# Patient Record
Sex: MALE | Race: WHITE | NOT HISPANIC OR LATINO | Employment: FULL TIME | URBAN - METROPOLITAN AREA
[De-identification: names, ages, dates, MRNs, and addresses within clinical notes are randomized per-mention and may not be internally consistent; named-entity substitution may affect disease eponyms.]

---

## 2017-04-24 ENCOUNTER — ALLSCRIPTS OFFICE VISIT (OUTPATIENT)
Dept: OTHER | Facility: OTHER | Age: 52
End: 2017-04-24

## 2017-05-19 ENCOUNTER — ALLSCRIPTS OFFICE VISIT (OUTPATIENT)
Dept: OTHER | Facility: OTHER | Age: 52
End: 2017-05-19

## 2017-05-19 ENCOUNTER — TRANSCRIBE ORDERS (OUTPATIENT)
Dept: ADMINISTRATIVE | Facility: HOSPITAL | Age: 52
End: 2017-05-19

## 2017-05-19 DIAGNOSIS — I35.0 NODULAR CALCIFIC AORTIC VALVE STENOSIS: Primary | ICD-10-CM

## 2017-05-19 DIAGNOSIS — I35.0 NONRHEUMATIC AORTIC VALVE STENOSIS: ICD-10-CM

## 2017-05-25 ENCOUNTER — HOSPITAL ENCOUNTER (OUTPATIENT)
Dept: NON INVASIVE DIAGNOSTICS | Facility: HOSPITAL | Age: 52
Discharge: HOME | End: 2017-05-25
Attending: INTERNAL MEDICINE
Payer: COMMERCIAL

## 2017-05-25 DIAGNOSIS — I35.0 NODULAR CALCIFIC AORTIC VALVE STENOSIS: ICD-10-CM

## 2017-05-25 PROCEDURE — 93306 TTE W/DOPPLER COMPLETE: CPT

## 2017-06-27 ENCOUNTER — ALLSCRIPTS OFFICE VISIT (OUTPATIENT)
Dept: OTHER | Facility: OTHER | Age: 52
End: 2017-06-27

## 2017-06-28 ENCOUNTER — GENERIC CONVERSION - ENCOUNTER (OUTPATIENT)
Dept: OTHER | Facility: OTHER | Age: 52
End: 2017-06-28

## 2017-08-23 ENCOUNTER — GENERIC CONVERSION - ENCOUNTER (OUTPATIENT)
Dept: OTHER | Facility: OTHER | Age: 52
End: 2017-08-23

## 2017-08-30 ENCOUNTER — HOSPITAL ENCOUNTER (OUTPATIENT)
Facility: HOSPITAL | Age: 52
Setting detail: OBSERVATION
Discharge: HOME | End: 2017-09-01
Attending: EMERGENCY MEDICINE | Admitting: INTERNAL MEDICINE
Payer: COMMERCIAL

## 2017-08-30 ENCOUNTER — ALLSCRIPTS OFFICE VISIT (OUTPATIENT)
Dept: OTHER | Facility: OTHER | Age: 52
End: 2017-08-30

## 2017-08-30 ENCOUNTER — APPOINTMENT (EMERGENCY)
Dept: CT IMAGING | Facility: HOSPITAL | Age: 52
End: 2017-08-30
Payer: COMMERCIAL

## 2017-08-30 ENCOUNTER — APPOINTMENT (EMERGENCY)
Dept: RADIOLOGY | Facility: HOSPITAL | Age: 52
End: 2017-08-30
Payer: COMMERCIAL

## 2017-08-30 DIAGNOSIS — R94.31 T WAVE INVERSION IN EKG: ICD-10-CM

## 2017-08-30 DIAGNOSIS — R06.00 DYSPNEA: Primary | ICD-10-CM

## 2017-08-30 DIAGNOSIS — E10.9 T1DM (TYPE 1 DIABETES MELLITUS) (HCC): ICD-10-CM

## 2017-08-30 DIAGNOSIS — I35.0 AS (AORTIC STENOSIS): ICD-10-CM

## 2017-08-30 PROBLEM — I10 BENIGN ESSENTIAL HTN: Status: ACTIVE | Noted: 2017-08-30

## 2017-08-30 PROBLEM — R00.2 PALPITATION: Status: ACTIVE | Noted: 2017-08-30

## 2017-08-30 LAB
ANION GAP SERPL CALCULATED.3IONS-SCNC: 9 MMOL/L (ref 4–13)
ATRIAL RATE: 82 BPM
ATRIAL RATE: 85 BPM
BASOPHILS # BLD AUTO: 0.06 THOUSANDS/ΜL (ref 0–0.1)
BASOPHILS NFR BLD AUTO: 1 % (ref 0–1)
BUN SERPL-MCNC: 11 MG/DL (ref 5–25)
CALCIUM SERPL-MCNC: 8.7 MG/DL (ref 8.3–10.1)
CHLORIDE SERPL-SCNC: 103 MMOL/L (ref 100–108)
CO2 SERPL-SCNC: 28 MMOL/L (ref 21–32)
CREAT SERPL-MCNC: 0.7 MG/DL (ref 0.6–1.3)
DEPRECATED D DIMER PPP: 468 NG/ML (FEU) (ref 0–424)
EOSINOPHIL # BLD AUTO: 0.17 THOUSAND/ΜL (ref 0–0.61)
EOSINOPHIL NFR BLD AUTO: 2 % (ref 0–6)
ERYTHROCYTE [DISTWIDTH] IN BLOOD BY AUTOMATED COUNT: 16.7 % (ref 11.6–15.1)
GFR SERPL CREATININE-BSD FRML MDRD: 109 ML/MIN/1.73SQ M
GLUCOSE SERPL-MCNC: 155 MG/DL (ref 65–140)
GLUCOSE SERPL-MCNC: 200 MG/DL (ref 65–140)
HCT VFR BLD AUTO: 42.7 % (ref 36.5–49.3)
HGB BLD-MCNC: 14 G/DL (ref 12–17)
LYMPHOCYTES # BLD AUTO: 1.76 THOUSANDS/ΜL (ref 0.6–4.47)
LYMPHOCYTES NFR BLD AUTO: 20 % (ref 14–44)
MCH RBC QN AUTO: 30.9 PG (ref 26.8–34.3)
MCHC RBC AUTO-ENTMCNC: 32.8 G/DL (ref 31.4–37.4)
MCV RBC AUTO: 94 FL (ref 82–98)
MONOCYTES # BLD AUTO: 1.06 THOUSAND/ΜL (ref 0.17–1.22)
MONOCYTES NFR BLD AUTO: 12 % (ref 4–12)
NEUTROPHILS # BLD AUTO: 5.97 THOUSANDS/ΜL (ref 1.85–7.62)
NEUTS SEG NFR BLD AUTO: 65 % (ref 43–75)
P AXIS: 35 DEGREES
P AXIS: 42 DEGREES
PLATELET # BLD AUTO: 277 THOUSANDS/UL (ref 149–390)
PMV BLD AUTO: 10.7 FL (ref 8.9–12.7)
POTASSIUM SERPL-SCNC: 4 MMOL/L (ref 3.5–5.3)
PR INTERVAL: 170 MS
PR INTERVAL: 170 MS
QRS AXIS: 20 DEGREES
QRS AXIS: 24 DEGREES
QRSD INTERVAL: 102 MS
QRSD INTERVAL: 98 MS
QT INTERVAL: 368 MS
QT INTERVAL: 370 MS
QTC INTERVAL: 429 MS
QTC INTERVAL: 440 MS
RBC # BLD AUTO: 4.53 MILLION/UL (ref 3.88–5.62)
SODIUM SERPL-SCNC: 140 MMOL/L (ref 136–145)
SPECIMEN SOURCE: NORMAL
T WAVE AXIS: -14 DEGREES
T WAVE AXIS: -7 DEGREES
TROPONIN I BLD-MCNC: 0.01 NG/ML (ref 0–0.08)
TROPONIN I SERPL-MCNC: 0.02 NG/ML
TROPONIN I SERPL-MCNC: 0.03 NG/ML
VENTRICULAR RATE: 82 BPM
VENTRICULAR RATE: 85 BPM
WBC # BLD AUTO: 9.02 THOUSAND/UL (ref 4.31–10.16)

## 2017-08-30 PROCEDURE — 94640 AIRWAY INHALATION TREATMENT: CPT

## 2017-08-30 PROCEDURE — 85025 COMPLETE CBC W/AUTO DIFF WBC: CPT | Performed by: EMERGENCY MEDICINE

## 2017-08-30 PROCEDURE — 36415 COLL VENOUS BLD VENIPUNCTURE: CPT | Performed by: EMERGENCY MEDICINE

## 2017-08-30 PROCEDURE — 71275 CT ANGIOGRAPHY CHEST: CPT

## 2017-08-30 PROCEDURE — 84484 ASSAY OF TROPONIN QUANT: CPT | Performed by: INTERNAL MEDICINE

## 2017-08-30 PROCEDURE — 80048 BASIC METABOLIC PNL TOTAL CA: CPT | Performed by: EMERGENCY MEDICINE

## 2017-08-30 PROCEDURE — 71020 HB CHEST X-RAY 2VW FRONTAL&LATL: CPT

## 2017-08-30 PROCEDURE — 82948 REAGENT STRIP/BLOOD GLUCOSE: CPT

## 2017-08-30 PROCEDURE — 85379 FIBRIN DEGRADATION QUANT: CPT | Performed by: EMERGENCY MEDICINE

## 2017-08-30 PROCEDURE — 84484 ASSAY OF TROPONIN QUANT: CPT

## 2017-08-30 PROCEDURE — 93005 ELECTROCARDIOGRAM TRACING: CPT | Performed by: EMERGENCY MEDICINE

## 2017-08-30 PROCEDURE — 99285 EMERGENCY DEPT VISIT HI MDM: CPT

## 2017-08-30 RX ORDER — OMEPRAZOLE 20 MG/1
20 CAPSULE, DELAYED RELEASE ORAL DAILY
COMMUNITY

## 2017-08-30 RX ORDER — LABETALOL HYDROCHLORIDE 5 MG/ML
10 INJECTION, SOLUTION INTRAVENOUS ONCE
Status: COMPLETED | OUTPATIENT
Start: 2017-08-30 | End: 2017-08-30

## 2017-08-30 RX ORDER — ALBUTEROL SULFATE 2.5 MG/3ML
5 SOLUTION RESPIRATORY (INHALATION) ONCE
Status: COMPLETED | OUTPATIENT
Start: 2017-08-30 | End: 2017-08-30

## 2017-08-30 RX ORDER — LABETALOL HYDROCHLORIDE 5 MG/ML
10 INJECTION, SOLUTION INTRAVENOUS EVERY 4 HOURS PRN
Status: DISCONTINUED | OUTPATIENT
Start: 2017-08-30 | End: 2017-09-01 | Stop reason: HOSPADM

## 2017-08-30 RX ORDER — LISINOPRIL 20 MG/1
2.5 TABLET ORAL DAILY
COMMUNITY
End: 2020-09-10 | Stop reason: HOSPADM

## 2017-08-30 RX ORDER — ASPIRIN 81 MG/1
324 TABLET, CHEWABLE ORAL ONCE
Status: COMPLETED | OUTPATIENT
Start: 2017-08-30 | End: 2017-08-30

## 2017-08-30 RX ORDER — LISINOPRIL 20 MG/1
20 TABLET ORAL DAILY
Status: DISCONTINUED | OUTPATIENT
Start: 2017-08-31 | End: 2017-09-01 | Stop reason: HOSPADM

## 2017-08-30 RX ORDER — LANOLIN ALCOHOL/MO/W.PET/CERES
3 CREAM (GRAM) TOPICAL
Status: DISCONTINUED | OUTPATIENT
Start: 2017-08-30 | End: 2017-09-01 | Stop reason: HOSPADM

## 2017-08-30 RX ORDER — PANTOPRAZOLE SODIUM 20 MG/1
20 TABLET, DELAYED RELEASE ORAL
Status: DISCONTINUED | OUTPATIENT
Start: 2017-08-31 | End: 2017-09-01 | Stop reason: HOSPADM

## 2017-08-30 RX ADMIN — ALBUTEROL SULFATE 5 MG: 2.5 SOLUTION RESPIRATORY (INHALATION) at 17:17

## 2017-08-30 RX ADMIN — IOHEXOL 85 ML: 350 INJECTION, SOLUTION INTRAVENOUS at 14:31

## 2017-08-30 RX ADMIN — ASPIRIN 81 MG 324 MG: 81 TABLET ORAL at 12:39

## 2017-08-30 RX ADMIN — LABETALOL HYDROCHLORIDE 10 MG: 5 INJECTION, SOLUTION INTRAVENOUS at 18:02

## 2017-08-30 RX ADMIN — MELATONIN TAB 3 MG 3 MG: 3 TAB at 23:27

## 2017-08-31 ENCOUNTER — APPOINTMENT (OUTPATIENT)
Dept: NON INVASIVE DIAGNOSTICS | Facility: HOSPITAL | Age: 52
End: 2017-08-31
Payer: COMMERCIAL

## 2017-08-31 ENCOUNTER — GENERIC CONVERSION - ENCOUNTER (OUTPATIENT)
Dept: OTHER | Facility: OTHER | Age: 52
End: 2017-08-31

## 2017-08-31 LAB
ALBUMIN SERPL BCP-MCNC: 3 G/DL (ref 3.5–5)
ALP SERPL-CCNC: 162 U/L (ref 46–116)
ALT SERPL W P-5'-P-CCNC: 44 U/L (ref 12–78)
ANION GAP SERPL CALCULATED.3IONS-SCNC: 9 MMOL/L (ref 4–13)
AST SERPL W P-5'-P-CCNC: 24 U/L (ref 5–45)
BILIRUB SERPL-MCNC: 0.5 MG/DL (ref 0.2–1)
BUN SERPL-MCNC: 12 MG/DL (ref 5–25)
CALCIUM SERPL-MCNC: 8.8 MG/DL (ref 8.3–10.1)
CHLORIDE SERPL-SCNC: 103 MMOL/L (ref 100–108)
CO2 SERPL-SCNC: 30 MMOL/L (ref 21–32)
CREAT SERPL-MCNC: 0.72 MG/DL (ref 0.6–1.3)
ERYTHROCYTE [DISTWIDTH] IN BLOOD BY AUTOMATED COUNT: 16.4 % (ref 11.6–15.1)
GFR SERPL CREATININE-BSD FRML MDRD: 108 ML/MIN/1.73SQ M
GLUCOSE P FAST SERPL-MCNC: 123 MG/DL (ref 65–99)
GLUCOSE SERPL-MCNC: 123 MG/DL (ref 65–140)
GLUCOSE SERPL-MCNC: 131 MG/DL (ref 65–140)
GLUCOSE SERPL-MCNC: 200 MG/DL (ref 65–140)
GLUCOSE SERPL-MCNC: 217 MG/DL (ref 65–140)
GLUCOSE SERPL-MCNC: 71 MG/DL (ref 65–140)
HCT VFR BLD AUTO: 40.5 % (ref 36.5–49.3)
HGB BLD-MCNC: 13.3 G/DL (ref 12–17)
KCT BLD-ACNC: 310 SEC (ref 89–137)
MCH RBC QN AUTO: 30.9 PG (ref 26.8–34.3)
MCHC RBC AUTO-ENTMCNC: 32.8 G/DL (ref 31.4–37.4)
MCV RBC AUTO: 94 FL (ref 82–98)
NT-PROBNP SERPL-MCNC: 1460 PG/ML
PLATELET # BLD AUTO: 276 THOUSANDS/UL (ref 149–390)
PMV BLD AUTO: 10.4 FL (ref 8.9–12.7)
POTASSIUM SERPL-SCNC: 3.8 MMOL/L (ref 3.5–5.3)
PROT SERPL-MCNC: 6.7 G/DL (ref 6.4–8.2)
RBC # BLD AUTO: 4.3 MILLION/UL (ref 3.88–5.62)
SODIUM SERPL-SCNC: 142 MMOL/L (ref 136–145)
SPECIMEN SOURCE: ABNORMAL
T3 SERPL-MCNC: 0.9 NG/ML (ref 0.6–1.8)
T4 FREE SERPL-MCNC: 1.07 NG/DL (ref 0.76–1.46)
TROPONIN I SERPL-MCNC: 0.02 NG/ML
TSH SERPL DL<=0.05 MIU/L-ACNC: 5.44 UIU/ML (ref 0.36–3.74)
WBC # BLD AUTO: 6.5 THOUSAND/UL (ref 4.31–10.16)

## 2017-08-31 PROCEDURE — C1725 CATH, TRANSLUMIN NON-LASER: HCPCS | Performed by: NURSE PRACTITIONER

## 2017-08-31 PROCEDURE — 83880 ASSAY OF NATRIURETIC PEPTIDE: CPT | Performed by: NURSE PRACTITIONER

## 2017-08-31 PROCEDURE — C9600 PERC DRUG-EL COR STENT SING: HCPCS | Performed by: NURSE PRACTITIONER

## 2017-08-31 PROCEDURE — C1874 STENT, COATED/COV W/DEL SYS: HCPCS

## 2017-08-31 PROCEDURE — C1887 CATHETER, GUIDING: HCPCS | Performed by: NURSE PRACTITIONER

## 2017-08-31 PROCEDURE — C1769 GUIDE WIRE: HCPCS | Performed by: NURSE PRACTITIONER

## 2017-08-31 PROCEDURE — 84439 ASSAY OF FREE THYROXINE: CPT | Performed by: INTERNAL MEDICINE

## 2017-08-31 PROCEDURE — 99153 MOD SED SAME PHYS/QHP EA: CPT | Performed by: NURSE PRACTITIONER

## 2017-08-31 PROCEDURE — 93005 ELECTROCARDIOGRAM TRACING: CPT | Performed by: INTERNAL MEDICINE

## 2017-08-31 PROCEDURE — 82948 REAGENT STRIP/BLOOD GLUCOSE: CPT

## 2017-08-31 PROCEDURE — C1894 INTRO/SHEATH, NON-LASER: HCPCS | Performed by: NURSE PRACTITIONER

## 2017-08-31 PROCEDURE — 84484 ASSAY OF TROPONIN QUANT: CPT | Performed by: INTERNAL MEDICINE

## 2017-08-31 PROCEDURE — 84480 ASSAY TRIIODOTHYRONINE (T3): CPT | Performed by: INTERNAL MEDICINE

## 2017-08-31 PROCEDURE — 93454 CORONARY ARTERY ANGIO S&I: CPT | Performed by: NURSE PRACTITIONER

## 2017-08-31 PROCEDURE — 80053 COMPREHEN METABOLIC PANEL: CPT | Performed by: INTERNAL MEDICINE

## 2017-08-31 PROCEDURE — 85347 COAGULATION TIME ACTIVATED: CPT

## 2017-08-31 PROCEDURE — 93308 TTE F-UP OR LMTD: CPT

## 2017-08-31 PROCEDURE — 84443 ASSAY THYROID STIM HORMONE: CPT | Performed by: INTERNAL MEDICINE

## 2017-08-31 PROCEDURE — 99152 MOD SED SAME PHYS/QHP 5/>YRS: CPT | Performed by: NURSE PRACTITIONER

## 2017-08-31 PROCEDURE — 85027 COMPLETE CBC AUTOMATED: CPT | Performed by: INTERNAL MEDICINE

## 2017-08-31 RX ORDER — VERAPAMIL HCL 2.5 MG/ML
AMPUL (ML) INTRAVENOUS CODE/TRAUMA/SEDATION MEDICATION
Status: DISCONTINUED | OUTPATIENT
Start: 2017-08-31 | End: 2017-09-01 | Stop reason: HOSPADM

## 2017-08-31 RX ORDER — HEPARIN SODIUM 1000 [USP'U]/ML
INJECTION, SOLUTION INTRAVENOUS; SUBCUTANEOUS CODE/TRAUMA/SEDATION MEDICATION
Status: DISCONTINUED | OUTPATIENT
Start: 2017-08-31 | End: 2017-09-01 | Stop reason: HOSPADM

## 2017-08-31 RX ORDER — ATORVASTATIN CALCIUM 40 MG/1
80 TABLET, FILM COATED ORAL
Status: DISCONTINUED | OUTPATIENT
Start: 2017-08-31 | End: 2017-09-01 | Stop reason: HOSPADM

## 2017-08-31 RX ORDER — FUROSEMIDE 10 MG/ML
20 INJECTION INTRAMUSCULAR; INTRAVENOUS ONCE
Status: COMPLETED | OUTPATIENT
Start: 2017-08-31 | End: 2017-08-31

## 2017-08-31 RX ORDER — ASPIRIN 325 MG
325 TABLET ORAL ONCE
Status: COMPLETED | OUTPATIENT
Start: 2017-08-31 | End: 2017-08-31

## 2017-08-31 RX ORDER — FENTANYL CITRATE 50 UG/ML
INJECTION, SOLUTION INTRAMUSCULAR; INTRAVENOUS CODE/TRAUMA/SEDATION MEDICATION
Status: DISCONTINUED | OUTPATIENT
Start: 2017-08-31 | End: 2017-09-01 | Stop reason: HOSPADM

## 2017-08-31 RX ORDER — NITROGLYCERIN 20 MG/100ML
INJECTION INTRAVENOUS CODE/TRAUMA/SEDATION MEDICATION
Status: DISCONTINUED | OUTPATIENT
Start: 2017-08-31 | End: 2017-09-01 | Stop reason: HOSPADM

## 2017-08-31 RX ORDER — MIDAZOLAM HYDROCHLORIDE 1 MG/ML
INJECTION INTRAMUSCULAR; INTRAVENOUS CODE/TRAUMA/SEDATION MEDICATION
Status: DISCONTINUED | OUTPATIENT
Start: 2017-08-31 | End: 2017-09-01 | Stop reason: HOSPADM

## 2017-08-31 RX ORDER — LIDOCAINE HYDROCHLORIDE 10 MG/ML
INJECTION, SOLUTION INFILTRATION; PERINEURAL CODE/TRAUMA/SEDATION MEDICATION
Status: DISCONTINUED | OUTPATIENT
Start: 2017-08-31 | End: 2017-09-01 | Stop reason: HOSPADM

## 2017-08-31 RX ORDER — SODIUM CHLORIDE 9 MG/ML
INJECTION, SOLUTION INTRAVENOUS
Status: DISCONTINUED | OUTPATIENT
Start: 2017-08-31 | End: 2017-09-01 | Stop reason: HOSPADM

## 2017-08-31 RX ORDER — ZOLPIDEM TARTRATE 5 MG/1
5 TABLET ORAL
Status: DISCONTINUED | OUTPATIENT
Start: 2017-08-31 | End: 2017-09-01 | Stop reason: HOSPADM

## 2017-08-31 RX ORDER — ATORVASTATIN CALCIUM 40 MG/1
80 TABLET, FILM COATED ORAL
Status: DISCONTINUED | OUTPATIENT
Start: 2017-08-31 | End: 2017-08-31

## 2017-08-31 RX ADMIN — LISINOPRIL 20 MG: 20 TABLET ORAL at 10:13

## 2017-08-31 RX ADMIN — ASPIRIN 325 MG: 325 TABLET ORAL at 12:32

## 2017-08-31 RX ADMIN — PANTOPRAZOLE SODIUM 20 MG: 20 TABLET, DELAYED RELEASE ORAL at 07:18

## 2017-08-31 RX ADMIN — HEPARIN SODIUM 5000 UNITS: 1000 INJECTION INTRAVENOUS; SUBCUTANEOUS at 13:50

## 2017-08-31 RX ADMIN — HEPARIN SODIUM 4000 UNITS: 1000 INJECTION INTRAVENOUS; SUBCUTANEOUS at 13:43

## 2017-08-31 RX ADMIN — LIDOCAINE HYDROCHLORIDE 1 ML: 10 INJECTION, SOLUTION INFILTRATION; PERINEURAL at 13:41

## 2017-08-31 RX ADMIN — ZOLPIDEM TARTRATE 5 MG: 5 TABLET ORAL at 22:06

## 2017-08-31 RX ADMIN — VERAPAMIL HYDROCHLORIDE 1.25 MG: 2.5 INJECTION, SOLUTION INTRAVENOUS at 13:43

## 2017-08-31 RX ADMIN — MIDAZOLAM HYDROCHLORIDE 1 MG: 1 INJECTION, SOLUTION INTRAMUSCULAR; INTRAVENOUS at 13:42

## 2017-08-31 RX ADMIN — MIDAZOLAM HYDROCHLORIDE 2 MG: 1 INJECTION, SOLUTION INTRAMUSCULAR; INTRAVENOUS at 13:33

## 2017-08-31 RX ADMIN — TICAGRELOR 180 MG: 90 TABLET ORAL at 13:50

## 2017-08-31 RX ADMIN — SERTRALINE HYDROCHLORIDE 50 MG: 50 TABLET ORAL at 10:13

## 2017-08-31 RX ADMIN — SODIUM CHLORIDE 100 ML/HR: 0.9 INJECTION, SOLUTION INTRAVENOUS at 13:25

## 2017-08-31 RX ADMIN — NITROGLYCERIN 200 MCG: 20 INJECTION INTRAVENOUS at 13:43

## 2017-08-31 RX ADMIN — FENTANYL CITRATE 25 MCG: 50 INJECTION, SOLUTION INTRAMUSCULAR; INTRAVENOUS at 13:42

## 2017-08-31 RX ADMIN — MIDAZOLAM HYDROCHLORIDE 2 MG: 1 INJECTION, SOLUTION INTRAMUSCULAR; INTRAVENOUS at 13:30

## 2017-08-31 RX ADMIN — FENTANYL CITRATE 50 MCG: 50 INJECTION, SOLUTION INTRAMUSCULAR; INTRAVENOUS at 13:30

## 2017-08-31 RX ADMIN — ATORVASTATIN CALCIUM 80 MG: 40 TABLET, FILM COATED ORAL at 12:32

## 2017-08-31 RX ADMIN — FENTANYL CITRATE 50 MCG: 50 INJECTION, SOLUTION INTRAMUSCULAR; INTRAVENOUS at 13:33

## 2017-08-31 RX ADMIN — IOHEXOL 130 ML: 350 INJECTION, SOLUTION INTRAVENOUS at 14:13

## 2017-08-31 RX ADMIN — FUROSEMIDE 20 MG: 10 INJECTION, SOLUTION INTRAMUSCULAR; INTRAVENOUS at 12:32

## 2017-09-01 VITALS
DIASTOLIC BLOOD PRESSURE: 80 MMHG | OXYGEN SATURATION: 93 % | RESPIRATION RATE: 18 BRPM | HEIGHT: 66 IN | TEMPERATURE: 98.5 F | WEIGHT: 176.59 LBS | HEART RATE: 73 BPM | SYSTOLIC BLOOD PRESSURE: 132 MMHG | BODY MASS INDEX: 28.38 KG/M2

## 2017-09-01 LAB
ALBUMIN SERPL BCP-MCNC: 3.1 G/DL (ref 3.5–5)
ALP SERPL-CCNC: 174 U/L (ref 46–116)
ALT SERPL W P-5'-P-CCNC: 46 U/L (ref 12–78)
ANION GAP SERPL CALCULATED.3IONS-SCNC: 7 MMOL/L (ref 4–13)
AST SERPL W P-5'-P-CCNC: 30 U/L (ref 5–45)
BILIRUB SERPL-MCNC: 0.5 MG/DL (ref 0.2–1)
BUN SERPL-MCNC: 8 MG/DL (ref 5–25)
CALCIUM SERPL-MCNC: 8.7 MG/DL (ref 8.3–10.1)
CHLORIDE SERPL-SCNC: 101 MMOL/L (ref 100–108)
CHOLEST SERPL-MCNC: 231 MG/DL (ref 50–200)
CO2 SERPL-SCNC: 31 MMOL/L (ref 21–32)
CREAT SERPL-MCNC: 0.7 MG/DL (ref 0.6–1.3)
ERYTHROCYTE [DISTWIDTH] IN BLOOD BY AUTOMATED COUNT: 16.3 % (ref 11.6–15.1)
EST. AVERAGE GLUCOSE BLD GHB EST-MCNC: 174 MG/DL
GFR SERPL CREATININE-BSD FRML MDRD: 109 ML/MIN/1.73SQ M
GLUCOSE P FAST SERPL-MCNC: 109 MG/DL (ref 65–99)
GLUCOSE SERPL-MCNC: 107 MG/DL (ref 65–140)
GLUCOSE SERPL-MCNC: 109 MG/DL (ref 65–140)
GLUCOSE SERPL-MCNC: 237 MG/DL (ref 65–140)
GLUCOSE SERPL-MCNC: 47 MG/DL (ref 65–140)
GLUCOSE SERPL-MCNC: 66 MG/DL (ref 65–140)
HBA1C MFR BLD: 7.7 % (ref 4.2–6.3)
HCT VFR BLD AUTO: 43.8 % (ref 36.5–49.3)
HDLC SERPL-MCNC: 161 MG/DL (ref 40–60)
HGB BLD-MCNC: 14.7 G/DL (ref 12–17)
LDLC SERPL CALC-MCNC: 58 MG/DL (ref 0–100)
MCH RBC QN AUTO: 31.3 PG (ref 26.8–34.3)
MCHC RBC AUTO-ENTMCNC: 33.6 G/DL (ref 31.4–37.4)
MCV RBC AUTO: 93 FL (ref 82–98)
PLATELET # BLD AUTO: 273 THOUSANDS/UL (ref 149–390)
PMV BLD AUTO: 10.2 FL (ref 8.9–12.7)
POTASSIUM SERPL-SCNC: 3.8 MMOL/L (ref 3.5–5.3)
PROT SERPL-MCNC: 7.1 G/DL (ref 6.4–8.2)
RBC # BLD AUTO: 4.69 MILLION/UL (ref 3.88–5.62)
SODIUM SERPL-SCNC: 139 MMOL/L (ref 136–145)
TRIGL SERPL-MCNC: 62 MG/DL
TSH SERPL DL<=0.05 MIU/L-ACNC: 4.25 UIU/ML (ref 0.36–3.74)
WBC # BLD AUTO: 7.36 THOUSAND/UL (ref 4.31–10.16)

## 2017-09-01 PROCEDURE — 85027 COMPLETE CBC AUTOMATED: CPT | Performed by: INTERNAL MEDICINE

## 2017-09-01 PROCEDURE — 80061 LIPID PANEL: CPT | Performed by: INTERNAL MEDICINE

## 2017-09-01 PROCEDURE — 84443 ASSAY THYROID STIM HORMONE: CPT | Performed by: INTERNAL MEDICINE

## 2017-09-01 PROCEDURE — 83036 HEMOGLOBIN GLYCOSYLATED A1C: CPT | Performed by: INTERNAL MEDICINE

## 2017-09-01 PROCEDURE — 80053 COMPREHEN METABOLIC PANEL: CPT | Performed by: INTERNAL MEDICINE

## 2017-09-01 PROCEDURE — 82948 REAGENT STRIP/BLOOD GLUCOSE: CPT

## 2017-09-01 RX ORDER — METOPROLOL SUCCINATE 25 MG/1
25 TABLET, EXTENDED RELEASE ORAL DAILY
Status: DISCONTINUED | OUTPATIENT
Start: 2017-09-01 | End: 2017-09-01 | Stop reason: HOSPADM

## 2017-09-01 RX ORDER — ATORVASTATIN CALCIUM 80 MG/1
80 TABLET, FILM COATED ORAL
Qty: 30 TABLET | Refills: 0 | Status: SHIPPED | OUTPATIENT
Start: 2017-09-01

## 2017-09-01 RX ORDER — FUROSEMIDE 10 MG/ML
20 INJECTION INTRAMUSCULAR; INTRAVENOUS ONCE
Status: COMPLETED | OUTPATIENT
Start: 2017-09-01 | End: 2017-09-01

## 2017-09-01 RX ORDER — DIPHENHYDRAMINE HCL 25 MG
25 TABLET ORAL ONCE
Status: COMPLETED | OUTPATIENT
Start: 2017-09-01 | End: 2017-09-01

## 2017-09-01 RX ORDER — CLOPIDOGREL BISULFATE 75 MG/1
300 TABLET ORAL ONCE
Status: DISCONTINUED | OUTPATIENT
Start: 2017-09-01 | End: 2017-09-01

## 2017-09-01 RX ORDER — ZOLPIDEM TARTRATE 5 MG/1
5 TABLET ORAL
Qty: 2 TABLET | Refills: 0 | Status: SHIPPED | OUTPATIENT
Start: 2017-09-01 | End: 2022-01-13 | Stop reason: HOSPADM

## 2017-09-01 RX ORDER — DEXTROSE MONOHYDRATE 25 G/50ML
25 INJECTION, SOLUTION INTRAVENOUS ONCE
Status: COMPLETED | OUTPATIENT
Start: 2017-09-01 | End: 2017-09-01

## 2017-09-01 RX ORDER — METOPROLOL SUCCINATE 25 MG/1
25 TABLET, EXTENDED RELEASE ORAL DAILY
Qty: 30 TABLET | Refills: 0 | Status: SHIPPED | OUTPATIENT
Start: 2017-09-01 | End: 2018-09-19 | Stop reason: ALTCHOICE

## 2017-09-01 RX ADMIN — METOPROLOL SUCCINATE 25 MG: 25 TABLET, EXTENDED RELEASE ORAL at 11:04

## 2017-09-01 RX ADMIN — LISINOPRIL 20 MG: 20 TABLET ORAL at 07:54

## 2017-09-01 RX ADMIN — SERTRALINE HYDROCHLORIDE 50 MG: 50 TABLET ORAL at 07:54

## 2017-09-01 RX ADMIN — PANTOPRAZOLE SODIUM 20 MG: 20 TABLET, DELAYED RELEASE ORAL at 07:55

## 2017-09-01 RX ADMIN — FUROSEMIDE 20 MG: 10 INJECTION, SOLUTION INTRAMUSCULAR; INTRAVENOUS at 07:53

## 2017-09-01 RX ADMIN — TICAGRELOR 90 MG: 90 TABLET ORAL at 11:00

## 2017-09-01 RX ADMIN — DEXTROSE MONOHYDRATE 25 ML: 25 INJECTION, SOLUTION INTRAVENOUS at 02:12

## 2017-09-01 RX ADMIN — DIPHENHYDRAMINE HYDROCHLORIDE 25 MG: 25 TABLET ORAL at 01:14

## 2017-09-02 LAB
ATRIAL RATE: 69 BPM
P AXIS: 53 DEGREES
PR INTERVAL: 200 MS
QRS AXIS: 20 DEGREES
QRSD INTERVAL: 106 MS
QT INTERVAL: 474 MS
QTC INTERVAL: 507 MS
T WAVE AXIS: 123 DEGREES
VENTRICULAR RATE: 69 BPM

## 2017-09-03 LAB
ATRIAL RATE: 87 BPM
P AXIS: 52 DEGREES
PR INTERVAL: 168 MS
QRS AXIS: 24 DEGREES
QRSD INTERVAL: 96 MS
QT INTERVAL: 358 MS
QTC INTERVAL: 430 MS
T WAVE AXIS: -1 DEGREES
VENTRICULAR RATE: 87 BPM

## 2017-09-05 ENCOUNTER — ALLSCRIPTS OFFICE VISIT (OUTPATIENT)
Dept: OTHER | Facility: OTHER | Age: 52
End: 2017-09-05

## 2017-09-15 ENCOUNTER — GENERIC CONVERSION - ENCOUNTER (OUTPATIENT)
Dept: OTHER | Facility: OTHER | Age: 52
End: 2017-09-15

## 2017-09-25 ENCOUNTER — TRANSCRIBE ORDERS (OUTPATIENT)
Dept: ADMINISTRATIVE | Facility: HOSPITAL | Age: 52
End: 2017-09-25

## 2017-09-25 ENCOUNTER — GENERIC CONVERSION - ENCOUNTER (OUTPATIENT)
Dept: OTHER | Facility: OTHER | Age: 52
End: 2017-09-25

## 2017-09-25 DIAGNOSIS — I42.9 CARDIOMYOPATHY, UNSPECIFIED TYPE (HCC): Primary | ICD-10-CM

## 2017-10-23 DIAGNOSIS — E78.5 HYPERLIPIDEMIA: ICD-10-CM

## 2017-11-30 DIAGNOSIS — E78.5 HYPERLIPIDEMIA: ICD-10-CM

## 2017-11-30 DIAGNOSIS — I25.5 ISCHEMIC CARDIOMYOPATHY: ICD-10-CM

## 2017-12-01 ENCOUNTER — HOSPITAL ENCOUNTER (INPATIENT)
Facility: HOSPITAL | Age: 52
LOS: 3 days | Discharge: HOME | DRG: 813 | End: 2017-12-04
Attending: EMERGENCY MEDICINE | Admitting: FAMILY MEDICINE
Payer: COMMERCIAL

## 2017-12-01 DIAGNOSIS — D69.3 ACUTE ITP (HCC): Primary | ICD-10-CM

## 2017-12-01 DIAGNOSIS — I25.10 CAD (CORONARY ARTERY DISEASE): ICD-10-CM

## 2017-12-01 DIAGNOSIS — D69.6 THROMBOCYTOPENIA (HCC): ICD-10-CM

## 2017-12-01 PROBLEM — K13.79 MOUTH SORES: Status: ACTIVE | Noted: 2017-12-01

## 2017-12-01 PROBLEM — R74.01 TRANSAMINITIS: Status: ACTIVE | Noted: 2017-12-01

## 2017-12-01 LAB
ALBUMIN SERPL BCP-MCNC: 3.4 G/DL (ref 3.5–5)
ALP SERPL-CCNC: 245 U/L (ref 46–116)
ALT SERPL W P-5'-P-CCNC: 107 U/L (ref 12–78)
ANION GAP SERPL CALCULATED.3IONS-SCNC: 5 MMOL/L (ref 4–13)
APTT PPP: 28 SECONDS (ref 23–35)
AST SERPL W P-5'-P-CCNC: 62 U/L (ref 5–45)
BASOPHILS # BLD AUTO: 0.04 THOUSANDS/ΜL (ref 0–0.1)
BASOPHILS NFR BLD AUTO: 1 % (ref 0–1)
BILIRUB SERPL-MCNC: 0.3 MG/DL (ref 0.2–1)
BUN SERPL-MCNC: 21 MG/DL (ref 5–25)
CALCIUM SERPL-MCNC: 9.2 MG/DL (ref 8.3–10.1)
CHLORIDE SERPL-SCNC: 100 MMOL/L (ref 100–108)
CO2 SERPL-SCNC: 30 MMOL/L (ref 21–32)
CREAT SERPL-MCNC: 0.81 MG/DL (ref 0.6–1.3)
EOSINOPHIL # BLD AUTO: 0.24 THOUSAND/ΜL (ref 0–0.61)
EOSINOPHIL NFR BLD AUTO: 4 % (ref 0–6)
ERYTHROCYTE [DISTWIDTH] IN BLOOD BY AUTOMATED COUNT: 14.2 % (ref 11.6–15.1)
GFR SERPL CREATININE-BSD FRML MDRD: 103 ML/MIN/1.73SQ M
GLUCOSE SERPL-MCNC: 221 MG/DL (ref 65–140)
GLUCOSE SERPL-MCNC: 265 MG/DL (ref 65–140)
GLUCOSE SERPL-MCNC: 320 MG/DL (ref 65–140)
HCT VFR BLD AUTO: 37 % (ref 36.5–49.3)
HGB BLD-MCNC: 12.3 G/DL (ref 12–17)
INR PPP: 0.83 (ref 0.86–1.16)
LDH SERPL-CCNC: 215 U/L (ref 81–234)
LYMPHOCYTES # BLD AUTO: 1.38 THOUSANDS/ΜL (ref 0.6–4.47)
LYMPHOCYTES NFR BLD AUTO: 22 % (ref 14–44)
MCH RBC QN AUTO: 30.2 PG (ref 26.8–34.3)
MCHC RBC AUTO-ENTMCNC: 33.2 G/DL (ref 31.4–37.4)
MCV RBC AUTO: 91 FL (ref 82–98)
MONOCYTES # BLD AUTO: 0.88 THOUSAND/ΜL (ref 0.17–1.22)
MONOCYTES NFR BLD AUTO: 14 % (ref 4–12)
NEUTROPHILS # BLD AUTO: 3.82 THOUSANDS/ΜL (ref 1.85–7.62)
NEUTS SEG NFR BLD AUTO: 59 % (ref 43–75)
PLATELET # BLD AUTO: 9 THOUSANDS/UL (ref 149–390)
POTASSIUM SERPL-SCNC: 4.7 MMOL/L (ref 3.5–5.3)
PROT SERPL-MCNC: 7.5 G/DL (ref 6.4–8.2)
PROTHROMBIN TIME: 11.6 SECONDS (ref 12.1–14.4)
RBC # BLD AUTO: 4.07 MILLION/UL (ref 3.88–5.62)
SODIUM SERPL-SCNC: 135 MMOL/L (ref 136–145)
WBC # BLD AUTO: 6.36 THOUSAND/UL (ref 4.31–10.16)

## 2017-12-01 PROCEDURE — 83615 LACTATE (LD) (LDH) ENZYME: CPT | Performed by: EMERGENCY MEDICINE

## 2017-12-01 PROCEDURE — 85730 THROMBOPLASTIN TIME PARTIAL: CPT | Performed by: EMERGENCY MEDICINE

## 2017-12-01 PROCEDURE — 36415 COLL VENOUS BLD VENIPUNCTURE: CPT | Performed by: EMERGENCY MEDICINE

## 2017-12-01 PROCEDURE — 80053 COMPREHEN METABOLIC PANEL: CPT | Performed by: EMERGENCY MEDICINE

## 2017-12-01 PROCEDURE — 99284 EMERGENCY DEPT VISIT MOD MDM: CPT

## 2017-12-01 PROCEDURE — 85610 PROTHROMBIN TIME: CPT | Performed by: EMERGENCY MEDICINE

## 2017-12-01 PROCEDURE — 85025 COMPLETE CBC W/AUTO DIFF WBC: CPT | Performed by: EMERGENCY MEDICINE

## 2017-12-01 PROCEDURE — 82948 REAGENT STRIP/BLOOD GLUCOSE: CPT

## 2017-12-01 RX ORDER — ATORVASTATIN CALCIUM 20 MG/1
80 TABLET, FILM COATED ORAL
Status: DISCONTINUED | OUTPATIENT
Start: 2017-12-01 | End: 2017-12-04 | Stop reason: HOSPADM

## 2017-12-01 RX ORDER — LISINOPRIL 20 MG/1
20 TABLET ORAL DAILY
Status: DISCONTINUED | OUTPATIENT
Start: 2017-12-01 | End: 2017-12-04 | Stop reason: HOSPADM

## 2017-12-01 RX ORDER — METOPROLOL SUCCINATE 25 MG/1
25 TABLET, EXTENDED RELEASE ORAL DAILY
Status: DISCONTINUED | OUTPATIENT
Start: 2017-12-01 | End: 2017-12-04 | Stop reason: HOSPADM

## 2017-12-01 RX ORDER — PANTOPRAZOLE SODIUM 20 MG/1
20 TABLET, DELAYED RELEASE ORAL
Status: DISCONTINUED | OUTPATIENT
Start: 2017-12-02 | End: 2017-12-04 | Stop reason: HOSPADM

## 2017-12-01 RX ORDER — ONDANSETRON 2 MG/ML
4 INJECTION INTRAMUSCULAR; INTRAVENOUS EVERY 4 HOURS PRN
Status: DISCONTINUED | OUTPATIENT
Start: 2017-12-01 | End: 2017-12-04 | Stop reason: HOSPADM

## 2017-12-01 RX ADMIN — DEXAMETHASONE SODIUM PHOSPHATE 40 MG: 10 INJECTION, SOLUTION INTRAMUSCULAR; INTRAVENOUS at 09:38

## 2017-12-01 NOTE — H&P
History and Physical - [facility] Internal Medicine    Patient Information: Yuki Donaldson 47 y o  male MRN: [id number]  Unit/Bed#: ED 08 Encounter: [id number]  Admitting Physician: Darryl Menjivar PA-C  PCP: Thaddeus Guerin DO  Date of Admission:  12/01/17    Assessment/Plan:    Hospital Problem List:     Principal Problem: Thrombocytopenia ([facility] )  Active Problems:    AS (aortic stenosis)    T1DM (type 1 diabetes mellitus) ([facility] )    Benign essential HTN    Mouth sores    CAD (coronary artery disease)    Transaminitis      Plan for the Primary Problem(s):  · Acute thrombocytopenia-patient with 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  He had normal platelet count of 294 until 3 months ago when he had a cardiac stent placed and when on dual anti-platelet therapy  He presents with multiple blood blisters in his mouth and gum bleeding but no other catastrophic bleeding noted  Hematology should be consulted and they have already been notified of the case and recommended Decadron 40 mg IV x1  Platelet transfusion was not recommended at this time unless he has significant bleeding  Recommend rechecking CBC in a m  -defer additional workup at this time to the Hematology team    Plan for Additional Problems:   · Mild transaminitis-unclear if this is related to would ever process has caused his thrombocytopenia or possibly related to his recent uptick in alcohol use  Would recheck in a m  recommend alcohol cessation  · Coronary artery disease status post stent 90 days ago-I spoke directly with Cardiology in reference to this today  Given his profoundly low platelet count his dual anti-platelet therapy should be held  Certainly this does create risk in the setting of recently placed stents but the risk of continuing it outweighs the benefit at this time  Continue beta-blocker, Ace inhibitor, and statin as patient does have underlying cardiomyopathy as well    He will continue with routine follow-up of his known aortic stenosis  · Type 1 diabetes-anticipate he will have significant hyperglycemia after the dose of Decadron provided today  He wishes to continue to utilize his insulin pump from home  VTE Prophylaxis: Pharmacologic VTE Prophylaxis contraindicated due to low platelets  / sequential compression device   Code Status: full   POLST: There is no POLST form on file for this patient (pre-hospital)    Anticipated Length of Stay:  Patient will be admitted on an Inpatient basis with an anticipated length of stay of  Greater than 2 midnights  Justification for Hospital Stay: severely low platelets    Total Time for Visit, including Counseling / Coordination of Care: 1 hour  Greater than 50% of this total time spent on direct patient counseling and coordination of care  Spoke with heme onc and cardiology    Chief Complaint:   Mouth sores    History of Present Illness:    Kimo Altamirano is a 47 y o  male who presents with 2 days of bothersome blood blisters and gum bleeding in his mouth  He also had 1 area of bruising on his knee that resulted from dropping of very heavy milk gallon it  He did not perceive this bruising to be more than expected  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  He had normal platelet count since  He has no diagnosis of cirrhosis and denies any recent significant infections  He denies any other significant bleeding episodes  Review of Systems:    Review of Systems   Constitutional: Negative for activity change, appetite change, chills, diaphoresis, fatigue, fever and unexpected weight change  HENT: Positive for congestion, mouth sores and rhinorrhea (He reports minor runny and stuffy nose)  Negative for nosebleeds, sinus pain, sinus pressure and trouble swallowing  Eyes: Negative for visual disturbance  Respiratory: Negative for choking, chest tightness, shortness of breath and wheezing  Cardiovascular: Negative for chest pain, palpitations and leg swelling  Gastrointestinal: Negative for abdominal distention, abdominal pain, anal bleeding, blood in stool, constipation, diarrhea, nausea and vomiting  Genitourinary: Negative for difficulty urinating  Musculoskeletal: Negative for arthralgias, back pain, gait problem, joint swelling, myalgias and neck pain  Skin: Negative for color change, pallor, rash and wound  Neurological: Positive for headaches (Patient reports new onset headache in the last couple weeks)  Negative for dizziness, tremors, seizures, syncope, facial asymmetry, speech difficulty, weakness, light-headedness and numbness  Hematological:        Patient reports that he did not feel he had any significant bruising that was similar to when he was in 7 grade and had ITP  He had an event yesterday where he dropped a very heavy milk bottle on his leg and had bruising but expected to have it based on how heavy the bottle was   Psychiatric/Behavioral:        Patient reports significant increased stress recently       Past Medical and Surgical History:     Past Medical History:   Diagnosis Date    Aortic stenosis     Diabetes mellitus ([facility] ) type 1 with insulin pump     Hyperlipidemia     Hypertension    Coronary artery disease with stent placed August 31, 2017  ITP diagnosed at approximately age 7, status post splenectomy at that time    Past Surgical History:   Procedure Laterality Date    SPLENECTOMY         Meds/Allergies:    Prior to Admission medications    Medication Sig Start Date End Date Taking?  Authorizing Provider   atorvastatin (LIPITOR) 80 mg tablet Take 1 tablet by mouth daily with dinner 9/1/17  Yes Vicki Vogel MD   Insulin Infusion Pump KIT 1 Units/hr by Subcutaneous Insulin Pump route continuous   Yes Historical Provider, MD   lisinopril (ZESTRIL) 20 mg tablet Take 20 mg by mouth daily   Yes Historical Provider, MD   metoprolol succinate (TOPROL-XL) 25 mg 24 hr tablet Take 1 tablet by mouth daily 9/1/17  Yes Ramon Louis MD   omeprazole (PriLOSEC) 20 mg delayed release capsule Take 20 mg by mouth daily   Yes Historical Provider, MD   sertraline (ZOLOFT) 50 mg tablet Take 50 mg by mouth daily   Yes Historical Provider, MD   ticagrelor (BRILINTA) 90 MG Take 1 tablet by mouth every 12 (twelve) hours 9/1/17  Yes Ramon Louis MD   zolpidem (AMBIEN) 5 mg tablet Take 1 tablet by mouth daily at bedtime as needed for sleep for up to 2 days 9/1/17 12/1/17 Yes Ramon Louis MD     I have reviewed home medications using allscripts  Patient reports he is on the exact same medicines he was on during his last admission but could not name all of them  He reports he is compliant with taking them  Allergies: No Known Allergies    Social History:     Marital Status: /Civil Union   Occupation:  He owns a small grocery store  Patient Pre-hospital Living Situation:  Lives with his wife  Patient Pre-hospital Level of Mobility:   Patient Pre-hospital Diet Restrictions:   Substance Use History:   History   Alcohol Use    Yes     Comment: daily    Patient initially told me he drinks 2 glasses of wine or vodka daily but does admit the last several months he has been more stressed out and has been having more like 3-4 drinks almost every night  He denies any withdrawal symptoms on days that he does not drink  History   Smoking Status    Former Smoker   Smokeless Tobacco    Never Used     History   Drug Use No       Family History:    non-contributory    Physical Exam:     Vitals:   Blood Pressure: 124/72 (12/01/17 1239)  Pulse: 74 (12/01/17 1239)  Temperature: 98 2 °F (36 8 °C) (12/01/17 0735)  Temp Source: Oral (12/01/17 0735)  Respirations: 18 (12/01/17 1239)  Weight - Scale: 77 1 kg (170 lb) (12/01/17 0735)  SpO2: 98 % (12/01/17 1239)    Physical Exam   Constitutional: He appears well-developed and well-nourished  No distress     HENT:   Head: Normocephalic and atraumatic  Patient with multiple areas of blood blisters along the buccal surface and gum line   Eyes: Conjunctivae are normal  Pupils are equal, round, and reactive to light  Right eye exhibits no discharge  Left eye exhibits no discharge  No scleral icterus  Subtle exotropia left eye   Cardiovascular: Normal rate and regular rhythm  Murmur (Systolic ejection murmur) heard  Pulmonary/Chest: Effort normal and breath sounds normal  No respiratory distress  He has no wheezes  Abdominal: Soft  Bowel sounds are normal  He exhibits no distension  There is no tenderness  There is no rebound  Musculoskeletal: He exhibits no edema, tenderness or deformity  Skin: Skin is warm and dry  He is not diaphoretic  Tiny area of ecchymosis noted just lateral to his left eye  Also with area of ecchymosis noted on his left shin near his knee  No identifiable hematoma   Psychiatric: He has a normal mood and affect  His behavior is normal  Judgment and thought content normal    Vitals reviewed  Additional Data:     Lab Results: I have personally reviewed pertinent reports  Results from last 7 days  Lab Units 12/01/17  0759   WBC Thousand/uL 6 36   HEMOGLOBIN g/dL 12 3   HEMATOCRIT % 37 0   PLATELETS Thousands/uL 9*   NEUTROS PCT % 59   LYMPHS PCT % 22   MONOS PCT % 14*   EOS PCT % 4       Results from last 7 days  Lab Units 12/01/17  0759   SODIUM mmol/L 135*   POTASSIUM mmol/L 4 7   CHLORIDE mmol/L 100   CO2 mmol/L 30   BUN mg/dL 21   CREATININE mg/dL 0 81   CALCIUM mg/dL 9 2   TOTAL PROTEIN g/dL 7 5   BILIRUBIN TOTAL mg/dL 0 30   ALK PHOS U/L 245*   ALT U/L 107*   AST U/L 62*   GLUCOSE RANDOM mg/dL 221*       Results from last 7 days  Lab Units 12/01/17  0759   INR  0 83*       Imaging:  No results found  EKG, Pathology, and Other Studies Reviewed on Admission:   · EKG:     Allscripts / Epic Records Reviewed: Yes     ** Please Note: This note has been constructed using a voice recognition system   **

## 2017-12-01 NOTE — CONSULTS
Consultation - Cardiology   Javid Crystal 47 y o  male MRN: [id number]  Unit/Bed#: -01 Encounter: [id number]    Assessment/Plan     Principal Problem: Thrombocytopenia (HCC)  Active Problems:    AS (aortic stenosis)    T1DM (type 1 diabetes mellitus) ([address] )    Benign essential HTN    Mouth sores    CAD (coronary artery disease)    Transaminitis      Assessment:  1  Severe thrombocytopenia  DAPT held d/t severe thrombocytopenia  Plate ct 8,557  Was 273,000 3 months ago prior to use of brilinta/plavix  No active bleeding  Hematology consulted  F/U with recs  Dose of IV decadron given    2  Mouth sores- secondary to #1  No other obvious bleeding  H/H stable    3  CAD s/p Xience Alpine MINNIE to mid LAD 8/31/2017  Was on DAPT- asa / Grady Colla ( which was changed 2 weeks ago to plavix )    4  ICMP- EF 40-45%  On toprol/Ace inhibitor/ lasix  Volume status stable    5  Alcohol use- daily  Advised on alcohol cessation with his severely low platelet count as well as CMP    6  HTN- stable    7  Transaminitis- ? 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    History of Present Illness   Physician Requesting Consult: Dom Flores,*  Reason for Consult / Principal Problem: severe thrombocytopenia, pt with MINNIE    HPI: Javid Crystal is a 47y o  year old male with ICMP ( EF 40-45%), chronic splenectomy, IDDM,  Mild to moderate AS, CAD s/p MINNIE ( Xience Alpine MINNIE) 8/31/2017 who presents with mouth sores that he noted over the past day or 2  He had been taking asa and brilinta 90mg BID  D/T cost his brilinta was changed to plavix 75mg daily on Nov 13th after loading dose of 300mg  He has a history of ITP in his childhood s/p splenectomy  No history of bleeding  Otherwise from CV standpoint he has been stable  No exertional CP / SOB  No c/o LE edema, PND  He is followed by Dr Jil Roberts        Inpatient consult to Cardiology  Consult performed by: Andrea Sandhu  Consult ordered by: Mckenzie COLLAZO          Review of Systems   Constitutional: Negative  HENT: Positive for mouth sores  Eyes: Negative  Respiratory: Negative for shortness of breath  Cardiovascular: Negative for chest pain, palpitations and leg swelling  Gastrointestinal: Negative  Genitourinary: Negative  Musculoskeletal: Negative  Neurological: Negative  Psychiatric/Behavioral: Negative  Historical Information   Past Medical History:   Diagnosis Date    Aortic stenosis     Diabetes mellitus ([address] )     Hyperlipidemia     Hypertension      Past Surgical History:   Procedure Laterality Date    SPLENECTOMY       History   Alcohol Use    Yes     Comment: daily     History   Drug Use No     History   Smoking Status    Former Smoker   Smokeless Tobacco    Never Used     Family History: History reviewed  No pertinent family history      Meds/Allergies   current meds:   Current Facility-Administered Medications   Medication Dose Route Frequency    atorvastatin (LIPITOR) tablet 80 mg  80 mg Oral Daily With Dinner    insulin aspart (NovoLOG) FOR PUMP REFILLS 1 Units  1 Units Subcutaneous Insulin Pump PRN    lisinopril (ZESTRIL) tablet 20 mg  20 mg Oral Daily    metoprolol succinate (TOPROL-XL) 24 hr tablet 25 mg  25 mg Oral Daily    ondansetron (ZOFRAN) injection 4 mg  4 mg Intravenous Q4H PRN    [START ON 12/2/2017] pantoprazole (PROTONIX) EC tablet 20 mg  20 mg Oral Early Morning    PATIENT MAINTAINED INSULIN PUMP 1 each  1 each Subcutaneous Q8H    sertraline (ZOLOFT) tablet 50 mg  50 mg Oral Daily    and PTA meds:    Prescriptions Prior to Admission   Medication    atorvastatin (LIPITOR) 80 mg tablet    Insulin Infusion Pump KIT    lisinopril (ZESTRIL) 20 mg tablet    metoprolol succinate (TOPROL-XL) 25 mg 24 hr tablet    omeprazole (PriLOSEC) 20 mg delayed release capsule    sertraline (ZOLOFT) 50 mg tablet    ticagrelor (BRILINTA) 90 MG    zolpidem (AMBIEN) 5 mg tablet No Known Allergies    Objective   Vitals: Blood pressure 134/68, pulse 75, temperature 98 2 °F (36 8 °C), temperature source Oral, resp  rate 20, weight 77 1 kg (170 lb), SpO2 95 %  Orthostatic Blood Pressures    Flowsheet Row Most Recent Value   Blood Pressure  134/68 filed at 12/01/2017 1542   Patient Position - Orthostatic VS  Lying filed at 12/01/2017 1542          No intake or output data in the 24 hours ending 12/01/17 1608    Invasive Devices     Peripheral Intravenous Line            Peripheral IV 12/01/17 Right Antecubital less than 1 day                Physical Exam: /68   Pulse 75   Temp 98 2 °F (36 8 °C) (Oral)   Resp 20   Wt 77 1 kg (170 lb)   SpO2 95%   BMI 27 44 kg/m²   General Appearance:    Alert, cooperative, no distress, appears stated age   Head:    Normocephalic, no scleral icterus   Eyes:    PERRL   Nose:   Nares normal, septum midline, mucosa normal, no drainage    Throat:   Mouth sores visible   Neck:   Supple, symmetrical, trachea midline     no JVD   Back:     Symmetric   Lungs:     Clear to auscultation bilaterally, respirations unlabored   Chest Wall:    No tenderness or deformity    Heart:    Regular rate and rhythm, S1 and S2 normal, no murmur, rub   or gallop   Abdomen:     Soft, non-tender, bowel sounds active all four quadrants,     no masses, no organomegaly   Extremities:   Extremities normal, atraumatic, no cyanosis or edema   Pulses:   2+ and symmetric all extremities   Skin:   Skin color, texture, turgor normal, no rashes or lesions   Neurologic:   Alert and oriented to person place and time   No focal deficits       Lab Results:   Recent Results (from the past 72 hour(s))   CBC and differential    Collection Time: 12/01/17  7:59 AM   Result Value Ref Range    WBC 6 36 4 31 - 10 16 Thousand/uL    RBC 4 07 3 88 - 5 62 Million/uL    Hemoglobin 12 3 12 0 - 17 0 g/dL    Hematocrit 37 0 36 5 - 49 3 %    MCV 91 82 - 98 fL    MCH 30 2 26 8 - 34 3 pg    MCHC 33 2 31 4 - 37 4 g/dL    RDW 14 2 11 6 - 15 1 %    Platelets 9 (LL) 543 - 390 Thousands/uL    Neutrophils Relative 59 43 - 75 %    Lymphocytes Relative 22 14 - 44 %    Monocytes Relative 14 (H) 4 - 12 %    Eosinophils Relative 4 0 - 6 %    Basophils Relative 1 0 - 1 %    Neutrophils Absolute 3 82 1 85 - 7 62 Thousands/µL    Lymphocytes Absolute 1 38 0 60 - 4 47 Thousands/µL    Monocytes Absolute 0 88 0 17 - 1 22 Thousand/µL    Eosinophils Absolute 0 24 0 00 - 0 61 Thousand/µL    Basophils Absolute 0 04 0 00 - 0 10 Thousands/µL   Protime-INR    Collection Time: 17  7:59 AM   Result Value Ref Range    Protime 11 6 (L) 12 1 - 14 4 seconds    INR 0 83 (L) 0 86 - 1 16   APTT    Collection Time: 17  7:59 AM   Result Value Ref Range    PTT 28 23 - 35 seconds   Comprehensive metabolic panel    Collection Time: 17  7:59 AM   Result Value Ref Range    Sodium 135 (L) 136 - 145 mmol/L    Potassium 4 7 3 5 - 5 3 mmol/L    Chloride 100 100 - 108 mmol/L    CO2 30 21 - 32 mmol/L    Anion Gap 5 4 - 13 mmol/L    BUN 21 5 - 25 mg/dL    Creatinine 0 81 0 60 - 1 30 mg/dL    Glucose 221 (H) 65 - 140 mg/dL    Calcium 9 2 8 3 - 10 1 mg/dL    AST 62 (H) 5 - 45 U/L     (H) 12 - 78 U/L    Alkaline Phosphatase 245 (H) 46 - 116 U/L    Total Protein 7 5 6 4 - 8 2 g/dL    Albumin 3 4 (L) 3 5 - 5 0 g/dL    Total Bilirubin 0 30 0 20 - 1 00 mg/dL    eGFR 103 ml/min/1 73sq m   LD,Blood    Collection Time: 17  7:59 AM   Result Value Ref Range     81 - 234 U/L        Patient: Ryan Rivera  MR number: [id number]  Account number: [de-identified]  Study date: 2017  Gender: Male  : 1965  Height: 66 1 in  Weight: 195 4 lb  BSA: 1 99 m squared     Allergies: NO KNOWN ALLERGIES     Diagnostic Cardiologist:  Vasu Zimmer MD  Interventional Cardiologist:  Vasu Zimmer MD  Primary Physician:  Zana Acharya     SUMMARY     CORONARY CIRCULATION:  Left main: Normal   LAD: The vessel was normal sized   There was a 90 % stenosis in mid vessel  Circumflex: The vessel was normal sized  There was moderate diffuse plaque of OM1  There were no obstructive lesions  RCA: The vessel was normal sized and dominant, giving rise to the PDA and two posterolateral branches  There was an 80% stenosis in the mid aspect of the small PL2 branch      1ST LESION INTERVENTIONS:  Following pre-dilation, a Xience Alpine Rx 3 0 x 23mm drug-eluting stent was placed across the site of the 90% stenosis in the mid LAD and deployed at a maximum inflation pressure of 16 arpan  At the completion of the procedure, there was no  residual stenosis, and distal flow was normal      REPORT ELEMENT SELECTION:  Right radial access was employed      INDICATIONS:  --  Possible CAD: unstable angina      PROCEDURES PERFORMED     --  Left coronary angiography  --  Right coronary angiography  --  Mod Sedation Same Physician Initial 15min  --  Mod Sedation Same Physician Add 15min  --  Mod Sedation Same Physician Add 15min  --  Coronary Catheterization (w/o LHC)  --  Coronary Drug Eluting Stent w/PTCA  --  Intervention on mid LAD: balloon angioplasty      PROCEDURE: The risks and alternatives of the procedures and conscious sedation were explained to the patient and informed consent was obtained  The patient was brought to the cath lab and placed on the table  The planned puncture sites  were prepped and draped in the usual sterile fashion      --  Right radial artery access  After performing an Rashawn's test to verify adequate ulnar artery supply to the hand, the radial site was prepped  The puncture site was infiltrated with local anesthetic  The vessel was accessed using the  modified Seldinger technique, a wire was advanced into the vessel, and a sheath was advanced over the wire into the vessel      --  Left coronary artery angiography   A catheter was advanced over a guidewire into the aorta and positioned in the left coronary artery ostium under fluoroscopic guidance  Angiography was performed      --  Right coronary artery angiography  A catheter was advanced over a guidewire into the aorta and positioned in the right coronary artery ostium under fluoroscopic guidance  Angiography was performed      --  Mod Sedation Same Physician Initial 15min      --  Mod Sedation Same Physician Add 15min      --  Mod Sedation Same Physician Add 15min      --  Coronary Catheterization (w/o C)      LESION INTERVENTION: PCI was performed on the 90% lesion in the mid LAD  BRIAN flow was grade 3 before and after PCI  Transthoracic Echocardiogram  Limited 2D, M-mode, Doppler, and Color Doppler     Study date:  31-Aug-2017     Patient: Danyel Greer  MR number: [id number]  Account number: [de-identified]  : 1965  Age: 47 years  Gender: Male  Status: Outpatient  Location: Bedside  Height: 66 in  Weight: 195 lb  BP: 143/ 75 mmHg     Indications: Limited to evaluate LV function      Diagnoses: I25 83 - Coronary atherosclerosis due to lipid rich plaque     Sonographer:  Delane Runner, RDCS  Primary Physician:  Bindu De Santiago DO  Referring Physician:  DEVEN Lozada  Group:  [facility]  Interpreting Physician:  Lani Mortimer, DO     SUMMARY     LEFT VENTRICLE:  Systolic function was mildly to moderately reduced  Ejection fraction was estimated in the range of 40 % to 45 %  There was severe hypokinesis of the apical anterior, mid anteroseptal, apical inferior, apical septal, apical lateral, and apical wall(s)  Wall thickness was mildly increased  There was mild concentric hypertrophy      RIGHT VENTRICLE:  The size was normal   Systolic function was normal      LEFT ATRIUM:  The atrium was mildly dilated      MITRAL VALVE:  There was mild regurgitation      AORTIC VALVE:  The valve was trileaflet  Leaflets exhibited moderately increased thickness and mild to moderate calcification  Transaortic velocity was increased due to valvular stenosis    There was mild to moderate stenosis (MINI 1 4 cm2/MG 12 mmHg/DI 0 37)  There was mild regurgitation      COMPARISONS:  Comparison was made with the previous study of 25-May-2017  Ejection fraction has decreased from 55 % to 40 % with new regional wall motion abnormalities      HISTORY: PRIOR HISTORY: Diabetes, Aortic stenosis, Hypertension, Abnormal EKG      PROCEDURE: The procedure was performed at the bedside  This was a routine study  The transthoracic approach was used  The study included limited 2D imaging, M-mode, limited spectral Doppler, and color Doppler  The heart rate was 73 bpm, at  the start of the study  Images were obtained from the parasternal, apical, and subcostal acoustic windows  Image quality was adequate      LEFT VENTRICLE: Size was normal  Systolic function was mildly to moderately reduced  Ejection fraction was estimated in the range of 40 % to 45 %  There was severe hypokinesis of the apical anterior, mid anteroseptal, apical inferior,  apical septal, apical lateral, and apical wall(s)  Wall thickness was mildly increased  There was mild concentric hypertrophy  There was a false tendon within the ventricle  DOPPLER: The study was not technically sufficient to allow  evaluation of LV diastolic function      RIGHT VENTRICLE: The size was normal  Systolic function was normal  Wall thickness was normal      LEFT ATRIUM: The atrium was mildly dilated      RIGHT ATRIUM: Size was normal      MITRAL VALVE: Valve structure was normal  There was normal leaflet separation  DOPPLER: The transmitral velocity was within the normal range  There was no evidence for stenosis  There was mild regurgitation      AORTIC VALVE: The valve was trileaflet  Leaflets exhibited moderately increased thickness and mild to moderate calcification  DOPPLER: Transaortic velocity was increased due to valvular stenosis  There was mild to moderate stenosis (MINI  1 4 cm2/MG 12 mmHg/DI 0 37)   There was mild regurgitation      TRICUSPID VALVE: The valve structure was normal  There was normal leaflet separation  DOPPLER: There was trace regurgitation  The tricuspid jet envelope definition was inadequate for estimation of RV systolic pressure  There are no  indirect findings (abnormal RV volume or geometry, altered pulmonary flow velocity profile, or leftward septal displacement) which would suggest moderate or severe pulmonary hypertension      PULMONIC VALVE: Leaflets exhibited normal thickness, no calcification, and normal cuspal separation  DOPPLER: The transpulmonic velocity was within the normal range  There was trace regurgitation      PERICARDIUM: There was no pericardial effusion  The pericardium was normal in appearance      AORTA: The root exhibited normal size      SYSTEMIC VEINS: IVC: Not assessed      SYSTEM MEASUREMENT TABLES     Imaging: I have personally reviewed pertinent reports  Code Status: Level 1 - Full Code  Advance Directive and Living Will:      Power of :    POLST:      Counseling / Coordination of Care  Total floor / unit time spent today 40 minutes  Greater than 50% of total time was spent with the patient and / or family counseling and / or coordination of care

## 2017-12-01 NOTE — ED PROVIDER NOTES
History  Chief Complaint   Patient presents with    Mouth Lesions     Pt states that he started with mouth sores 2 days ago and also woke up with more of them today in his mouth and also now on his face  50-year-old male presents today complaining of mouth sores since yesterday  History provided by:  Patient  Mouth Lesions   Location:  Buccal mucosa, lower lip and lower gingiva  Buccal mucosa location:  R buccal mucosa and L buccal mucosa  Lower gingiva location:  R buccal and L buccal  Quality:  Blistered and bleeding  Onset quality:  Sudden  Duration:  2 days  Chronicity:  New  Context: not a change in diet, not a change in medications, not medications, not a possible infection, not stress and not trauma    Relieved by:  None tried  Worsened by:  Nothing  Ineffective treatments:  None tried  Associated symptoms: no congestion, no dental pain, no ear pain, no fever, no malaise, no neck pain, no rash, no rhinorrhea, no sore throat and no swollen glands        Prior to Admission Medications   Prescriptions Last Dose Informant Patient Reported? Taking?    Insulin Infusion Pump KIT  Self Yes Yes   Si Units/hr by Subcutaneous Insulin Pump route continuous   atorvastatin (LIPITOR) 80 mg tablet   No Yes   Sig: Take 1 tablet by mouth daily with dinner   lisinopril (ZESTRIL) 20 mg tablet   Yes Yes   Sig: Take 20 mg by mouth daily   metoprolol succinate (TOPROL-XL) 25 mg 24 hr tablet   No Yes   Sig: Take 1 tablet by mouth daily   omeprazole (PriLOSEC) 20 mg delayed release capsule   Yes Yes   Sig: Take 20 mg by mouth daily   sertraline (ZOLOFT) 50 mg tablet   Yes Yes   Sig: Take 50 mg by mouth daily   ticagrelor (BRILINTA) 90 MG   No Yes   Sig: Take 1 tablet by mouth every 12 (twelve) hours   zolpidem (AMBIEN) 5 mg tablet   No Yes   Sig: Take 1 tablet by mouth daily at bedtime as needed for sleep for up to 2 days      Facility-Administered Medications: None       Past Medical History:   Diagnosis Date    Aortic stenosis     Diabetes mellitus ([facility] [address] )     Hyperlipidemia     Hypertension        Past Surgical History:   Procedure Laterality Date    SPLENECTOMY         History reviewed  No pertinent family history  I have reviewed and agree with the history as documented  Social History   Substance Use Topics    Smoking status: Former Smoker    Smokeless tobacco: Never Used    Alcohol use Yes      Comment: daily        Review of Systems   Constitutional: Negative for fever  HENT: Positive for mouth sores  Negative for congestion, ear pain, rhinorrhea and sore throat  Eyes: Negative for visual disturbance  Respiratory: Negative for chest tightness and shortness of breath  Cardiovascular: Negative for chest pain  Gastrointestinal: Negative for abdominal pain  Genitourinary: Negative for difficulty urinating  Musculoskeletal: Negative for neck pain  Skin: Negative for rash  Allergic/Immunologic: Negative for immunocompromised state  Neurological: Negative for headaches  Hematological: Bruises/bleeds easily  Psychiatric/Behavioral: Negative for confusion  Physical Exam  ED Triage Vitals [12/01/17 0735]   Temperature Pulse Respirations Blood Pressure SpO2   98 2 °F (36 8 °C) 75 16 (!) 173/80 98 %      Temp Source Heart Rate Source Patient Position - Orthostatic VS BP Location FiO2 (%)   Oral Monitor Lying Right arm --      Pain Score       2           Orthostatic Vital Signs  Vitals:    12/01/17 0735   BP: (!) 173/80   Pulse: 75   Patient Position - Orthostatic VS: Lying       Physical Exam   Constitutional: He is oriented to person, place, and time  He appears well-developed and well-nourished  HENT:   Head: Normocephalic and atraumatic    2 ecchymotic areas on face, one just at the hairline and one at the corner of the left eye  Multiple areas of raised purpuric lesions intraoral on b/l buccal mucosa and the lower gingiva and inner lower lip  No posterior pharynx lesions noted    No airway compromise  Eyes: Pupils are equal, round, and reactive to light  Neck: Normal range of motion  Cardiovascular: Normal rate and regular rhythm  Pulmonary/Chest: Effort normal    Abdominal: Soft  Bowel sounds are normal    Musculoskeletal: Normal range of motion  Legs:  Large contusion on left anterior lower leg which patient states occurred yesterday when a milk crate fell on his leg  Neurological: He is alert and oriented to person, place, and time  Skin: Skin is warm and dry  Psychiatric: He has a normal mood and affect  Nursing note and vitals reviewed  ED Medications  Medications   dexamethasone (DECADRON) 40 mg in sodium chloride 0 9 % 50 mL IVPB (not administered)       Diagnostic Studies  Results Reviewed     Procedure Component Value Units Date/Time    LD,Blood [[id number]] Collected:  12/01/17 0759    Lab Status:   In process Specimen:  Blood from Arm, Right Updated:  12/01/17 0911    CBC and differential [[id number]]  (Abnormal) Collected:  12/01/17 0759    Lab Status:  Final result Specimen:  Blood from Arm, Right Updated:  12/01/17 0834     WBC 6 36 Thousand/uL      RBC 4 07 Million/uL      Hemoglobin 12 3 g/dL      Hematocrit 37 0 %      MCV 91 fL      MCH 30 2 pg      MCHC 33 2 g/dL      RDW 14 2 %      Platelets 9 (LL) Thousands/uL      Neutrophils Relative 59 %      Lymphocytes Relative 22 %      Monocytes Relative 14 (H) %      Eosinophils Relative 4 %      Basophils Relative 1 %      Neutrophils Absolute 3 82 Thousands/µL      Lymphocytes Absolute 1 38 Thousands/µL      Monocytes Absolute 0 88 Thousand/µL      Eosinophils Absolute 0 24 Thousand/µL      Basophils Absolute 0 04 Thousands/µL     Protime-INR [[id number]]  (Abnormal) Collected:  12/01/17 0759    Lab Status:  Final result Specimen:  Blood from Arm, Right Updated:  12/01/17 0823     Protime 11 6 (L) seconds      INR 0 83 (L)    APTT [[id number]]  (Normal) Collected:  12/01/17 0759    Lab Status:  Final result Specimen:  Blood from Arm, Right Updated:  12/01/17 0823     PTT 28 seconds     Narrative: Therapeutic Heparin Range = 60-90 seconds    Comprehensive metabolic panel [[id number]]  (Abnormal) Collected:  12/01/17 0759    Lab Status:  Final result Specimen:  Blood from Arm, Right Updated:  12/01/17 1184     Sodium 135 (L) mmol/L      Potassium 4 7 mmol/L      Chloride 100 mmol/L      CO2 30 mmol/L      Anion Gap 5 mmol/L      BUN 21 mg/dL      Creatinine 0 81 mg/dL      Glucose 221 (H) mg/dL      Calcium 9 2 mg/dL      AST 62 (H) U/L       (H) U/L      Alkaline Phosphatase 245 (H) U/L      Total Protein 7 5 g/dL      Albumin 3 4 (L) g/dL      Total Bilirubin 0 30 mg/dL      eGFR 103 ml/min/1 73sq m     Narrative:         National Kidney Disease Education Program recommendations are as follows:  GFR calculation is accurate only with a steady state creatinine  Chronic Kidney disease less than 60 ml/min/1 73 sq  meters  Kidney failure less than 15 ml/min/1 73 sq  meters  No orders to display              Procedures  Procedures       Phone Contacts  ED Phone Contact    ED Course  ED Course                                MDM  Number of Diagnoses or Management Options  Acute ITP [facility]): new and requires workup  Diagnosis management comments: 7:49 AM  Concern for ITP or other hematological pathology  Will check labs and reevaluate  8:36 AM  Platelets are 9  Will speak with heme/onc and admit  8:50 AM  Heme onc recommends Decadron 40mg IV and admit  Add on schistocyte count and LDH and paged SLIM for admission          Amount and/or Complexity of Data Reviewed  Clinical lab tests: ordered and reviewed  Tests in the medicine section of CPT®: ordered and reviewed  Review and summarize past medical records: yes  Discuss the patient with other providers: yes    Risk of Complications, Morbidity, and/or Mortality  Presenting problems: high  Diagnostic procedures: high  Management options: high    Patient Progress  Patient progress: stable    CritCare Time    Disposition  Final diagnoses:   Acute ITP ([address] )     Time reflects when diagnosis was documented in both MDM as applicable and the Disposition within this note     Time User Action Codes Description Comment    12/1/2017  8:54 AM Nils Cohen Add [D69 3] Acute ITP [facility])       ED Disposition     ED Disposition Condition Comment    Admit  Case was discussed with JENNIFER and the patient's admission status was agreed to be Admission Status: inpatient status to the service of Dr Fara Aguilar   Follow-up Information    None       Patient's Medications   Discharge Prescriptions    No medications on file     No discharge procedures on file      ED Provider  Electronically Signed by           Fara Goyal DO  12/01/17 8381

## 2017-12-01 NOTE — CONSULTS
Consultation -  Hematology/Oncology   Ritika Zhang 47 y o  male MRN: [id number]  Unit/Bed#: -01 Encounter: [id number]    Physician Requesting Consult: Lora Cruz,*  Reason for Consult: Thrombocytopenia    HPI: Ritika Zhang is a 47y o  year old male with a history of ITP, status post splenectomy who was admitted for severe thrombocytopenia  Patient presented to the ED on 12/1 for 2 day history of mouth blisters and bleeding gums  Subsequent evaluation with a CBC showed a platelet count of 9k  Hemoglobin was 12 3, WBC 6 3  T bili 0 3  He denied any other evidence of bleeding bleeding or excessive bruising  No infectious symptoms  No pain  He reports diagnosis of ITP, age 16 followed by splenectomy  Reports normal platelet count since that time without bleeding issues  In September 2017 he underwent cardiac stenting for CAD, he was initiated on dual anti-platelet therapy  This was held on admission due to thrombocytopenia    Assessment/Plan:   #1 Thrombocytopenia, admission platelet count 9 K, poss 2/2 stress induced ITP +/- drug induced (antiplatelet therapy regimen change)  -patient has a history of ITP at age 16, status post splenectomy  -he had cardiac stent placement in September along with anti platelet therapy with Brilinta until 2 weeks ago when he was switched to Plavix due to insurance issues  This may be contributing to drug induced reaction    -He has not had heparin, therefore do not suspect HIT  He does not have infection therefore low suspicion for DIC  -recommend dexamethasone 40 mg IV daily x 4 doses, first dose today  -If no response to steroids by day #4, he may need IVIG  -We will check chronic hepatitis panel and HIV for liver disease as well  -Trend platelet count closely  Hold platelet transfusion for now unless he is symptomatic   -Recommend stable platelet of at least 50k prior to discharge   He should have a CBC within 1 week after discharge in follow up  Discussed wit the primary team (Oscar Bar PA-C) on this date  We will continue to follow this admission, Dr Alex Stroud is the on call hospital physician this weekend  Please contact us if you have any questions regarding this consult  Thank you for this consult  Past Medical History:   Diagnosis Date    Aortic stenosis     Diabetes mellitus (HCC)     Hyperlipidemia     Hypertension      History reviewed  No pertinent family history  Social History     Social History    Marital status: /Civil Union     Spouse name: N/A    Number of children: N/A    Years of education: N/A     Social History Main Topics    Smoking status: Former Smoker    Smokeless tobacco: Never Used    Alcohol use Yes      Comment: daily    Drug use: No    Sexual activity: Not Asked     Other Topics Concern    None     Social History Narrative    None     No Known Allergies    Subjective:  He states after her received a dose of steroids he subsequently noted reduction in the size of the blood blisters on the inside of his cheeks and lips  He he has a small bruise on his left lower extremity which he states he had accidentally dropped a great holding several gal of milk on the extremity  He states it is normal  for him not to bruise very easily  Denies any hematuria or nosebleeds  Review of Systems   Constitutional: Negative for fatigue and fever  HENT:   Negative for trouble swallowing  Respiratory: Negative for shortness of breath  Cardiovascular: Negative for chest pain, leg swelling and palpitations  Genitourinary: Negative for hematuria  Skin: Negative for rash  Hematological: Does not bruise/bleed easily  All other systems reviewed and are negative      Objective:  /63   Pulse 73   Temp 98 7 °F (37 1 °C) (Oral)   Resp 18   Wt 77 1 kg (170 lb)   SpO2 96%   BMI 27 44 kg/m²       Current Facility-Administered Medications:     atorvastatin (LIPITOR) tablet 80 mg, 80 mg, Oral, Daily With Dinner, Trinity Bar PA-C    insulin aspart (NovoLOG) FOR PUMP REFILLS 1 Units, 1 Units, Subcutaneous Insulin Pump, PRN, Trinity Bar PA-C    lisinopril (ZESTRIL) tablet 20 mg, 20 mg, Oral, Daily, Trinity Bar PA-C    metoprolol succinate (TOPROL-XL) 24 hr tablet 25 mg, 25 mg, Oral, Daily, Trinity Bra PA-C    ondansetron (ZOFRAN) injection 4 mg, 4 mg, Intravenous, Q4H PRN, Trinity Bar PA-C    [START ON 12/2/2017] pantoprazole (PROTONIX) EC tablet 20 mg, 20 mg, Oral, Early Morning, Trinity Bar PA-C    PATIENT MAINTAINED INSULIN PUMP 1 each, 1 each, Subcutaneous, Q8H, Trinity Bar PA-C    sertraline (ZOLOFT) tablet 50 mg, 50 mg, Oral, Daily, Trinity Bar PA-C    Recent Labs      12/01/17   0759   WBC  6 36   HGB  12 3   PLT  9*   MCV  91   RDW  14 2   CREATININE  0 81   AST  62*   ALT  107*     Laboratory studies were reviewed    Imaging:       Pathology: None    Physical Exam   Constitutional: He is oriented to person, place, and time  HENT:   Multiple oral blood blisters on inferior lip and b/l checks  Eyes: Conjunctivae are normal  No scleral icterus  Cardiovascular: Normal rate and regular rhythm  Pulmonary/Chest: Effort normal  No respiratory distress  Abdominal: There is no tenderness  Musculoskeletal: He exhibits no edema or tenderness  Neurological: He is alert and oriented to person, place, and time  Skin: Skin is warm and dry  No rash noted  No erythema  No pallor  Code Status: Level 1 - Full Code    Counseling / Coordination of Care  Total floor / unit time spent today 30 minutes  Greater than 50% of total time was spent with the patient and / or family counseling and / or coordination of care

## 2017-12-02 LAB
ALBUMIN SERPL BCP-MCNC: 3.4 G/DL (ref 3.5–5)
ALP SERPL-CCNC: 229 U/L (ref 46–116)
ALT SERPL W P-5'-P-CCNC: 84 U/L (ref 12–78)
ANION GAP SERPL CALCULATED.3IONS-SCNC: 10 MMOL/L (ref 4–13)
AST SERPL W P-5'-P-CCNC: 29 U/L (ref 5–45)
BASOPHILS # BLD AUTO: 0 THOUSANDS/ΜL (ref 0–0.1)
BASOPHILS NFR BLD AUTO: 0 % (ref 0–1)
BILIRUB SERPL-MCNC: 0.4 MG/DL (ref 0.2–1)
BUN SERPL-MCNC: 15 MG/DL (ref 5–25)
CALCIUM SERPL-MCNC: 9.4 MG/DL (ref 8.3–10.1)
CHLORIDE SERPL-SCNC: 100 MMOL/L (ref 100–108)
CO2 SERPL-SCNC: 26 MMOL/L (ref 21–32)
CREAT SERPL-MCNC: 0.82 MG/DL (ref 0.6–1.3)
EOSINOPHIL # BLD AUTO: 0 THOUSAND/ΜL (ref 0–0.61)
EOSINOPHIL NFR BLD AUTO: 0 % (ref 0–6)
ERYTHROCYTE [DISTWIDTH] IN BLOOD BY AUTOMATED COUNT: 14 % (ref 11.6–15.1)
GFR SERPL CREATININE-BSD FRML MDRD: 102 ML/MIN/1.73SQ M
GGT SERPL-CCNC: 274 U/L (ref 5–85)
GLUCOSE SERPL-MCNC: 175 MG/DL (ref 65–140)
GLUCOSE SERPL-MCNC: 238 MG/DL (ref 65–140)
GLUCOSE SERPL-MCNC: 245 MG/DL (ref 65–140)
GLUCOSE SERPL-MCNC: 298 MG/DL (ref 65–140)
GLUCOSE SERPL-MCNC: 352 MG/DL (ref 65–140)
HCT VFR BLD AUTO: 37 % (ref 36.5–49.3)
HGB BLD-MCNC: 12.7 G/DL (ref 12–17)
LYMPHOCYTES # BLD AUTO: 1.22 THOUSANDS/ΜL (ref 0.6–4.47)
LYMPHOCYTES NFR BLD AUTO: 11 % (ref 14–44)
MCH RBC QN AUTO: 30.6 PG (ref 26.8–34.3)
MCHC RBC AUTO-ENTMCNC: 34.3 G/DL (ref 31.4–37.4)
MCV RBC AUTO: 89 FL (ref 82–98)
MONOCYTES # BLD AUTO: 1.02 THOUSAND/ΜL (ref 0.17–1.22)
MONOCYTES NFR BLD AUTO: 9 % (ref 4–12)
NEUTROPHILS # BLD AUTO: 8.77 THOUSANDS/ΜL (ref 1.85–7.62)
NEUTS SEG NFR BLD AUTO: 80 % (ref 43–75)
PLATELET # BLD AUTO: 12 THOUSANDS/UL (ref 149–390)
PMV BLD AUTO: 9.7 FL (ref 8.9–12.7)
POTASSIUM SERPL-SCNC: 4.4 MMOL/L (ref 3.5–5.3)
PROT SERPL-MCNC: 7.6 G/DL (ref 6.4–8.2)
RBC # BLD AUTO: 4.15 MILLION/UL (ref 3.88–5.62)
SODIUM SERPL-SCNC: 136 MMOL/L (ref 136–145)
WBC # BLD AUTO: 11.01 THOUSAND/UL (ref 4.31–10.16)

## 2017-12-02 PROCEDURE — 82948 REAGENT STRIP/BLOOD GLUCOSE: CPT

## 2017-12-02 PROCEDURE — 86705 HEP B CORE ANTIBODY IGM: CPT | Performed by: PHYSICIAN ASSISTANT

## 2017-12-02 PROCEDURE — 80053 COMPREHEN METABOLIC PANEL: CPT | Performed by: PHYSICIAN ASSISTANT

## 2017-12-02 PROCEDURE — 86803 HEPATITIS C AB TEST: CPT | Performed by: PHYSICIAN ASSISTANT

## 2017-12-02 PROCEDURE — 82977 ASSAY OF GGT: CPT | Performed by: PHYSICIAN ASSISTANT

## 2017-12-02 PROCEDURE — 87340 HEPATITIS B SURFACE AG IA: CPT | Performed by: PHYSICIAN ASSISTANT

## 2017-12-02 PROCEDURE — 86704 HEP B CORE ANTIBODY TOTAL: CPT | Performed by: PHYSICIAN ASSISTANT

## 2017-12-02 PROCEDURE — 87536 HIV-1 QUANT&REVRSE TRNSCRPJ: CPT | Performed by: PHYSICIAN ASSISTANT

## 2017-12-02 PROCEDURE — 85025 COMPLETE CBC W/AUTO DIFF WBC: CPT | Performed by: PHYSICIAN ASSISTANT

## 2017-12-02 RX ORDER — ZOLPIDEM TARTRATE 5 MG/1
5 TABLET ORAL
Status: DISCONTINUED | OUTPATIENT
Start: 2017-12-02 | End: 2017-12-04 | Stop reason: HOSPADM

## 2017-12-02 RX ADMIN — DEXAMETHASONE SODIUM PHOSPHATE 40 MG: 10 INJECTION, SOLUTION INTRAMUSCULAR; INTRAVENOUS at 15:44

## 2017-12-02 RX ADMIN — SERTRALINE HYDROCHLORIDE 50 MG: 50 TABLET ORAL at 08:30

## 2017-12-02 RX ADMIN — ZOLPIDEM TARTRATE 5 MG: 5 TABLET ORAL at 00:54

## 2017-12-02 RX ADMIN — METOPROLOL SUCCINATE 25 MG: 25 TABLET, EXTENDED RELEASE ORAL at 08:30

## 2017-12-02 RX ADMIN — PANTOPRAZOLE SODIUM 20 MG: 20 TABLET, DELAYED RELEASE ORAL at 05:12

## 2017-12-02 RX ADMIN — ATORVASTATIN CALCIUM 80 MG: 20 TABLET, FILM COATED ORAL at 15:44

## 2017-12-02 RX ADMIN — LISINOPRIL 20 MG: 20 TABLET ORAL at 08:30

## 2017-12-02 NOTE — PROGRESS NOTES
Progress Note - Mellisa Hawthorneole 47 y o  male MRN: [id number]    Unit/Bed#: -01 Encounter: [id number]        * Thrombocytopenia [facility])   Assessment & Plan      · Secondary to stress induced ITP +/- antiplatelet therapy  · Oncology input appreciated  · On IV dexamethasone for 4 days  · Plt count should be stable at 50k prior to discharge per heme/onc        T1DM (type 1 diabetes mellitus) ([facility] [address] )   Assessment & Plan    · On insulin pump  · Discussed the need to increase insulin while on steroids, patient to make further adjustments today for a goal blood sugar less than 180  · Discussed with patient to let us know if he is unable to manage sugars with his pump ASAP        Mouth sores   Assessment & Plan      · Improving        CAD (coronary artery disease)   Assessment & Plan      · Cardiology input appreciated  · Keep off anti-platelet therapy for now  Will need hematology input as to when to resume   · Use Brilinta or Effient when able        Transaminitis   Assessment & Plan    · Trending down  · Suspect secondary to recent ETOH use  · Hematology to follow up on hepatitis and HIV        Benign essential HTN   Assessment & Plan    · stable            Pharmacologic: Pharmacologic VTE Prophylaxis contraindicated due to thrombocytopenia  Mechanical VTE Prophylaxis in Place: Yes    Patient Centered Rounds: I have performed bedside rounds with nursing staff today  Discussions with Specialists or Other Care Team Provider:     Education and Discussions with Family / Patient: patient    Time Spent for Care: 30 minutes  More than 50% of total time spent on counseling and coordination of care as described above      Current Length of Stay: 1 day(s)    Current Patient Status: Inpatient   Certification Statement: The patient will continue to require additional inpatient hospital stay due to IV Decadron    Discharge Plan / Estimated Discharge Date: await clinical coarse      Code Status: Level 1 - Full Code      Subjective:   Bored, wants to go home  Mouth sores improving  No signs of bleeding  Objective:     Vitals:   Temp (24hrs), Av 6 °F (37 °C), Min:98 4 °F (36 9 °C), Max:98 7 °F (37 1 °C)    HR:  [69-75] 69  Resp:  [18-20] 18  BP: (122-140)/(58-68) 140/66  SpO2:  [95 %-96 %] 95 %  Body mass index is 27 44 kg/m²  Input and Output Summary (last 24 hours): Intake/Output Summary (Last 24 hours) at 17 1533  Last data filed at 17 1401   Gross per 24 hour   Intake              420 ml   Output                0 ml   Net              420 ml       Physical Exam:     Physical Exam   Constitutional: He is oriented to person, place, and time  He appears well-developed  No distress  HENT:   Head: Normocephalic and atraumatic  Neck: Normal range of motion  Neck supple  Cardiovascular: Normal rate and regular rhythm  No murmur heard  Pulmonary/Chest: Effort normal and breath sounds normal  No respiratory distress  He has no wheezes  He has no rales  Abdominal: Soft  Bowel sounds are normal  He exhibits no distension  Musculoskeletal: He exhibits no edema  Neurological: He is alert and oriented to person, place, and time  No cranial nerve deficit  Skin: Skin is warm and dry  No rash noted  Psychiatric: He has a normal mood and affect         Additional Data:     Labs:      Results from last 7 days  Lab Units 17  0617   WBC Thousand/uL 11 01*   HEMOGLOBIN g/dL 12 7   HEMATOCRIT % 37 0   PLATELETS Thousands/uL 12*   NEUTROS PCT % 80*   LYMPHS PCT % 11*   MONOS PCT % 9   EOS PCT % 0       Results from last 7 days  Lab Units 17  0617   SODIUM mmol/L 136   POTASSIUM mmol/L 4 4   CHLORIDE mmol/L 100   CO2 mmol/L 26   BUN mg/dL 15   CREATININE mg/dL 0 82   CALCIUM mg/dL 9 4   TOTAL PROTEIN g/dL 7 6   BILIRUBIN TOTAL mg/dL 0 40   ALK PHOS U/L 229*   ALT U/L 84*   AST U/L 29   GLUCOSE RANDOM mg/dL 238*       Results from last 7 days  Lab Units 17  0759   INR  0 83* Recent Cultures (last 7 days):           Last 24 Hours Medication List:     atorvastatin 80 mg Oral Daily With Dinner   dexamethasone 40 mg Intravenous Daily   lisinopril 20 mg Oral Daily   metoprolol succinate 25 mg Oral Daily   pantoprazole 20 mg Oral Early Morning   patient maintained insulin pump 1 each Subcutaneous Q8H   sertraline 50 mg Oral Daily        Today, Patient Was Seen By: Tye Cartagena PA-C    ** Please Note: Dragon 360 Dictation voice to text software may have been used in the creation of this document   **

## 2017-12-02 NOTE — PROGRESS NOTES
Progress Note - Cardiology   Sara Miller 47 y o  male MRN: [id number]  Unit/Bed#: -01 Encounter: [id number]  12/02/17  4:49 PM        Subjective/Objective   Chief Complaint:   Chief Complaint   Patient presents with    Mouth Lesions     Pt states that he started with mouth sores 2 days ago and also woke up with more of them today in his mouth and also now on his face  Subjective: Patient denies any complaints  Specifically denies chest pains or shortness of breath    Objective: Comfortable , no distress at the time of exam      Patient Active Problem List   Diagnosis    Dyspnea    AS (aortic stenosis)    Palpitation    T1DM (type 1 diabetes mellitus) ([facility] [address] )    Benign essential HTN    Abnormal EKG    Thrombocytopenia (HCC)    Mouth sores    CAD (coronary artery disease)    Transaminitis       Vitals: /61   Pulse 65   Temp 98 7 °F (37 1 °C) (Oral)   Resp 18   Wt 77 1 kg (170 lb)   SpO2 98%   BMI 27 44 kg/m²     I/O this shift: In: 420 [P O :420]  Out: -   Wt Readings from Last 3 Encounters:   12/01/17 77 1 kg (170 lb)   09/01/17 80 1 kg (176 lb 9 4 oz)       Intake/Output Summary (Last 24 hours) at 12/02/17 1649  Last data filed at 12/02/17 1401   Gross per 24 hour   Intake              420 ml   Output                0 ml   Net              420 ml     No intake/output data recorded      Invasive Devices     Peripheral Intravenous Line            Peripheral IV 12/01/17 Right Antecubital 1 day                  Physical Exam:  GEN: Sara Miller appears well, alert and oriented x 3, pleasant and cooperative, tiny hematomas in the mouth, no clear ulcers   HEENT: pupils equal, round, and reactive to light; extraocular muscles intact  NECK: supple, no carotid bruits or JVD  HEART: regular rhythm, normal S1 and S2, no murmurs, clicks, gallops or rubs   LUNGS: clear to auscultation bilaterally; no wheezes, rales, or rhonchi   ABDOMEN/GI: normal bowel sounds, soft, no tenderness, no distention  EXTREMITIES/Musculoskeltal: peripheral pulses normal; no clubbing, cyanosis, or edema  NEURO: no focal findings   SKIN: normal without suspicious lesions on exposed skin            Lab Results:       Results from last 7 days  Lab Units 12/02/17  0617 12/01/17  0759   WBC Thousand/uL 11 01* 6 36   HEMOGLOBIN g/dL 12 7 12 3   HEMATOCRIT % 37 0 37 0   PLATELETS Thousands/uL 12* 9*           Results from last 7 days  Lab Units 12/02/17  0617 12/01/17  0759   SODIUM mmol/L 136 135*   POTASSIUM mmol/L 4 4 4 7   CHLORIDE mmol/L 100 100   CO2 mmol/L 26 30   BUN mg/dL 15 21   CREATININE mg/dL 0 82 0 81   CALCIUM mg/dL 9 4 9 2   TOTAL PROTEIN g/dL 7 6 7 5   BILIRUBIN TOTAL mg/dL 0 40 0 30   ALK PHOS U/L 229* 245*   ALT U/L 84* 107*   AST U/L 29 62*   GLUCOSE RANDOM mg/dL 238* 221*     Results from last 7 days  Lab Units 12/01/17  0759   INR  0 83*       Imaging: I have personally reviewed pertinent reports  EKG:  No events on telemetry    Scheduled Meds:    atorvastatin 80 mg Oral Daily With Dinner   dexamethasone 40 mg Intravenous Daily   lisinopril 20 mg Oral Daily   metoprolol succinate 25 mg Oral Daily   pantoprazole 20 mg Oral Early Morning   patient maintained insulin pump 1 each Subcutaneous Q8H   sertraline 50 mg Oral Daily     Continuous Infusions:     Impression/plan:    Coronary artery disease:  Status post PCI of the mid LAD-August 2017, was on aspirin and Brilinta at baseline, Brilinta changed to Plavix with subsequent decrease in platelets-thrombocytopenia with platelets of 9  No overt signs of it significant bleeding, hemoglobin has been stable  Obviously, hold off antiplatelet therapy at this time, eventually aspirin and Brilinta  Continue metoprolol and statin    Thrombocytopenia:  Differential diagnosis includes idiopathic thrombocytopenic purpura versus drug-induced thrombocytopenia from clopidogrel, on steroids, eventually IVIG if platelets are not improve in 4 days    Hematology is following the patient  Dyslipidemia:  Continue statin    Hypertension:  Well controlled    Counseling / Coordination of Care  Total time spent 20 minutes including teaching and family updates  More than 50% was spent counseling pt and family

## 2017-12-02 NOTE — PLAN OF CARE
INFECTION - ADULT     Absence or prevention of progression during hospitalization Progressing     Absence of fever/infection during neutropenic period Progressing        Knowledge Deficit     Patient/family/caregiver demonstrates understanding of disease process, treatment plan, medications, and discharge instructions Progressing        SAFETY ADULT     Patient will remain free of falls Progressing     Maintain or return to baseline ADL function Progressing     Maintain or return mobility status to optimal level Progressing

## 2017-12-02 NOTE — ASSESSMENT & PLAN NOTE
· Trending down  · Suspect secondary to recent ETOH use  · Hematology to follow up on hepatitis and HIV

## 2017-12-02 NOTE — ASSESSMENT & PLAN NOTE
· Cardiology input appreciated  · Keep off anti-platelet therapy for now   Will need hematology input as to when to resume   · Use Brilinta or Effient when able

## 2017-12-02 NOTE — ASSESSMENT & PLAN NOTE
· On insulin pump  · Discussed the need to increase insulin while on steroids, patient to make further adjustments today for a goal blood sugar less than 180    · Discussed with patient to let us know if he is unable to manage sugars with his pump ASAP

## 2017-12-02 NOTE — ASSESSMENT & PLAN NOTE
· Secondary to stress induced ITP +/- antiplatelet therapy  · Oncology input appreciated  · On IV dexamethasone for 4 days  · Plt count should be stable at 50k prior to discharge per heme/onc

## 2017-12-03 LAB
ERYTHROCYTE [DISTWIDTH] IN BLOOD BY AUTOMATED COUNT: 14.5 % (ref 11.6–15.1)
GLUCOSE SERPL-MCNC: 164 MG/DL (ref 65–140)
GLUCOSE SERPL-MCNC: 192 MG/DL (ref 65–140)
GLUCOSE SERPL-MCNC: 277 MG/DL (ref 65–140)
GLUCOSE SERPL-MCNC: 353 MG/DL (ref 65–140)
HBV CORE AB SER QL: NORMAL
HBV CORE IGM SER QL: NORMAL
HBV SURFACE AG SER QL: NORMAL
HCT VFR BLD AUTO: 37.3 % (ref 36.5–49.3)
HCV AB SER QL: NORMAL
HGB BLD-MCNC: 12.4 G/DL (ref 12–17)
MCH RBC QN AUTO: 30.1 PG (ref 26.8–34.3)
MCHC RBC AUTO-ENTMCNC: 33.2 G/DL (ref 31.4–37.4)
MCV RBC AUTO: 91 FL (ref 82–98)
PLATELET # BLD AUTO: 30 THOUSANDS/UL (ref 149–390)
RBC # BLD AUTO: 4.12 MILLION/UL (ref 3.88–5.62)
WBC # BLD AUTO: 10.13 THOUSAND/UL (ref 4.31–10.16)

## 2017-12-03 PROCEDURE — 82948 REAGENT STRIP/BLOOD GLUCOSE: CPT

## 2017-12-03 PROCEDURE — 85027 COMPLETE CBC AUTOMATED: CPT | Performed by: PHYSICIAN ASSISTANT

## 2017-12-03 RX ADMIN — PANTOPRAZOLE SODIUM 20 MG: 20 TABLET, DELAYED RELEASE ORAL at 06:02

## 2017-12-03 RX ADMIN — METOPROLOL SUCCINATE 25 MG: 25 TABLET, EXTENDED RELEASE ORAL at 11:36

## 2017-12-03 RX ADMIN — ZOLPIDEM TARTRATE 5 MG: 5 TABLET ORAL at 00:32

## 2017-12-03 RX ADMIN — DEXAMETHASONE SODIUM PHOSPHATE 40 MG: 10 INJECTION, SOLUTION INTRAMUSCULAR; INTRAVENOUS at 11:39

## 2017-12-03 RX ADMIN — LISINOPRIL 20 MG: 20 TABLET ORAL at 11:37

## 2017-12-03 RX ADMIN — ATORVASTATIN CALCIUM 80 MG: 20 TABLET, FILM COATED ORAL at 16:16

## 2017-12-03 RX ADMIN — SERTRALINE HYDROCHLORIDE 50 MG: 50 TABLET ORAL at 11:37

## 2017-12-03 NOTE — ASSESSMENT & PLAN NOTE
· Secondary to stress induced ITP +/- antiplatelet therapy  · Oncology input appreciated  · On IV dexamethasone for 4 days  · Plt count should be stable at 50k prior to discharge per heme/onc  · Platelet count improving

## 2017-12-03 NOTE — PROGRESS NOTES
Progress Note - Cardiology   Gavin Salmon 47 y o  male MRN: [id number]  Unit/Bed#: -01 Encounter: [id number]  12/03/17  2:22 PM        Subjective/Objective   Chief Complaint:   Chief Complaint   Patient presents with    Mouth Lesions     Pt states that he started with mouth sores 2 days ago and also woke up with more of them today in his mouth and also now on his face  Subjective: Patient denies any complaints  Specifically denies chest pains or shortness of breath    Objective: Comfortable , no distress at the time of exam      Patient Active Problem List   Diagnosis    Dyspnea    AS (aortic stenosis)    Palpitation    T1DM (type 1 diabetes mellitus) ([facility] [address] )    Benign essential HTN    Abnormal EKG    Thrombocytopenia (HCC)    Mouth sores    CAD (coronary artery disease)    Transaminitis       Vitals: /63   Pulse 66   Temp 98 1 °F (36 7 °C) (Oral)   Resp 18   Wt 77 1 kg (170 lb)   SpO2 98%   BMI 27 44 kg/m²     I/O this shift:  In: 180 [P O :180]  Out: -   Wt Readings from Last 3 Encounters:   12/01/17 77 1 kg (170 lb)   09/01/17 80 1 kg (176 lb 9 4 oz)       Intake/Output Summary (Last 24 hours) at 12/03/17 1422  Last data filed at 12/03/17 0900   Gross per 24 hour   Intake              230 ml   Output                0 ml   Net              230 ml     I/O last 3 completed shifts:   In: 26 [P O :420; IV Piggyback:50]  Out: -     Invasive Devices     Peripheral Intravenous Line            Peripheral IV 12/01/17 Right Antecubital 2 days                  Physical Exam:  GEN: Gavin Salmon appears well, alert and oriented x 3, pleasant and cooperative   HEENT: pupils equal, round, and reactive to light; extraocular muscles intact  NECK: supple, no carotid bruits or JVD  HEART: regular rhythm, normal S1 and S2, no murmurs, clicks, gallops or rubs   LUNGS: clear to auscultation bilaterally; no wheezes, rales, or rhonchi   ABDOMEN/GI: normal bowel sounds, soft, no tenderness, no distention  EXTREMITIES/Musculoskeltal: peripheral pulses normal; no clubbing, cyanosis, or edema  NEURO: no focal findings   SKIN: normal without suspicious lesions on exposed skin            Lab Results:       Results from last 7 days  Lab Units 12/03/17  0506 12/02/17  0617 12/01/17  0759   WBC Thousand/uL 10 13 11 01* 6 36   HEMOGLOBIN g/dL 12 4 12 7 12 3   HEMATOCRIT % 37 3 37 0 37 0   PLATELETS Thousands/uL 30* 12* 9*           Results from last 7 days  Lab Units 12/02/17  0617 12/01/17  0759   SODIUM mmol/L 136 135*   POTASSIUM mmol/L 4 4 4 7   CHLORIDE mmol/L 100 100   CO2 mmol/L 26 30   BUN mg/dL 15 21   CREATININE mg/dL 0 82 0 81   CALCIUM mg/dL 9 4 9 2   TOTAL PROTEIN g/dL 7 6 7 5   BILIRUBIN TOTAL mg/dL 0 40 0 30   ALK PHOS U/L 229* 245*   ALT U/L 84* 107*   AST U/L 29 62*   GLUCOSE RANDOM mg/dL 238* 221*     Results from last 7 days  Lab Units 12/01/17  0759   INR  0 83*       Imaging: I have personally reviewed pertinent reports  EKG: No events on telemetry      Scheduled Meds:    atorvastatin 80 mg Oral Daily With Dinner   dexamethasone 40 mg Intravenous Daily   lisinopril 20 mg Oral Daily   metoprolol succinate 25 mg Oral Daily   pantoprazole 20 mg Oral Early Morning   patient maintained insulin pump 1 each Subcutaneous Q8H   sertraline 50 mg Oral Daily     Continuous Infusions:       VTE Pharmacologic Prophylaxis: Sequential compression device (Venodyne)   VTE Mechanical Prophylaxis: sequential compression device    Impression/plan:     Coronary artery disease:  Status post PCI of the mid LAD-August 2017, was on aspirin and Brilinta at baseline, Brilinta changed to Plavix with subsequent decrease in platelets-thrombocytopenia with platelets of 9 - slowly improving -30 today  No overt signs of it significant bleeding, hemoglobin has been stable  Obviously, hold off antiplatelet therapy at this time, eventually aspirin and Brilinta    No Plavix anymore  Continue metoprolol and statin     Thrombocytopenia:  Differential diagnosis includes idiopathic thrombocytopenic purpura versus drug-induced thrombocytopenia from clopidogrel, on steroids, eventually IVIG if platelets are not improve in 4 days  overall slow improvement, and I suspect will continue to improve Hematology is following the patient      Dyslipidemia:  Continue statin     Hypertension:  Well controlled  Counseling / Coordination of Care  Total time spent 20 minutes including teaching and family updates  More than 50% was spent counseling pt and family

## 2017-12-03 NOTE — PROGRESS NOTES
Progress Note - Daivd Omaha 47 y o  male MRN: [id number]    Unit/Bed#: -01 Encounter: [id number]        * Thrombocytopenia [facility])   Assessment & Plan      · Secondary to stress induced ITP +/- antiplatelet therapy  · Oncology input appreciated  · On IV dexamethasone for 4 days  · Plt count should be stable at 50k prior to discharge per heme/onc  · Platelet count improving  T1DM (type 1 diabetes mellitus) ([facility] [address] )   Assessment & Plan    · On insulin pump  · Discussed the need to increase insulin while on steroids, patient to make further adjustments today for a goal blood sugar less than 180  · Discussed with patient to let us know if he is unable to manage sugars with his pump ASAP        Mouth sores   Assessment & Plan      · Resolving        CAD (coronary artery disease)   Assessment & Plan      · Cardiology input appreciated  · Keep off anti-platelet therapy for now  Will need hematology input as to when to resume   · Use Brilinta or Effient when able        Transaminitis   Assessment & Plan    · Trending down  · Suspect secondary to recent ETOH use  · Hematology to follow up on hepatitis and HIV        Benign essential HTN   Assessment & Plan    · stable            Pharmacologic: Pharmacologic VTE Prophylaxis contraindicated due to Thrombocytopenia  Mechanical VTE Prophylaxis in Place: No    Patient Centered Rounds: I have performed bedside rounds with nursing staff today  Discussions with Specialists or Other Care Team Provider:     Education and Discussions with Family / Patient:  Patient    Time Spent for Care: 30 minutes  More than 50% of total time spent on counseling and coordination of care as described above  Current Length of Stay: 2 day(s)    Current Patient Status: Inpatient   Certification Statement: The patient will continue to require additional inpatient hospital stay due to IV dexamethasone    Discharge Plan / Estimated Discharge Date:  When platelet count stable  Anticipate next 24-48 hours  Code Status: Level 1 - Full Code      Subjective:   Patient without complaints  Mouth sores almost resolved  No issues overnight  Objective:     Vitals:   Temp (24hrs), Av 4 °F (36 9 °C), Min:98 1 °F (36 7 °C), Max:98 7 °F (37 1 °C)    HR:  [65-66] 66  Resp:  [18] 18  BP: (120-136)/(58-63) 125/63  SpO2:  [94 %-98 %] 98 %  Body mass index is 27 44 kg/m²  Input and Output Summary (last 24 hours): Intake/Output Summary (Last 24 hours) at 17 1052  Last data filed at 17 0900   Gross per 24 hour   Intake              410 ml   Output                0 ml   Net              410 ml       Physical Exam:     Physical Exam   Constitutional: He is oriented to person, place, and time  He appears well-developed  No distress  HENT:   Head: Normocephalic and atraumatic  Neck: Normal range of motion  Neck supple  Cardiovascular: Normal rate and regular rhythm  No murmur heard  Pulmonary/Chest: Effort normal and breath sounds normal  No respiratory distress  He has no wheezes  He has no rales  Abdominal: Soft  Bowel sounds are normal  He exhibits no distension  Musculoskeletal: He exhibits no edema  Neurological: He is alert and oriented to person, place, and time  No cranial nerve deficit  Skin: Skin is warm and dry  No rash noted  Psychiatric: He has a normal mood and affect         Additional Data:     Labs:      Results from last 7 days  Lab Units 17  0506 17  0617   WBC Thousand/uL 10 13 11 01*   HEMOGLOBIN g/dL 12 4 12 7   HEMATOCRIT % 37 3 37 0   PLATELETS Thousands/uL 30* 12*   NEUTROS PCT %  --  80*   LYMPHS PCT %  --  11*   MONOS PCT %  --  9   EOS PCT %  --  0       Results from last 7 days  Lab Units 17  0617   SODIUM mmol/L 136   POTASSIUM mmol/L 4 4   CHLORIDE mmol/L 100   CO2 mmol/L 26   BUN mg/dL 15   CREATININE mg/dL 0 82   CALCIUM mg/dL 9 4   TOTAL PROTEIN g/dL 7 6   BILIRUBIN TOTAL mg/dL 0 40   ALK PHOS U/L 229* ALT U/L 84*   AST U/L 29   GLUCOSE RANDOM mg/dL 238*       Results from last 7 days  Lab Units 12/01/17  0759   INR  0 83*         Recent Cultures (last 7 days):           Last 24 Hours Medication List:     atorvastatin 80 mg Oral Daily With Dinner   dexamethasone 40 mg Intravenous Daily   lisinopril 20 mg Oral Daily   metoprolol succinate 25 mg Oral Daily   pantoprazole 20 mg Oral Early Morning   patient maintained insulin pump 1 each Subcutaneous Q8H   sertraline 50 mg Oral Daily        Today, Patient Was Seen By: Kendra Shell PA-C    ** Please Note: Dragon 360 Dictation voice to text software may have been used in the creation of this document   **

## 2017-12-04 VITALS
SYSTOLIC BLOOD PRESSURE: 148 MMHG | TEMPERATURE: 98.3 F | OXYGEN SATURATION: 94 % | BODY MASS INDEX: 27.44 KG/M2 | WEIGHT: 170 LBS | DIASTOLIC BLOOD PRESSURE: 72 MMHG | HEART RATE: 59 BPM | RESPIRATION RATE: 18 BRPM

## 2017-12-04 LAB
ALBUMIN SERPL BCP-MCNC: 3 G/DL (ref 3.5–5)
ALP SERPL-CCNC: 192 U/L (ref 46–116)
ALT SERPL W P-5'-P-CCNC: 53 U/L (ref 12–78)
ANION GAP SERPL CALCULATED.3IONS-SCNC: 8 MMOL/L (ref 4–13)
AST SERPL W P-5'-P-CCNC: 22 U/L (ref 5–45)
BILIRUB SERPL-MCNC: 0.3 MG/DL (ref 0.2–1)
BUN SERPL-MCNC: 16 MG/DL (ref 5–25)
CALCIUM SERPL-MCNC: 8.9 MG/DL (ref 8.3–10.1)
CHLORIDE SERPL-SCNC: 103 MMOL/L (ref 100–108)
CO2 SERPL-SCNC: 26 MMOL/L (ref 21–32)
CREAT SERPL-MCNC: 0.79 MG/DL (ref 0.6–1.3)
ERYTHROCYTE [DISTWIDTH] IN BLOOD BY AUTOMATED COUNT: 14.2 % (ref 11.6–15.1)
GFR SERPL CREATININE-BSD FRML MDRD: 104 ML/MIN/1.73SQ M
GLUCOSE SERPL-MCNC: 134 MG/DL (ref 65–140)
GLUCOSE SERPL-MCNC: 166 MG/DL (ref 65–140)
GLUCOSE SERPL-MCNC: 199 MG/DL (ref 65–140)
HCT VFR BLD AUTO: 36.4 % (ref 36.5–49.3)
HGB BLD-MCNC: 12.2 G/DL (ref 12–17)
MCH RBC QN AUTO: 30.3 PG (ref 26.8–34.3)
MCHC RBC AUTO-ENTMCNC: 33.5 G/DL (ref 31.4–37.4)
MCV RBC AUTO: 90 FL (ref 82–98)
PLATELET # BLD AUTO: 87 THOUSANDS/UL (ref 149–390)
PMV BLD AUTO: 12.6 FL (ref 8.9–12.7)
POTASSIUM SERPL-SCNC: 4 MMOL/L (ref 3.5–5.3)
PROT SERPL-MCNC: 6.9 G/DL (ref 6.4–8.2)
RBC # BLD AUTO: 4.03 MILLION/UL (ref 3.88–5.62)
SODIUM SERPL-SCNC: 137 MMOL/L (ref 136–145)
WBC # BLD AUTO: 14.66 THOUSAND/UL (ref 4.31–10.16)

## 2017-12-04 PROCEDURE — 80053 COMPREHEN METABOLIC PANEL: CPT | Performed by: PHYSICIAN ASSISTANT

## 2017-12-04 PROCEDURE — 82948 REAGENT STRIP/BLOOD GLUCOSE: CPT

## 2017-12-04 PROCEDURE — 85027 COMPLETE CBC AUTOMATED: CPT | Performed by: PHYSICIAN ASSISTANT

## 2017-12-04 RX ORDER — ASPIRIN 81 MG/1
81 TABLET ORAL DAILY
Refills: 0
Start: 2017-12-04 | End: 2020-09-10 | Stop reason: HOSPADM

## 2017-12-04 RX ADMIN — METOPROLOL SUCCINATE 25 MG: 25 TABLET, EXTENDED RELEASE ORAL at 09:40

## 2017-12-04 RX ADMIN — LISINOPRIL 20 MG: 20 TABLET ORAL at 09:41

## 2017-12-04 RX ADMIN — SERTRALINE HYDROCHLORIDE 50 MG: 50 TABLET ORAL at 09:40

## 2017-12-04 RX ADMIN — ZOLPIDEM TARTRATE 5 MG: 5 TABLET ORAL at 00:10

## 2017-12-04 RX ADMIN — DEXAMETHASONE SODIUM PHOSPHATE 40 MG: 10 INJECTION, SOLUTION INTRAMUSCULAR; INTRAVENOUS at 10:36

## 2017-12-04 RX ADMIN — PANTOPRAZOLE SODIUM 20 MG: 20 TABLET, DELAYED RELEASE ORAL at 06:41

## 2017-12-04 NOTE — PLAN OF CARE
INFECTION - ADULT     Absence of fever/infection during neutropenic period Completed          INFECTION - ADULT     Absence or prevention of progression during hospitalization Progressing        Knowledge Deficit     Patient/family/caregiver demonstrates understanding of disease process, treatment plan, medications, and discharge instructions Progressing        Potential for Falls     Patient will remain free of falls Progressing        SAFETY ADULT     Patient will remain free of falls Progressing     Maintain or return to baseline ADL function Progressing     Maintain or return mobility status to optimal level Progressing

## 2017-12-04 NOTE — PROGRESS NOTES
Medical Oncology/Hematology Progress Note  Tati Linda, 47 y o , 1965  /-[birth year], [id number]  12/04/17    Assessment and Plan:    1  Thrombocytopenia, secondary to ITP  Patient was started on 40 mg of dexamethasone on 12/1/17  After receiving three daily doses, patient's platelet count has rebounded to 87 Thous/uL  Patient was given his 4th dose this morning  Patient understands the importance of follow-up  CBC which he was instructed to complete on 12/8/17  Patient will follow up with Asa Shea on 12/12/17 at the [address] [place] [address]  This office has placed orders of for [facility]CD  As the patient had a significant response to dexamethasone, I believe his low platelet count is secondary to previously diagnosed ITP  As the patient's platelet count is above 50 Thous/uL,  The patient will restart anti-platelet therapy Brilinta and ASA  The patient understands that with restarting these medications he could experience decreased platelet count  We discussed signs and symptoms of thrombocytopenia  If patient exhibits these he says seek immediate medical attention  If platelet count diminishes again then these agents may need to be discontinued  Reason for Consultation:  thrombocytopenia    Chief complaint:  Bleeding gums and mouth blisters  History of present illness: This is a 52-year-old male with past medical history of ITP status post splenectomy, diabetes mellitus, CAD status post stent and hyperlipidemia who presented to the [facility] on 12/1/17 for evaluation of  Of bleeding comes in mouth blisters  The patient underwent CBC which demonstrated platelet count of 9 Thous/uL  Hematology was consulted and the patient was started on dexamethasone 40 mg x 4 days  Patient notes anti-platelet medications including aspirin and Brilinta we held at this time     Of note, the patient was on Plavix previously but was switched to Brilinta for cost reasons  Previously the patient did not have thrombocytopenia on Plavix  Interval history:   Patient denies additional gum bleeding lower extremity petechiae or new ecchymosis  Patient states overall he feels well  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  I reviewed the patient's hospital course  Patient did not have any questions or concerns for Hematology at this time  Patient did voiced agreement to following up with Hematology  Review of Systems   Constitutional: Negative for activity change, fatigue and fever  Respiratory: Negative for cough, choking and chest tightness  Cardiovascular: Negative for chest pain, palpitations and leg swelling  Gastrointestinal: Negative for abdominal distention and blood in stool  Genitourinary: Negative for hematuria  Musculoskeletal: Negative for arthralgias  Hematological: Does not bruise/bleed easily ( not since admission  )  All other review of systems were negative      Medication Administration - last 24 hours from 12/03/2017 1609 to 12/04/2017 1609       Date/Time Order Dose Route Action Action by     12/04/2017 0941 lisinopril (ZESTRIL) tablet 20 mg 20 mg Oral Given Venita Winter RN     12/04/2017 0641 pantoprazole (PROTONIX) EC tablet 20 mg 20 mg Oral Given Yamini Rodrigez RN     12/04/2017 0940 sertraline (ZOLOFT) tablet 50 mg 50 mg Oral Given Venita Winter RN     12/03/2017 1616 atorvastatin (LIPITOR) tablet 80 mg 80 mg Oral Given Lissy Shah RN     12/04/2017 0940 metoprolol succinate (TOPROL-XL) 24 hr tablet 25 mg 25 mg Oral Given Venita Winter RN     12/04/2017 0944 PATIENT MAINTAINED INSULIN PUMP 1 each 1 each Subcutaneous Given Venita Winter RN     12/04/2017 0549 PATIENT MAINTAINED INSULIN PUMP 1 each 1 each Subcutaneous Given Yamini Rodrigez RN     12/03/2017 1617 PATIENT MAINTAINED INSULIN PUMP 1 each 7 1 each Subcutaneous Given Lissy Shah RN 12/04/2017 0010 zolpidem (AMBIEN) tablet 5 mg 5 mg Oral Given Jojo Garcia RN     12/04/2017 1036 dexamethasone (DECADRON) 40 mg in sodium chloride 0 9 % 50 mL IVPB 40 mg Intravenous New Bag Vivek Connell RN        Vital signs:  Vitals:    12/04/17 1058   BP: 148/72   Pulse:    Resp:    Temp:    SpO2:      Physical Exam   Constitutional: He is oriented to person, place, and time  He appears well-developed and well-nourished  HENT:   Head: Normocephalic and atraumatic  Eyes: EOM are normal  Pupils are equal, round, and reactive to light  No scleral icterus  Cardiovascular: Normal rate and regular rhythm  Pulmonary/Chest: Effort normal and breath sounds normal  No respiratory distress  Abdominal: Soft  Bowel sounds are normal  He exhibits no distension  Musculoskeletal: He exhibits no edema  Neurological: He is alert and oriented to person, place, and time  Skin: No rash noted  Scattered ecchymosis on the upper extremities  No petechiae         Labs and Pertinent Reports Reviewed  Component      Latest Ref Rng & Units 12/2/2017   HEPATITIS B SURFACE ANTIGEN      Non-reactive, NonReactive - Confirmed Non-reactive   HEPATITIS C ANTIBODY      Non-reactive Non-reactive   HEPATITIS B CORE IGM ANTIBODY      Non-reactive Non-reactive   HEPATITIS B CORE TOTAL ANTIBODY      Non-reactive Non-reactive     Component      Latest Ref Rng & Units 12/1/2017 12/2/2017 12/3/2017 12/4/2017   WBC      4 31 - 10 16 Thousand/uL 6 36 11 01 (H) 10 13 14 66 (H)   RBC      3 88 - 5 62 Million/uL 4 07 4 15 4 12 4 03   Hemoglobin      12 0 - 17 0 g/dL 12 3 12 7 12 4 12 2   Hematocrit      36 5 - 49 3 % 37 0 37 0 37 3 36 4 (L)   MCV      82 - 98 fL 91 89 91 90   MCH      26 8 - 34 3 pg 30 2 30 6 30 1 30 3   MCHC      31 4 - 37 4 g/dL 33 2 34 3 33 2 33 5   RDW      11 6 - 15 1 % 14 2 14 0 14 5 14 2   MPV      8 9 - 12 7 fL  9 7  12 6   Platelets      040 - 390 Thousands/uL 9 (LL) 12 (LL) 30 (LL) 87 (L)     Please note:  This report has been generated by a voice recognition software system  Therefore there may be syntax, spelling, and/or grammatical errors  Please call if you have any questions

## 2017-12-04 NOTE — NURSING NOTE
Cardiology delivered pt's med samples  Day shift RN reviewed discharge instructions with pt and spouse, both state they have no questions now

## 2017-12-04 NOTE — DISCHARGE INSTRUCTIONS
Continue to monitor your blood sugars closely  Your blood sugars are high from the steroids you received  The blood sugars will trend down over the next 24-48 hours, so be sure to adjust your insulin pump accordingly  Your liver function studies were elevated on admission and have now improved  This may have been due to your alcohol use  Avoid alcohol use at this time  Thrombocytopenia   WHAT YOU NEED TO KNOW:   Thrombocytopenia occurs when your body does not have enough platelets  Platelets are cells that help your blood clot  Your body may not be making enough platelets, or it may be destroying too many platelets  When platelets become low, your risk for bleeding increases  Severe bleeding may become life-threatening  DISCHARGE INSTRUCTIONS:   Call 911 for any of the following:   · You have chest pain, tightness, or heaviness that spreads to your shoulders, arms, jaw, neck, or back  · You have weakness on one side of your body, a severe headache, difficulty speaking, or a change in vision  Seek care immediately if:   · You have bleeding that does not stop after you elevate and place pressure on the area  · You vomit blood or material that looks like coffee grounds  · Your arm or leg feels warm, tender, and painful  It may look swollen and red  · You suddenly feel lightheaded, dizzy, or weak  Contact your healthcare provider if:   · You have bleeding from your gums, mouth, or nose  · You have irregular or heavy menstrual bleeding  · You have blood in your urine or bowel movement  · You have more bruises or small red or purple spots on your skin  · You have questions or concerns about your condition or care  Medicines:   · Medicines  can help increase platelet production and prevent bleeding  · Take your medicine as directed  Contact your healthcare provider if you think your medicine is not helping or if you have side effects   Tell him or her if you are allergic to any medicine  Keep a list of the medicines, vitamins, and herbs you take  Include the amounts, and when and why you take them  Bring the list or the pill bottles to follow-up visits  Carry your medicine list with you in case of an emergency  Self-care to help prevent bleeding:  Examine your skin for minor bumps, scrapes, and cuts  These injuries can increase your risk for bleeding that can become life-threatening  · Use caution with skin and mouth care  Use a soft washcloth and a soft toothbrush  This can keep your skin and gums from bleeding  Keep your nails trimmed  If you shave, use an electric shaver  · Do not strain when you have a bowel movement  This can increase pressure in your brain and could cause bleeding  Ask your healthcare provider about a stool softener or laxative if you are constipated  Do not use enemas or suppositories  · Use a cool mist humidifier  to increase moisture in your home  This may help prevent coughing or nosebleeds  Coughing can increase pressure in your brain and cause bleeding  · Avoid activities that may cause scratches or bruises  Wear shoes or slippers to protect your feet from injury  Ask which activities are safe for you  · Do not take aspirin or NSAIDs  These medicines can cause you to bleed and bruise more easily  Wear medical alert identification:  Wear a medical alert bracelet or carry a card that says you have thrombocytopenia  Ask where to get these items  Follow up with your healthcare provider as directed: You will need to return for more blood tests  Write down your questions so you remember to ask them during your visits  © 2017 [employer] Information is for End User's use only and may not be sold, redistributed or otherwise used for commercial purposes  All illustrations and images included in CareNotes® are the copyrighted property of [employer]  or Madhu Gerber    The above information is an  only  It is not intended as medical advice for individual conditions or treatments  Talk to your doctor, nurse or pharmacist before following any medical regimen to see if it is safe and effective for you

## 2017-12-04 NOTE — DISCHARGE SUMMARY
[facility]    Patient Information: Halina Howard 47 y o  male MRN: [id number]  Unit/Bed#: -01 Encounter: [id number]    Discharging Physician / Practitioner: Nisha Barba PA-C  PCP: Carlos Alberto Senior DO  Admission Date: 12/1/2017  Discharge Date: 12/04/17    Reason for Admission: mouth sores/thrombocytopenia    Discharge Diagnoses:     Principal Problem: Thrombocytopenia ([facility] [address] )  Active Problems:    [facility] )    Mouth sores    CAD (coronary artery disease)    Transaminitis    Benign essential HTN    AS (aortic stenosis)  Resolved Problems:    * No resolved hospital problems  *      Consultations During Hospital Stay:  · Dr Iris Garcia  · Dr Michael Hawk    Procedures Performed:     · none    Significant Findings / Test Results:     · thrombocytopenia    Incidental Findings:   · Elevated LFT's     Test Results Pending at Discharge (will require follow up):   · none     Outpatient Tests Requested:  · Follow up CBC    Complications:  none    Hospital Course:     Halina Howard is a 47 y o  male patient with a history of ITP/splenectomy who originally presented to the hospital on 12/1/2017 due to worsening mouth sores  Patient was found to have a platelet count of 9,536  He was seen in consultation by heme/onc and felt to have either stress induced ITP vs medication side effect secondary to his recent change from Brilinta to Plavix  Patient received IV Decadron 40mg daily for 4 doses with improvement of his platelet count to 87,809  Patient will remain off Plavix  Cardiology recommends restarting Brilinta and aspirin in the setting of recent cardiac stent placement when cleared by hematology  Hematology cleared patient to restart Brilinta and aspirin on discharge  Cardiology will bring the patient a month of samples until they can get his prescription prior authorized  Patient was also noted to have elevated LFT's including GGT   Patient did admit to a recent increase in alcohol use  LFT's normalized prior to discharge  Condition at Discharge: good     Discharge Day Visit / Exam:     Subjective:  Offers no complaints  Vitals: Blood Pressure: 148/72 (12/04/17 1058)  Pulse: 59 (12/04/17 0700)  Temperature: 98 3 °F (36 8 °C) (12/04/17 0700)  Temp Source: Oral (12/04/17 0700)  Respirations: 18 (12/04/17 0700)  Weight - Scale: 77 1 kg (170 lb) (12/01/17 0735)  SpO2: 94 % (12/04/17 0700)  Exam:   Physical Exam   Constitutional: He is oriented to person, place, and time  He appears well-developed  No distress  HENT:   Head: Normocephalic and atraumatic  Neck: Normal range of motion  Neck supple  Cardiovascular: Normal rate and regular rhythm  No murmur heard  Pulmonary/Chest: Effort normal and breath sounds normal  No respiratory distress  He has no wheezes  He has no rales  Abdominal: Soft  Bowel sounds are normal  He exhibits no distension  Musculoskeletal: He exhibits no edema  Neurological: He is alert and oriented to person, place, and time  No cranial nerve deficit  Skin: Skin is warm and dry  No rash noted  Psychiatric: He has a normal mood and affect  Discussion with Family:     Discharge instructions/Information to patient and family:   See after visit summary for information provided to patient and family  Provisions for Follow-Up Care:  See after visit summary for information related to follow-up care and any pertinent home health orders  Disposition:     Home    For Discharges to [facility]:   · Not Applicable to this Patient - Not Applicable to this Patient    Planned Readmission: none     Discharge Statement:  I spent 45 minutes discharging the patient  This time was spent on the day of discharge  I had direct contact with the patient on the day of discharge   Greater than 50% of the total time was spent examining patient, answering all patient questions, arranging and discussing plan of care with patient as well as directly providing post-discharge instructions  Additional time then spent on discharge activities  Discharge Medications:  See after visit summary for reconciled discharge medications provided to patient and family        ** Please Note: This note has been constructed using a voice recognition system **

## 2017-12-04 NOTE — PROGRESS NOTES
Cardiology Progress Note - Simi Bernsteni 47 y o  male MRN: [id number]    Unit/Bed#: -01 Encounter: [id number]      Assessment:  Principal Problem: Thrombocytopenia ([facility] )  Active Problems:    AS (aortic stenosis)    T1DM (type 1 diabetes mellitus) ([facility] )    Benign essential HTN    Mouth sores    CAD (coronary artery disease)    Transaminitis    S/P drug-eluting stent placement to the LAD - 8/31/17     Ischemic cardiomyopathy ejection fraction 40-45%    Plan: Thrombocytopenia presumed to be from Plavix, as platelets are improving off of this  Suggest restarting anti-platelet therapy as soon as possible  Still requires dual anti-platelet therapy, and suggest aspirin and Brilinta at this point  Continue all other medical therapy  Close outpatient follow-up  Follows with Dr Juanita Sotelo  Subjective:   Patient seen and examined  No significant events overnight  Denies chest pain or shortness of breath  No signs of bleeding  Objective:     Vitals: Blood pressure 148/72, pulse 59, temperature 98 3 °F (36 8 °C), temperature source Oral, resp  rate 18, weight 77 1 kg (170 lb), SpO2 94 %  , Body mass index is 27 44 kg/m² , Orthostatic Blood Pressures    Flowsheet Row Most Recent Value   Blood Pressure  148/72 filed at 12/04/2017 1058   Patient Position - Orthostatic VS  Lying filed at 12/04/2017 0700            Intake/Output Summary (Last 24 hours) at 12/04/17 1208  Last data filed at 12/03/17 1300   Gross per 24 hour   Intake              240 ml   Output                0 ml   Net              240 ml       Physical Exam:    GEN: Simi Bernstein appears well, alert and oriented x 3, pleasant and cooperative   HEENT: pupils equal, round, and reactive to light; extraocular muscles intact  NECK: supple, no carotid bruits   HEART: regular rhythm, normal S1 and S2, no murmurs, clicks, gallops or rubs   LUNGS: clear to auscultation bilaterally; no wheezes, rales, or rhonchi   ABDOMEN: normal bowel sounds, soft, no tenderness, no distention  EXTREMITIES: peripheral pulses normal; no clubbing, cyanosis, or edema  NEURO: no focal findings   SKIN: normal without suspicious lesions on exposed skin    Medications:      Current Facility-Administered Medications:     atorvastatin (LIPITOR) tablet 80 mg, 80 mg, Oral, Daily With Jarred Bar PA-C, 80 mg at 12/03/17 1616    insulin aspart (NovoLOG) FOR PUMP REFILLS 1 Units, 1 Units, Subcutaneous Insulin Pump, PRN, Trinity Bar PA-C    lisinopril (ZESTRIL) tablet 20 mg, 20 mg, Oral, Daily, Trinity Bar PA-C, 20 mg at 12/04/17 0941    metoprolol succinate (TOPROL-XL) 24 hr tablet 25 mg, 25 mg, Oral, Daily, Trinity Bar PA-C, 25 mg at 12/04/17 0940    ondansetron (ZOFRAN) injection 4 mg, 4 mg, Intravenous, Q4H PRN, Trinity Bar PA-C    pantoprazole (PROTONIX) EC tablet 20 mg, 20 mg, Oral, Early Morning, Trinity Bar PA-C, 20 mg at 12/04/17 0641    PATIENT MAINTAINED INSULIN PUMP 1 each, 1 each, Subcutaneous, Q8H, Trinity Bar PA-C, 1 each at 12/04/17 0944    sertraline (ZOLOFT) tablet 50 mg, 50 mg, Oral, Daily, Trinity Bar PA-C, 50 mg at 12/04/17 0940    zolpidem (AMBIEN) tablet 5 mg, 5 mg, Oral, HS PRN, Cait Faria PA-C, 5 mg at 12/04/17 0010     Labs & Results:          Results from last 7 days  Lab Units 12/04/17  0628 12/03/17  0506 12/02/17  0617   WBC Thousand/uL 14 66* 10 13 11 01*   HEMOGLOBIN g/dL 12 2 12 4 12 7   HEMATOCRIT % 36 4* 37 3 37 0   PLATELETS Thousands/uL 87* 30* 12*           Results from last 7 days  Lab Units 12/04/17  0628 12/02/17  0617 12/01/17  0759   SODIUM mmol/L 137 136 135*   POTASSIUM mmol/L 4 0 4 4 4 7   CHLORIDE mmol/L 103 100 100   CO2 mmol/L 26 26 30   BUN mg/dL 16 15 21   CREATININE mg/dL 0 79 0 82 0 81   CALCIUM mg/dL 8 9 9 4 9 2   TOTAL PROTEIN g/dL 6 9 7 6 7 5   BILIRUBIN TOTAL mg/dL 0 30 0 40 0 30   ALK PHOS U/L 192* 229* 245*   ALT U/L 53 84* 107*   AST U/L 22 29 62*   GLUCOSE RANDOM mg/dL 166* 238* 221*       Results from last 7 days  Lab Units 12/01/17  0759   INR  0 83*   PTT seconds 28       Counseling / Coordination of Care  Total floor / unit time spent today 20 minutes  Greater than 50% of total time was spent with the patient and / or family counseling and / or coordination of care

## 2017-12-04 NOTE — CASE MANAGEMENT
Initial Clinical Review    Admission: Date/Time/Statement: 12/1/17 @ 0916 Inpatient Written     Orders Placed This Encounter   Procedures    Inpatient Admission (expected length of stay for this patient is greater than two midnights)     Standing Status:   Standing     Number of Occurrences:   1     Order Specific Question:   Admitting Physician     Answer:   Horacio Anderson [[id number]]     Order Specific Question:   Level of Care     Answer:   Med Surg [16]     Order Specific Question:   Estimated length of stay     Answer:   More than 2 Midnights     Order Specific Question:   Certification     Answer:   I certify that inpatient services are medically necessary for this patient for a duration of greater than two midnights  See H&P and MD Progress Notes for additional information about the patient's course of treatment  ED: Date/Time/Mode of Arrival:   ED Arrival Information     Expected Arrival Acuity Means of Arrival Escorted By Service Admission Type    - 12/1/2017 06:55 Urgent Walk-In Self General Medicine Urgent    Arrival Complaint    Mouth sores          Chief Complaint:   Chief Complaint   Patient presents with    Mouth Lesions     Pt states that he started with mouth sores 2 days ago and also woke up with more of them today in his mouth and also now on his face  History of Illness: Paulette Poon is a 47 y o  male who presents with 2 days of bothersome blood blisters and gum bleeding in his mouth  He also had 1 area of bruising on his knee that resulted from dropping of very heavy milk gallon it  He did not perceive this bruising to be more than expected  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  He had normal platelet count since        ED Vital Signs:   ED Triage Vitals [12/01/17 0735]   Temperature Pulse Respirations Blood Pressure SpO2   98 2 °F (36 8 °C) 75 16 (!) 173/80 98 %      Temp Source Heart Rate Source Patient Position - Orthostatic VS BP Location FiO2 (%)   Oral Monitor Lying Right arm --      Pain Score       2        Wt Readings from Last 1 Encounters:   12/01/17 77 1 kg (170 lb)     Abnormal Labs/Diagnostic Test Results:   PLATELETS 9*   MONOS PCT 14*     SODIUM 135*     ALK PHOS 245*   *   AST 62*   GLUCOSE RANDOM 221*      INR   0 83*     ED Treatment:   Medication Administration from 12/01/2017 0655 to 12/01/2017 1549       Date/Time Order Dose Route Action     12/01/2017 0938 dexamethasone (DECADRON) 40 mg in sodium chloride 0 9 % 50 mL IVPB 40 mg Intravenous New Bag          Past Medical/Surgical History: Active Ambulatory Problems     Diagnosis Date Noted    Dyspnea 08/30/2017    AS (aortic stenosis) 08/30/2017    Palpitation 08/30/2017    T1DM (type 1 diabetes mellitus) ([facility] ) 08/30/2017    Benign essential HTN 08/30/2017    Abnormal EKG 08/30/2017     Resolved Ambulatory Problems     Diagnosis Date Noted    No Resolved Ambulatory Problems     Past Medical History:   Diagnosis Date    Aortic stenosis     Diabetes mellitus ([facility] )     Hyperlipidemia     Hypertension        Admitting Diagnosis: CAD (coronary artery disease) [I25 10]  Thrombocytopenia (HCC) [D69 6]  Mouth sores [K13 79]  Acute ITP (HCC) [D69 3]    Age/Sex: 47 y o  male    Assessment:  Principal Problem: Thrombocytopenia ([facility] )  Active Problems:    AS (aortic stenosis)    T1DM (type 1 diabetes mellitus) ([facility] )    Benign essential HTN    Mouth sores    CAD (coronary artery disease)    Transaminitis        Plan for the Primary Problem(s):  · Acute thrombocytopenia-patient with 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  He had normal platelet count of 888 until 3 months ago when he had a cardiac stent placed and when on dual anti-platelet therapy  He presents with multiple blood blisters in his mouth and gum bleeding but no other catastrophic bleeding noted   Hematology should be consulted and they have already been notified of the case and recommended Decadron 40 mg IV x1  Platelet transfusion was not recommended at this time unless he has significant bleeding  Recommend rechecking CBC in a m  -defer additional workup at this time to the Hematology team     Plan for Additional Problems:   · Mild transaminitis-unclear if this is related to would ever process has caused his thrombocytopenia or possibly related to his recent uptick in alcohol use  Would recheck in a m  recommend alcohol cessation  · Coronary artery disease status post stent 90 days ago-I spoke directly with Cardiology in reference to this today  Given his profoundly low platelet count his dual anti-platelet therapy should be held  Certainly this does create risk in the setting of recently placed stents but the risk of continuing it outweighs the benefit at this time  Continue beta-blocker, Ace inhibitor, and statin as patient does have underlying cardiomyopathy as well  He will continue with routine follow-up of his known aortic stenosis  · Type 1 diabetes-anticipate he will have significant hyperglycemia after the dose of Decadron provided today  He wishes to continue to utilize his insulin pump from home      VTE Prophylaxis: Pharmacologic VTE Prophylaxis contraindicated due to low platelets  / sequential compression device   Code Status: full   POLST: There is no POLST form on file for this patient (pre-hospital)     Anticipated Length of Stay:  Patient will be admitted on an Inpatient basis with an anticipated length of stay of  Greater than 2 midnights     Justification for Hospital Stay: severely low platelets    Admission Orders:  Accuchecks QAC and QHS with coverage  Sequential compression device  Consult cardiology    Scheduled Meds:   atorvastatin 80 mg Oral Daily With Dinner   lisinopril 20 mg Oral Daily   metoprolol succinate 25 mg Oral Daily   pantoprazole 20 mg Oral Early Morning   patient maintained insulin pump 1 each Subcutaneous Q8H sertraline 50 mg Oral Daily     Continuous Infusions:    PRN Meds: insulin aspart    ondansetron    zolpidem    St  [facility] in the [facility] [place] by [address] for 2017  Network Utilization Review Department  Phone: [phone]; Fax [phone]  ATTENTION: The Network Utilization Review Department is now centralized for our 7 Facilities  Make a note that we have a new phone and fax numbers for our Department  Please call with any questions or concerns to [phone] and carefully follow the prompts so that you are directed to the right person  All voicemails are confidential  Fax any determinations, approvals, denials, and requests for initial or continue stay review clinical to [phone]  Due to HIGH CALL volume, it would be easier if you could please send faxed requests to expedite your requests and in part, help us provide discharge notifications faster

## 2017-12-04 NOTE — PROGRESS NOTES
Pt asking why it is taking so long "for them to bring the Brilinta samples"  Pt unsure who was supposed to bring the samples  Dr Layton tian and he said he will go get the samples "shortly" and bring them to the pt's room

## 2017-12-05 LAB
HIV1 RNA # SERPL NAA+PROBE: <20 COPIES/ML
HIV1 RNA SERPL NAA+PROBE-LOG#: NORMAL LOG10COPY/ML

## 2017-12-05 NOTE — CASE MANAGEMENT
Notification of Discharge  This is a Notification of Discharge from our facility [address]  Please be advised that this patient has been discharge from our facility  Below you will find the admission and discharge date and time including the patients disposition  PRESENTATION DATE: 12/1/2017  7:31 AM  IP ADMISSION DATE: 12/1/17 0916  DISCHARGE DATE: 12/4/2017  5:06 PM  DISPOSITION: [address] [place] [address] in the [facility] by Madhu Gerber for 2017  Network Utilization Review Department  Phone: [phone]; Fax [phone]  ATTENTION: The Network Utilization Review Department is now centralized for our 7 Facilities  Make a note that we have a new phone and fax numbers for our Department  Please call with any questions or concerns to [phone] and carefully follow the prompts so that you are directed to the right person  All voicemails are confidential  Fax any determinations, approvals, denials, and requests for initial or continue stay review clinical to [phone]  Due to HIGH CALL volume, it would be easier if you could please send faxed requests to expedite your requests and in part, help us provide discharge notifications faster

## 2017-12-07 ENCOUNTER — TRANSCRIBE ORDERS (OUTPATIENT)
Dept: LAB | Facility: CLINIC | Age: 52
End: 2017-12-07

## 2017-12-07 ENCOUNTER — APPOINTMENT (OUTPATIENT)
Dept: LAB | Facility: CLINIC | Age: 52
End: 2017-12-07
Payer: COMMERCIAL

## 2017-12-07 ENCOUNTER — ALLSCRIPTS OFFICE VISIT (OUTPATIENT)
Dept: OTHER | Facility: OTHER | Age: 52
End: 2017-12-07

## 2017-12-07 ENCOUNTER — TRANSCRIBE ORDERS (OUTPATIENT)
Dept: ADMINISTRATIVE | Facility: HOSPITAL | Age: 52
End: 2017-12-07

## 2017-12-07 DIAGNOSIS — E78.5 HYPERLIPIDEMIA, UNSPECIFIED HYPERLIPIDEMIA TYPE: ICD-10-CM

## 2017-12-07 DIAGNOSIS — E78.5 HYPERLIPIDEMIA, UNSPECIFIED HYPERLIPIDEMIA TYPE: Primary | ICD-10-CM

## 2017-12-07 DIAGNOSIS — E78.5 HYPERLIPIDEMIA: ICD-10-CM

## 2017-12-07 DIAGNOSIS — E55.9 AVITAMINOSIS D: ICD-10-CM

## 2017-12-07 DIAGNOSIS — I25.5 ISCHEMIC CARDIOMYOPATHY: Primary | ICD-10-CM

## 2017-12-07 LAB
25(OH)D3 SERPL-MCNC: 10.7 NG/ML (ref 30–100)
CHOLEST SERPL-MCNC: 200 MG/DL (ref 50–200)
CK SERPL-CCNC: 36 U/L (ref 39–308)
ERYTHROCYTE [DISTWIDTH] IN BLOOD BY AUTOMATED COUNT: 14.9 % (ref 11.6–15.1)
HCT VFR BLD AUTO: 40.7 % (ref 36.5–49.3)
HDLC SERPL-MCNC: 129 MG/DL (ref 40–60)
HGB BLD-MCNC: 13.6 G/DL (ref 12–17)
LDLC SERPL CALC-MCNC: 51 MG/DL (ref 0–100)
MCH RBC QN AUTO: 30.5 PG (ref 26.8–34.3)
MCHC RBC AUTO-ENTMCNC: 33.4 G/DL (ref 31.4–37.4)
MCV RBC AUTO: 91 FL (ref 82–98)
PLATELET # BLD AUTO: 167 THOUSANDS/UL (ref 149–390)
PMV BLD AUTO: 9.9 FL (ref 8.9–12.7)
RBC # BLD AUTO: 4.46 MILLION/UL (ref 3.88–5.62)
TRIGL SERPL-MCNC: 98 MG/DL
WBC # BLD AUTO: 8.78 THOUSAND/UL (ref 4.31–10.16)

## 2017-12-07 PROCEDURE — 82306 VITAMIN D 25 HYDROXY: CPT

## 2017-12-07 PROCEDURE — 36415 COLL VENOUS BLD VENIPUNCTURE: CPT

## 2017-12-07 PROCEDURE — 80061 LIPID PANEL: CPT

## 2017-12-07 PROCEDURE — 82550 ASSAY OF CK (CPK): CPT

## 2017-12-07 PROCEDURE — 85027 COMPLETE CBC AUTOMATED: CPT

## 2017-12-08 NOTE — PROGRESS NOTES
Assessment  Assessed    1  Aortic valve regurgitation, nonrheumatic (424 1) (I35 1)   2  Aortic stenosis (424 1) (I35 0)   3  Benign essential hypertension (401 1) (I10)   4  CAD in native artery (414 01) (I25 10)   5  Hyperlipidemia (272 4) (E78 5)   6  Ischemic cardiomyopathy (414 8) (I25 5)   7  Thrombocytopenia (287 5) (D69 6)   8  Type 1 diabetes mellitus without complication (734 97) (O71 2)    Plan  CAD in native artery    · Clopidogrel Bisulfate 75 MG Oral Tablet (Plavix)   Rx By: Duran Meyer; Dispense: 31 Days ; #:98 TAB; Refill: 3;For: CAD in native artery; MASSIMO = N; Sent To: [address] [place] [address]   · Brilinta 90 MG Oral Tablet; TAKE 1 TABLET TWICE DAILY   Rx By: Duran Meyer; Dispense: 90 Days ; #:180 Tablet; Refill: 3;For: CAD in native artery; MASSIMO = N; Sent To: [facility] [address]  Hyperlipidemia    · (1) CBC/ PLT (NO DIFF); Status:Active; Requested for:31Opx6932;    Perform:[facility]; JWT:70QMY5972; Ordered;For:Hyperlipidemia; Ordered By:Emma Mckeon;   · (1) CK (CPK); Status:Active; Requested for:20Wub3445;    Perform:[facility]; KLL:22SOW0001; Ordered;Ordered By:Emma Mckeon;   · (1) LIPID PANEL FASTING W DIRECT LDL REFLEX; Status:Active; Requestedfor:04Ggo3445;    Perform:[facility]; YKQ:52CKM4595; Ordered;Ordered By:Emma Mckeon;  Ischemic cardiomyopathy    · ECHO FOLLOW UP/LIMITED; Status:Need Information - Financial Authorization; Requested for:10Fhr7915;    Perform:[facility]; NNE:23ETZ4198; Ordered;cardiomyopathy; Ordered By:Jose Mckeon;    Discussion/Summary  Cardiology Discussion Summary Free Text Note Form [facility]:   1  Aortic stenosis  Echo 5/17 showed normal LV size and function, EF 55%, no RWMA, normal diastolic function  Normal RV size and function  Trace MR  Aortic valve was trileaflet, but was moderately thickened and calcified with mildly reduced cuspal separation  Mild to moderate stenosis with trace AI  Mean gradient was 16 mm Hg  Calculated MINI was 1 6 cm squared  HTN  He is on Lisinopril 20 mg daily and Metoprolol 25 mg daily  BP controlled  DM  He is on Humalog  Hgb A1c 10% in 11/16  Hgb A1c 9/1/17 7 7%  HL  Lipid profile 9/1/17 showed total cholesterol 231, TG 62, , LDL 58  He was started on high dose Atorvastatin 80 8/17 due to CAD  Repeat lipid panel now  Palpitations  EKG 5/19/17 showed isolated PVCs  He was started on beta blocker for his CAD  No current palpitations  CAD  Now on aspirin, Brilinta, statin, and beta blocker  ICM  Repeat echo now, 3 months after revascularization to ensure normalization of LV function  Will give script for repeat echo to assess for improvement in LV function  Discharge summary reports he was discharged on diuretic, currently on Lasix 20 mg daily  Chief Complaint  Chief Complaint Free Text Note Form: Patient is here for a 3 month follow up and complaints of weakness but believes it is because of his platelet count  History of Present Illness  Cardiology HPI Free Text Note Form  [facility]: 47year old man with a history of DM (type I, diagnosed at age 6), HTN, and aortic stenosis, who is here for follow up of CAD and AS  reports he saw Dr Chantal Nuñez around 2007 for a heart murmur, was told it should be followed  He was at gym with his son in 4-5/1and he got dizzy and felt like he was going to pass out while they were boxing (hitting heavy bags)  No syncope, took about 5 minutes for symptoms to resolve  No chest pain  No shortness of breath  No LE edema  No orthopnea, uses 1 pillow to sleep, no PND  He reports palpitations, which he has had in the past, he was prescribed medication for it, and it went away, feels palpitations more especially at night when he is lying, just feels like a skipped beat  5/17 showed normal LV size and function, EF 55%, no RWMA, normal diastolic function  Normal RV size and function  Trace MR   Aortic valve was trileaflet, but was moderately thickened and calcified with mildly reduced cuspal separation  Mild to moderate stenosis with trace AI  Mean gradient was 16 mm Hg  Calculated MINI was 1 6 cm squared  was admitted to [address] [place] [address] 8/17 with sudden onset shortness of breath, EKG in PCP's office showed abnormal EKG with inferior and anterior ST changes  CXR showed small bilateral pleural effusions  Troponins were negative, but echo showed EF of 40-45% with severe hypokinesis of apical anterior, mid anteroseptal, apical inferior, apical septal, apical lateral, and apical walls  Mild LVH, normal RV size and function  Mild MR  Mild to moderate AS with MINI 1 4 cm squared, mean gradient 12 mm Hg, mild AI  Given new LV dysfunction, he underwent cardiac catheterization 8/31/17, which showed 90% stenosis of mid LCAD, moderate diffuse plaque of OM1, RCA dominant, with 80% stenosis of mid small PL2 branch  He underwent [employer] [employer] MINNIE of 90% mid LAD stenosis  was admitted to [place] [address] 12/17 with mouth sores and easy bruising, found to have significant thrombocytopenia with platelets 9K  He had been switched from Brilinta to Plavix about 2 weeks (around Nov 13) prior due to cost of Brilinta  He was seen by Hematology, who felt that Plavix may have caused thrombocytopenia, but also treated him with IV steroids  Platelet count at discharge was 87K  He was restarted on Brilinta at time of discharge  reports no chest pain, no significant shortness of breath, just very fatigued for last several days after steroids stopped  No associated palpitations  No PND  No LE edema  Occasional dizziness if he gets up quickly  No bruising or bleeding  Review of Systems  Cardiology Male ROS:    Cardiac: palpitations present , but-- no chest pain,-- no rhythm problems,-- no fainting/blackouts-- and-- no signs of swelling  Skin: No complaints of nonhealing sores or skin rash    Genitourinary: No complaints of recurrent urinary tract infections, frequent urination at night, difficult urination, blood in urine, kidney stones, loss of bladder control, no kidney or prostate problems, no erectile dysfunction  Psychological: depression, but-- no anxiety,-- no panic attacks,-- no difficulty concentrating-- and-- no palpitations present  General: trouble sleeping, but-- no appetite changes,-- no changes in weight,-- no lack of energy/fatigue,-- no fever,-- no night sweats-- and-- no frequent infections  Respiratory: No complaints of shortness of breath, cough with sputum, or wheezing  HEENT: No complaints of serious problems, hearing problems, nose problems, throat problems, or snoring  Gastrointestinal: No complaints of liver problems, nausea, vomiting, heartburn, constipation, bloody stools, diarrhea, problems swallowing, adbominal pain, or rectal bleeding  Hematologic: No complaints of bleeding disorders, anemia, blood clots, or excessive brusing  Neurological: No complaints of numbness, tingling, dizziness, weakness, seizures, headaches, syncope or fainting, AM fatigue, daytime sleepiness, no witnessed apnea episodes  Musculoskeletal: arthritis, but-- no back pain-- and-- no swelling/pain   ROS Reviewed:   ROS reviewed  Active Problems  Problems    1  Abnormal EKG (794 31) (R94 31)   2  Aortic stenosis (424 1) (I35 0)   3  Aortic valve regurgitation, nonrheumatic (424 1) (I35 1)   4  Benign essential hypertension (401 1) (I10)   5  CAD in native artery (414 01) (I25 10)   6  Depression (311) (F32 9)   7  Diabetic retinopathy (250 50,362 01) (E11 319)   8  Encounter for prostate cancer screening (V76 44) (Z12 5)   9  Erectile dysfunction of non-organic origin (302 72) (F52 21)   10  Esophageal reflux (530 81) (K21 9)   11  Generalized weakness (780 79) (R53 1)   12  Hyperkalemia (276 7) (E87 5)   13  Hyperlipidemia (272 4) (E78 5)   14  Insomnia, persistent (307 42) (G47 00)   15  Ischemic cardiomyopathy (414 8) (I25 5)   16  Lower back pain (724 2) (M54 5)   17  Malaise and fatigue (780 79) (R53 81,R53 83)   18   Need for immunization against influenza (V04 81) (Z23)   19  Need for pneumococcal vaccination (V03 82) (Z23)   20  Pain of finger, unspecified laterality (729 5) (M79 646)   21  Palpitations (785 1) (R00 2)   22  Screen for colon cancer (V76 51) (Z12 11)   23  Shortness of breath at rest (786 05) (R06 02)   24  Thrombocytopenia (287 5) (D69 6)   25  Type 1 diabetes mellitus without complication (461 79) (Q45 4)   26  Vitamin D deficiency (268 9) (E55 9)    Past Medical History  Problems    1  History of Acute Idiopathic Thrombocytopenic Purpura (287 31)   2  Acute sinusitis (461 9) (J01 90)   3  History of Fracture Of The Clavicle (810 00)  Active Problems And Past Medical History Reviewed: The active problems and past medical history were reviewed and updated today  Surgical History  Problems    1  History of Mechanical Vitrectomy By Anterior Approach   2  History of Rotator Cuff Repair   3  History of Splenectomy   4  History of Tonsillectomy  Surgical History Reviewed: The surgical history was reviewed and updated today  Family History  Mother    1  Family history of Cerebral Artery Aneurysm   2  Family history of Coronary Artery Disease (V17 49)   3  Family history of Diabetes Mellitus (V18 0)  Family History Reviewed: The family history was reviewed and updated today  Social History  Problems    · Being A Social Drinker   · Educational Level - Has [employer] Diploma   · Former smoker (F35 39) (C27 200)   · Marital History - Currently    · Occupation:  Social History Reviewed: The social history was reviewed and updated today  Current Meds   1  Aspirin EC 81 MG Oral Tablet Delayed Release; Take 1 tablet daily; Therapy: 12AVA9221 to (Mark Yarbrough)  Requested for: 68XPQ1532; Last Rx:56Twf6573 Ordered   2  Atorvastatin Calcium 80 MG Oral Tablet; TAKE 1 TABLET Bedtime; Therapy: 09UQC6000 to (Mark Yarbrough)  Requested for: 58XNN4332; Last Rx:16Hsq3460 Ordered   3   [facility] In V[facility] Strip; Test blood glucose 4-5 times per day; Therapy: 35CCQ8302 to (IOQYVJNE:46HSC5887)  Requested for: 06WEK1186; Last Rx:25Jan2017 Ordered   4  Yuliana Microlet Lancets Miscellaneous; TEST 4 TIMES A DAY; Therapy: 45PYA9832 to (Last Eveleen Shallow)  Requested for: 20WHG1656 Ordered   5  BD Insulin Syr Ultrafine II 31G X 5/16 0 5 ML Miscellaneous; use as directed; Therapy: 11MBQ3637 to (Evaluate:16Nov2016)  Requested for: 80HNE2673; Last Rx:17Oct2016 Ordered   6  Clopidogrel Bisulfate 75 MG Oral Tablet; TAKE 4 TABLETS BY MOUTH DAY 1 THEN TAKE 1 TABLET EVERY DAY THEREAFTER; Therapy: 65FFQ9424 to (Evaluate:17Mar2018)  Requested for: 47SZA2008; Last Rx:13Nov2017 Ordered   7  Furosemide 20 MG Oral Tablet; take 1 tablet by mouth daily; Therapy: 63ZYI4568 to (KFCSUEDR:43GXV2353)  Requested for: 15NZM7690; Last Rx:18Kmg4411 Ordered   8  HumaLOG 100 UNIT/ML Subcutaneous Solution; Inject subcutaneously via  pump as directed (about 60  units daily via the pump); Therapy: 62PTL8403 to (Miller Lacy)  Requested for: 96Oyx1071; Last Rx:54Yrp4551 Ordered   9  Lisinopril 20 MG Oral Tablet; Take 1 tablet daily  Requested for: 98KIM9782; Last Rx:71Xtv2039 Ordered   10  Lisinopril 20 MG Oral Tablet; TAKE 1 TABLET DAILY; Therapy: 07BQE7675 to (Dellar Melena)  Requested for: 33ZIK7553; Last  Rx:17Vsa0677 Ordered   11  Metoprolol Succinate ER 25 MG Oral Tablet Extended Release 24 Hour; Take 1 tablet  daily; Therapy: 84XYI7212 to (Last Carine Rumple)  Requested for: 77FWH5804 Ordered   12  Microlet Lancets Miscellaneous; USE 4 TIMES A DAY; Therapy: 32GRI5985 to (Luzma Shruthi)  Requested for: 44GGJ4096; Last  Rx:25Jan2017 Ordered   13  OneTouch Lancets Miscellaneous; use four times daily as directed; Therapy: 08Apr2013 to (Last Rx:08Apr2013) Ordered   14  OneTouch Ultra Blue In Vitro Strip; TEST FOUR TIMES DAILY;   Therapy: 84Dio3076 to (Guille Mcconnell)  Requested for: 10QFI5966; Last  ASHLEY:64JAW1673 Ordered   15  PriLOSEC OTC 20 MG Oral Tablet Delayed Release; PO  QD; Therapy: 36QTP2063 to (Last Rx:27Gcv5843)  Requested for: 72Atk2613 Ordered   16  Sertraline HCl - 100 MG Oral Tablet; take one tablet by mouth every day; Therapy: 00Zcn8894 to (Evaluate:22Yny2600)  Requested for: 81YEI5628; Last  Rx:97Gfh2142 Ordered   17  Zolpidem Tartrate 5 MG Oral Tablet; TAKE 1 TABLET BY MOUTH AT BEDTIME AS  NEEDED FOR SLEEP; Therapy: 93DEL8072 to (Evaluate:24Mar2018); Last Rx:98Efs6364 Ordered  Medication List Reviewed: The medication list was reviewed and updated today  Medication List Reviewed: The medication list was reviewed and updated today  Allergies  Medication    1  No Known Drug Allergies    Vitals  Vital Signs    Recorded: 62DAK5905 01:09PM   Heart Rate 82   Systolic 924, LUE, Sitting   Diastolic 62, LUE, Sitting   Height 5 ft 5 in   Weight 175 lb    BMI Calculated 29 12   BSA Calculated 1 87   O2 Saturation 99       Physical Exam   Constitutional  General appearance: No acute distress, well appearing and well nourished  Eyes  Conjunctiva and Sclera examination: Conjunctiva pink, sclera anicteric  Ears, Nose, Mouth, and Throat - Oropharynx: Clear, nares are clear, mucous membranes are moist   Neck  Neck and thyroid: Normal, supple, trachea midline, no thyromegaly  Pulmonary  Respiratory effort: No increased work of breathing or signs of respiratory distress  Auscultation of lungs: Clear to auscultation, no rales, no rhonchi, no wheezing, good air movement  Cardiovascular  Palpation of heart: Normal PMI, no thrills  Auscultation of heart: Abnormal  -- III/VI systolic murmur, no gallop rubs, regular rate and rhythm  Carotid pulses: Normal, 2+ bilaterally  Examination of extremities for edema and/or varicosities: Normal    Chest - Chest: Normal   Abdomen  Abdomen: Non-tender and no distention  Musculoskeletal Gait and station: Normal gait    Skin - Skin and subcutaneous tissue: Normal without rashes or lesions  Skin is warm and well perfused, normal turgor  Neurologic - Speech: Normal    Psychiatric - Orientation to person, place, and time: Normal -- Mood and affect: Normal       Future Appointments    Date/Time Provider Specialty Site   12/13/2017 08:00 AM BRIGIDA Burgess  Endocrinology [facility] ENDOCRINOLOGY [facility]   [facility]12[facility]2017 10[facility]   12/11/2017 01:30 PM Doron Coats DO Family Medicine FAMILY PRACTICE [facility]       Signatures   Electronically signed by :  Cathleen Cardona MD; Dec  7 2017  1:31PM EST                       (Author)

## 2017-12-11 ENCOUNTER — ALLSCRIPTS OFFICE VISIT (OUTPATIENT)
Dept: OTHER | Facility: OTHER | Age: 52
End: 2017-12-11

## 2017-12-11 ENCOUNTER — GENERIC CONVERSION - ENCOUNTER (OUTPATIENT)
Dept: OTHER | Facility: OTHER | Age: 52
End: 2017-12-11

## 2017-12-13 ENCOUNTER — ALLSCRIPTS OFFICE VISIT (OUTPATIENT)
Dept: OTHER | Facility: OTHER | Age: 52
End: 2017-12-13

## 2017-12-21 ENCOUNTER — HOSPITAL ENCOUNTER (OUTPATIENT)
Dept: NON INVASIVE DIAGNOSTICS | Facility: CLINIC | Age: 52
Discharge: HOME | End: 2017-12-21
Payer: COMMERCIAL

## 2017-12-21 ENCOUNTER — GENERIC CONVERSION - ENCOUNTER (OUTPATIENT)
Dept: CARDIOLOGY CLINIC | Facility: MEDICAL CENTER | Age: 52
End: 2017-12-21

## 2017-12-21 DIAGNOSIS — I25.5 ISCHEMIC CARDIOMYOPATHY: ICD-10-CM

## 2017-12-21 PROCEDURE — 93308 TTE F-UP OR LMTD: CPT

## 2018-01-11 NOTE — PROGRESS NOTES
Assessment    1  Encounter for preventive health examination (V70 0) (Z00 00)   2  Depression (311) (F32 9)   3  Hyperlipidemia (272 4) (E78 5)   4  Benign essential hypertension (401 1) (I10)   5  Aortic stenosis (424 1) (I35 0)   6  Type 1 diabetes mellitus without complication (937 11) (W28 2)   7  Vitamin D deficiency (268 9) (E55 9)   8  Need for pneumococcal vaccination (V03 82) (Z23)   9  Screen for colon cancer (V76 51) (Z12 11)   10  Insomnia, persistent (307 42) (G47 00)    Plan  Aortic stenosis, Benign essential hypertension, Type 1 diabetes mellitus without  complication    · 1 - Fermin Flowers DO (Cardiology) Co-Management  *  Status: Active  Requested for:  24Apr2017  Care Summary provided  : Yes  Benign essential hypertension, Health Maintenance, Hyperlipidemia, Type 1 diabetes  mellitus without complication, Vitamin D deficiency    · (1) CBC/ PLT (NO DIFF); Status:Active; Requested for:24Apr2017;    · (1) COMPREHENSIVE METABOLIC PANEL; Status:Active; Requested for:24Apr2017;    · (1) HEMOGLOBIN A1C; Status:Active; Requested for:24Apr2017;    · (1) LIPID PANEL, FASTING; Status:Active; Requested for:24Apr2017;    · (1) VITAMIN D 25-HYDROXY; Status:Active; Requested for:24Apr2017;   Depression    · Sertraline HCl - 100 MG Oral Tablet; take one tablet by mouth every day  Health Maintenance, Encounter for prostate cancer screening    · (1) PSA (SCREEN) (Dx V76 44 Screen for Prostate Cancer); Status:Active; Requested  for:24Apr2017; Insomnia, persistent    · LORazepam 0 5 MG Oral Tablet; TAKE 1 TABLET Bedtime PRN sleep  Need for pneumococcal vaccination, Type 1 diabetes mellitus without complication    · Pneumo (Pneumovax); INJECT 0 5  ML Intramuscular; To Be Done:  [id number]  Screen for colon cancer    · COLONOSCOPY; Status:Active; Requested for:24Apr2017;     Discussion/Summary  Impression: health maintenance visit, healthy adult male   Currently, he eats an adequate diet and has an adequate exercise regimen  Prostate cancer screening: the risks and benefits of prostate cancer screening were discussed and PSA was ordered  Colorectal cancer screening: the risks and benefits of colorectal cancer screening were discussed and colonoscopy has been ordered  Screening lab work includes glucose, lipid profile and 25-hydroxyvitamin D  The immunizations will be given as outlined in the orders  He was advised to be evaluated by an optometrist and a dentist  Advice and education were given regarding nutrition, aerobic exercise, weight bearing exercise, weight loss, calcium supplements, vitamin D supplements, cardiovascular risk reduction, alcohol use, sunscreen use, self skin examination and seat belt use  Patient discussion: discussed with the patient  The patient was counseled regarding diagnostic results, instructions for management, risk factor reductions, prognosis, patient and family education, impressions, risks and benefits of treatment options, importance of compliance with treatment  Immunization Counseling The parent/guardian was counseled on the following vaccine components: pneumovax  Total number of vaccine components counseled: 1  Possible side effects of new medications were reviewed with the patient/guardian today  The treatment plan was reviewed with the patient/guardian  The patient/guardian understands and agrees with the treatment plan      Chief Complaint  Pt here for annual physical exam      History of Present Illness  HM, Adult Male: The patient is being seen for a health maintenance evaluation  The last health maintenance visit was 3 year(s) ago  Social History: Household members include spouse and 1 son(s)  He is   Work status: working full time and occupation: grocery owner  The patient has never smoked cigarettes  He reports frequent alcohol use and drinking 10-12 drinks per week  Alcohol concern:  no personal concern about alcohol use and no family concern about alcohol use   He has never used illicit drugs  General Health: The patient's health since the last visit is described as good  He has regular dental visits  He complains of vision problems  Vision care includes wearing glasses and having regular eye examinations  He denies hearing loss  Immunizations status: up to date  Lifestyle:  He consumes a diverse and healthy diet  (healthy, diverse -- protein from lean meat/fish )   He exercises regularly  He exercises less than three times a week for 30 or more minutes per session  Exercise includes aerobic conditioning and strength training  Reproductive health:  the patient is sexually active  He denies erectile dysfunction  Screening: cancer screening reviewed and current  metabolic screening reviewed and current  risk screening reviewed and current  Review of Systems    Constitutional: No fever or chills, feels well, no tiredness, no recent weight gain or weight loss  Eyes: no eyesight problems and no purulent discharge from the eyes  ENT: no earache, no sore throat and no nasal discharge  Cardiovascular: no chest pain, no palpitations and no extremity edema  Respiratory: no shortness of breath, no cough and no wheezing  Gastrointestinal: No complaints of abdominal pain, no constipation, no nausea or vomiting, no diarrhea or bloody stools  Genitourinary: no dysuria and no incontinence  Musculoskeletal: no arthralgias and no myalgias  Integumentary: no rashes and no skin lesions  Neurological: no headache, no numbness, no tingling and no confusion  Psychiatric: sleep disturbances, but no anxiety and no depression  Endocrine: no hot flashes and no feelings of weakness  Hematologic/Lymphatic: no swollen glands, no tendency for easy bleeding and no tendency for easy bruising  Active Problems    1  Aortic regurgitation (424 1) (I35 1)   2  Aortic stenosis (424 1) (I35 0)   3  Benign essential hypertension (401 1) (I10)   4   Depression (311) (F32 9)   5  Diabetic retinopathy (250 50,362 01) (E11 319)   6  Erectile dysfunction of non-organic origin (302 72) (F52 21)   7  Esophageal reflux (530 81) (K21 9)   8  Generalized weakness (780 79) (R53 1)   9  Hyperkalemia (276 7) (E87 5)   10  Hyperlipidemia (272 4) (E78 5)   11  Lower back pain (724 2) (M54 5)   12  Malaise and fatigue (780 79) (R53 81,R53 83)   13  Need for immunization against influenza (V04 81) (Z23)   14  Pain of finger, unspecified laterality (729 5) (M79 646)   15  Type 1 diabetes mellitus without complication (137 89) (M36 6)   16  Vitamin D deficiency (268 9) (E55 9)    Past Medical History    · History of Acute Idiopathic Thrombocytopenic Purpura (287 31)   · Acute sinusitis (461 9) (J01 90)   · History of Fracture Of The Clavicle (810 00)    Surgical History    · History of Mechanical Vitrectomy By Anterior Approach   · History of Splenectomy   · History of Tonsillectomy    Family History  Mother    · Family history of Cerebral Artery Aneurysm   · Family history of Coronary Artery Disease (V17 49)   · Family history of Diabetes Mellitus (V18 0)    Social History    · Being A Social Drinker   · Educational Level - Has [employer] Diploma   · Former smoker (V15 82) (M73 392)   · Cigars   · Marital History - Currently    · 2 children   · Occupation:   · Manager [employer]    Current Meds   1  [employer]; Test blood glucose 4-5 times per day; Therapy: 61IRN2304 to (YXXOIRJS:99WFQ5920)  Requested for: 34SEH8035; Last   Rx:25Jan2017 Ordered   2  Yuliana Microlet Lancets Miscellaneous; TEST 4 TIMES A DAY; Therapy: 57RBU8540 to (Last [facility])  Requested for: 10BGO5189 Ordered   3  BD Insulin Syr Ultrafine II 31G X 5/16" 0 5 ML Miscellaneous; use as directed; Therapy: 09OZO6034 to (Evaluate:16Nov2016)  Requested for: 90QEU1184; Last   Rx:17Oct2016 Ordered   4  HumaLOG 100 UNIT/ML Subcutaneous Solution; USE AS DIRECTED VIA PUMP;    Therapy: 87YEA1743 to (Evaluate:19Mar2017)  Requested for: 68IZO4522; Last   Rx:93Muk2180 Ordered   5  Lisinopril 10 MG Oral Tablet; take 1 tablet by mouth daily; Therapy: 89NOE4496 to (Evaluate:16Oct2017)  Requested for: 19Apr2017; Last   Rx:19Apr2017 Ordered   6  Microlet Lancets Miscellaneous; USE 4 TIMES A DAY; Therapy: 63EVN9706 to (Kimberlee Rogers)  Requested for: 88QRZ7094; Last   Rx:25Jan2017 Ordered   7  OneTouch Lancets Miscellaneous; use four times daily as directed; Therapy: 44Lyq0332 to (Last Rx:08Apr2013) Ordered   8  OneTouch Ultra Blue In Vitro Strip; TEST FOUR TIMES DAILY; Therapy: 72Eoo4913 to (Jennifer Turner)  Requested for: 37YSH4807; Last   Rx:27Jan2017 Ordered   9  PriLOSEC OTC 20 MG Oral Tablet Delayed Release; PO  QD; Therapy: 10QGL5158 to (Last Rx:17Oct2016)  Requested for: 17Oct2016 Ordered   10  Sertraline HCl - 50 MG Oral Tablet; TAKE 1 TABLET BY MOUTH EVERY NIGHT AT    BEDTIME; Therapy: 43Htg2123 to [phone])  Requested for: 25Jan2017; Last    Rx:25Jan2017 Ordered   11  Vitamin D (Ergocalciferol) 86227 UNIT Oral Capsule; TAKE 1 CAPSULE BY MOUTH    WEEKLY; Therapy: 72SKZ5924 to (Evaluate:63Eez6264)  Requested for: 26MTB7222; Last    Rx:30Jan2017 Ordered    Allergies    1  No Known Drug Allergies    Vitals   Recorded: 24Apr2017 02:47PM   Temperature 97 2 F, Tympanic   Heart Rate 81   Systolic 669   Diastolic 74   Height 5 ft 5 in   Weight 172 lb    BMI Calculated 28 62   BSA Calculated 1 86   O2 Saturation 98     Physical Exam    Constitutional   General appearance: No acute distress, well appearing and well nourished  Head and Face   Head and face: Normal     Palpation of the face and sinuses: No sinus tenderness  Eyes   Conjunctiva and lids: No erythema, swelling or discharge  Pupils and irises: Equal, round, reactive to light      Ears, Nose, Mouth, and Throat   External inspection of ears and nose: Normal     Otoscopic examination: Tympanic membranes translucent with normal light reflex  Canals patent without erythema  Hearing: Normal     Nasal mucosa, septum, and turbinates: Normal without edema or erythema  Lips, teeth, and gums: Normal, good dentition  Oropharynx: Normal with no erythema, edema, exudate or lesions  Neck   Neck: Supple, symmetric, trachea midline, no masses  Thyroid: Normal, no thyromegaly  Pulmonary   Respiratory effort: No increased work of breathing or signs of respiratory distress  Auscultation of lungs: Clear to auscultation  Cardiovascular   Auscultation of heart: Abnormal   The heart rate was normal  The rhythm was regular  Heart sounds: normal S1 and normal S2  A grade 2 systolic murmur was heard at the RUSB  A grade 2 systolic murmur was heard at the LUSB  Carotid pulses: 2+ bilaterally  Examination of extremities for edema and/or varicosities: Normal     Chest   Chest: Normal     Abdomen   Abdomen: Non-tender, no masses  Liver and spleen: No hepatomegaly or splenomegaly  Lymphatic   Palpation of lymph nodes in neck: No lymphadenopathy  Musculoskeletal   Gait and station: Normal     Inspection/palpation of digits and nails: Normal without clubbing or cyanosis  Inspection/palpation of joints, bones, and muscles: Normal     Range of motion: Normal     Stability: Normal     Muscle strength/tone: Normal     Skin   Skin and subcutaneous tissue: Normal without rashes or lesions  Neurologic   Cranial nerves: Cranial nerves 2-12 intact  Reflexes: 2+ and symmetric  Sensation: No sensory loss  Psychiatric   Orientation to person, place and time: Normal     Mood and affect: Normal        Results/Data  PHQ-9 Adult Depression Screening 24Apr2017 02:53PM User, [employer]     Test Name Result Flag Reference   PHQ-9 Adult Depression Score 12     Over the last two weeks, how often have you been bothered by any of the following problems?   Little interest or pleasure in doing things: More than half the days - 2  Feeling down, depressed, or hopeless: Not at all - 0  Trouble falling or staying asleep, or sleeping too much: Nearly every day - 3  Feeling tired or having little energy: More than half the days - 2  Poor appetite or over eating: Several days - 1  Feeling bad about yourself - or that you are a failure or have let yourself or your family down: More than half the days - 2  Trouble concentrating on things, such as reading the newspaper or watching television: More than half the days - 2  Moving or speaking so slowly that other people could have noticed  Or the opposite -  being so fidgety or restless that you have been moving around a lot more than usual: Not at all - 0  Thoughts that you would be better off dead, or of hurting yourself in some way: Not at all - 0   PHQ-9 Adult Depression Screening Positive     PHQ-9 Difficulty Level Somewhat difficult     PHQ-9 Severity Moderate Depression         Signatures   Electronically signed by : Kathy Sanchez DO;  Apr 24 2017  3:27PM EST                       (Author)

## 2018-01-12 NOTE — MISCELLANEOUS
Provider Comments  Provider Comments:   Patient no showed nor did he call to cancel or reschedule  mlo 92off      Signatures   Electronically signed by : Celeste Garza DO; Oct 24 2016  1:34PM EST                       (Author)

## 2018-01-13 VITALS
BODY MASS INDEX: 28.75 KG/M2 | SYSTOLIC BLOOD PRESSURE: 126 MMHG | HEIGHT: 65 IN | WEIGHT: 172.56 LBS | DIASTOLIC BLOOD PRESSURE: 78 MMHG | HEART RATE: 89 BPM

## 2018-01-13 VITALS
WEIGHT: 176.06 LBS | BODY MASS INDEX: 29.33 KG/M2 | DIASTOLIC BLOOD PRESSURE: 80 MMHG | HEART RATE: 88 BPM | HEIGHT: 65 IN | SYSTOLIC BLOOD PRESSURE: 134 MMHG | OXYGEN SATURATION: 98 %

## 2018-01-14 VITALS
DIASTOLIC BLOOD PRESSURE: 74 MMHG | OXYGEN SATURATION: 98 % | WEIGHT: 172 LBS | TEMPERATURE: 97.2 F | BODY MASS INDEX: 28.66 KG/M2 | HEART RATE: 81 BPM | SYSTOLIC BLOOD PRESSURE: 118 MMHG | HEIGHT: 65 IN

## 2018-01-14 VITALS
OXYGEN SATURATION: 98 % | BODY MASS INDEX: 30.29 KG/M2 | WEIGHT: 182 LBS | HEART RATE: 81 BPM | DIASTOLIC BLOOD PRESSURE: 78 MMHG | SYSTOLIC BLOOD PRESSURE: 130 MMHG | TEMPERATURE: 98.3 F

## 2018-01-14 NOTE — MISCELLANEOUS
Provider Comments  Provider Comments:   Patient no showed for his appointment nor did he call to cancel or reschedule/mlo 92off      Signatures   Electronically signed by : Javi Charles DO; Sep  5 2017 11:28AM EST                       (Author)

## 2018-01-15 NOTE — RESULT NOTES
Message   please inform pt that recent labs unremarkable except for low vitamin D -- will send replacement rx to pharmacy -- suggest he follow up with endocrinologist as his sugar is pretty high -- awaiting result of echocardiogram -- follow up if not feeling any better     Verified Results  (1) CBC/PLT/DIFF 29Oct2016 07:42AM Maxene Sidles     Test Name Result Flag Reference   WHITE BLOOD CELL COUNT 6 1 Thousand/uL  3 8-10 8   RED BLOOD CELL COUNT 5 08 Million/uL  4 20-5 80   HEMOGLOBIN 15 9 g/dL  13 2-17 1   HEMATOCRIT 47 9 %  38 5-50 0   MCV 94 2 fL  80 0-100 0   MCH 31 2 pg  27 0-33 0   MCHC 33 1 g/dL  32 0-36 0   RDW 14 1 %  11 0-15 0   PLATELET COUNT 518 Thousand/uL  140-400   MPV 10 1 fL  7 5-11 5   ABSOLUTE NEUTROPHILS 3416 cells/uL  5762-4630   ABSOLUTE LYMPHOCYTES 1787 cells/uL  850-3900   ABSOLUTE MONOCYTES 738 cells/uL  200-950   ABSOLUTE EOSINOPHILS 140 cells/uL     ABSOLUTE BASOPHILS 18 cells/uL  0-200   NEUTROPHILS 56 0 %     LYMPHOCYTES 29 3 %     MONOCYTES 12 1 %     EOSINOPHILS 2 3 %     BASOPHILS 0 3 %       (1) COMPREHENSIVE METABOLIC PANEL 58RPZ3640 56:80MA Maxene Sidles     Test Name Result Flag Reference   GLUCOSE 251 mg/dL H 65-99   Fasting reference interval   UREA NITROGEN (BUN) 16 mg/dL  7-25   CREATININE 0 92 mg/dL  0 70-1 33   For patients >52years of age, the reference limit  for Creatinine is approximately 13% higher for people  identified as -American  eGFR NON-AFR   AMERICAN 97 mL/min/1 73m2  > OR = 60   eGFR AFRICAN AMERICAN 112 mL/min/1 73m2  > OR = 60   BUN/CREATININE RATIO   2-43   NOT APPLICABLE (calc)   SODIUM 138 mmol/L  135-146   POTASSIUM 5 2 mmol/L  3 5-5 3   CHLORIDE 99 mmol/L     CARBON DIOXIDE 29 mmol/L  20-31   CALCIUM 9 7 mg/dL  8 6-10 3   PROTEIN, TOTAL 7 3 g/dL  6 1-8 1   ALBUMIN 4 3 g/dL  3 6-5 1   GLOBULIN 3 0 g/dL (calc)  1 9-3 7   ALBUMIN/GLOBULIN RATIO 1 4 (calc)  1 0-2 5   BILIRUBIN, TOTAL 0 4 mg/dL  0 2-1 2   ALKALINE PHOSPHATASE 121 U/L H    AST 21 U/L  10-35   ALT 26 U/L  9-46     (Q) LYME DISEASE AB, TOTAL W/REFL WB (IGG, IGM) 29Oct2016 07:42AM Trinity Roe     Test Name Result Flag Reference   LYME AB SCREEN < OR = 0 90 index     Index                 Interpretation                     -----                 --------------                     < or = 0 90           Negative                     0  91-1 09             Equivocal                     > or = 1 10           Positive     The use of purified VlsE-1 and PepC10 antigens in this  assay provides improved specificity compared to assays  that utilize whole cell lysates of B  burgdorferi, the  causative agent of Lyme disease, and slightly better  sensitivity compared to the C6 antibody assay  As recommended by the Food and Drug   Administration (FDA), all samples with positive or   equivocal results in a Borrelia burgdorferi antibody    EIA (screening) will be tested using a blot method  Positive or equivocal screening test results should not   be interpreted as truly positive until verified as such   using a supplemental assay (e g , B  burgdorferi blot)  The screening test and/or blot for B  burgdorferi   antibodies may be falsely negative in early stages of   Lyme disease, including the period when erythema   migrans is apparent  *(Q) VITAMIN D, 25-HYDROXY, LC/MS/MS 29Oct2016 07:42AM Trinity Roe     Test Name Result Flag Reference   VITAMIN D, 25-OH, TOTAL 18 ng/mL L    Vitamin D Status         25-OH Vitamin D:     Deficiency:                    <20 ng/mL  Insufficiency:             20 - 29 ng/mL  Optimal:                 > or = 30 ng/mL     For 25-OH Vitamin D testing on patients on   D2-supplementation and patients for whom quantitation   of D2 and D3 fractions is required, the Mutracx(TM)  25-OH VIT D, (D2,D3), LC/MS/MS is recommended: order   code 66604 (patients >2yrs)       For more information on this test, go to:  http://[email or web address]/MBC538  (This link is being provided for   informational/educational purposes only )     (Q) TSH, 3RD GENERATION W/REFLEX TO FT4 29Oct2016 07:42AM Trinity Roe     Test Name Result Flag Reference   TSH W/REFLEX TO FT4 2 10 mIU/L  0 40-4 50     (Q) TESTOSTERONE, FREE AND TOTAL, LC/MS/MS 29Oct2016 07:42AM Trinity Dolan     Test Name Result Flag Reference   TESTOSTERONE, TOTAL,$LC/MS/ ng/dL  250-1100   For more information on this test, go to  http://Liveyearbook/faq/  TotalTestosteroneLCMSMS   FREE TESTOSTERONE 48 7 pg/mL  35 0-155 0     (Q) HEMOGLOBIN A1c 29Oct2016 07:42AM Trinity Roe   REPORT COMMENT:  FASTING:YES     Test Name Result Flag Reference   HEMOGLOBIN A1c 10 0 % of total Hgb H <5 7   According to ADA guidelines, hemoglobin A1c <7 0%  represents optimal control in non-pregnant diabetic  patients  Different metrics may apply to specific  patient populations  Standards of Medical Care in    Diabetes Care  2013;36:s11-s66     For the purpose of screening for the presence of  diabetes  <5 7%       Consistent with the absence of diabetes  5 7-6 4%    Consistent with increased risk for diabetes              (prediabetes)  >or=6 5%    Consistent with diabetes     This assay result is consistent with diabetes  mellitus  Currently, no consensus exists for use of hemoglobin  A1c for diagnosis of diabetes for children         Plan  Vitamin D deficiency    · Vitamin D (Ergocalciferol) 54242 UNIT Oral Capsule; TAKE 1 CAPSULE WEEKLY

## 2018-01-17 NOTE — MISCELLANEOUS
Provider Comments  Provider Comments:   Patient not showed for appointment today nor did he call to cancel or reschedule/ 92off mlo      Signatures   Electronically signed by : BRIGIDA Lima ; Jul 7 2016  4:33PM EST                       (Author)

## 2018-01-22 VITALS
DIASTOLIC BLOOD PRESSURE: 70 MMHG | HEART RATE: 83 BPM | BODY MASS INDEX: 29.37 KG/M2 | SYSTOLIC BLOOD PRESSURE: 126 MMHG | WEIGHT: 176.25 LBS | OXYGEN SATURATION: 99 % | HEIGHT: 65 IN

## 2018-01-23 VITALS
HEART RATE: 82 BPM | DIASTOLIC BLOOD PRESSURE: 62 MMHG | OXYGEN SATURATION: 99 % | BODY MASS INDEX: 29.16 KG/M2 | SYSTOLIC BLOOD PRESSURE: 120 MMHG | HEIGHT: 65 IN | WEIGHT: 175 LBS

## 2018-01-23 NOTE — MISCELLANEOUS
Provider Comments  Provider Comments:   Patient called and was 35 minutes late to appointment today   They are now on cancellation list       Signatures   Electronically signed by : BRIGIDA Suggs ; Dec 13 2017 11:47AM EST                       (Author)

## 2018-01-23 NOTE — MISCELLANEOUS
Assessment    1  Thrombocytopenia (287 5) (D69 6)   2  Type 1 diabetes mellitus without complication (741 56) (O59 7)    Plan  Type 1 diabetes mellitus without complication    · Insulin Syringe/Needle 28G X 1/2" 1 ML Miscellaneous; USE AS DIRECTED   Rx By: Ludmila Marc; Dispense: 0 Days ; #:100 Miscellaneous; Refill: 1; For: Type 1 diabetes mellitus without complication; MASSIMO = N; Verified Transmission to [address] [place] [address]; Last Updated By: System, SureScridangelo; 12/11/2017 1:23:08 PM    Discussion/Summary  Discussion Summary:   Platelet count has improved significantly since receiving IV steroids in hospital and discontinuing Plavix  Cardiology note reviewed with patient and wife during visit  Continue Caylailincarl at this point--keep follow-up appointment with Hematology scheduled for tomorrow  Patient to call immediately if symptoms return  Counseling Documentation With Imm: The patient was counseled regarding diagnostic results, instructions for management, risk factor reductions, prognosis, patient and family education, impressions, risks and benefits of treatment options, importance of compliance with treatment  Medication SE Review and Pt Understands Tx: Possible side effects of new medications were reviewed with the patient/guardian today  The treatment plan was reviewed with the patient/guardian  The patient/guardian understands and agrees with the treatment plan      Chief Complaint  Chief Complaint Free Text Note Form: pt here for a katerin f/u dx thrombocytopenia      History of Present Illness  TCM Communication [facility]: The patient is being contacted for follow-up after hospitalization and PCP, cardio, Oncology and Endo  He was hospitalized at [facility]  The date of admission: 12/1/17, date of discharge: 12/4/17  Diagnosis: mouth sores/thrombocytopenia  He was discharged to home  Medications were not reviewed today  He scheduled a follow up appointment  The patient is currently asymptomatic  Counseling was provided to the patient  Communication performed and completed by Gil Valverde   HPI: Patient presents for transition of care visit  Admitted to [facility] 12/1/2017  Discharged 12/4/2017  Discharge diagnosis: thrombocytopenia    Patient presented to the emergency room on 12/1/2017 due to worsening mouth sores  He was found have a platelet count of 3934 on admission and was evaluated by the hematology department  Believed to be 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 versus medication side effect secondary to recent change from Brilinta to Plavix  He received IV Decadron and platelet count 97550  Cardiology was also consulted and they agree with holding Plavix in favor of restarting Brilinta at this time  Patient states he is feeling well now--platelet count has significantly improved since discharge  He is back on Brilinta and tolerating this well, managed by Cardiology  Has not seen any new sores in his mouth--no new bruising or bleeding reported      Review of Systems  Complete-Male:   Constitutional: as noted in HPI  Eyes: No complaints of eye pain, no red eyes, no discharge from eyes, no itchy eyes  ENT: as noted in HPI  Cardiovascular: No complaints of slow heart rate, no fast heart rate, no chest pain, no palpitations, no leg claudication, no lower extremity  Respiratory: No complaints of shortness of breath, no wheezing, no cough, no SOB on exertion, no orthopnea or PND  Gastrointestinal: No complaints of abdominal pain, no constipation, no nausea or vomiting, no diarrhea or bloody stools  Genitourinary: No complaints of dysuria, no incontinence, no hesitancy, no nocturia, no genital lesion, no testicular pain  Musculoskeletal: No complaints of arthralgia, no myalgias, no joint swelling or stiffness, no limb pain or swelling  Integumentary: as noted in HPI     Neurological: No compliants of headache, no confusion, no convulsions, no numbness or tingling, no dizziness or fainting, no limb weakness, no difficulty walking  Psychiatric: Is not suicidal, no sleep disturbances, no anxiety or depression, no change in personality, no emotional problems  Endocrine: No complaints of proptosis, no hot flashes, no muscle weakness, no erectile dysfunction, no deepening of the voice, no feelings of weakness  Hematologic/Lymphatic: as noted in HPI  Active Problems    1  Abnormal EKG (794 31) (R94 31)   2  Depression (311) (F32 9)   3  Diabetic retinopathy (250 50,362 01) (E11 319)   4  Encounter for prostate cancer screening (V76 44) (Z12 5)   5  Erectile dysfunction of non-organic origin (302 72) (F52 21)   6  Esophageal reflux (530 81) (K21 9)   7  Generalized weakness (780 79) (R53 1)   8  Hyperkalemia (276 7) (E87 5)   9  Insomnia, persistent (307 42) (G47 00)   10  Lower back pain (724 2) (M54 5)   11  Malaise and fatigue (780 79) (R53 81,R53 83)   12  Need for immunization against influenza (V04 81) (Z23)   13  Need for pneumococcal vaccination (V03 82) (Z23)   14  Pain of finger, unspecified laterality (729 5) (M79 646)   15  Palpitations (785 1) (R00 2)   16  Screen for colon cancer (V76 51) (Z12 11)   17  Shortness of breath at rest (786 05) (R06 02)   18  Vitamin D deficiency (268 9) (E55 9)    Past Medical History    1  History of Acute Idiopathic Thrombocytopenic Purpura (287 31)   2  Acute sinusitis (461 9) (J01 90)   3  History of Fracture Of The Clavicle (810 00)    Surgical History    1  History of Mechanical Vitrectomy By Anterior Approach   2  History of Rotator Cuff Repair   3  History of Splenectomy   4  History of Tonsillectomy  Surgical History Reviewed: The surgical history was reviewed and updated today  Family History  Mother    1  Family history of Cerebral Artery Aneurysm   2  Family history of Coronary Artery Disease (V17 49)   3  Family history of Diabetes Mellitus (V18 0)  Family History Reviewed:    The family history was reviewed and updated today  Social History    · Being A Social Drinker   · Educational Level - Has [employer] Diploma   · Former smoker (W28 58) (N05 223)   · Marital History - Currently    · Occupation:  Social History Reviewed: The social history was reviewed and updated today  The social history was reviewed and is unchanged  Current Meds   1  Aspirin EC 81 MG Oral Tablet Delayed Release; Take 1 tablet daily; Therapy: 52BXD7592 to ([facility])  Requested for: 39UAM5437; Last   Rx:31Vob9115 Ordered   2  Atorvastatin Calcium 80 MG Oral Tablet; TAKE 1 TABLET Bedtime; Therapy: 80IJO5683 to ([facility])  Requested for: 97FZK3137; Last   Rx:00Hwl1019 Ordered   3  [employer]; Test blood glucose 4-5 times per day; Therapy: 70NLB5926 to (OGXFZCCE:24ZDJ9192)  Requested for: 74KVO7342; Last   Rx:25Jan2017 Ordered   4  Yuliana Microlet Lancets Miscellaneous; TEST 4 TIMES A DAY; Therapy: 44NBH5978 to (Last Mark Anthony )  Requested for: 62ORR6979 Ordered   5  BD Insulin Syr Ultrafine II 31G X 5/16" 0 5 ML Miscellaneous; use as directed; Therapy: 65VJL8516 to (Evaluate:16Nov2016)  Requested for: 05SMU5367; Last   Rx:17Oct2016 Ordered   6  Clopidogrel Bisulfate 75 MG Oral Tablet; TAKE 4 TABLETS BY MOUTH DAY 1 THEN TAKE   1 TABLET EVERY DAY THEREAFTER; Therapy: 63UVT0612 to (Evaluate:17Mar2018)  Requested for: 43XLE3876; Last   Rx:13Nov2017 Ordered   7  Furosemide 20 MG Oral Tablet; take 1 tablet by mouth daily; Therapy: 62QCX3238 to (YCTPHTKX:53ZHL6274)  Requested for: 07AXZ1341; Last   Rx:29Xrj2800 Ordered   8  HumaLOG 100 UNIT/ML Subcutaneous Solution; Inject subcutaneously via  pump as   directed (about 60  units daily via the pump); Therapy: 04UBT3367 to ([id number])  Requested for: 02Hex2771; Last   Rx:09Siw3334 Ordered   9  Lisinopril 20 MG Oral Tablet; Take 1 tablet daily  Requested for: 35EPM6724; Last   Rx:44Fkv8438 Ordered   10   Lisinopril 20 MG Oral Tablet; TAKE 1 TABLET DAILY; Therapy: 25RHH2382 to (Belen Galvan)  Requested for: 69KRR5866; Last    Rx:70Oug0127 Ordered   11  Metoprolol Succinate ER 25 MG Oral Tablet Extended Release 24 Hour; Take 1 tablet    daily; Therapy: 74KGT4760 to (Last Ghassan Chaparro)  Requested for: 43FJF0497 Ordered   12  Microlet Lancets Miscellaneous; USE 4 TIMES A DAY; Therapy: 04VSH1531 to (Monica Ramírez)  Requested for: 77HJE0561; Last    Rx:53Cmg8396 Ordered   13  OneTouch Lancets Miscellaneous; use four times daily as directed; Therapy: 04Irp4436 to (Last Rx:48Tmg7910) Ordered   14  OneTouch Ultra Blue In Vitro Strip; TEST FOUR TIMES DAILY; Therapy: 98Gvd4655 to (Tirso Askew)  Requested for: 27CSB4813; Last    Rx:18Jzr4902 Ordered   15  PriLOSEC OTC 20 MG Oral Tablet Delayed Release; PO  QD; Therapy: 12TCE6048 to (Last Rx:15Rmk8097)  Requested for: 25Ojg9435 Ordered   16  Sertraline HCl - 100 MG Oral Tablet; take one tablet by mouth every day; Therapy: 38Qio5336 to (Evaluate:88Mcm8177)  Requested for: 81CMF9112; Last    Rx:78Vrc2645 Ordered   17  Zolpidem Tartrate 5 MG Oral Tablet; TAKE 1 TABLET BY MOUTH AT BEDTIME AS    NEEDED FOR SLEEP; Therapy: 45SBA3674 to (Evaluate:24Mar2018); Last Rx:44Fhx7850 Ordered  Medication List Reviewed: The medication list was reviewed and updated today  Allergies    1  No Known Drug Allergies    Vitals  Signs   Recorded: 31Pfm0996 01:07PM   Temperature: 99 9 F  Heart Rate: 78  Respiration: 14  Systolic: 121  Diastolic: 68  Weight: 223 lb   BMI Calculated: 29 95  BSA Calculated: 1 89  O2 Saturation: 98    Physical Exam    Constitutional   General appearance: No acute distress, well appearing and well nourished  Eyes   Conjunctiva and lids: No swelling, erythema, or discharge  Ears, Nose, Mouth, and Throat   External inspection of ears and nose: Normal     Oropharynx: Normal with no erythema, edema, exudate or lesions      Pulmonary   Respiratory effort: No increased work of breathing or signs of respiratory distress  Auscultation of lungs: Clear to auscultation, equal breath sounds bilaterally, no wheezes, no rales, no rhonci  Cardiovascular   Auscultation of heart: Normal rate and rhythm, normal S1 and S2, without murmurs  Examination of extremities for edema and/or varicosities: Normal     Abdomen   Abdomen: Non-tender, no masses  Lymphatic   Palpation of lymph nodes in neck: No lymphadenopathy  Musculoskeletal   Gait and station: Normal     Skin   Skin and subcutaneous tissue: Normal without rashes or lesions  Neurologic   Cranial nerves: Cranial nerves 2-12 intact  Psychiatric   Orientation to person, place and time: Normal     Mood and affect: Normal          Results/Data  (1) CBC/ PLT (NO DIFF) 28BCE3316 01:48PM Michael Lujan     Test Name Result Flag Reference   HEMATOCRIT 40 7 %  36 5-49 3   HEMOGLOBIN 13 6 g/dL  12 0-17 0   MCHC 33 4 g/dL  31 4-37 4   MCH 30 5 pg  26 8-34 3   MCV 91 fL  82-98   PLATELET COUNT 373 Thousands/uL  149-390   RBC COUNT 4 46 Million/uL  3 88-5 62   RDW 14 9 %  11 6-15 1   WBC COUNT 8 78 Thousand/uL  4 31-10 16   MPV 9 9 fL  8 9-12 7       Message   Recorded as Task   Date: 12/04/2017 05:06 PM, Created By: System   Task Name: [facility]   Assigned To: Trinity Roe   Regarding Patient: Sissy Boas, Status: Active   Comment:    System - 04 Dec 2017 5:06 PM     Patient discharged from hospital   Patient Name: Ryder Gonzales  Patient YOB: 1965  Discharge Date: 12/4/2017  Facility[facility]     Future Appointments    Date/Time Provider Specialty Site   12/13/2017 08:00 AM BRIGIDA Roca   Endocrinology [facility] ENDOCRINOLOGY [facility]   12/12/2017 10:00 AM Rupesh España, [facility] H[facility]ology [facility]     Signatures   Electronically signed by : Rodríguez Curtis DO; Dec 11 2017  2:51PM EST                       (Author)

## 2018-01-24 VITALS
SYSTOLIC BLOOD PRESSURE: 122 MMHG | RESPIRATION RATE: 14 BRPM | BODY MASS INDEX: 29.95 KG/M2 | OXYGEN SATURATION: 98 % | DIASTOLIC BLOOD PRESSURE: 68 MMHG | WEIGHT: 180 LBS | HEART RATE: 78 BPM | TEMPERATURE: 99.9 F

## 2018-02-20 DIAGNOSIS — E11.8 TYPE 2 DIABETES MELLITUS WITH COMPLICATION, WITH LONG-TERM CURRENT USE OF INSULIN (HCC): Primary | ICD-10-CM

## 2018-02-20 DIAGNOSIS — Z79.4 TYPE 2 DIABETES MELLITUS WITH COMPLICATION, WITH LONG-TERM CURRENT USE OF INSULIN (HCC): Primary | ICD-10-CM

## 2018-02-23 DIAGNOSIS — Z79.4 TYPE 2 DIABETES MELLITUS WITHOUT COMPLICATION, WITH LONG-TERM CURRENT USE OF INSULIN (HCC): Primary | ICD-10-CM

## 2018-02-23 DIAGNOSIS — E11.9 TYPE 2 DIABETES MELLITUS WITHOUT COMPLICATION, WITH LONG-TERM CURRENT USE OF INSULIN (HCC): Primary | ICD-10-CM

## 2018-02-23 RX ORDER — IBUPROFEN 200 MG
TABLET ORAL
Qty: 100 EACH | Refills: 0 | Status: SHIPPED | OUTPATIENT
Start: 2018-02-23 | End: 2021-10-05

## 2018-02-23 RX ORDER — IBUPROFEN 200 MG
TABLET ORAL
Refills: 1 | COMMUNITY
Start: 2018-01-14 | End: 2018-02-23 | Stop reason: SDUPTHER

## 2018-02-23 RX ORDER — IBUPROFEN 200 MG
TABLET ORAL
Qty: 100 EACH | Refills: 0 | Status: SHIPPED | OUTPATIENT
Start: 2018-02-23 | End: 2018-03-02 | Stop reason: SDUPTHER

## 2018-02-28 ENCOUNTER — TELEPHONE (OUTPATIENT)
Dept: FAMILY MEDICINE CLINIC | Facility: OTHER | Age: 53
End: 2018-02-28

## 2018-02-28 NOTE — TELEPHONE ENCOUNTER
Pt called requesting a Rx for Mini med reservoir  Dr Cheli Carias gave him A rx for 3 months supply on 12/2017  Pt agreed he has to make Endo appointment  Dr Cheli Carias explained he needs to establish care with Endo to f/u Dm  Pt did not show for his appointment on 12/2018  I helped him to make a new appointment,b ut they did not have anything until 3/5/2018  Dr Cheli Carias agreed to prescribed his supply until then, but he must keep his appointment  Pt called this week asking for his Rx  He said he cannot keep his Endo appointment because he lost his insurance

## 2018-03-06 DIAGNOSIS — E10.9 TYPE 1 DIABETES MELLITUS WITHOUT COMPLICATION (HCC): Primary | ICD-10-CM

## 2018-03-13 ENCOUNTER — TELEPHONE (OUTPATIENT)
Dept: CARDIOLOGY CLINIC | Facility: CLINIC | Age: 53
End: 2018-03-13

## 2018-03-19 ENCOUNTER — TELEPHONE (OUTPATIENT)
Dept: OTHER | Facility: HOSPITAL | Age: 53
End: 2018-03-19

## 2018-03-19 ENCOUNTER — TELEPHONE (OUTPATIENT)
Dept: FAMILY MEDICINE CLINIC | Facility: OTHER | Age: 53
End: 2018-03-19

## 2018-03-19 NOTE — TELEPHONE ENCOUNTER
Unfortunately he cannot hold his aspirin or Brilinta for pulling tooth as he needs to stay on it until August 2018  If he can delay tooth pulling until then, it would be better, but if he needs it now, he may have more bleeding from site of tooth pulling  Generally it will stop bleeding, but may take longer for bleeding to stop after tooth extraction

## 2018-03-21 ENCOUNTER — DOCUMENTATION (OUTPATIENT)
Dept: ENDOCRINOLOGY | Facility: CLINIC | Age: 53
End: 2018-03-21

## 2018-03-21 NOTE — PROGRESS NOTES
Dr Carisa Morales was contacted by patient's PCP last week about accommodating for an appointment  PCP stated they were uncomfortable managing the patient's T1DM  Patient has missed 2 other new appointments scheduled with our office since December  Patient was scheduled for today however due to inclement weather, appointment needed to be cancelled  We offered tomorrow at 9am and patient declined  Stated he could only come in on Wednesdays  Advised we would look at the schedule to see what we could do to accommodate

## 2018-03-22 DIAGNOSIS — E10.9 TYPE 1 DIABETES MELLITUS WITHOUT COMPLICATION (HCC): ICD-10-CM

## 2018-03-28 ENCOUNTER — OFFICE VISIT (OUTPATIENT)
Dept: ENDOCRINOLOGY | Facility: CLINIC | Age: 53
End: 2018-03-28

## 2018-03-28 VITALS
HEIGHT: 66 IN | BODY MASS INDEX: 28 KG/M2 | DIASTOLIC BLOOD PRESSURE: 72 MMHG | HEART RATE: 88 BPM | SYSTOLIC BLOOD PRESSURE: 140 MMHG | WEIGHT: 174.2 LBS

## 2018-03-28 DIAGNOSIS — E10.9 TYPE 1 DIABETES MELLITUS WITHOUT COMPLICATION (HCC): ICD-10-CM

## 2018-03-28 DIAGNOSIS — E10.42 DIABETIC POLYNEUROPATHY ASSOCIATED WITH TYPE 1 DIABETES MELLITUS (HCC): ICD-10-CM

## 2018-03-28 DIAGNOSIS — E10.8 TYPE 1 DIABETES MELLITUS WITH COMPLICATION (HCC): Primary | ICD-10-CM

## 2018-03-28 DIAGNOSIS — I10 BENIGN ESSENTIAL HTN: ICD-10-CM

## 2018-03-28 DIAGNOSIS — R79.89 ELEVATED TSH: ICD-10-CM

## 2018-03-28 PROCEDURE — 99243 OFF/OP CNSLTJ NEW/EST LOW 30: CPT | Performed by: INTERNAL MEDICINE

## 2018-03-28 NOTE — LETTER
March 28, 2018     Surendra Britton    Patient: Devon Mcmullen   YOB: 1965   Date of Visit: 3/28/2018       Dear Dr Velna Cowden:    Thank you for referring Maicol Kamaljit to me for evaluation  Below are my notes for this consultation  If you have questions, please do not hesitate to call me  I look forward to following your patient along with you  Sincerely,        Alecia Dawn MD        CC: No Recipients  Alecia Dawn MD  3/28/2018 10:32 AM  Sign at close encounter   Devon Mcmullen 47 y o  male MRN: [id number]    Encounter: [id number]      Assessment/Plan     Assessment: This is a 47y o -year-old male with type 1 diabetes with polyneuropathy, hypertension, elevated TSH, hypertension and vitamin-D deficiency    Plan:  1  Type 1 diabetes with polyneuropathy-I have emphasized the importance of checking his blood sugars 4 times per day  He is also to change his insulin sets every three days  I referred him to diabetes education center for carbohydrate counting refresher  I asked him to find out how his pump is to see you would call five to the noon 670 G insulin pump system  He states that he will have insurance in the next week at which time he will be able to better take care of his diabetes  I ordered routine laboratory testing today  2   Elevated TSH-check TSH, free T4 and thyroid antibodies  3   Vitamin-D deficiency-continue replacement  4   Hypertension-the blood pressure is close to goal     CC: Diabetes    History of Present Illness     HPI:  79-year-old male with type 1 diabetes for 40 years who presents for consultation  He is on an [employer] insulin pump  His basal rate is midnight 1 3 units/hour and 7:00 a m  1 unit/hour  His insulin to carbohydrate ratio is 1 unit per 10 g for breakfast and lunch and 1 unit per 9 g for dinner  His insulin sensitivity factor is 1 unit per 30 mg/dL with a target of 100 to 120 per dL  He uses Humalog insulin  He has hypoglycemic 2 to 3 times per week  He denies any polyuria or polydipsia  He denies any retinopathy, neuropathy, nephropathy, stroke, heart attack in claudication  He did have a stent placed recently  Review of Systems   Constitutional: Negative for chills and fever  Respiratory: Negative for shortness of breath  Cardiovascular: Negative for chest pain  Gastrointestinal: Negative for constipation, diarrhea, nausea and vomiting  Endocrine: Negative for polydipsia and polyuria  Neurological: Negative for dizziness and numbness  All other systems reviewed and are negative        Historical Information   Past Medical History:   Diagnosis Date    Aortic stenosis     Clavicle fracture     LEFT    Diabetes mellitus ([facility] [address] )     Hyperlipidemia     Hypertension     Thrombocytopenic purpura ([facility] [address] )      Past Surgical History:   Procedure Laterality Date    ROTATOR CUFF REPAIR      SPLENECTOMY      TONSILLECTOMY      VITRECTOMY Bilateral     BY ANTERIOR APPROACH      Social History   History   Alcohol Use    Yes     Comment: daily     History   Drug Use No     History   Smoking Status    Former Smoker    Types: Cigars   Smokeless Tobacco    Never Used     Family History:   Family History   Problem Relation Age of Onset    Aneurysm Mother      CAREBRAL ARTERY     Coronary artery disease Mother     Diabetes Mother        Meds/Allergies   Current Outpatient Prescriptions   Medication Sig Dispense Refill    aspirin (ECOTRIN LOW STRENGTH) 81 mg EC tablet Take 1 tablet by mouth daily  0    atorvastatin (LIPITOR) 80 mg tablet Take 1 tablet by mouth daily with dinner 30 tablet 0    Cholecalciferol (VITAMIN D) 2000 units CAPS TK 1 C PO QD  3    furosemide (LASIX) 20 mg tablet TK 1 T PO D  3    HUMALOG 100 UNIT/ML injection AS DIRECTED VIA PUMP 60 mL 0    Insulin Infusion Pump KIT 1 Units/hr by Subcutaneous Insulin Pump route continuous      INSULIN SYRINGE 1CC/29G 29G X 1/2" 1 ML MISC Inject as directed 3 (three) times a day with meals Use as directed 100 each 0    lisinopril (ZESTRIL) 20 mg tablet Take 20 mg by mouth daily      metoprolol succinate (TOPROL-XL) 25 mg 24 hr tablet Take 1 tablet by mouth daily 30 tablet 0    omeprazole (PriLOSEC) 20 mg delayed release capsule Take 20 mg by mouth daily      sertraline (ZOLOFT) 100 mg tablet Take 100 mg by mouth daily  1    ticagrelor (BRILINTA) 90 MG Take 1 tablet by mouth every 12 (twelve) hours 30 tablet 0    zolpidem (AMBIEN) 5 mg tablet Take 1 tablet by mouth daily at bedtime as needed for sleep for up to 2 days 2 tablet 0     No current facility-administered medications for this visit  No Known Allergies    Objective   Vitals: Blood pressure 140/72, pulse 88, height 5' 6" (1 676 m), weight 79 kg (174 lb 3 2 oz)  Physical Exam   Constitutional: He is oriented to person, place, and time  He appears well-developed and well-nourished  No distress  HENT:   Head: Normocephalic and atraumatic  Mouth/Throat: Oropharynx is clear and moist and mucous membranes are normal  No oropharyngeal exudate  Eyes: Conjunctivae, EOM and lids are normal  Right eye exhibits no discharge  Left eye exhibits no discharge  No scleral icterus  Neck: Neck supple  No thyromegaly present  Cardiovascular: Normal rate, regular rhythm and normal heart sounds  Exam reveals no gallop and no friction rub  Pulses are no weak pulses  No murmur heard  Pulses:       Dorsalis pedis pulses are 1+ on the right side, and 1+ on the left side  Pulmonary/Chest: Effort normal and breath sounds normal  No respiratory distress  He has no wheezes  Abdominal: Soft  Bowel sounds are normal  He exhibits no distension  There is no tenderness  Musculoskeletal: Normal range of motion  He exhibits no edema, tenderness or deformity  Feet:   Right Foot:   Skin Integrity: Negative for ulcer, skin breakdown, erythema, warmth, callus or dry skin  Left Foot:   Skin Integrity: Negative for ulcer, skin breakdown, erythema, warmth, callus or dry skin  Lymphadenopathy:        Head (right side): No occipital adenopathy present  Head (left side): No occipital adenopathy present  Right: No supraclavicular adenopathy present  Left: No supraclavicular adenopathy present  Neurological: He is alert and oriented to person, place, and time  No cranial nerve deficit  Coordination normal    Skin: Skin is warm and intact  No rash noted  He is not diaphoretic  No erythema  Psychiatric: He has a normal mood and affect  Vitals reviewed  Patient's shoes and socks removed  Right Foot/Ankle   Right Foot Inspection  Skin Exam: skin normal and skin intact no dry skin, no warmth, no callus, no erythema, no maceration, no abnormal color, no pre-ulcer, no ulcer and no callus                            Sensory   Vibration: diminished    Monofilament testing: intact  Vascular    The right DP pulse is 1+  Left Foot/Ankle  Left Foot Inspection  Skin Exam: skin intactno dry skin, no warmth, no erythema, no maceration, normal color, no pre-ulcer, no ulcer and no callus                                         Sensory   Vibration: diminished    Monofilament: intact  Vascular    The left DP pulse is 1+  Assign Risk Category:  No deformity present; No loss of protective sensation; No weak pulses       Risk: 0      The history was obtained from the review of the chart, patient      Lab Results:   Lab Results   Component Value Date/Time    Hemoglobin A1C 7 7 (H) 09/01/2017 03:19 AM    WBC 8 78 12/07/2017 01:48 PM    WBC 14 66 (H) 12/04/2017 06:28 AM    WBC 10 13 12/03/2017 05:06 AM    Hemoglobin 13 6 12/07/2017 01:48 PM    Hemoglobin 12 2 12/04/2017 06:28 AM    Hemoglobin 12 4 12/03/2017 05:06 AM    Hematocrit 40 7 12/07/2017 01:48 PM    Hematocrit 36 4 (L) 12/04/2017 06:28 AM    Hematocrit 37 3 12/03/2017 05:06 AM    MCV 91 12/07/2017 01:48 PM    MCV 90 12/04/2017 06:28 AM    MCV 91 12/03/2017 05:06 AM    Platelets 580 05/05/0111 01:48 PM    Platelets 87 (L) 62/20/3675 06:28 AM    Platelets 30 (LL) 61/58/8678 05:06 AM    BUN 16 12/04/2017 06:28 AM    BUN 15 12/02/2017 06:17 AM    BUN 21 12/01/2017 07:59 AM    Sodium 137 12/04/2017 06:28 AM    Sodium 136 12/02/2017 06:17 AM    Sodium 135 (L) 12/01/2017 07:59 AM    Potassium 4 0 12/04/2017 06:28 AM    Potassium 4 4 12/02/2017 06:17 AM    Potassium 4 7 12/01/2017 07:59 AM    Chloride 103 12/04/2017 06:28 AM    Chloride 100 12/02/2017 06:17 AM    Chloride 100 12/01/2017 07:59 AM    CO2 26 12/04/2017 06:28 AM    CO2 26 12/02/2017 06:17 AM    CO2 30 12/01/2017 07:59 AM    Creatinine 0 79 12/04/2017 06:28 AM    Creatinine 0 82 12/02/2017 06:17 AM    Creatinine 0 81 12/01/2017 07:59 AM    AST 22 12/04/2017 06:28 AM    AST 29 12/02/2017 06:17 AM    AST 62 (H) 12/01/2017 07:59 AM    ALT 53 12/04/2017 06:28 AM    ALT 84 (H) 12/02/2017 06:17 AM     (H) 12/01/2017 07:59 AM    Albumin 3 0 (L) 12/04/2017 06:28 AM    Albumin 3 4 (L) 12/02/2017 06:17 AM    Albumin 3 4 (L) 12/01/2017 07:59 AM    Cholesterol 200 12/07/2017 01:48 PM    Cholesterol 231 (H) 09/01/2017 03:18 AM    HDL, Direct 129 (H) 12/07/2017 01:48 PM    HDL, Direct 161 (H) 09/01/2017 03:18 AM    Triglycerides 98 12/07/2017 01:48 PM    Triglycerides 62 09/01/2017 03:18 AM    Most recent TSH in September was 4 247  Imaging Studies: I have personally reviewed pertinent reports  Portions of the record may have been created with voice recognition software  Occasional wrong word or "sound a like" substitutions may have occurred due to the inherent limitations of voice recognition software  Read the chart carefully and recognize, using context, where substitutions have occurred

## 2018-03-28 NOTE — PROGRESS NOTES
Vanessa Kent 47 y o  male MRN: [id number]    Encounter: [id number]      Assessment/Plan     Assessment: This is a 47y o -year-old male with type 1 diabetes with polyneuropathy, hypertension, elevated TSH, hypertension and vitamin-D deficiency    Plan:  1  Type 1 diabetes with polyneuropathy-I have emphasized the importance of checking his blood sugars 4 times per day  He is also to change his insulin sets every three days  I referred him to diabetes education center for carbohydrate counting refresher  I asked him to find out how his pump is to see you would call five to the noon 670 G insulin pump system  He states that he will have insurance in the next week at which time he will be able to better take care of his diabetes  I ordered routine laboratory testing today  2   Elevated TSH-check TSH, free T4 and thyroid antibodies  3   Vitamin-D deficiency-continue replacement  4   Hypertension-the blood pressure is close to goal     CC: Diabetes    History of Present Illness     HPI:  64-year-old male with type 1 diabetes for 40 years who presents for consultation  He is on an Medtronic insulin pump  His basal rate is midnight 1 3 units/hour and 7:00 a m  1 unit/hour  His insulin to carbohydrate ratio is 1 unit per 10 g for breakfast and lunch and 1 unit per 9 g for dinner  His insulin sensitivity factor is 1 unit per 30 mg/dL with a target of 100 to 120 per dL  He uses Humalog insulin  He has hypoglycemic 2 to 3 times per week  He denies any polyuria or polydipsia  He denies any retinopathy, neuropathy, nephropathy, stroke, heart attack in claudication  He did have a stent placed recently  Review of Systems   Constitutional: Negative for chills and fever  Respiratory: Negative for shortness of breath  Cardiovascular: Negative for chest pain  Gastrointestinal: Negative for constipation, diarrhea, nausea and vomiting  Endocrine: Negative for polydipsia and polyuria  Neurological: Negative for dizziness and numbness  All other systems reviewed and are negative        Historical Information   Past Medical History:   Diagnosis Date    Aortic stenosis     Clavicle fracture     LEFT    [facility]     Hyperlipidemia     Hypertension     Thrombocytopenic purpura ([facility] )      Past Surgical History:   Procedure Laterality Date    ROTATOR CUFF REPAIR      SPLENECTOMY      TONSILLECTOMY      VITRECTOMY Bilateral     BY ANTERIOR APPROACH      Social History   History   Alcohol Use    Yes     Comment: daily     History   Drug Use No     History   Smoking Status    Former Smoker    Types: Cigars   Smokeless Tobacco    Never Used     Family History:   Family History   Problem Relation Age of Onset    Aneurysm Mother      CAREBRAL ARTERY     Coronary artery disease Mother     Diabetes Mother        Meds/Allergies   Current Outpatient Prescriptions   Medication Sig Dispense Refill    aspirin (ECOTRIN LOW STRENGTH) 81 mg EC tablet Take 1 tablet by mouth daily  0    atorvastatin (LIPITOR) 80 mg tablet Take 1 tablet by mouth daily with dinner 30 tablet 0    Cholecalciferol (VITAMIN D) 2000 units CAPS TK 1 C PO QD  3    furosemide (LASIX) 20 mg tablet TK 1 T PO D  3    HUMALOG 100 UNIT/ML injection AS DIRECTED VIA PUMP 60 mL 0    Insulin Infusion Pump KIT 1 Units/hr by Subcutaneous Insulin Pump route continuous      INSULIN SYRINGE 1CC/29G 29G X 1/2" 1 ML MISC Inject as directed 3 (three) times a day with meals Use as directed 100 each 0    lisinopril (ZESTRIL) 20 mg tablet Take 20 mg by mouth daily      metoprolol succinate (TOPROL-XL) 25 mg 24 hr tablet Take 1 tablet by mouth daily 30 tablet 0    omeprazole (PriLOSEC) 20 mg delayed release capsule Take 20 mg by mouth daily      sertraline (ZOLOFT) 100 mg tablet Take 100 mg by mouth daily  1    ticagrelor (BRILINTA) 90 MG Take 1 tablet by mouth every 12 (twelve) hours 30 tablet 0    zolpidem (AMBIEN) 5 mg tablet Take 1 tablet by mouth daily at bedtime as needed for sleep for up to 2 days 2 tablet 0     No current facility-administered medications for this visit  No Known Allergies    Objective   Vitals: Blood pressure 140/72, pulse 88, height 5' 6" (1 676 m), weight 79 kg (174 lb 3 2 oz)  Physical Exam   Constitutional: He is oriented to person, place, and time  He appears well-developed and well-nourished  No distress  HENT:   Head: Normocephalic and atraumatic  Mouth/Throat: Oropharynx is clear and moist and mucous membranes are normal  No oropharyngeal exudate  Eyes: Conjunctivae, EOM and lids are normal  Right eye exhibits no discharge  Left eye exhibits no discharge  No scleral icterus  Neck: Neck supple  No thyromegaly present  Cardiovascular: Normal rate, regular rhythm and normal heart sounds  Exam reveals no gallop and no friction rub  Pulses are no weak pulses  No murmur heard  Pulses:       Dorsalis pedis pulses are 1+ on the right side, and 1+ on the left side  Pulmonary/Chest: Effort normal and breath sounds normal  No respiratory distress  He has no wheezes  Abdominal: Soft  Bowel sounds are normal  He exhibits no distension  There is no tenderness  Musculoskeletal: Normal range of motion  He exhibits no edema, tenderness or deformity  Feet:   Right Foot:   Skin Integrity: Negative for ulcer, skin breakdown, erythema, warmth, callus or dry skin  Left Foot:   Skin Integrity: Negative for ulcer, skin breakdown, erythema, warmth, callus or dry skin  Lymphadenopathy:        Head (right side): No occipital adenopathy present  Head (left side): No occipital adenopathy present  Right: No supraclavicular adenopathy present  Left: No supraclavicular adenopathy present  Neurological: He is alert and oriented to person, place, and time  No cranial nerve deficit  Coordination normal    Skin: Skin is warm and intact  No rash noted  He is not diaphoretic  No erythema  Psychiatric: He has a normal mood and affect  Vitals reviewed  Patient's shoes and socks removed  Right Foot/Ankle   Right Foot Inspection  Skin Exam: skin normal and skin intact no dry skin, no warmth, no callus, no erythema, no maceration, no abnormal color, no pre-ulcer, no ulcer and no callus                            Sensory   Vibration: diminished    Monofilament testing: intact  Vascular    The right DP pulse is 1+  Left Foot/Ankle  Left Foot Inspection  Skin Exam: skin intactno dry skin, no warmth, no erythema, no maceration, normal color, no pre-ulcer, no ulcer and no callus                                         Sensory   Vibration: diminished    Monofilament: intact  Vascular    The left DP pulse is 1+  Assign Risk Category:  No deformity present; No loss of protective sensation; No weak pulses       Risk: 0      The history was obtained from the review of the chart, patient      Lab Results:   Lab Results   Component Value Date/Time    Hemoglobin A1C 7 7 (H) 09/01/2017 03:19 AM    WBC 8 78 12/07/2017 01:48 PM    WBC 14 66 (H) 12/04/2017 06:28 AM    WBC 10 13 12/03/2017 05:06 AM    Hemoglobin 13 6 12/07/2017 01:48 PM    Hemoglobin 12 2 12/04/2017 06:28 AM    Hemoglobin 12 4 12/03/2017 05:06 AM    Hematocrit 40 7 12/07/2017 01:48 PM    Hematocrit 36 4 (L) 12/04/2017 06:28 AM    Hematocrit 37 3 12/03/2017 05:06 AM    MCV 91 12/07/2017 01:48 PM    MCV 90 12/04/2017 06:28 AM    MCV 91 12/03/2017 05:06 AM    Platelets 235 14/05/6817 01:48 PM    Platelets 87 (L) 25/16/5848 06:28 AM    Platelets 30 (LL) 99/34/5665 05:06 AM    BUN 16 12/04/2017 06:28 AM    BUN 15 12/02/2017 06:17 AM    BUN 21 12/01/2017 07:59 AM    Sodium 137 12/04/2017 06:28 AM    Sodium 136 12/02/2017 06:17 AM    Sodium 135 (L) 12/01/2017 07:59 AM    Potassium 4 0 12/04/2017 06:28 AM    Potassium 4 4 12/02/2017 06:17 AM    Potassium 4 7 12/01/2017 07:59 AM    Chloride 103 12/04/2017 06:28 AM    Chloride 100 12/02/2017 06:17 AM    Chloride 100 12/01/2017 07:59 AM    CO2 26 12/04/2017 06:28 AM    CO2 26 12/02/2017 06:17 AM    CO2 30 12/01/2017 07:59 AM    Creatinine 0 79 12/04/2017 06:28 AM    Creatinine 0 82 12/02/2017 06:17 AM    Creatinine 0 81 12/01/2017 07:59 AM    AST 22 12/04/2017 06:28 AM    AST 29 12/02/2017 06:17 AM    AST 62 (H) 12/01/2017 07:59 AM    ALT 53 12/04/2017 06:28 AM    ALT 84 (H) 12/02/2017 06:17 AM     (H) 12/01/2017 07:59 AM    Albumin 3 0 (L) 12/04/2017 06:28 AM    Albumin 3 4 (L) 12/02/2017 06:17 AM    Albumin 3 4 (L) 12/01/2017 07:59 AM    Cholesterol 200 12/07/2017 01:48 PM    Cholesterol 231 (H) 09/01/2017 03:18 AM    HDL, Direct 129 (H) 12/07/2017 01:48 PM    HDL, Direct 161 (H) 09/01/2017 03:18 AM    Triglycerides 98 12/07/2017 01:48 PM    Triglycerides 62 09/01/2017 03:18 AM    Most recent TSH in September was 4 247  Imaging Studies: I have personally reviewed pertinent reports  Portions of the record may have been created with voice recognition software  Occasional wrong word or "sound a like" substitutions may have occurred due to the inherent limitations of voice recognition software  Read the chart carefully and recognize, using context, where substitutions have occurred

## 2018-05-08 DIAGNOSIS — F33.2 SEVERE EPISODE OF RECURRENT MAJOR DEPRESSIVE DISORDER, WITHOUT PSYCHOTIC FEATURES (HCC): Primary | ICD-10-CM

## 2018-05-08 RX ORDER — SERTRALINE HYDROCHLORIDE 100 MG/1
TABLET, FILM COATED ORAL
Qty: 90 TABLET | Refills: 0 | Status: SHIPPED | OUTPATIENT
Start: 2018-05-08 | End: 2018-08-08 | Stop reason: SDUPTHER

## 2018-05-25 DIAGNOSIS — I10 ESSENTIAL HYPERTENSION: Primary | ICD-10-CM

## 2018-05-29 RX ORDER — FUROSEMIDE 20 MG/1
TABLET ORAL
Qty: 30 TABLET | Refills: 5 | Status: SHIPPED | OUTPATIENT
Start: 2018-05-29 | End: 2018-07-31 | Stop reason: HOSPADM

## 2018-06-05 PROBLEM — E78.5 DYSLIPIDEMIA: Status: ACTIVE | Noted: 2018-06-05

## 2018-06-05 PROBLEM — D69.6 THROMBOCYTOPENIA (HCC): Status: RESOLVED | Noted: 2017-12-01 | Resolved: 2018-06-05

## 2018-06-05 PROBLEM — R06.00 DYSPNEA: Status: RESOLVED | Noted: 2017-08-30 | Resolved: 2018-06-05

## 2018-07-25 ENCOUNTER — OFFICE VISIT (OUTPATIENT)
Dept: ENDOCRINOLOGY | Facility: CLINIC | Age: 53
End: 2018-07-25
Payer: COMMERCIAL

## 2018-07-25 VITALS
DIASTOLIC BLOOD PRESSURE: 72 MMHG | BODY MASS INDEX: 27 KG/M2 | HEIGHT: 66 IN | SYSTOLIC BLOOD PRESSURE: 118 MMHG | WEIGHT: 168 LBS | HEART RATE: 70 BPM

## 2018-07-25 DIAGNOSIS — E10.8 TYPE 1 DIABETES MELLITUS WITH COMPLICATION (HCC): Primary | ICD-10-CM

## 2018-07-25 DIAGNOSIS — R79.89 ELEVATED TSH: ICD-10-CM

## 2018-07-25 DIAGNOSIS — E78.5 DYSLIPIDEMIA: ICD-10-CM

## 2018-07-25 DIAGNOSIS — I10 BENIGN ESSENTIAL HTN: ICD-10-CM

## 2018-07-25 DIAGNOSIS — E10.42 DIABETIC POLYNEUROPATHY ASSOCIATED WITH TYPE 1 DIABETES MELLITUS (HCC): ICD-10-CM

## 2018-07-25 LAB
ALBUMIN SERPL BCP-MCNC: 3.5 G/DL (ref 3.5–5)
ALP SERPL-CCNC: 126 U/L (ref 46–116)
ALT SERPL W P-5'-P-CCNC: 50 U/L (ref 12–78)
ANION GAP SERPL CALCULATED.3IONS-SCNC: 5 MMOL/L (ref 4–13)
AST SERPL W P-5'-P-CCNC: 38 U/L (ref 5–45)
BILIRUB SERPL-MCNC: 0.21 MG/DL (ref 0.2–1)
BUN SERPL-MCNC: 12 MG/DL (ref 5–25)
CALCIUM SERPL-MCNC: 8.6 MG/DL (ref 8.3–10.1)
CHLORIDE SERPL-SCNC: 103 MMOL/L (ref 100–108)
CHOLEST SERPL-MCNC: 200 MG/DL (ref 50–200)
CO2 SERPL-SCNC: 30 MMOL/L (ref 21–32)
CREAT SERPL-MCNC: 0.78 MG/DL (ref 0.6–1.3)
CREAT UR-MCNC: 167 MG/DL
EST. AVERAGE GLUCOSE BLD GHB EST-MCNC: 200 MG/DL
GFR SERPL CREATININE-BSD FRML MDRD: 104 ML/MIN/1.73SQ M
GLUCOSE P FAST SERPL-MCNC: 219 MG/DL (ref 65–99)
HBA1C MFR BLD: 8.6 % (ref 4.2–6.3)
HDLC SERPL-MCNC: 53 MG/DL (ref 40–60)
LDLC SERPL CALC-MCNC: 102 MG/DL (ref 0–100)
MICROALBUMIN UR-MCNC: 13 MG/L (ref 0–20)
MICROALBUMIN/CREAT 24H UR: 8 MG/G CREATININE (ref 0–30)
NONHDLC SERPL-MCNC: 147 MG/DL
POTASSIUM SERPL-SCNC: 4.5 MMOL/L (ref 3.5–5.3)
PROT SERPL-MCNC: 6.8 G/DL (ref 6.4–8.2)
SODIUM SERPL-SCNC: 138 MMOL/L (ref 136–145)
T4 FREE SERPL-MCNC: 0.86 NG/DL (ref 0.76–1.46)
TRIGL SERPL-MCNC: 225 MG/DL
TSH SERPL DL<=0.05 MIU/L-ACNC: 2.9 UIU/ML (ref 0.36–3.74)

## 2018-07-25 PROCEDURE — 80053 COMPREHEN METABOLIC PANEL: CPT | Performed by: INTERNAL MEDICINE

## 2018-07-25 PROCEDURE — 99215 OFFICE O/P EST HI 40 MIN: CPT | Performed by: INTERNAL MEDICINE

## 2018-07-25 PROCEDURE — 80061 LIPID PANEL: CPT | Performed by: INTERNAL MEDICINE

## 2018-07-25 PROCEDURE — 83036 HEMOGLOBIN GLYCOSYLATED A1C: CPT | Performed by: INTERNAL MEDICINE

## 2018-07-25 PROCEDURE — 82043 UR ALBUMIN QUANTITATIVE: CPT | Performed by: INTERNAL MEDICINE

## 2018-07-25 PROCEDURE — 84443 ASSAY THYROID STIM HORMONE: CPT | Performed by: INTERNAL MEDICINE

## 2018-07-25 PROCEDURE — 36415 COLL VENOUS BLD VENIPUNCTURE: CPT | Performed by: INTERNAL MEDICINE

## 2018-07-25 PROCEDURE — 82570 ASSAY OF URINE CREATININE: CPT | Performed by: INTERNAL MEDICINE

## 2018-07-25 PROCEDURE — 84439 ASSAY OF FREE THYROXINE: CPT | Performed by: INTERNAL MEDICINE

## 2018-07-25 RX ORDER — INSULIN PUMP SYRINGE, 3 ML
EACH MISCELLANEOUS
COMMUNITY
Start: 2017-12-19 | End: 2021-10-05

## 2018-07-25 RX ORDER — INFUSION SET FOR INSULIN PUMP
INFUSION SETS-PARAPHERNALIA MISCELLANEOUS
COMMUNITY
Start: 2017-12-19 | End: 2021-10-05

## 2018-07-25 NOTE — PROGRESS NOTES
Gomez Getting 47 y o  male MRN: [id number]    Encounter: [id number]      Assessment/Plan     Assessment: This is a 47y o -year-old male with diabetes with hyperglycemia, hypertension and hyperlipidemia  He also has coronary artery disease  Plan:  1  Type 1 diabetes with hyperglycemia-he has uncontrolled type 1 diabetes since he is only checking once a day  He is using a Medtronic insulin pump which is due for an upgrade  I have asked him to contact [employer] to start on the new [id number] [facility] system  Today, I referred him to our diabetes educators to do a carb counting refresher  In addition, I emphasized the importance of checking his blood sugars 4 times per day and changing his infusion sets every three days  I asked him to use the bolus was ordered instead of doing manual boluses  Check hemoglobin A1c and urine for microalbumin  He states that he is going to make an appointment with an ophthalmologist for an eye exam     2   Hypertension-the blood pressure is at target  3   Hyperlipidemia-check lipid panel, TSH and free T4     4   Coronary disease status post stent placement-he is doing well at this time  5   Elevated TSH-check TSH and free T4     CC: Diabetes    History of Present Illness     HPI:  81-year-old male with type 1 diabetes for over 40 years who presents for follow-up  He is currently on a Medtronic insulin pump  His current basal rates are midnight 1 3 units/hour and 7:00 a m  1 unit/hour  His insulin to carbohydrate ratio is 1 unit per 10 g for breakfast and lunch and 1 unit per 9 g for dinner  His insulin sensitivity factor is 1 unit per 30 mg/dL with a target of 100 to 120 mg/dL  He denies any hypoglycemic episodes  He is only checking his blood sugars once a day  For the hyperlipidemia, he is on a statin  He denies any myalgia  For hypertension, he is on multiple medications  He denies any headaches  Last year, he had a stent placed for coronary artery disease  Review of Systems   Constitutional: Negative for chills and fever  Respiratory: Negative for shortness of breath  Cardiovascular: Negative for chest pain  Gastrointestinal: Negative for constipation, diarrhea, nausea and vomiting  Endocrine: Negative for polydipsia and polyuria  Neurological: Negative for numbness  All other systems reviewed and are negative        Historical Information   Past Medical History:   Diagnosis Date    Aortic stenosis     Clavicle fracture     LEFT    [facility] mellitus [facility] [address] [facility]     Hyperlipidemia     Hypertension     Thrombocytopenic purpura ([facility] [address] [facility]      Past Surgical History:   Procedure Laterality Date    ROTATOR CUFF REPAIR      SPLENECTOMY      TONSILLECTOMY      VITRECTOMY Bilateral     BY ANTERIOR APPROACH      Social History   History   Alcohol Use    Yes     Comment: daily     History   Drug Use No     History   Smoking Status    Former Smoker    Types: Cigars   Smokeless Tobacco    Never Used     Family History:   Family History   Problem Relation Age of Onset    Aneurysm Mother         CAREBRAL ARTERY     Coronary artery disease Mother     Diabetes Mother        Meds/Allergies   Current Outpatient Prescriptions   Medication Sig Dispense Refill    aspirin (ECOTRIN LOW STRENGTH) 81 mg EC tablet Take 1 tablet by mouth daily  0    atorvastatin (LIPITOR) 80 mg tablet Take 1 tablet by mouth daily with dinner 30 tablet 0    [employer] MICROLET LANCETS lancets by Other route 4 (four) times a day Use as instructed 400 each 3    Cholecalciferol (VITAMIN D) 2000 units CAPS TK 1 C PO QD  3    furosemide (LASIX) 20 mg tablet TAKE 1 TABLET BY MOUTH DAILY 30 tablet 5    glucagon (GLUCAGON EMERGENCY) 1 MG injection Inject 1 mg under the skin once as needed for low blood sugar for up to 1 dose 1 each 0    glucose blood (NEL CONTOUR TEST) test strip 1 each by Other route 4 (four) times a day 400 each 3    Insulin Infusion Pump KIT 1 Units/hr by Subcutaneous Insulin Pump route continuous      Insulin Infusion Pump Supplies (MINIMED INFUSION SET-) MISC by Does not apply route      Insulin Infusion Pump Supplies (MINIMED RESERVOIR 3ML) MISC by Does not apply route      insulin lispro (HUMALOG) 100 units/mL injection Use up to 100 units per day via insulin pump 60 mL 3    INSULIN SYRINGE 1CC/29G 29G X 1/2" 1 ML MISC Inject as directed 3 (three) times a day with meals Use as directed 100 each 0    lisinopril (ZESTRIL) 20 mg tablet Take 20 mg by mouth daily      metoprolol succinate (TOPROL-XL) 25 mg 24 hr tablet Take 1 tablet by mouth daily 30 tablet 0    omeprazole (PriLOSEC) 20 mg delayed release capsule Take 20 mg by mouth daily      sertraline (ZOLOFT) 100 mg tablet TAKE 1 TABLET BY MOUTH DAILY 90 tablet 0    ticagrelor (BRILINTA) 90 MG Take 1 tablet by mouth every 12 (twelve) hours 30 tablet 0    zolpidem (AMBIEN) 5 mg tablet Take 1 tablet by mouth daily at bedtime as needed for sleep for up to 2 days 2 tablet 0     No current facility-administered medications for this visit  No Known Allergies    Objective   Vitals: Blood pressure 118/72, pulse 70, height 5' 6" (1 676 m), weight 76 2 kg (168 lb)  Physical Exam   Constitutional: He is oriented to person, place, and time  He appears well-developed and well-nourished  No distress  HENT:   Head: Normocephalic and atraumatic  Mouth/Throat: Oropharynx is clear and moist and mucous membranes are normal  No oropharyngeal exudate  Eyes: Conjunctivae, EOM and lids are normal  Right eye exhibits no discharge  Left eye exhibits no discharge  No scleral icterus  Neck: Neck supple  No thyromegaly present  Cardiovascular: Normal rate, regular rhythm and normal heart sounds  Exam reveals no gallop and no friction rub  Pulses are no weak pulses  No murmur heard  Pulses:       Dorsalis pedis pulses are 1+ on the right side, and 1+ on the left side     Pulmonary/Chest: Effort normal and breath sounds normal  No respiratory distress  He has no wheezes  Abdominal: Soft  Bowel sounds are normal  He exhibits no distension  There is no tenderness  Musculoskeletal: Normal range of motion  He exhibits no edema, tenderness or deformity  He has Dupuytrens contractures in both hands  Feet:   Right Foot:   Skin Integrity: Negative for ulcer, skin breakdown, erythema, warmth, callus or dry skin  Left Foot:   Skin Integrity: Negative for ulcer, skin breakdown, erythema, warmth, callus or dry skin  Lymphadenopathy:        Head (right side): No occipital adenopathy present  Head (left side): No occipital adenopathy present  Right: No supraclavicular adenopathy present  Left: No supraclavicular adenopathy present  Neurological: He is alert and oriented to person, place, and time  No cranial nerve deficit  Coordination normal    Skin: Skin is warm and intact  No rash noted  He is not diaphoretic  No erythema  Psychiatric: He has a normal mood and affect  Vitals reviewed  Patient's shoes and socks removed  Right Foot/Ankle   Right Foot Inspection  Skin Exam: skin normal and skin intact no dry skin, no warmth, no callus, no erythema, no maceration, no abnormal color, no pre-ulcer, no ulcer and no callus                            Sensory   Vibration: diminished    Monofilament testing: intact  Vascular    The right DP pulse is 1+  Left Foot/Ankle  Left Foot Inspection  Skin Exam: skin intactno dry skin, no warmth, no erythema, no maceration, normal color, no pre-ulcer, no ulcer and no callus                                         Sensory   Vibration: diminished    Monofilament: intact  Vascular    The left DP pulse is 1+  Assign Risk Category:  No deformity present; No loss of protective sensation; No weak pulses       Risk: 0      The history was obtained from the review of the chart, patient      Lab Results:   Lab Results   Component Value Date/Time    Hemoglobin A1C 7 7 (H) 09/01/2017 03:19 AM    WBC 8 78 12/07/2017 01:48 PM    WBC 14 66 (H) 12/04/2017 06:28 AM    WBC 10 13 12/03/2017 05:06 AM    Hemoglobin 13 6 12/07/2017 01:48 PM    Hemoglobin 12 2 12/04/2017 06:28 AM    Hemoglobin 12 4 12/03/2017 05:06 AM    Hematocrit 40 7 12/07/2017 01:48 PM    Hematocrit 36 4 (L) 12/04/2017 06:28 AM    Hematocrit 37 3 12/03/2017 05:06 AM    MCV 91 12/07/2017 01:48 PM    MCV 90 12/04/2017 06:28 AM    MCV 91 12/03/2017 05:06 AM    Platelets 039 76/15/4726 01:48 PM    Platelets 87 (L) 61/23/1972 06:28 AM    Platelets 30 (LL) 90/08/3693 05:06 AM    BUN 16 12/04/2017 06:28 AM    BUN 15 12/02/2017 06:17 AM    BUN 21 12/01/2017 07:59 AM    Sodium 137 12/04/2017 06:28 AM    Sodium 136 12/02/2017 06:17 AM    Sodium 135 (L) 12/01/2017 07:59 AM    Potassium 4 0 12/04/2017 06:28 AM    Potassium 4 4 12/02/2017 06:17 AM    Potassium 4 7 12/01/2017 07:59 AM    Chloride 103 12/04/2017 06:28 AM    Chloride 100 12/02/2017 06:17 AM    Chloride 100 12/01/2017 07:59 AM    CO2 26 12/04/2017 06:28 AM    CO2 26 12/02/2017 06:17 AM    CO2 30 12/01/2017 07:59 AM    Creatinine 0 79 12/04/2017 06:28 AM    Creatinine 0 82 12/02/2017 06:17 AM    Creatinine 0 81 12/01/2017 07:59 AM    AST 22 12/04/2017 06:28 AM    AST 29 12/02/2017 06:17 AM    AST 62 (H) 12/01/2017 07:59 AM    ALT 53 12/04/2017 06:28 AM    ALT 84 (H) 12/02/2017 06:17 AM     (H) 12/01/2017 07:59 AM    Albumin 3 0 (L) 12/04/2017 06:28 AM    Albumin 3 4 (L) 12/02/2017 06:17 AM    Albumin 3 4 (L) 12/01/2017 07:59 AM    Cholesterol 200 12/07/2017 01:48 PM    Cholesterol 231 (H) 09/01/2017 03:18 AM    HDL, Direct 129 (H) 12/07/2017 01:48 PM    HDL, Direct 161 (H) 09/01/2017 03:18 AM    Triglycerides 98 12/07/2017 01:48 PM    Triglycerides 62 09/01/2017 03:18 AM           Imaging Studies: I have personally reviewed pertinent reports  Portions of the record may have been created with voice recognition software   Occasional wrong word or "sound a like" substitutions may have occurred due to the inherent limitations of voice recognition software  Read the chart carefully and recognize, using context, where substitutions have occurred

## 2018-07-26 ENCOUNTER — TELEPHONE (OUTPATIENT)
Dept: ENDOCRINOLOGY | Facility: CLINIC | Age: 53
End: 2018-07-26

## 2018-07-26 NOTE — TELEPHONE ENCOUNTER
----- Message from Laron Ku MD sent at 7/26/2018  8:19 AM EDT -----  Please call the patient regarding his abnormal result  Hemoglobin A1c has worsened  The triglycerides and a blood sugar elevated  He is looking to get the new Medtronic 670 G system

## 2018-07-26 NOTE — TELEPHONE ENCOUNTER
Called patient to provided lab results, voice mail box full unable to leave message  Also gave info to Med[employer] Rep Raegan Baker to get in contact with patient to so he can get an upgrade to the Medtronic 670 G system

## 2018-07-26 NOTE — PROGRESS NOTES
Please call the patient regarding his abnormal result  Hemoglobin A1c has worsened  The triglycerides and a blood sugar elevated  He is looking to get the new Medtronic 670 G system

## 2018-07-29 ENCOUNTER — APPOINTMENT (EMERGENCY)
Dept: CT IMAGING | Facility: HOSPITAL | Age: 53
DRG: 392 | End: 2018-07-29
Payer: COMMERCIAL

## 2018-07-29 ENCOUNTER — HOSPITAL ENCOUNTER (INPATIENT)
Facility: HOSPITAL | Age: 53
LOS: 2 days | Discharge: HOME | DRG: 392 | End: 2018-07-31
Attending: EMERGENCY MEDICINE | Admitting: INTERNAL MEDICINE
Payer: COMMERCIAL

## 2018-07-29 DIAGNOSIS — E10.8 TYPE 1 DIABETES MELLITUS WITH COMPLICATION (HCC): ICD-10-CM

## 2018-07-29 DIAGNOSIS — R73.9 HYPERGLYCEMIA: Primary | ICD-10-CM

## 2018-07-29 DIAGNOSIS — R11.2 NAUSEA AND VOMITING: ICD-10-CM

## 2018-07-29 PROBLEM — R65.10 SIRS (SYSTEMIC INFLAMMATORY RESPONSE SYNDROME) (HCC): Status: ACTIVE | Noted: 2018-07-29

## 2018-07-29 LAB
ACETONE SERPL-MCNC: ABNORMAL MG/DL
ALBUMIN SERPL BCP-MCNC: 3.9 G/DL (ref 3.5–5)
ALP SERPL-CCNC: 130 U/L (ref 46–116)
ALT SERPL W P-5'-P-CCNC: 42 U/L (ref 12–78)
ANION GAP SERPL CALCULATED.3IONS-SCNC: 10 MMOL/L (ref 4–13)
AST SERPL W P-5'-P-CCNC: 22 U/L (ref 5–45)
BASE EX.OXY STD BLDV CALC-SCNC: 78.6 % (ref 60–80)
BASE EXCESS BLDA CALC-SCNC: -2 MMOL/L (ref -2–3)
BASE EXCESS BLDV CALC-SCNC: -4.5 MMOL/L
BASOPHILS # BLD AUTO: 0.06 THOUSANDS/ΜL (ref 0–0.1)
BASOPHILS NFR BLD AUTO: 0 % (ref 0–1)
BILIRUB SERPL-MCNC: 0.4 MG/DL (ref 0.2–1)
BILIRUB UR QL STRIP: NEGATIVE
BUN SERPL-MCNC: 15 MG/DL (ref 5–25)
CA-I BLD-SCNC: 1.15 MMOL/L (ref 1.12–1.32)
CALCIUM SERPL-MCNC: 9.2 MG/DL (ref 8.3–10.1)
CHLORIDE SERPL-SCNC: 101 MMOL/L (ref 100–108)
CLARITY UR: CLEAR
CO2 SERPL-SCNC: 24 MMOL/L (ref 21–32)
COLOR UR: YELLOW
CREAT SERPL-MCNC: 0.99 MG/DL (ref 0.6–1.3)
EOSINOPHIL # BLD AUTO: 0.04 THOUSAND/ΜL (ref 0–0.61)
EOSINOPHIL NFR BLD AUTO: 0 % (ref 0–6)
ERYTHROCYTE [DISTWIDTH] IN BLOOD BY AUTOMATED COUNT: 13.8 % (ref 11.6–15.1)
GFR SERPL CREATININE-BSD FRML MDRD: 87 ML/MIN/1.73SQ M
GLUCOSE SERPL-MCNC: 139 MG/DL (ref 65–140)
GLUCOSE SERPL-MCNC: 173 MG/DL (ref 65–140)
GLUCOSE SERPL-MCNC: 222 MG/DL (ref 65–140)
GLUCOSE SERPL-MCNC: 250 MG/DL (ref 65–140)
GLUCOSE SERPL-MCNC: 365 MG/DL (ref 65–140)
GLUCOSE SERPL-MCNC: 382 MG/DL (ref 65–140)
GLUCOSE SERPL-MCNC: 452 MG/DL (ref 65–140)
GLUCOSE SERPL-MCNC: 454 MG/DL (ref 65–140)
GLUCOSE UR STRIP-MCNC: ABNORMAL MG/DL
HCO3 BLDA-SCNC: 24.6 MMOL/L (ref 24–30)
HCO3 BLDV-SCNC: 22 MMOL/L (ref 24–30)
HCT VFR BLD AUTO: 40.6 % (ref 36.5–49.3)
HCT VFR BLD CALC: 42 % (ref 36.5–49.3)
HGB BLD-MCNC: 13.7 G/DL (ref 12–17)
HGB BLDA-MCNC: 14.3 G/DL (ref 12–17)
HGB UR QL STRIP.AUTO: NEGATIVE
HOLD SPECIMEN: NORMAL
KETONES UR STRIP-MCNC: ABNORMAL MG/DL
LACTATE SERPL-SCNC: 1.5 MMOL/L (ref 0.5–2)
LEUKOCYTE ESTERASE UR QL STRIP: NEGATIVE
LIPASE SERPL-CCNC: 59 U/L (ref 73–393)
LYMPHOCYTES # BLD AUTO: 1.75 THOUSANDS/ΜL (ref 0.6–4.47)
LYMPHOCYTES NFR BLD AUTO: 12 % (ref 14–44)
MCH RBC QN AUTO: 30 PG (ref 26.8–34.3)
MCHC RBC AUTO-ENTMCNC: 33.7 G/DL (ref 31.4–37.4)
MCV RBC AUTO: 89 FL (ref 82–98)
MONOCYTES # BLD AUTO: 0.55 THOUSAND/ΜL (ref 0.17–1.22)
MONOCYTES NFR BLD AUTO: 4 % (ref 4–12)
NEUTROPHILS # BLD AUTO: 11.99 THOUSANDS/ΜL (ref 1.85–7.62)
NEUTS SEG NFR BLD AUTO: 83 % (ref 43–75)
NITRITE UR QL STRIP: NEGATIVE
O2 CT BLDV-SCNC: 15.7 ML/DL
PCO2 BLD: 26 MMOL/L (ref 21–32)
PCO2 BLD: 47.2 MM HG (ref 42–50)
PCO2 BLDV: 45.7 MM HG (ref 42–50)
PH BLD: 7.33 [PH] (ref 7.3–7.4)
PH BLDV: 7.3 [PH] (ref 7.3–7.4)
PH UR STRIP.AUTO: 6 [PH] (ref 4.5–8)
PLATELET # BLD AUTO: 324 THOUSANDS/UL (ref 149–390)
PMV BLD AUTO: 10.8 FL (ref 8.9–12.7)
PO2 BLD: 40 MM HG (ref 35–45)
PO2 BLDV: 48.5 MM HG (ref 35–45)
POTASSIUM BLD-SCNC: 5.2 MMOL/L (ref 3.5–5.3)
POTASSIUM SERPL-SCNC: 5.2 MMOL/L (ref 3.5–5.3)
PROT SERPL-MCNC: 7.5 G/DL (ref 6.4–8.2)
PROT UR STRIP-MCNC: NEGATIVE MG/DL
RBC # BLD AUTO: 4.57 MILLION/UL (ref 3.88–5.62)
SAO2 % BLD FROM PO2: 71 % (ref 95–98)
SODIUM BLD-SCNC: 137 MMOL/L (ref 136–145)
SODIUM SERPL-SCNC: 135 MMOL/L (ref 136–145)
SP GR UR STRIP.AUTO: 1.01 (ref 1–1.03)
SPECIMEN SOURCE: ABNORMAL
TROPONIN I SERPL-MCNC: <0.02 NG/ML
UROBILINOGEN UR QL STRIP.AUTO: 0.2 E.U./DL
WBC # BLD AUTO: 14.39 THOUSAND/UL (ref 4.31–10.16)

## 2018-07-29 PROCEDURE — 82330 ASSAY OF CALCIUM: CPT

## 2018-07-29 PROCEDURE — 85014 HEMATOCRIT: CPT

## 2018-07-29 PROCEDURE — 82948 REAGENT STRIP/BLOOD GLUCOSE: CPT

## 2018-07-29 PROCEDURE — 82805 BLOOD GASES W/O2 SATURATION: CPT | Performed by: EMERGENCY MEDICINE

## 2018-07-29 PROCEDURE — 87040 BLOOD CULTURE FOR BACTERIA: CPT | Performed by: INTERNAL MEDICINE

## 2018-07-29 PROCEDURE — 82803 BLOOD GASES ANY COMBINATION: CPT

## 2018-07-29 PROCEDURE — 74176 CT ABD & PELVIS W/O CONTRAST: CPT

## 2018-07-29 PROCEDURE — 83690 ASSAY OF LIPASE: CPT | Performed by: EMERGENCY MEDICINE

## 2018-07-29 PROCEDURE — 81003 URINALYSIS AUTO W/O SCOPE: CPT

## 2018-07-29 PROCEDURE — 82947 ASSAY GLUCOSE BLOOD QUANT: CPT

## 2018-07-29 PROCEDURE — 99285 EMERGENCY DEPT VISIT HI MDM: CPT

## 2018-07-29 PROCEDURE — 84132 ASSAY OF SERUM POTASSIUM: CPT

## 2018-07-29 PROCEDURE — 82009 KETONE BODYS QUAL: CPT | Performed by: EMERGENCY MEDICINE

## 2018-07-29 PROCEDURE — 96376 TX/PRO/DX INJ SAME DRUG ADON: CPT

## 2018-07-29 PROCEDURE — 70450 CT HEAD/BRAIN W/O DYE: CPT

## 2018-07-29 PROCEDURE — 96375 TX/PRO/DX INJ NEW DRUG ADDON: CPT

## 2018-07-29 PROCEDURE — 93005 ELECTROCARDIOGRAM TRACING: CPT

## 2018-07-29 PROCEDURE — 96361 HYDRATE IV INFUSION ADD-ON: CPT

## 2018-07-29 PROCEDURE — 36415 COLL VENOUS BLD VENIPUNCTURE: CPT | Performed by: EMERGENCY MEDICINE

## 2018-07-29 PROCEDURE — 80053 COMPREHEN METABOLIC PANEL: CPT | Performed by: EMERGENCY MEDICINE

## 2018-07-29 PROCEDURE — 84484 ASSAY OF TROPONIN QUANT: CPT | Performed by: EMERGENCY MEDICINE

## 2018-07-29 PROCEDURE — 99223 1ST HOSP IP/OBS HIGH 75: CPT | Performed by: INTERNAL MEDICINE

## 2018-07-29 PROCEDURE — 83605 ASSAY OF LACTIC ACID: CPT | Performed by: INTERNAL MEDICINE

## 2018-07-29 PROCEDURE — 96365 THER/PROPH/DIAG IV INF INIT: CPT

## 2018-07-29 PROCEDURE — C9113 INJ PANTOPRAZOLE SODIUM, VIA: HCPCS | Performed by: INTERNAL MEDICINE

## 2018-07-29 PROCEDURE — 84295 ASSAY OF SERUM SODIUM: CPT

## 2018-07-29 PROCEDURE — 85025 COMPLETE CBC W/AUTO DIFF WBC: CPT | Performed by: EMERGENCY MEDICINE

## 2018-07-29 RX ORDER — ZOLPIDEM TARTRATE 5 MG/1
5 TABLET ORAL
Status: DISCONTINUED | OUTPATIENT
Start: 2018-07-29 | End: 2018-07-31 | Stop reason: HOSPADM

## 2018-07-29 RX ORDER — METOCLOPRAMIDE HYDROCHLORIDE 5 MG/ML
5 INJECTION INTRAMUSCULAR; INTRAVENOUS ONCE
Status: COMPLETED | OUTPATIENT
Start: 2018-07-29 | End: 2018-07-29

## 2018-07-29 RX ORDER — ASPIRIN 81 MG/1
81 TABLET ORAL DAILY
Status: DISCONTINUED | OUTPATIENT
Start: 2018-07-30 | End: 2018-07-31 | Stop reason: HOSPADM

## 2018-07-29 RX ORDER — LABETALOL HYDROCHLORIDE 5 MG/ML
10 INJECTION, SOLUTION INTRAVENOUS ONCE
Status: COMPLETED | OUTPATIENT
Start: 2018-07-29 | End: 2018-07-29

## 2018-07-29 RX ORDER — HYDRALAZINE HYDROCHLORIDE 20 MG/ML
10 INJECTION INTRAMUSCULAR; INTRAVENOUS EVERY 6 HOURS PRN
Status: DISCONTINUED | OUTPATIENT
Start: 2018-07-29 | End: 2018-07-31 | Stop reason: HOSPADM

## 2018-07-29 RX ORDER — METOPROLOL SUCCINATE 25 MG/1
25 TABLET, EXTENDED RELEASE ORAL DAILY
Status: DISCONTINUED | OUTPATIENT
Start: 2018-07-30 | End: 2018-07-31 | Stop reason: HOSPADM

## 2018-07-29 RX ORDER — ONDANSETRON 2 MG/ML
INJECTION INTRAMUSCULAR; INTRAVENOUS
Status: COMPLETED
Start: 2018-07-29 | End: 2018-07-29

## 2018-07-29 RX ORDER — ONDANSETRON 2 MG/ML
4 INJECTION INTRAMUSCULAR; INTRAVENOUS ONCE
Status: COMPLETED | OUTPATIENT
Start: 2018-07-29 | End: 2018-07-29

## 2018-07-29 RX ORDER — ATORVASTATIN CALCIUM 40 MG/1
80 TABLET, FILM COATED ORAL
Status: DISCONTINUED | OUTPATIENT
Start: 2018-07-30 | End: 2018-07-31 | Stop reason: HOSPADM

## 2018-07-29 RX ORDER — SERTRALINE HYDROCHLORIDE 100 MG/1
100 TABLET, FILM COATED ORAL DAILY
Status: DISCONTINUED | OUTPATIENT
Start: 2018-07-30 | End: 2018-07-31 | Stop reason: HOSPADM

## 2018-07-29 RX ORDER — SODIUM CHLORIDE 9 MG/ML
100 INJECTION, SOLUTION INTRAVENOUS CONTINUOUS
Status: DISCONTINUED | OUTPATIENT
Start: 2018-07-29 | End: 2018-07-31 | Stop reason: HOSPADM

## 2018-07-29 RX ORDER — MELATONIN
2000 DAILY
Status: DISCONTINUED | OUTPATIENT
Start: 2018-07-30 | End: 2018-07-31 | Stop reason: HOSPADM

## 2018-07-29 RX ORDER — ONDANSETRON 2 MG/ML
4 INJECTION INTRAMUSCULAR; INTRAVENOUS EVERY 6 HOURS PRN
Status: DISCONTINUED | OUTPATIENT
Start: 2018-07-29 | End: 2018-07-31 | Stop reason: HOSPADM

## 2018-07-29 RX ORDER — METOCLOPRAMIDE HYDROCHLORIDE 5 MG/ML
10 INJECTION INTRAMUSCULAR; INTRAVENOUS EVERY 6 HOURS PRN
Status: DISCONTINUED | OUTPATIENT
Start: 2018-07-29 | End: 2018-07-31 | Stop reason: HOSPADM

## 2018-07-29 RX ORDER — ACETAMINOPHEN 325 MG/1
650 TABLET ORAL EVERY 6 HOURS PRN
Status: DISCONTINUED | OUTPATIENT
Start: 2018-07-29 | End: 2018-07-31 | Stop reason: HOSPADM

## 2018-07-29 RX ORDER — PANTOPRAZOLE SODIUM 40 MG/1
40 INJECTION, POWDER, FOR SOLUTION INTRAVENOUS EVERY 12 HOURS SCHEDULED
Status: DISCONTINUED | OUTPATIENT
Start: 2018-07-29 | End: 2018-07-31

## 2018-07-29 RX ORDER — LISINOPRIL 20 MG/1
20 TABLET ORAL DAILY
Status: DISCONTINUED | OUTPATIENT
Start: 2018-07-30 | End: 2018-07-31 | Stop reason: HOSPADM

## 2018-07-29 RX ADMIN — METOCLOPRAMIDE 5 MG: 5 INJECTION, SOLUTION INTRAMUSCULAR; INTRAVENOUS at 15:47

## 2018-07-29 RX ADMIN — SODIUM CHLORIDE 1000 ML: 0.9 INJECTION, SOLUTION INTRAVENOUS at 15:49

## 2018-07-29 RX ADMIN — ONDANSETRON 4 MG: 2 INJECTION INTRAMUSCULAR; INTRAVENOUS at 13:24

## 2018-07-29 RX ADMIN — SODIUM CHLORIDE 2000 ML: 0.9 INJECTION, SOLUTION INTRAVENOUS at 13:33

## 2018-07-29 RX ADMIN — SODIUM CHLORIDE 1000 ML: 0.9 INJECTION, SOLUTION INTRAVENOUS at 14:11

## 2018-07-29 RX ADMIN — METOCLOPRAMIDE 5 MG: 5 INJECTION, SOLUTION INTRAMUSCULAR; INTRAVENOUS at 14:03

## 2018-07-29 RX ADMIN — HYDRALAZINE HYDROCHLORIDE 10 MG: 20 INJECTION INTRAMUSCULAR; INTRAVENOUS at 23:35

## 2018-07-29 RX ADMIN — ONDANSETRON 4 MG: 2 INJECTION INTRAMUSCULAR; INTRAVENOUS at 19:18

## 2018-07-29 RX ADMIN — PANTOPRAZOLE SODIUM 40 MG: 40 INJECTION, POWDER, FOR SOLUTION INTRAVENOUS at 20:38

## 2018-07-29 RX ADMIN — SODIUM CHLORIDE 8 UNITS/HR: 9 INJECTION, SOLUTION INTRAVENOUS at 14:59

## 2018-07-29 RX ADMIN — METOCLOPRAMIDE HYDROCHLORIDE 10 MG: 5 INJECTION INTRAMUSCULAR; INTRAVENOUS at 23:18

## 2018-07-29 RX ADMIN — LABETALOL 20 MG/4 ML (5 MG/ML) INTRAVENOUS SYRINGE 10 MG: at 15:50

## 2018-07-29 NOTE — ED NOTES
1639 blood pressure reading performed by second nurse for validation  Confirmed that patient's blood pressure is lower than automatic readings        Luciano Mckoy RN  07/29/18 [id number]

## 2018-07-29 NOTE — ED NOTES
Patient nauseous, requesting additional antiemetic  Provider aware of patient's VS  Orders pending  Clarified total amount of IV fluids requested, 4th NS bolus to run 100 ml/hr       Rachele Alexis RN  07/29/18 [id number]

## 2018-07-29 NOTE — ED PROVIDER NOTES
History  Chief Complaint   Patient presents with    Vomiting     Pt reports vomitting beginning this AM  Pt shut off BG machine when bg was 53  Pt BG pre-hospital was 451  History provided by:  Patient   used: No    Vomiting   Associated symptoms: no abdominal pain, no chills, no cough, no diarrhea, no fever, no headaches and no sore throat      Patient is a 52-year-old type 1 diabetic presenting to ER with nausea vomiting  Feels diaphoretic  No chest pain shortness of breath  Abdominal pain since vomiting  No fevers  No urine complaints  Patient's blood sugar today morning was in the 50s, he stopped his insulin pump, comes in to ER for evaluation  Does not remember last time he was in DKA  Patient has 1 stent in his heart, and shortness of breath a year ago, diagnosed cardiac ischemia  MDM will evaluate for DKA, cardiac workup, evaluate        Prior to Admission Medications   Prescriptions Last Dose Informant Patient Reported? Taking?    NEL MICROLET LANCETS lancets  Self No Yes   Sig: by Other route 4 (four) times a day Use as instructed   Cholecalciferol (VITAMIN D) 2000 units CAPS  Self Yes Yes   Sig: TK 1 C PO QD   INSULIN SYRINGE 1CC/29G 29G X 1/2" 1 ML MISC  Self No Yes   Sig: Inject as directed 3 (three) times a day with meals Use as directed   Insulin Infusion Pump KIT  Self Yes Yes   Si Units/hr by Subcutaneous Insulin Pump route continuous   Insulin Infusion Pump Supplies (MINIMED INFUSION SET-) MISC  Self Yes Yes   Sig: by Does not apply route   Insulin Infusion Pump Supplies (MINIMED RESERVOIR 3ML) MISC  Self Yes Yes   Sig: by Does not apply route   aspirin (ECOTRIN LOW STRENGTH) 81 mg EC tablet  Self No Yes   Sig: Take 1 tablet by mouth daily   atorvastatin (LIPITOR) 80 mg tablet  Self No Yes   Sig: Take 1 tablet by mouth daily with dinner   furosemide (LASIX) 20 mg tablet  Self No Yes   Sig: TAKE 1 TABLET BY MOUTH DAILY   glucagon (GLUCAGON EMERGENCY) 1 MG injection   No Yes   Sig: Inject 1 mg under the skin once as needed for low blood sugar for up to 1 dose   glucose blood (NEL CONTOUR TEST) test strip  Self No Yes   Si each by Other route 4 (four) times a day   insulin lispro (HUMALOG) 100 units/mL injection  Self No Yes   Sig: Use up to 100 units per day via insulin pump   lisinopril (ZESTRIL) 20 mg tablet  Self Yes Yes   Sig: Take 20 mg by mouth daily   metoprolol succinate (TOPROL-XL) 25 mg 24 hr tablet  Self No Yes   Sig: Take 1 tablet by mouth daily   omeprazole (PriLOSEC) 20 mg delayed release capsule  Self Yes Yes   Sig: Take 20 mg by mouth daily   sertraline (ZOLOFT) 100 mg tablet  Self No Yes   Sig: TAKE 1 TABLET BY MOUTH DAILY   ticagrelor (BRILINTA) 90 MG  Self No Yes   Sig: Take 1 tablet by mouth every 12 (twelve) hours   zolpidem (AMBIEN) 5 mg tablet  Self No Yes   Sig: Take 1 tablet by mouth daily at bedtime as needed for sleep for up to 2 days      Facility-Administered Medications: None       Past Medical History:   Diagnosis Date    Aortic stenosis     Clavicle fracture     LEFT    Diabetes mellitus (HCC)     Hyperlipidemia     Hypertension     Thrombocytopenic purpura (HCC)        Past Surgical History:   Procedure Laterality Date    ABDOMINAL SURGERY      ROTATOR CUFF REPAIR      SPLENECTOMY      TONSILLECTOMY      VITRECTOMY Bilateral     BY ANTERIOR APPROACH        Family History   Problem Relation Age of Onset    Aneurysm Mother         CAREBRAL ARTERY     Coronary artery disease Mother     Diabetes Mother      I have reviewed and agree with the history as documented  Social History   Substance Use Topics    Smoking status: Former Smoker     Types: Cigars    Smokeless tobacco: Never Used    Alcohol use Yes      Comment: daily        Review of Systems   Constitutional: Positive for diaphoresis  Negative for chills and fever  HENT: Negative for congestion and sore throat      Eyes: Negative for photophobia and visual disturbance  Respiratory: Negative for cough, shortness of breath, wheezing and stridor  Cardiovascular: Negative for chest pain, palpitations and leg swelling  Gastrointestinal: Positive for nausea and vomiting  Negative for abdominal pain, blood in stool and diarrhea  Genitourinary: Negative for dysuria, frequency and urgency  Musculoskeletal: Negative for neck pain and neck stiffness  Skin: Negative for pallor and rash  Neurological: Negative for dizziness, syncope, weakness, light-headedness and headaches  All other systems reviewed and are negative  Physical Exam  Physical Exam   Constitutional: He is oriented to person, place, and time  He appears well-developed and well-nourished  HENT:   Head: Normocephalic and atraumatic  Eyes: Pupils are equal, round, and reactive to light  Neck: Neck supple  Cardiovascular: Normal rate, regular rhythm, normal heart sounds and intact distal pulses  Pulmonary/Chest: Effort normal and breath sounds normal  No respiratory distress  Abdominal: Soft  Bowel sounds are normal  There is no tenderness  Musculoskeletal: Normal range of motion  He exhibits no edema or tenderness  Neurological: He is alert and oriented to person, place, and time  Skin: Skin is warm  Capillary refill takes less than 2 seconds  He is diaphoretic  No erythema  No pallor  Vitals reviewed        Vital Signs  ED Triage Vitals [07/29/18 1317]   Temperature Pulse Respirations Blood Pressure SpO2   97 5 °F (36 4 °C) 84 18 (!) 216/96 99 %      Temp Source Heart Rate Source Patient Position - Orthostatic VS BP Location FiO2 (%)   Oral Monitor Sitting Right arm --      Pain Score       4           Vitals:    07/29/18 1600 07/29/18 1629 07/29/18 1639 07/29/18 1714   BP: (!) 192/69 148/52 (!) 162/42 142/56   Pulse: 90 91  90   Patient Position - Orthostatic VS: Sitting Sitting Lying Lying       Visual Acuity  Visual Acuity      Most Recent Value   L Pupil Size (mm) 3   R Pupil Size (mm)  3          ED Medications  Medications   insulin regular (HumuLIN R,NovoLIN R) 1 Units/mL in sodium chloride 0 9 % 100 mL infusion (4 Units/hr Intravenous Rate/Dose Change 7/29/18 1713)   ondansetron (ZOFRAN) injection 4 mg (not administered)    EMS REPLENISHMENT MED ( Does not apply Given to EMS 7/29/18 1330)   ondansetron (ZOFRAN) injection 4 mg (4 mg Intravenous Given 7/29/18 1324)   sodium chloride 0 9 % bolus 2,000 mL (0 mL Intravenous Stopped 7/29/18 1407)   metoclopramide (REGLAN) injection 5 mg (5 mg Intravenous Given 7/29/18 1403)   sodium chloride 0 9 % bolus 1,000 mL (0 mL Intravenous Stopped 7/29/18 1500)   sodium chloride 0 9 % bolus 1,000 mL (1,000 mL Intravenous New Bag 7/29/18 1549)   metoclopramide (REGLAN) injection 5 mg (5 mg Intravenous Given 7/29/18 1547)   labetalol (NORMODYNE) injection 10 mg (10 mg Intravenous Given 7/29/18 1550)       Diagnostic Studies  Results Reviewed     Procedure Component Value Units Date/Time    Trauma tubes on hold [[id number]] Collected:  07/29/18 1330    Lab Status:  Final result Specimen:  Blood Updated:  07/29/18 1801    Narrative: The following orders were created for panel order Trauma tubes on hold  Procedure                               Abnormality         Status                     ---------                               -----------         ------                     Alon Askew Top on UZUB[[id number]]                            Final result               Gold top on WQCM[[id number]]                                  Final result               Green / Yellow tube on ZXIH[[id number]]                       Final result               Green / Black tube on BTQA[[id number]]                        Final result               Grey top tube on BFAV[[id number]]                             Final result                 Please view results for these tests on the individual orders      Fingerstick Glucose (POCT) [[id number]]  (Abnormal) Collected:  07/29/18 1710    Lab Status:  Final result Updated:  07/29/18 1711     POC Glucose 250 (H) mg/dl     Fingerstick Glucose (POCT) [[id number]]  (Abnormal) Collected:  07/29/18 1538    Lab Status:  Final result Updated:  07/29/18 1540     POC Glucose 382 (H) mg/dl     ED Urine Macroscopic [[id number]]  (Abnormal) Collected:  07/29/18 1458    Lab Status:  Final result Specimen:  Urine Updated:  07/29/18 1450     Color, UA Yellow     Clarity, UA Clear     pH, UA 6 0     Leukocytes, UA Negative     Nitrite, UA Negative     Protein, UA Negative mg/dl      Glucose,  (1/2%) (A) mg/dl      Ketones, UA 40 (2+) (A) mg/dl      Urobilinogen, UA 0 2 E U /dl      Bilirubin, UA Negative     Blood, UA Negative     Specific Gravity, UA 1 010    Narrative:       CLINITEK RESULT    Fingerstick Glucose (POCT) [[id number]]  (Abnormal) Collected:  07/29/18 1438    Lab Status:  Final result Updated:  07/29/18 1440     POC Glucose 365 (H) mg/dl     Troponin I [[id number]]  (Normal) Collected:  07/29/18 1334    Lab Status:  Final result Specimen:  Blood from Arm, Left Updated:  07/29/18 1401     Troponin I <0 02 ng/mL     Lipase [[id number]]  (Abnormal) Collected:  07/29/18 1334    Lab Status:  Final result Specimen:  Blood from Arm, Left Updated:  07/29/18 1359     Lipase 59 (L) u/L     Comprehensive metabolic panel [[id number]]  (Abnormal) Collected:  07/29/18 1334    Lab Status:  Final result Specimen:  Blood from Arm, Left Updated:  07/29/18 1359     Sodium 135 (L) mmol/L      Potassium 5 2 mmol/L      Chloride 101 mmol/L      CO2 24 mmol/L      Anion Gap 10 mmol/L      BUN 15 mg/dL      Creatinine 0 99 mg/dL      Glucose 454 (H) mg/dL      Calcium 9 2 mg/dL      AST 22 U/L      ALT 42 U/L      Alkaline Phosphatase 130 (H) U/L      Total Protein 7 5 g/dL      Albumin 3 9 g/dL      Total Bilirubin 0 40 mg/dL      eGFR 87 ml/min/1 73sq m     Narrative:         National Kidney Disease Education Program recommendations are as follows:  GFR calculation is accurate only with a steady state creatinine  Chronic Kidney disease less than 60 ml/min/1 73 sq  meters  Kidney failure less than 15 ml/min/1 73 sq  meters      Acetone [[id number]]  (Abnormal) Collected:  07/29/18 1334    Lab Status:  Final result Specimen:  Blood from Arm, Left Updated:  07/29/18 1353     Acetone, Bld 3+ (A)    POCT Blood Gas (CG8+) [[id number]]  (Abnormal) Collected:  07/29/18 1340    Lab Status:  Final result Updated:  07/29/18 1345     ph, Mychal ISTAT 7 325     pCO2, Mychla i-STAT 47 2 mm HG      pO2, Mychal i-STAT 40 0 mm HG      BE, i-STAT -2 mmol/L      HCO3, Mychal i-STAT 24 6 mmol/L      CO2, i-STAT 26 mmol/L      O2 Sat, i-STAT 71 (L) %      SODIUM, I-STAT 137 mmol/l      Potassium, i-STAT 5 2 mmol/L      Calcium, Ionized i-STAT 1 15 mmol/L      Hct, i-STAT 42 %      Hgb, i-STAT 14 3 g/dl      Glucose, i-STAT 452 (H) mg/dl      Specimen Type VENOUS    CBC and differential [[id number]]  (Abnormal) Collected:  07/29/18 1334    Lab Status:  Final result Specimen:  Blood from Arm, Left Updated:  07/29/18 1343     WBC 14 39 (H) Thousand/uL      RBC 4 57 Million/uL      Hemoglobin 13 7 g/dL      Hematocrit 40 6 %      MCV 89 fL      MCH 30 0 pg      MCHC 33 7 g/dL      RDW 13 8 %      MPV 10 8 fL      Platelets 574 Thousands/uL      Neutrophils Relative 83 (H) %      Lymphocytes Relative 12 (L) %      Monocytes Relative 4 %      Eosinophils Relative 0 %      Basophils Relative 0 %      Neutrophils Absolute 11 99 (H) Thousands/µL      Lymphocytes Absolute 1 75 Thousands/µL      Monocytes Absolute 0 55 Thousand/µL      Eosinophils Absolute 0 04 Thousand/µL      Basophils Absolute 0 06 Thousands/µL     Blood gas, venous [[id number]]  (Abnormal) Collected:  07/29/18 1334    Lab Status:  Final result Specimen:  Blood from Arm, Left Updated:  07/29/18 1342     pH, Mychal 7 300     pCO2, Mychal 45 7 mm Hg      pO2, Mychal 48 5 (H) mm Hg      HCO3, Mychal 22 0 (L) mmol/L      Base Excess, Mychal -4 5 mmol/L      O2 Content, Mychal 15 7 ml/dL      O2 HGB, VENOUS 78 6 %                  CT head without contrast   Final Result by Ronni Arndt DO (07/29 1525)      No acute intracranial abnormality  Workstation performed: [id number]         CT abdomen pelvis wo contrast   Final Result by Yuki Hodges MD (07/29 1517)      No acute intra-abdominal pelvic abnormality identified  Prior splenectomy  Lobulated cystic structure in the tail of the pancreas, mostly stable compared to 2017 but mostly obscured by prior streak artifact related to metallic clips in the left upper quadrant  Suggest nonemergent dedicated CT pancreatic protocol exam                Workstation performed: [id number]                    Procedures  Procedures       Phone Contacts  ED Phone Contact    ED Course  ED Course as of Jul 29 1910   Sun Jul 29, 2018   1356 ECG shows rate of 79, sinus, normal axis, normal QRS, no significant ST or T-wave changes, independently interpreted by me                                MDM  CritCare Time    Disposition  Final diagnoses:   Hyperglycemia   Nausea and vomiting     Time reflects when diagnosis was documented in both MDM as applicable and the Disposition within this note     Time User Action Codes Description Comment    7/29/2018  4:27 PM Shahnaz Escobar [R73 9] Hyperglycemia     7/29/2018  4:27 PM Shahnaz Escobar [R11 2] Nausea and vomiting       ED Disposition     ED Disposition Condition Comment    Admit  Case was discussed with medicine and the patient's admission status was agreed to be Admission Status: inpatient status to the service of Dr Olaf Murphy           Follow-up Information    None         Current Discharge Medication List      CONTINUE these medications which have NOT CHANGED    Details   aspirin (ECOTRIN LOW STRENGTH) 81 mg EC tablet Take 1 tablet by mouth daily  Refills: 0      atorvastatin (LIPITOR) 80 mg tablet Take 1 tablet by mouth daily with dinner  Qty: 30 tablet, Refills: 0 NEL MICROLET LANCETS lancets by Other route 4 (four) times a day Use as instructed  Qty: 400 each, Refills: 3    Associated Diagnoses: Type 1 diabetes mellitus with complication (HCC)      Cholecalciferol (VITAMIN D) 2000 units CAPS TK 1 C PO QD  Refills: 3      furosemide (LASIX) 20 mg tablet TAKE 1 TABLET BY MOUTH DAILY  Qty: 30 tablet, Refills: 5    Associated Diagnoses: Essential hypertension      glucagon (GLUCAGON EMERGENCY) 1 MG injection Inject 1 mg under the skin once as needed for low blood sugar for up to 1 dose  Qty: 2 each, Refills: 3    Associated Diagnoses: Type 1 diabetes mellitus with complication (HCC)      glucose blood (NEL CONTOUR TEST) test strip 1 each by Other route 4 (four) times a day  Qty: 400 each, Refills: 3    Associated Diagnoses: Type 1 diabetes mellitus with complication ([facility])      Insulin Infusion Pump KIT 1 Units/hr by Subcutaneous Insulin Pump route continuous      !! Insulin Infusion Pump Supplies (MINIMED INFUSION SET-) MISC by Does not apply route      !!  Insulin Infusion Pump Supplies (MINIMED RESERVOIR 3ML) MISC by Does not apply route      insulin lispro (HUMALOG) 100 units/mL injection Use up to 100 units per day via insulin pump  Qty: 60 mL, Refills: 3    Associated Diagnoses: Type 1 diabetes mellitus without complication ([facility])      INSULIN SYRINGE 1CC/29G 29G X 1/2" 1 ML MISC Inject as directed 3 (three) times a day with meals Use as directed  Qty: 100 each, Refills: 0    Associated Diagnoses: Type 2 diabetes mellitus without complication, with long-term current use of insulin ([facility])      lisinopril (ZESTRIL) 20 mg tablet Take 20 mg by mouth daily      metoprolol succinate (TOPROL-XL) 25 mg 24 hr tablet Take 1 tablet by mouth daily  Qty: 30 tablet, Refills: 0      omeprazole (PriLOSEC) 20 mg delayed release capsule Take 20 mg by mouth daily      sertraline (ZOLOFT) 100 mg tablet TAKE 1 TABLET BY MOUTH DAILY  Qty: 90 tablet, Refills: 0    Associated Diagnoses: Severe episode of recurrent major depressive disorder, without psychotic features ([facility] [address] )      ticagrelor (BRILINTA) 90 MG Take 1 tablet by mouth every 12 (twelve) hours  Qty: 30 tablet, Refills: 0      zolpidem (AMBIEN) 5 mg tablet Take 1 tablet by mouth daily at bedtime as needed for sleep for up to 2 days  Qty: 2 tablet, Refills: 0       !! - Potential duplicate medications found  Please discuss with provider  No discharge procedures on file      ED Provider  Electronically Signed by           Christ Hoff MD  07/29/18 4712

## 2018-07-29 NOTE — LETTER
[facility]  [address] [address]  Dept: [phone]    July 31, 2018     Patient: Ana Ramires   YOB: 1965   Date of Visit: 7/29/2018       To Whom it May Concern:    Denny Cates is under my professional care  He was seen in the hospital from 7/29/2018   to 07/31/18  He may return to work on August 2nd, 2018  If you have any questions or concerns, please don't hesitate to call           Sincerely,                  Casie Neville PA-C

## 2018-07-29 NOTE — H&P
History and Physical - [facility] Internal Medicine    Patient Information: Mackenzie Zamorano 47 y o  male MRN: [id number]  Unit/Bed#: -01 Encounter: [id number]  Admitting Physician: Sukumar Jessica DO  PCP: Mushtaq Carlson DO  Date of Admission:  07/29/18    Assessment/Plan:    Hospital Problem List:     Principal Problem:    Intractable nausea and vomiting  Active Problems:    T1DM (type 1 diabetes mellitus) ([facility] [address] )    Benign essential HTN    Coronary artery disease involving native coronary artery of native heart without angina pectoris    Hyperglycemia    Plan for the Primary Problem(s):  · Intractable Nausea / Vomiting, Likely Acute Gastroenteritis with SIRS criteria -   · PPI - Protonix 40 mg IV bid  · Eval for potential gastroenteritis,   · Assess for antibiotic Use -   · Check rectal temperature  If elevated, start abdomen coverage with IV antibiotics and get blood cultures  · Get stool culture  · Check lactic acid now  Get blood cultures  · With no clear new infection, will hold off on antibiotic dosing currently  · Provide supportive care -   · IV Fluids - will run normal saline at 125 ml/hr  · Antiemetics - Zofran  · Diet - clear liquid diet  · Pain control - IV dilaudid  · At risk for Diabetic Gastroparesis - Will schedule Reglan 10 mg IV q6h  · If symptoms persist, consider GI evaluation  Plan for Additional Problems:   · Diabetes Mellitus with Severe Hyperglycemia (glucose 454) -   · Insulin Drip  IV fluids  · Once glucose is down can try switching regimen to insulin pump regimen by tomorrow if he is taking PO better  · Check Hemoglobin A1c     · Monitor glucose levels  · Patient has insulin pump  Will consult endocrinology  · Essential Hypertension - Home regimen  PRN IV hydralazine  VTE Prophylaxis: Low risk - ambulate   / sequential compression device and reason for no mechanical VTE prophylaxis low risk  Code Status: Level 1 Full Code    POLST: There is no POLST form on file for this patient (pre-hospital)    Anticipated Length of Stay:  Patient will be admitted on an Inpatient basis with an anticipated length of stay of  > 2 midnights  Justification for Hospital Stay: Evaluation of intractable Nausea / vomiting with severe hyperglycemia  Total Time for Visit, including Counseling / Coordination of Care: 30 minutes  Greater than 50% of this total time spent on direct patient counseling and coordination of care  Chief Complaint:  Intractable Nausea / Vomiting  History of Present Illness:    Lou Henriquez is a 47 y o  male who presents with intractable nausea and vomiting  The patient was in his usual state health until last night when he began to get nauseous at the end of his shift  Normally when he is done working he will stop to get food but states that last night he just went right to bed  Then, today when he woke up he began vomiting  The patient is unable to tell me how many times he has vomited but states that he has been vomiting a lot  The emesis he describes as mostly liquid and bilious and at this point is mostly dry heaves  He the patient, when asked if he has coffee-ground emesis or blood seems to provide an ambiguous answer and states "possibly"  The patient states that he has abdominal pain but only as he is vomiting  The patient denies any fevers or chills at home but currently appears to have chills and states he feels cold  The patient states he had a little bit of diarrhea  No noted melena or hematochezia  The patient does not seem very willing to continue answering multiple questions at this time due to dry heaves and general discomfort  Review of Systems:    Review of Systems   Constitutional: Positive for appetite change (decreased) and chills  Negative for activity change, diaphoresis and fever  HENT: Negative for congestion, sinus pain, sinus pressure and trouble swallowing  Eyes: Negative    Negative for visual disturbance  Respiratory: Negative  Negative for cough, shortness of breath and wheezing  Cardiovascular: Negative for chest pain and palpitations  Gastrointestinal: Positive for diarrhea, nausea and vomiting  Negative for abdominal distention, abdominal pain, blood in stool and constipation  Endocrine: Negative  Genitourinary: Negative  Negative for decreased urine volume, difficulty urinating, flank pain and frequency  Musculoskeletal: Negative for arthralgias, back pain, neck pain and neck stiffness  Skin: Negative  Negative for pallor and rash  Allergic/Immunologic: Negative  Neurological: Positive for dizziness and light-headedness  Negative for syncope, numbness and headaches  Hematological: Negative  Psychiatric/Behavioral: Negative  All other systems reviewed and are negative  Past Medical and Surgical History:     Past Medical History:   Diagnosis Date    Aortic stenosis     Clavicle fracture     LEFT    Diabetes mellitus ([facility] [address] )     Hyperlipidemia     Hypertension     Thrombocytopenic purpura ([facility] [address] )        Past Surgical History:   Procedure Laterality Date    ABDOMINAL SURGERY      ROTATOR CUFF REPAIR      SPLENECTOMY      TONSILLECTOMY      VITRECTOMY Bilateral     BY ANTERIOR APPROACH        Meds/Allergies:    Prior to Admission medications    Medication Sig Start Date End Date Taking?  Authorizing Provider   aspirin (ECOTRIN LOW STRENGTH) 81 mg EC tablet Take 1 tablet by mouth daily 12/4/17  Yes Sheldon Flores MD   atorvastatin (LIPITOR) 80 mg tablet Take 1 tablet by mouth daily with dinner 9/1/17  Yes Fani Landa MD   NEL MICROLET LANCETS lancets by Other route 4 (four) times a day Use as instructed 3/28/18  Yes James Villavicencio MD   Cholecalciferol (VITAMIN D) 2000 units CAPS TK 1 C PO QD 12/12/17  Yes Historical Provider, MD   furosemide (LASIX) 20 mg tablet TAKE 1 TABLET BY MOUTH DAILY 5/29/18  Yes Chad Melchor MD   glucagon (GLUCAGON EMERGENCY) 1 MG injection Inject 1 mg under the skin once as needed for low blood sugar for up to 1 dose 7/25/18  Yes Simone Lindsey MD   glucose blood (NEL CONTOUR TEST) test strip 1 each by Other route 4 (four) times a day 3/28/18  Yes Simone Lindsey MD   Insulin Infusion Pump KIT 1 Units/hr by Subcutaneous Insulin Pump route continuous   Yes Historical Provider, MD   Insulin Infusion Pump Supplies (MINIMED INFUSION SET-) MISC by Does not apply route 12/19/17  Yes Historical Provider, MD   Insulin Infusion Pump Supplies ([address] [place] [address]) [address] [facility] by Does not apply route 12/19/17  Yes Historical Provider, MD   insulin lispro (HUMALOG) 100 units/mL injection Use up to 100 units per day via insulin pump 3/28/18  Yes Simone Lindsey MD   INSULIN SYRINGE 1CC/29G 29G X 1/2" 1 ML MISC Inject as directed 3 (three) times a day with meals Use as directed 2/23/18  Yes Carlos Jim MD   lisinopril (ZESTRIL) 20 mg tablet Take 20 mg by mouth daily   Yes Historical Provider, MD   metoprolol succinate (TOPROL-XL) 25 mg 24 hr tablet Take 1 tablet by mouth daily 9/1/17  Yes Lux Muhammad MD   omeprazole (PriLOSEC) 20 mg delayed release capsule Take 20 mg by mouth daily   Yes Historical Provider, MD   sertraline (ZOLOFT) 100 mg tablet TAKE 1 TABLET BY MOUTH DAILY 5/8/18  Yes Sera Roe DO   ticagrelor (BRILINTA) 90 MG Take 1 tablet by mouth every 12 (twelve) hours 9/1/17  Yes Lux Muhammad MD   zolpidem (AMBIEN) 5 mg tablet Take 1 tablet by mouth daily at bedtime as needed for sleep for up to 2 days 9/1/17 7/29/18 Yes Lux Muhammad MD     I have reviewed home medications with patient personally      Allergies: No Known Allergies    Social History:     Marital Status: /Civil Union     Substance Use History:   History   Alcohol Use    Yes     Comment: daily     History   Smoking Status    Former Smoker    Types: Cigars   Smokeless Tobacco    Never Used     History   Drug Use    Frequency: 3 0 times per week    Types: Marijuana       Family History:    non-contributory ; no sick contacts with similar symptoms  Physical Exam:     Vitals:   Blood Pressure: 142/56 (07/29/18 1714)  Pulse: 90 (07/29/18 1714)  Temperature: 97 5 °F (36 4 °C) (07/29/18 1317)  Temp Source: Oral (07/29/18 1317)  Respirations: 16 (07/29/18 1714)  Height: 5' 6" (167 6 cm) (07/29/18 1507)  Weight - Scale: 74 8 kg (165 lb) (07/29/18 1507)  SpO2: 97 % (07/29/18 1714)    Physical Exam   Constitutional: He is oriented to person, place, and time  No distress  Dependent areas of body against mattress are diaphoretic  The patient has obvious chills / teeth chattering  HENT:   Mouth/Throat: Oropharynx is clear and moist  No oropharyngeal exudate  Eyes: Conjunctivae are normal  Pupils are equal, round, and reactive to light  Neck: No JVD present  Cardiovascular: Normal rate and regular rhythm  No murmur heard  Pulmonary/Chest: Effort normal and breath sounds normal  He has no wheezes  He has no rales  Abdominal: Soft  Bowel sounds are normal  He exhibits no distension  There is no tenderness  Dry heaves multiple times during my exam with about 2 tablespoons of bilious emesis produced while I am in room  Abdomen flat   Musculoskeletal: He exhibits no edema or tenderness  Lymphadenopathy:     He has no cervical adenopathy  Neurological: He is alert and oriented to person, place, and time  Skin: Skin is warm and dry  No rash noted  Vitals reviewed  Additional Data:     Lab Results: I have personally reviewed pertinent reports          Results from last 7 days  Lab Units 07/29/18  1340 07/29/18  1334   WBC Thousand/uL  --  14 39*   HEMOGLOBIN g/dL  --  13 7   I STAT HEMOGLOBIN g/dl 14 3  --    HEMATOCRIT %  --  40 6   PLATELETS Thousands/uL  --  324   NEUTROS PCT %  --  83*   LYMPHS PCT %  --  12*   MONOS PCT %  --  4   EOS PCT %  --  0       Results from last 7 days  Lab Units 07/29/18  1340 07/29/18  1334   SODIUM mmol/L  --  135*   POTASSIUM mmol/L  --  5 2   CHLORIDE mmol/L  --  101   CO2 mmol/L  --  24   BUN mg/dL  --  15   CREATININE mg/dL  --  0 99   CALCIUM mg/dL  --  9 2   TOTAL PROTEIN g/dL  --  7 5   BILIRUBIN TOTAL mg/dL  --  0 40   ALK PHOS U/L  --  130*   ALT U/L  --  42   AST U/L  --  22   GLUCOSE RANDOM mg/dL  --  454*   GLUCOSE, ISTAT mg/dl 452*  --            Imaging: I have personally reviewed pertinent reports  Ct Abdomen Pelvis Wo Contrast    Result Date: 7/29/2018  Narrative: CT ABDOMEN AND PELVIS WITHOUT IV CONTRAST INDICATION:   abd pain/n/v  COMPARISON:  None  TECHNIQUE:  CT examination of the abdomen and pelvis was performed without intravenous contrast   Axial, sagittal, and coronal 2D reformatted images were created from the source data and submitted for interpretation  Radiation dose length product (DLP) for this visit:  34 33 96 mGy-cm   This examination, like all CT scans performed in the [facility], was performed utilizing techniques to minimize radiation dose exposure, including the use of iterative reconstruction and automated exposure control  Enteric contrast was not administered  FINDINGS: ABDOMEN LOWER CHEST:  No clinically significant abnormality identified in the visualized lower chest   Heavy coronary artery calcification  LIVER/BILIARY TREE:  Unremarkable  GALLBLADDER:  No calcified gallstones  No pericholecystic inflammatory change  SPLEEN:  Prior splenectomy  PANCREAS:   there is a lobulated cystic lesion in the tail of the pancreas which measures 3 4 x 2 3 cm  Review of a prior chest CT from August 2017 suggest this was likely present, grossly similar in size but mostly obscured by extensive streak artifact  related to adjacent coils  The remainder of the pancreatic body and tail are diffusely atrophic  Suggest nonemergent dedicated contrast CT pancreatic protocol to better assess  MRI would not be helpful given the extensive adjacent metallic clip artifact  ADRENAL GLANDS:  Unremarkable  KIDNEYS/URETERS:  Unremarkable  No hydronephrosis  STOMACH AND BOWEL:  Unremarkable  APPENDIX:  A normal appendix was visualized  ABDOMINOPELVIC CAVITY:  No ascites or free intraperitoneal air  No lymphadenopathy  VESSELS:  Moderate calcified atherosclerosis throughout the visceral arteries  No aortic aneurysm  PELVIS REPRODUCTIVE ORGANS:  Unremarkable for patient's age  URINARY BLADDER:  Moderate bladder distention without thickening or inflammation  No filling defects  ABDOMINAL WALL/INGUINAL REGIONS:  Unremarkable  OSSEOUS STRUCTURES:  No acute fracture or destructive osseous lesion  Impression: No acute intra-abdominal pelvic abnormality identified  Prior splenectomy  Lobulated cystic structure in the tail of the pancreas, mostly stable compared to 2017 but mostly obscured by prior streak artifact related to metallic clips in the left upper quadrant  Suggest nonemergent dedicated CT pancreatic protocol exam  Workstation performed: [id number]     Ct Head Without Contrast    Result Date: 7/29/2018  Narrative: CT BRAIN - WITHOUT CONTRAST INDICATION:   Hypertensive, dizzy/n/v  COMPARISON:  None  TECHNIQUE:  CT examination of the brain was performed  In addition to axial images, coronal 2D reformatted images were created and submitted for interpretation  Radiation dose length product (DLP) for this visit:  1198 mGy-cm   This examination, like all CT scans performed in the [facility], was performed utilizing techniques to minimize radiation dose exposure, including the use of iterative reconstruction and automated exposure control  IMAGE QUALITY:  Diagnostic  FINDINGS: PARENCHYMA:  No intracranial mass, mass effect or midline shift  No CT signs of acute infarction  No acute parenchymal hemorrhage  VENTRICLES AND EXTRA-AXIAL SPACES:  Normal for the patient's age  VISUALIZED ORBITS AND PARANASAL SINUSES:  Unremarkable   CALVARIUM AND EXTRACRANIAL SOFT TISSUES: Normal      Impression: No acute intracranial abnormality  Workstation performed: [id number]       Allscripts / Epic Records Reviewed: Yes     ** Please Note: This note has been constructed using a voice recognition system   **

## 2018-07-30 LAB
ALBUMIN SERPL BCP-MCNC: 3.3 G/DL (ref 3.5–5)
ALP SERPL-CCNC: 106 U/L (ref 46–116)
ALT SERPL W P-5'-P-CCNC: 34 U/L (ref 12–78)
ANION GAP SERPL CALCULATED.3IONS-SCNC: 11 MMOL/L (ref 4–13)
AST SERPL W P-5'-P-CCNC: 17 U/L (ref 5–45)
ATRIAL RATE: 79 BPM
BASOPHILS # BLD AUTO: 0.01 THOUSANDS/ΜL (ref 0–0.1)
BASOPHILS NFR BLD AUTO: 0 % (ref 0–1)
BILIRUB SERPL-MCNC: 0.3 MG/DL (ref 0.2–1)
BUN SERPL-MCNC: 12 MG/DL (ref 5–25)
CALCIUM SERPL-MCNC: 8.6 MG/DL (ref 8.3–10.1)
CHLORIDE SERPL-SCNC: 111 MMOL/L (ref 100–108)
CO2 SERPL-SCNC: 22 MMOL/L (ref 21–32)
CREAT SERPL-MCNC: 0.7 MG/DL (ref 0.6–1.3)
EOSINOPHIL # BLD AUTO: 0 THOUSAND/ΜL (ref 0–0.61)
EOSINOPHIL NFR BLD AUTO: 0 % (ref 0–6)
ERYTHROCYTE [DISTWIDTH] IN BLOOD BY AUTOMATED COUNT: 14 % (ref 11.6–15.1)
EST. AVERAGE GLUCOSE BLD GHB EST-MCNC: 214 MG/DL
GFR SERPL CREATININE-BSD FRML MDRD: 109 ML/MIN/1.73SQ M
GLUCOSE SERPL-MCNC: 104 MG/DL (ref 65–140)
GLUCOSE SERPL-MCNC: 104 MG/DL (ref 65–140)
GLUCOSE SERPL-MCNC: 136 MG/DL (ref 65–140)
GLUCOSE SERPL-MCNC: 137 MG/DL (ref 65–140)
GLUCOSE SERPL-MCNC: 147 MG/DL (ref 65–140)
GLUCOSE SERPL-MCNC: 152 MG/DL (ref 65–140)
GLUCOSE SERPL-MCNC: 160 MG/DL (ref 65–140)
GLUCOSE SERPL-MCNC: 169 MG/DL (ref 65–140)
GLUCOSE SERPL-MCNC: 203 MG/DL (ref 65–140)
GLUCOSE SERPL-MCNC: 215 MG/DL (ref 65–140)
GLUCOSE SERPL-MCNC: 95 MG/DL (ref 65–140)
HBA1C MFR BLD: 9.1 % (ref 4.2–6.3)
HCT VFR BLD AUTO: 36.1 % (ref 36.5–49.3)
HGB BLD-MCNC: 12.1 G/DL (ref 12–17)
LYMPHOCYTES # BLD AUTO: 1.5 THOUSANDS/ΜL (ref 0.6–4.47)
LYMPHOCYTES NFR BLD AUTO: 13 % (ref 14–44)
MCH RBC QN AUTO: 29.9 PG (ref 26.8–34.3)
MCHC RBC AUTO-ENTMCNC: 33.5 G/DL (ref 31.4–37.4)
MCV RBC AUTO: 89 FL (ref 82–98)
MONOCYTES # BLD AUTO: 0.97 THOUSAND/ΜL (ref 0.17–1.22)
MONOCYTES NFR BLD AUTO: 9 % (ref 4–12)
NEUTROPHILS # BLD AUTO: 8.84 THOUSANDS/ΜL (ref 1.85–7.62)
NEUTS SEG NFR BLD AUTO: 78 % (ref 43–75)
P AXIS: 69 DEGREES
PLATELET # BLD AUTO: 310 THOUSANDS/UL (ref 149–390)
PMV BLD AUTO: 10.4 FL (ref 8.9–12.7)
POTASSIUM SERPL-SCNC: 3.7 MMOL/L (ref 3.5–5.3)
PR INTERVAL: 192 MS
PROT SERPL-MCNC: 6.7 G/DL (ref 6.4–8.2)
QRS AXIS: 45 DEGREES
QRSD INTERVAL: 102 MS
QT INTERVAL: 404 MS
QTC INTERVAL: 463 MS
RBC # BLD AUTO: 4.05 MILLION/UL (ref 3.88–5.62)
SODIUM SERPL-SCNC: 144 MMOL/L (ref 136–145)
T WAVE AXIS: 26 DEGREES
TSH SERPL DL<=0.05 MIU/L-ACNC: 1.27 UIU/ML (ref 0.36–3.74)
VENTRICULAR RATE: 79 BPM
WBC # BLD AUTO: 11.32 THOUSAND/UL (ref 4.31–10.16)

## 2018-07-30 PROCEDURE — 93010 ELECTROCARDIOGRAM REPORT: CPT | Performed by: INTERNAL MEDICINE

## 2018-07-30 PROCEDURE — 99255 IP/OBS CONSLTJ NEW/EST HI 80: CPT | Performed by: INTERNAL MEDICINE

## 2018-07-30 PROCEDURE — 85025 COMPLETE CBC W/AUTO DIFF WBC: CPT | Performed by: INTERNAL MEDICINE

## 2018-07-30 PROCEDURE — 99232 SBSQ HOSP IP/OBS MODERATE 35: CPT | Performed by: INTERNAL MEDICINE

## 2018-07-30 PROCEDURE — 80053 COMPREHEN METABOLIC PANEL: CPT | Performed by: INTERNAL MEDICINE

## 2018-07-30 PROCEDURE — C9113 INJ PANTOPRAZOLE SODIUM, VIA: HCPCS | Performed by: INTERNAL MEDICINE

## 2018-07-30 PROCEDURE — 83036 HEMOGLOBIN GLYCOSYLATED A1C: CPT | Performed by: INTERNAL MEDICINE

## 2018-07-30 PROCEDURE — 82948 REAGENT STRIP/BLOOD GLUCOSE: CPT

## 2018-07-30 PROCEDURE — 84443 ASSAY THYROID STIM HORMONE: CPT | Performed by: INTERNAL MEDICINE

## 2018-07-30 RX ORDER — INSULIN GLARGINE 100 [IU]/ML
24 INJECTION, SOLUTION SUBCUTANEOUS
Status: DISCONTINUED | OUTPATIENT
Start: 2018-07-31 | End: 2018-07-31

## 2018-07-30 RX ORDER — INSULIN GLARGINE 100 [IU]/ML
24 INJECTION, SOLUTION SUBCUTANEOUS ONCE
Status: COMPLETED | OUTPATIENT
Start: 2018-07-30 | End: 2018-07-30

## 2018-07-30 RX ADMIN — SODIUM CHLORIDE 100 ML/HR: 0.9 INJECTION, SOLUTION INTRAVENOUS at 22:49

## 2018-07-30 RX ADMIN — INSULIN GLARGINE 24 UNITS: 100 INJECTION, SOLUTION SUBCUTANEOUS at 15:17

## 2018-07-30 RX ADMIN — METOCLOPRAMIDE HYDROCHLORIDE 10 MG: 5 INJECTION INTRAMUSCULAR; INTRAVENOUS at 21:00

## 2018-07-30 RX ADMIN — ONDANSETRON 4 MG: 2 INJECTION INTRAMUSCULAR; INTRAVENOUS at 07:13

## 2018-07-30 RX ADMIN — ONDANSETRON 4 MG: 2 INJECTION INTRAMUSCULAR; INTRAVENOUS at 01:17

## 2018-07-30 RX ADMIN — INSULIN LISPRO 3 UNITS: 100 INJECTION, SOLUTION INTRAVENOUS; SUBCUTANEOUS at 18:43

## 2018-07-30 RX ADMIN — INSULIN LISPRO 1 UNITS: 100 INJECTION, SOLUTION INTRAVENOUS; SUBCUTANEOUS at 21:07

## 2018-07-30 RX ADMIN — METOCLOPRAMIDE HYDROCHLORIDE 10 MG: 5 INJECTION INTRAMUSCULAR; INTRAVENOUS at 11:43

## 2018-07-30 RX ADMIN — PANTOPRAZOLE SODIUM 40 MG: 40 INJECTION, POWDER, FOR SOLUTION INTRAVENOUS at 11:43

## 2018-07-30 RX ADMIN — ONDANSETRON 4 MG: 2 INJECTION INTRAMUSCULAR; INTRAVENOUS at 17:16

## 2018-07-30 RX ADMIN — METOCLOPRAMIDE HYDROCHLORIDE 10 MG: 5 INJECTION INTRAMUSCULAR; INTRAVENOUS at 05:20

## 2018-07-30 RX ADMIN — PANTOPRAZOLE SODIUM 40 MG: 40 INJECTION, POWDER, FOR SOLUTION INTRAVENOUS at 21:00

## 2018-07-30 RX ADMIN — SODIUM CHLORIDE 125 ML/HR: 0.9 INJECTION, SOLUTION INTRAVENOUS at 13:12

## 2018-07-30 NOTE — PLAN OF CARE
INFECTION - ADULT     Absence or prevention of progression during hospitalization Progressing        Knowledge Deficit     Patient/family/caregiver demonstrates understanding of disease process, treatment plan, medications, and discharge instructions Progressing        METABOLIC, FLUID AND ELECTROLYTES - ADULT     Electrolytes maintained within normal limits Progressing     Fluid balance maintained Progressing     Glucose maintained within target range Progressing        PAIN - ADULT     Verbalizes/displays adequate comfort level or baseline comfort level Progressing        Potential for Falls     Patient will remain free of falls Progressing        Prexisting or High Potential for Compromised Skin Integrity     Skin integrity is maintained or improved Progressing        SAFETY ADULT     Patient will remain free of falls Progressing

## 2018-07-30 NOTE — PLAN OF CARE
Problem: PAIN - ADULT  Goal: Verbalizes/displays adequate comfort level or baseline comfort level  Interventions:  - Encourage patient to monitor pain and request assistance  - Assess pain using appropriate pain scale  - Administer analgesics based on type and severity of pain and evaluate response  - Implement non-pharmacological measures as appropriate and evaluate response  - Consider cultural and social influences on pain and pain management  - Notify physician/advanced practitioner if interventions unsuccessful or patient reports new pain  Outcome: Progressing      Problem: INFECTION - ADULT  Goal: Absence or prevention of progression during hospitalization  INTERVENTIONS:  - Assess and monitor for signs and symptoms of infection  - Monitor lab/diagnostic results  - Monitor all insertion sites, i e  indwelling lines, tubes, and drains  - Monitor endotracheal (as able) and nasal secretions for changes in amount and color  - [place] appropriate cooling/warming therapies per order  - Administer medications as ordered  - Instruct and encourage patient and family to use good hand hygiene technique  - Identify and instruct in appropriate isolation precautions for identified infection/condition  Outcome: Progressing      Problem: SAFETY ADULT  Goal: Patient will remain free of falls  INTERVENTIONS:  - Assess patient frequently for physical needs  -  Identify cognitive and physical deficits and behaviors that affect risk of falls    -  [place] fall precautions as indicated by assessment   - Educate patient/family on patient safety including physical limitations  - Instruct patient to call for assistance with activity based on assessment  - Modify environment to reduce risk of injury  - Consider OT/PT consult to assist with strengthening/mobility  Outcome: Progressing      Problem: Knowledge Deficit  Goal: Patient/family/caregiver demonstrates understanding of disease process, treatment plan, medications, and discharge instructions  Complete learning assessment and assess knowledge base    Interventions:  - Provide teaching at level of understanding  - Provide teaching via preferred learning methods  Outcome: Progressing

## 2018-07-30 NOTE — CASE MANAGEMENT
Initial Clinical Review    Thank you,  Yovanny Larsonq  291 Utilization Review Department  Phone: [phone]; Fax [phone]  ATTENTION: The Network Utilization Review Department is now centralized for our 9 Facilities  Make a note that we have a new phone and fax numbers for our Department  Please call with any questions or concerns to [phone] and carefully follow the prompts so that you are directed to the right person  All voicemails are confidential  Fax any determinations, approvals, denials, and requests for initial or continue stay review clinical to [phone]  Due to HIGH CALL volume, it would be easier if you could please send faxed requests to expedite your requests and in part, help us provide discharge notifications faster  Admission: Date/Time/Statement: 7/29/18 @ 1627     Orders Placed This Encounter   Procedures    Inpatient Admission (expected length of stay for this patient is greater than two midnights)     Standing Status:   Standing     Number of Occurrences:   1     Order Specific Question:   Admitting Physician     Answer:   Shlomo Vail [[id number]]     Order Specific Question:   Level of Care     Answer:   Med Surg [[id number]]     Order Specific Question:   Estimated length of stay     Answer:   More than 2 Midnights     Order Specific Question:   Certification     Answer:   I certify that inpatient services are medically necessary for this patient for a duration of greater than two midnights  See H&P and MD Progress Notes for additional information about the patient's course of treatment         ED: Date/Time/Mode of Arrival:   ED Arrival Information     Expected Arrival Acuity Means of Arrival Escorted By Service Admission Type    - 7/29/2018 13:08 [facility] General Medicine Urgent    Arrival Complaint    -          Chief Complaint:   Chief Complaint   Patient presents with    Vomiting     Pt reports vomitting beginning this AM  Pt shut off BG machine when bg was 53  Pt BG pre-hospital was 451  History of Illness:  47 y o  male who presents with intractable nausea and vomiting  The patient was in his usual state of health until last night when he began to get nauseous at the end of his shift  Normally when he is done working he will stop to get food but states that last night he just went right to bed  Then, today when he woke up he began vomiting  The patient is unable to tell me how many times he has vomited but states that he has been vomiting a lot  The emesis he describes as mostly liquid and bilious and at this point is mostly dry heaves  He the patient, when asked if he has coffee-ground emesis or blood seems to provide an ambiguous answer and states "possibly"  The patient states that he has abdominal pain but only as he is vomiting  The patient denies any fevers or chills at home but currently appears to have chills and states he feels cold  The patient states he had a little bit of diarrhea  No noted melena or hematochezia    The patient does not seem very willing to continue answering multiple questions at this time due to dry heaves and general discomfort        ED Vital Signs:   ED Triage Vitals [07/29/18 1317]   Temperature Pulse Respirations Blood Pressure SpO2   97 5 °F (36 4 °C) 84 18 (!) 216/96 99 %      Temp Source Heart Rate Source Patient Position - Orthostatic VS BP Location FiO2 (%)   Oral Monitor Sitting Right arm --      Pain Score       4        Wt Readings from Last 1 Encounters:   07/29/18 76 kg (167 lb 8 8 oz)       Vital Signs (abnormal):  /95    Abnormal Labs/Diagnostic Test Results:    Ref Range & Units 07/29/18 1334   Sodium 136 - 145 mmol/L 135     Potassium 3 5 - 5 3 mmol/L 5 2    Chloride 100 - 108 mmol/L 101    CO2 21 - 32 mmol/L 24    Anion Gap 4 - 13 mmol/L 10    BUN 5 - 25 mg/dL 15    Creatinine 0 60 - 1 30 mg/dL 0 99    Glucose 65 - 140 mg/dL 454     Alkaline Phosphatase 46 - 116 U/L 130       Lipase 73 - 393 u/L 59       WBC 4 31 - 10 16 Thousand/uL 14 39        Ref Range & Units 07/29/18 1334   Acetone, Bld Negative 3+        Ref Range & Units 07/29/18 1458   Color, UA  Yellow    Clarity, UA  Clear    pH, UA 4 5 - 8 0 6 0    Leukocytes, UA Negative Negative    Nitrite, UA Negative Negative    Protein, UA Negative mg/dl Negative    Glucose, UA Negative mg/dl 500 (1/2%)     Ketones, UA Negative mg/dl 40 (2+)     Urobilinogen, UA 0 2, 1 0 E U /dl E U /dl 0 2    Bilirubin, UA Negative Negative    Blood, UA Negative Negative    Specific Gravity, UA 1 003 - 1 030 1 010      Blood cxs -- (P)      ED Treatment:   Medication Administration from 07/29/2018 1307 to 07/29/2018 1758       Date/Time Order Dose Route Action Action by Comments     07/29/2018 1324 ondansetron (ZOFRAN) injection 4 mg 4 mg Intravenous Given Marizol Sousa RN      07/29/2018 1333 sodium chloride 0 9 % bolus 2,000 mL 2,000 mL Intravenous New Bag Marizol Sousa RN      07/29/2018 1403 metoclopramide (REGLAN) injection 5 mg 5 mg Intravenous Given Marizol Sousa RN Diluted in 9 mL NSS     07/29/2018 1713 insulin regular (HumuLIN R,NovoLIN R) 1 Units/mL in sodium chloride 0 9 % 100 mL infusion 4 Units/hr Intravenous Rate/Dose Change Calixto Delgado RN confirmed with Bhargav Paul RN     07/29/2018 1459 insulin regular (HumuLIN R,NovoLIN R) 1 Units/mL in sodium chloride 0 9 % 100 mL infusion 8 Units/hr Intravenous [id number] [facility] Deb Sousa RN      07/29/2018 1411 sodium chloride 0 9 % bolus 1,000 mL 1,000 mL Intravenous New Bag Marizol Sousa RN      07/29/2018 1549 sodium chloride 0 9 % bolus 1,000 mL 1,000 mL Intravenous New Bag Calixto Delgado RN verbal order by Dr Claudetta Chen for rate change     07/29/2018 1547 metoclopramide (REGLAN) injection 5 mg 5 mg Intravenous Given Calixto Delgado RN      07/29/2018 1550 labetalol (NORMODYNE) injection 10 mg 10 mg Intravenous Given Calixto Delgado RN /87 HR 93 Past Medical/Surgical History: Active Ambulatory Problems     Diagnosis Date Noted    AS (aortic stenosis) 08/30/2017    Palpitation 08/30/2017    T1DM (type 1 diabetes mellitus) ([facility] ) 08/30/2017    Benign essential HTN 08/30/2017    Abnormal EKG 08/30/2017    Mouth sores 12/01/2017    Coronary artery disease involving native coronary artery of native heart without angina pectoris 12/01/2017    Transaminitis 12/01/2017    Dyslipidemia 06/05/2018     Past Medical History:   Diagnosis Date    Aortic stenosis     Clavicle fracture     Diabetes mellitus ([facility] )     Hyperlipidemia     Hypertension     Thrombocytopenic purpura ([facility] )        Admitting Diagnosis: Weakness [R53 1]  Nausea and vomiting [R11 2]  Hyperglycemia [R73 9]    Age/Sex: 47 y o  male    Assessment/Plan:   Hospital Problem List:    Principal Problem:    Intractable nausea and vomiting  Active Problems:    T1DM (type 1 diabetes mellitus) ([facility] )    Benign essential HTN    Coronary artery disease involving native coronary artery of native heart without angina pectoris    Hyperglycemia     Plan for the Primary Problem(s):  · Intractable Nausea / Vomiting, Likely Acute Gastroenteritis with SIRS criteria -   ? PPI - Protonix 40 mg IV bid   ? Eval for potential gastroenteritis,   § Assess for antibiotic Use -   § Check rectal temperature  If elevated, start abdomen coverage with IV antibiotics and get blood cultures  § Get stool culture  § Check lactic acid now  Get blood cultures  § With no clear new infection, will hold off on antibiotic dosing currently  § Provide supportive care -   § IV Fluids - will run normal saline at 125 ml/hr  § Antiemetics - Zofran  § Diet - clear liquid diet  § Pain control - IV dilaudid  ?  At risk for Diabetic Gastroparesis - Will schedule Reglan 10 mg IV q6h   ? If symptoms persist, consider GI evaluation      Plan for Additional Problems:   · Diabetes Mellitus with Severe Hyperglycemia (glucose 454) - ? Insulin Drip  IV fluids  ? Once glucose is down can try switching regimen to insulin pump regimen by tomorrow if he is taking PO better  ? Check Hemoglobin A1c     ? Monitor glucose levels  ? Patient has insulin pump  Will consult endocrinology  · Essential Hypertension - Home regimen  PRN IV hydralazine      VTE Prophylaxis: Low risk - ambulate   / sequential compression device and reason for no mechanical VTE prophylaxis low risk  Code Status: Level 1 Full Code  POLST: There is no POLST form on file for this patient (pre-hospital)     Anticipated Length of Stay:  Patient will be admitted on an Inpatient basis with an anticipated length of stay of  > 2 midnights  Justification for Hospital Stay: Evaluation of intractable Nausea / vomiting with severe hyperglycemia        Admission Orders:  M/S/Tele unit  Telem  Clear liquid diet  Fingerstick glucose checks q2h  Monitor I&O's  SCD's  Up & oob as tolerated  Stool for enteric bacterial panel  Consult endocrinology    Scheduled Meds:   Current Facility-Administered Medications:  acetaminophen 650 mg Oral Q6H PRN Monica Bravo, DO    aspirin 81 mg Oral Daily Monica Bravo, DO    atorvastatin 80 mg Oral Daily With [employer], DO    cholecalciferol 2,000 Units Oral Daily Monica Bravo, DO    hydrALAZINE 10 mg Intravenous Q6H PRN Monica Bravo, DO    HYDROmorphone 0 5 mg Intravenous Q4H PRN Monica Bravo, DO    insulin regular (HumuLIN R,NovoLIN R) infusion 0 3-21 Units/hr Intravenous Titrated Shahnaz MD Shawn Last Rate: 0 5 Units/hr (07/30/18 0932)   lisinopril 20 mg Oral Daily Moinca Bravo, DO    metoclopramide 10 mg Intravenous Q6H PRN Monica Bravo, DO    metoprolol succinate 25 mg Oral Daily Monica Bravo, DO    ondansetron 4 mg Intravenous Q6H PRN Monica Bravo, DO    pantoprazole 40 mg Intravenous Q12H [address] [facility], DO    sertraline 100 mg Oral Daily Monica Bravo, DO    sodium chloride 125 mL/hr Intravenous Continuous Martin Chapman DO Last Rate: 125 mL/hr (07/29/18 1945)   ticagrelor 90 mg Oral Q12H [address] Toby Hodgson, DO    zolpidem 5 mg Oral HS PRN Martin Chapman DO      Continuous Infusions:   insulin regular (HumuLIN R,NovoLIN R) infusion 0 3-21 Units/hr Last Rate: 0 5 Units/hr (07/30/18 0932)   sodium chloride 125 mL/hr Last Rate: 125 mL/hr (07/29/18 1945)     PRN Meds:   acetaminophen    hydrALAZINE    HYDROmorphone    metoclopramide    ondansetron    zolpidem

## 2018-07-30 NOTE — CONSULTS
Consultation - Bradley Steiner 47 y o  male MRN: [id number]    Unit/Bed#: -01 Encounter: [id number]      Assessment/Plan     Assessment: This is a 47y o -year-old male with type 1 diabetes with hyperglycemia and hypoglycemia admitted with intractable nausea and vomiting  Plan :     1, Type 1 diabetes with hyperglycemia/ hypoglycemia - He is currently off insulin pump, Will switch him to subcutaneous insulin therapy today, give lantus 24 units now and stop drip in 2 hours, discussed plan with nurse and attending   -will start humalog 3 units with meals +scale, pt is on clear liquid diet   -Discussed to bring pump and supplies, if his condition improves by tomorrow, will switch him to Insulin pump in afternoon as he will have  lantus on board until afternoon   -continue FS monitoring QAC and QHS   -follow up with ENDO on out pt basis     2  Nausea/ vomiting - managed by Primary team, currently on liq diet and not tolerating   On Reglan Q 8 hours     3  CAD- stable     Thank you for consulting Endocrinology , please do not hesitate to call if you have any questions       CC: Diabetes Consult    History of Present Illness     HPI: Bradley Steiner is a 47y o  year old male w with type 1 diabetes for over 40 years who presents for follow-up  He is currently on a [employer] insulin pump   he does not have insulin pump and supplies with him  He follows with Dr Angela Prado on out pt basis, reviewed note  His current basal rates are midnight 1 3 units/hour and 7:00 a m  1 unit/hour  His insulin to carbohydrate ratio is 1 unit per 10 g for breakfast and lunch and 1 unit per 9 g for dinner  His insulin sensitivity factor is 1 unit per 30 mg/dL with a target of 100 to 120 mg/dL  He denies any hypoglycemic episodes  He is only checking his blood sugars once a day  He is admitted with Chief complaint of severe nausea and vomiting and was experiencing hypoglycemia at home    He Denies fevers   He denies chest pain and SOB  He denies Neuropathy, Nephropathy   He is currently mentained on insulin infusion at 3 units per hour   Pt is very upset that his blood sugars is checked every 2 hours         Lab Results   Component Value Date    CREATININE 0 70 07/30/2018           Component      Latest Ref Rng & Units 7/30/2018 7/30/2018 7/30/2018           5:16 AM  5:19 AM  1:06 PM   Sodium      136 - 145 mmol/L 144     Potassium      3 5 - 5 3 mmol/L 3 7     Chloride      100 - 108 mmol/L 111 (H)     CO2      21 - 32 mmol/L 22     Anion Gap      4 - 13 mmol/L 11     BUN      5 - 25 mg/dL 12     Creatinine      0 60 - 1 30 mg/dL 0 70     Glucose      65 - 140 mg/dL 160 (H)     Calcium      8 3 - 10 1 mg/dL 8 6     AST      5 - 45 U/L 17     ALT      12 - 78 U/L 34     Alkaline Phosphatase      46 - 116 U/L 106     Total Protein      6 4 - 8 2 g/dL 6 7     Albumin      3 5 - 5 0 g/dL 3 3 (L)     Total Bilirubin      0 20 - 1 00 mg/dL 0 30     eGFR      ml/min/1 73sq m 109     Hemoglobin A1C      4 2 - 6 3 % 9 1 (H)     EAG      mg/dl 214     TSH 3RD GENERATON      0 358 - 3 740 uIU/mL 1 270     POC Glucose      65 - 140 mg/dl  136 169 (H)     Inpatient consult to Endocrinology  Consult performed by: [facility]  Consult ordered by: Kendra Lemos          Review of Systems   Constitutional: Positive for activity change, appetite change and fatigue  Negative for unexpected weight change  HENT: Negative  Eyes: Negative for visual disturbance  Respiratory: Negative  Cardiovascular: Negative  Gastrointestinal: Positive for nausea and vomiting  Endocrine: Negative for polydipsia, polyphagia and polyuria  Genitourinary: Negative  Musculoskeletal: Negative  Skin: Negative  Allergic/Immunologic: Negative  Neurological: Positive for weakness  Hematological: Negative  Psychiatric/Behavioral: Negative          Historical Information   Past Medical History:   Diagnosis Date    Aortic stenosis     Clavicle fracture     LEFT    [facility]     Hyperlipidemia     Hypertension     Thrombocytopenic purpura ([facility] )      Past Surgical History:   Procedure Laterality Date    ABDOMINAL SURGERY      ROTATOR CUFF REPAIR      SPLENECTOMY      TONSILLECTOMY      VITRECTOMY Bilateral     BY ANTERIOR APPROACH      Social History   History   Alcohol Use    Yes     Comment: daily     History   Drug Use    Frequency: 3 0 times per week    Types: Marijuana     History   Smoking Status    Former Smoker    Types: Cigars   Smokeless Tobacco    Never Used     Family History:   Family History   Problem Relation Age of Onset    Aneurysm Mother         CAREBRAL ARTERY     Coronary artery disease Mother     Diabetes Mother        Meds/Allergies   Current Facility-Administered Medications   Medication Dose Route Frequency Provider Last Rate Last Dose    acetaminophen (TYLENOL) tablet 650 mg  650 mg Oral Q6H PRN Joyice Hunger, DO        aspirin (ECOTRIN LOW STRENGTH) EC tablet 81 mg  81 mg Oral Daily Joyice Hunger, DO        atorvastatin (LIPITOR) tablet 80 mg  80 mg Oral Daily With [employer], DO        cholecalciferol (VITAMIN D3) tablet 2,000 Units  2,000 Units Oral Daily Joyice Hunger, DO        hydrALAZINE (APRESOLINE) injection 10 mg  10 mg Intravenous Q6H PRN Joyice Hunger, DO   10 mg at 07/29/18 2335    HYDROmorphone (DILAUDID) injection 0 5 mg  0 5 mg Intravenous Q4H PRN Joyice Hunger, DO        insulin regular (HumuLIN R,NovoLIN R) 1 Units/mL in sodium chloride 0 9 % 100 mL infusion  0 3-21 Units/hr Intravenous Titrated Shahnaz Escobar MD 3 mL/hr at 07/30/18 1313 3 Units/hr at 07/30/18 1313    lisinopril (ZESTRIL) tablet 20 mg  20 mg Oral Daily Joyice Hunger, DO        metoclopramide (REGLAN) injection 10 mg  10 mg Intravenous Q6H PRN Joyice Hunger, DO   10 mg at 07/30/18 1143    metoprolol succinate (TOPROL-XL) 24 hr tablet 25 mg  25 mg Oral Daily Toby Noé, DO        ondansetron [facility]) injection 4 mg  4 mg Intravenous Q6H PRN Handy Marci, DO   4 mg at 07/30/18 0713    pantoprazole (PROTONIX) injection 40 mg  40 mg Intravenous Q12H [address] Toby Umanzor, DO   40 mg at 07/30/18 1143    sertraline (ZOLOFT) tablet 100 mg  100 mg Oral Daily Handy Marci, DO        sodium chloride 0 9 % infusion  125 mL/hr Intravenous Continuous Handy Marci,  mL/hr at 07/30/18 1312 125 mL/hr at 07/30/18 1312    ticagrelor (BRILINTA) tablet 90 mg  90 mg Oral Q12H [address] Toby Melchor,         zolpidem (AMBIEN) tablet 5 mg  5 mg Oral HS PRN Handy Marci, DO         No Known Allergies    Objective   Vitals: Blood pressure 144/67, pulse 92, temperature 98 7 °F (37 1 °C), temperature source Axillary, resp  rate 18, height 5' 6" (1 676 m), weight 76 kg (167 lb 8 8 oz), SpO2 96 %  Intake/Output Summary (Last 24 hours) at 07/30/18 1452  Last data filed at 07/30/18 1313   Gross per 24 hour   Intake             1100 ml   Output             2200 ml   Net            -1100 ml     Invasive Devices     Peripheral Intravenous Line            Peripheral IV 07/29/18 Left Antecubital 1 day    Peripheral IV 07/29/18 Left Hand less than 1 day                Physical Exam   Constitutional: He is oriented to person, place, and time  He appears well-developed and well-nourished  No distress  HENT:   Head: Normocephalic and atraumatic  Eyes: Conjunctivae and EOM are normal  Pupils are equal, round, and reactive to light  Right eye exhibits no discharge  Left eye exhibits no discharge  Neck: Normal range of motion  Neck supple  No tracheal deviation present  No thyromegaly present  Cardiovascular: Normal rate, regular rhythm, normal heart sounds and intact distal pulses  Exam reveals no friction rub  No murmur heard  Pulmonary/Chest: Effort normal and breath sounds normal  No respiratory distress  He has no wheezes  He has no rales  He exhibits no tenderness  Abdominal: Soft  Bowel sounds are normal  He exhibits no distension  There is no tenderness  Musculoskeletal: Normal range of motion  He exhibits no edema, tenderness or deformity  Lymphadenopathy:     He has no cervical adenopathy  Neurological: He is alert and oriented to person, place, and time  He has normal reflexes  Coordination abnormal    Skin: Skin is warm and dry  No rash noted  He is not diaphoretic  No erythema  No pallor  Psychiatric: He has a normal mood and affect  His behavior is normal    Vitals reviewed  The history was obtained from the review of the chart, patient  Lab Results:     Results from last 7 days  Lab Units 07/30/18  0516   HEMOGLOBIN A1C % 9 1*     Lab Results   Component Value Date    WBC 11 32 (H) 07/30/2018    HGB 12 1 07/30/2018    HCT 36 1 (L) 07/30/2018    MCV 89 07/30/2018     07/30/2018     Lab Results   Component Value Date/Time    BUN 12 07/30/2018 05:16 AM    BUN 16 10/29/2016 07:42 AM     07/30/2018 05:16 AM     10/29/2016 07:42 AM    K 3 7 07/30/2018 05:16 AM    K 5 2 10/29/2016 07:42 AM     (H) 07/30/2018 05:16 AM    CL 99 10/29/2016 07:42 AM    CO2 22 07/30/2018 05:16 AM    CO2 29 10/29/2016 07:42 AM    CREATININE 0 70 07/30/2018 05:16 AM    CREATININE 0 92 10/29/2016 07:42 AM    AST 17 07/30/2018 05:16 AM    AST 21 10/29/2016 07:42 AM    ALT 34 07/30/2018 05:16 AM    ALT 26 10/29/2016 07:42 AM    ALB 3 3 (L) 07/30/2018 05:16 AM    ALB 4 3 10/29/2016 07:42 AM     No results for input(s): CHOL, HDL, LDL, TRIG, VLDL in the last 72 hours  No results found for: Cinthya Johnson  POC Glucose (mg/dl)   Date Value   07/30/2018 169 (H)   07/30/2018 215 (H)   07/30/2018 104   07/30/2018 147 (H)   07/30/2018 136   07/30/2018 137   07/30/2018 203 (H)   07/29/2018 222 (H)   07/29/2018 139   07/29/2018 173 (H)       Imaging Studies: I have personally reviewed pertinent reports         Ct abdomen   ADRENAL GLANDS: Unremarkable      KIDNEYS/URETERS:  Unremarkable  No hydronephrosis      STOMACH AND BOWEL:  Unremarkable      APPENDIX:  A normal appendix was visualized      ABDOMINOPELVIC CAVITY:  No ascites or free intraperitoneal air  No lymphadenopathy      VESSELS:  Moderate calcified atherosclerosis throughout the visceral arteries  No aortic aneurysm      PELVIS     REPRODUCTIVE ORGANS:  Unremarkable for patient's age      URINARY BLADDER:  Moderate bladder distention without thickening or inflammation  No filling defects      ABDOMINAL WALL/INGUINAL REGIONS:  Unremarkable      OSSEOUS STRUCTURES:  No acute fracture or destructive osseous lesion      IMPRESSION:     No acute intra-abdominal pelvic abnormality identified      Prior splenectomy  Portions of the record may have been created with voice recognition software

## 2018-07-30 NOTE — CASE MANAGEMENT
Notification of Inpatient Admission/Inpatient Authorization Request  This is a Notification of Inpatient Admission/Request for Inpatient Authorization to our facility [facility]  Please be advised that this patient is currently in our facility under Inpatient Status  Below you will find the Attending Physician and Facilitys information including NPI# and contact information for the Utilization  assigned to the [facility] where the patient is receiving services  Please feel free to contact the Utilization Review Department with any questions  Patient Information:  PATIENT NAME: Yuki Donaldson  MRN: [id number]  YOB: 1965    PRESENTATION DATE: 7/29/2018  1:08 PM  IP ADMISSION DATE: 7/29/18 1627  DISCHARGE DATE: No discharge date for patient encounter  DISPOSITION: Home/Self Care(Disregard disposition as pt is still in house with us)    Attending Physician:  Marisela Fatima  Specialty- Hospitalist  UPIN Number - Felipe Loco 994 ID- [id number]  [address]  Phone 1: [phone]  Fax: [phone]  [facility]ility:  [facility]  Address: [address] [place], [address]    Phone: [phone]  Tax ID [id number]  NPI [id number]   Medicare: [id number]    Thank you,  Yovanny Beltre  [facility] Utilization Review Department  Phone: [phone]; Fax [phone]  ATTENTION: The Network Utilization Review Department is now centralized for our 9 Facilities  Make a note that we have a new phone and fax numbers for our Department  Please call with any questions or concerns to [phone] and carefully follow the prompts so that you are directed to the right person  All voicemails are confidential  Fax any determinations, approvals, denials, and requests for initial or continue stay review clinical to [phone]   Due to HIGH CALL volume, it would be easier if you could please send faxed requests to expedite your requests and in part, help us provide discharge notifications faster

## 2018-07-30 NOTE — PROGRESS NOTES
[facility] Progress Note  Patient: Ana Ramires 47 y o  male   MRN: [id number]  PCP: Elvia Morales DO  Unit/Bed#: -01 Encounter: [id number]  Date Of Visit: 07/30/18    Assessment:    Principal Problem:    Intractable nausea and vomiting  Active Problems:    T1DM (type 1 diabetes mellitus) ([facility] )    Benign essential HTN    Coronary artery disease involving native coronary artery of native heart without angina pectoris    Hyperglycemia    SIRS (systemic inflammatory response syndrome) ([facility] )      Plan:    · Intractable Nausea / Vomiting, Likely Acute Gastroenteritis with SIRS criteria -   · PPI - Protonix 40 mg IV bid  · Eval for potential viral gastroenteritis,   · Assess for antibiotic Use -   · Follow up blood cultures  · Follow up stool culture  · Lactic acid level on admission within normal limits  · With no clear new bacterial infection, will hold off on antibiotic dosing currently  · Provide supportive care -   · IV Fluids - decrease normal saline to 100 ml/hr  · Antiemetics - Zofran  · Diet - clear liquid diet  · Pain control - IV dilaudid  · At risk for Diabetic Gastroparesis - Scheduled Reglan 10 mg IV q6h  · As symptoms starting to improve, will hold off on GI evaluation just yet, but consider GI evaluation if worsening symptoms again  Plan for Additional Problems:   · Diabetes Mellitus (A1c 9 1%) with Severe Hyperglycemia (glucose 454) -   · Insulin Drip started on admission but, per discussion with endocrinology today, will switch to basal bolus regimen today and then likely to switch to insulin pump tomorrow when he brings it in    · Monitor glucose levels  · Endocrinology following  · Essential Hypertension - Home regimen  PRN IV hydralazine  VTE Pharmacologic Prophylaxis:   Pharmacologic: None (low risk)  Mechanical VTE Prophylaxis in Place: None    Patient Centered Rounds: I have performed bedside rounds with nursing staff today      Discussions with Specialists or Other Care Team Provider: Endocrinology    Education and Discussions with Family / Patient: Patient only  Time Spent for Care: 30 minutes  More than 50% of total time spent on counseling and coordination of care as described above  Current Length of Stay: 1 day(s)    Current Patient Status: Inpatient   Certification Statement: The patient will continue to require additional inpatient hospital stay due to evaluation and treatment above  Discharge Plan / Estimated Discharge Date: to be determined  Code Status: Level 1 - Full Code      Subjective:   "for the last few hours, I am starting to feel a little better"  He is encouraged by the fact that he kept two large cups of liquid down and previously had not been able to keep anything down at all  He denies any abdominal pain now but still has some nausea  Was vomiting still up till a few hours ago  No diarrhea  Objective:     Vitals:   Temp (24hrs), Av 5 °F (36 9 °C), Min:96 7 °F (35 9 °C), Max:99 4 °F (37 4 °C)    HR:  [] 92  Resp:  [16-18] 18  BP: (142-192)/(42-83) 144/67  SpO2:  [96 %-98 %] 96 %  Body mass index is 27 04 kg/m²  Input and Output Summary (last 24 hours): Intake/Output Summary (Last 24 hours) at 18 1508  Last data filed at 18 1313   Gross per 24 hour   Intake              100 ml   Output             2200 ml   Net            -2100 ml       Physical Exam:     Physical Exam   Constitutional: He is oriented to person, place, and time  HENT:   Slightly dry oral mucosa  Eyes: Conjunctivae are normal  Pupils are equal, round, and reactive to light  Cardiovascular: Normal rate and regular rhythm  No murmur heard  Pulmonary/Chest: Effort normal  He has no wheezes  He has no rales  Abdominal: Soft  Bowel sounds are normal  He exhibits no distension  There is no tenderness  Musculoskeletal: He exhibits no edema or tenderness     Neurological: He is alert and oriented to person, place, and time  Psychiatric:   Somewhat better spirits today  Vitals reviewed  Additional Data:     Labs:      Results from last 7 days  Lab Units 07/30/18  0516   WBC Thousand/uL 11 32*   HEMOGLOBIN g/dL 12 1   HEMATOCRIT % 36 1*   PLATELETS Thousands/uL 310   NEUTROS PCT % 78*   LYMPHS PCT % 13*   MONOS PCT % 9   EOS PCT % 0       Results from last 7 days  Lab Units 07/30/18  0516   SODIUM mmol/L 144   POTASSIUM mmol/L 3 7   CHLORIDE mmol/L 111*   CO2 mmol/L 22   BUN mg/dL 12   CREATININE mg/dL 0 70   CALCIUM mg/dL 8 6   TOTAL PROTEIN g/dL 6 7   BILIRUBIN TOTAL mg/dL 0 30   ALK PHOS U/L 106   ALT U/L 34   AST U/L 17   GLUCOSE RANDOM mg/dL 160*           * I Have Reviewed All Lab Data Listed Above  * Additional Pertinent Lab Tests Reviewed: [facility] Admission Reviewed    Imaging:    Imaging Reports Reviewed Today Include: CT abdomen / pelvis / CT head    Imaging Personally Reviewed by Myself Includes: None    Recent Cultures (last 7 days):           Last 24 Hours Medication List:     Current Facility-Administered Medications:  acetaminophen 650 mg Oral Q6H PRN Maria Luisa Tod, DO    aspirin 81 mg Oral Daily Maria Luisa Tod, DO    atorvastatin 80 mg Oral Daily With Priyank Donato, DO    cholecalciferol 2,000 Units Oral Daily Maria Luisa Scurry, DO    hydrALAZINE 10 mg Intravenous Q6H PRN Maria Luisa Scurry, DO    HYDROmorphone 0 5 mg Intravenous Q4H PRN Maria Luisa Tod, DO    insulin regular (HumuLIN R,NovoLIN R) infusion 0 3-21 Units/hr Intravenous Titrated Shahnaz Escobar MD Last Rate: 0 5 Units/hr (07/30/18 1501)   lisinopril 20 mg Oral Daily Maria Luisa Scurry, DO    metoclopramide 10 mg Intravenous Q6H PRN Maria Luisa Scurry, DO    metoprolol succinate 25 mg Oral Daily Maria Luisa Tod, DO    ondansetron 4 mg Intravenous Q6H PRN Maria Luisa Scurry, DO    pantoprazole 40 mg Intravenous Q12H [facility] Toby Arellano, DO    sertraline 100 mg Oral Daily Maria Luisa Tod, DO    sodium chloride 125 mL/hr Intravenous Continuous Maria Luisa Baron DO Last Rate: 125 mL/hr (07/30/18 1312)   ticagrelor 90 mg Oral Q12H [facility] Toby Arellano DO    zolpidem 5 mg Oral HS PRN Maria Luisa Baron DO         Today, Patient Was Seen By: Maria Luisa Baron DO    ** Please Note: This note has been constructed using a voice recognition system   **

## 2018-07-31 ENCOUNTER — TRANSITIONAL CARE MANAGEMENT (OUTPATIENT)
Dept: FAMILY MEDICINE CLINIC | Facility: OTHER | Age: 53
End: 2018-07-31

## 2018-07-31 VITALS
OXYGEN SATURATION: 98 % | DIASTOLIC BLOOD PRESSURE: 65 MMHG | TEMPERATURE: 98.4 F | RESPIRATION RATE: 16 BRPM | SYSTOLIC BLOOD PRESSURE: 139 MMHG | HEIGHT: 66 IN | BODY MASS INDEX: 26.93 KG/M2 | HEART RATE: 71 BPM | WEIGHT: 167.55 LBS

## 2018-07-31 LAB
EST. AVERAGE GLUCOSE BLD GHB EST-MCNC: 200 MG/DL
GLUCOSE SERPL-MCNC: 104 MG/DL (ref 65–140)
GLUCOSE SERPL-MCNC: 253 MG/DL (ref 65–140)
GLUCOSE SERPL-MCNC: 259 MG/DL (ref 65–140)
GLUCOSE SERPL-MCNC: 315 MG/DL (ref 65–140)
GLUCOSE SERPL-MCNC: 96 MG/DL (ref 65–140)
HBA1C MFR BLD: 8.6 % (ref 4.2–6.3)

## 2018-07-31 PROCEDURE — C9113 INJ PANTOPRAZOLE SODIUM, VIA: HCPCS | Performed by: INTERNAL MEDICINE

## 2018-07-31 PROCEDURE — 83036 HEMOGLOBIN GLYCOSYLATED A1C: CPT | Performed by: INTERNAL MEDICINE

## 2018-07-31 PROCEDURE — 82948 REAGENT STRIP/BLOOD GLUCOSE: CPT

## 2018-07-31 PROCEDURE — 99239 HOSP IP/OBS DSCHRG MGMT >30: CPT | Performed by: PHYSICIAN ASSISTANT

## 2018-07-31 RX ORDER — PANTOPRAZOLE SODIUM 40 MG/1
40 TABLET, DELAYED RELEASE ORAL
Status: DISCONTINUED | OUTPATIENT
Start: 2018-07-31 | End: 2018-07-31 | Stop reason: HOSPADM

## 2018-07-31 RX ADMIN — LISINOPRIL 20 MG: 20 TABLET ORAL at 09:19

## 2018-07-31 RX ADMIN — ONDANSETRON 4 MG: 2 INJECTION INTRAMUSCULAR; INTRAVENOUS at 00:00

## 2018-07-31 RX ADMIN — PANTOPRAZOLE SODIUM 40 MG: 40 INJECTION, POWDER, FOR SOLUTION INTRAVENOUS at 09:18

## 2018-07-31 RX ADMIN — TICAGRELOR 90 MG: 90 TABLET ORAL at 09:18

## 2018-07-31 RX ADMIN — ZOLPIDEM TARTRATE 5 MG: 5 TABLET ORAL at 00:00

## 2018-07-31 RX ADMIN — SODIUM CHLORIDE 100 ML/HR: 0.9 INJECTION, SOLUTION INTRAVENOUS at 06:05

## 2018-07-31 RX ADMIN — METOCLOPRAMIDE HYDROCHLORIDE 10 MG: 5 INJECTION INTRAMUSCULAR; INTRAVENOUS at 06:05

## 2018-07-31 RX ADMIN — CHOLECALCIFEROL TAB 25 MCG (1000 UNIT) 2000 UNITS: 25 TAB at 09:18

## 2018-07-31 RX ADMIN — SERTRALINE HYDROCHLORIDE 100 MG: 100 TABLET ORAL at 09:18

## 2018-07-31 RX ADMIN — ASPIRIN 81 MG: 81 TABLET, COATED ORAL at 09:18

## 2018-07-31 RX ADMIN — METOPROLOL SUCCINATE 25 MG: 25 TABLET, EXTENDED RELEASE ORAL at 09:18

## 2018-07-31 NOTE — ASSESSMENT & PLAN NOTE
·  Likely viral gastroenteritis, now self-resolving  ·  differential diagnosis would be diabetic gastroparesis  ·  advanced diet as tolerated and observed today  ·  continue supportive fluids for the time being and IV Reglan

## 2018-07-31 NOTE — ASSESSMENT & PLAN NOTE
Lab Results   Component Value Date    HGBA1C 9 1 (H) 07/30/2018       Recent Labs      07/30/18   1711  07/30/18   2044  07/31/18   0204  07/31/18   0642   POCGLU  104  152*  104  96       Blood Sugar Average: Last 72 hrs:  (P) 875 [id number]      severe hyperglycemia present on admission has resolved  Appreciate Endocrinology input    Observe today with advancement in diet and resume insulin pump if able

## 2018-07-31 NOTE — INCIDENTAL FINDINGS
The following findings require follow up:  Radiographic finding   Finding: Lobulated cystic structure in the tail of the pancreas, mostly stable compared to 2017 but mostly obscured by prior streak artifact related to metallic clips in the left upper quadrant    Suggest nonemergent dedicated CT pancreatic protocol exam        Please notify the following clinician to assist with the follow up:   Dr Yakov Hsieh

## 2018-07-31 NOTE — PROGRESS NOTES
Progress Note - Ulices Lyman 1965, 47 y o  male MRN: [id number]    Unit/Bed#: -01 Encounter: [id number]    Primary Care Provider: Cameron Lopez DO   Date and time admitted to hospital: 7/29/2018  1:08 PM  * Intractable nausea and vomiting   Assessment & Plan    ·  Likely viral gastroenteritis, now self-resolving  ·  differential diagnosis would be diabetic gastroparesis  ·  advanced diet as tolerated and observed today  ·  continue supportive fluids for the time being and IV Reglan        T1DM (type 1 diabetes mellitus) [facility])   Assessment & Plan    Lab Results   Component Value Date    HGBA1C 9 1 (H) 07/30/2018       Recent Labs      07/30/18   1711  07/30/18   2044  07/31/18   0204  07/31/18   0642   POCGLU  104  152*  104  96       Blood Sugar Average: Last 72 hrs:  (P) 891 [id number]      severe hyperglycemia present on admission has resolved  Appreciate Endocrinology input  Observe today with advancement in diet and resume insulin pump if able        Hypertension   Assessment & Plan    ·  Uncontrolled hypertension initially now improved  ·  continue lisinopril and Toprol, Lasix is currently on hold        Coronary artery disease involving native coronary artery of native heart without angina pectoris   Assessment & Plan    ·  Continue home meds          VTE Pharmacologic Prophylaxis:   Pharmacologic: low risk  Mechanical VTE Prophylaxis in Place:no    Patient Centered Rounds: I have performed bedside rounds with nursing staff today  Discussions with Specialists or Other Care Team Provider:     Education and Discussions with Family / Patient:     Time Spent for Care: 25 min  More than 50% of total time spent on counseling and coordination of care as described above      Current Length of Stay: 2 day(s)    Current Patient Status: Inpatient   Certification Statement: The patient will continue to require additional inpatient hospital stay due to Observation with advancement in diet    Discharge Plan:  Home later today if improved    Code Status: Level 1 - Full Code    Subjective:   " I feel a zillion times better than when I came in "  Patient reports only a small amount of indigestion but otherwise has not had any nausea or vomiting or severe abdominal pain  No episodes of diarrhea, fever, chills  Patient is anxious to advance his diet if able  He reports no other questions or concerns at this time or new events overnight  Objective:     Vitals:   Temp (24hrs), Av 8 °F (37 1 °C), Min:98 4 °F (36 9 °C), Max:99 1 °F (37 3 °C)    HR:  [64-93] 64  Resp:  [18] 18  BP: (130-156)/(61-69) 156/69  SpO2:  [97 %-100 %] 97 %  Body mass index is 27 04 kg/m²  Input and Output Summary (last 24 hours): Intake/Output Summary (Last 24 hours) at 18 0933  Last data filed at 18 0840   Gross per 24 hour   Intake             2555 ml   Output             2900 ml   Net             -345 ml       Physical Exam:     Physical Exam   Constitutional: He appears well-developed and well-nourished  No distress  Patient is quite warm and sweaty at this time  Blood sugar checked was 96  He is however awake alert and interactive and clinically nontoxic   HENT:   Head: Normocephalic and atraumatic  Eyes: Conjunctivae are normal  Right eye exhibits no discharge  Left eye exhibits no discharge  No scleral icterus  Neck: Neck supple  Cardiovascular: Normal rate, regular rhythm and normal heart sounds  No murmur heard  Pulmonary/Chest: Effort normal and breath sounds normal  No respiratory distress  He has no wheezes  He has no rales  Abdominal: Soft  Bowel sounds are normal  He exhibits no distension  There is no tenderness  Musculoskeletal: He exhibits no edema  Neurological: He is alert  Awake alert appropriate   Skin: Skin is warm  No rash noted  He is diaphoretic  No erythema  Psychiatric: He has a normal mood and affect   His behavior is normal  Judgment and thought content normal    Vitals reviewed  Additional Data:     Labs:      Results from last 7 days  Lab Units 07/30/18  0516   WBC Thousand/uL 11 32*   HEMOGLOBIN g/dL 12 1   HEMATOCRIT % 36 1*   PLATELETS Thousands/uL 310   NEUTROS PCT % 78*   LYMPHS PCT % 13*   MONOS PCT % 9   EOS PCT % 0       Results from last 7 days  Lab Units 07/30/18  0516   SODIUM mmol/L 144   POTASSIUM mmol/L 3 7   CHLORIDE mmol/L 111*   CO2 mmol/L 22   BUN mg/dL 12   CREATININE mg/dL 0 70   CALCIUM mg/dL 8 6   TOTAL PROTEIN g/dL 6 7   BILIRUBIN TOTAL mg/dL 0 30   ALK PHOS U/L 106   ALT U/L 34   AST U/L 17   GLUCOSE RANDOM mg/dL 160*           Results from last 7 days  Lab Units 07/31/18  0642 07/31/18  0204 07/30/18  2044 07/30/18  1711 07/30/18  1500 07/30/18  1306 07/30/18  1135 07/30/18  0931 07/30/18  0709 07/30/18  0519 07/30/18  0307 07/30/18  0106   POC GLUCOSE mg/dl 96 104 152* 104 95 169* 215* 104 147* 136 137 203*       Results from last 7 days  Lab Units 07/30/18  0516 07/25/18  0831   HEMOGLOBIN A1C % 9 1* 8 6*     * I Have Reviewed All Lab Data Listed Above  * Additional Pertinent Lab Tests Reviewed: [facility]    Imaging:    Imaging Reports Reviewed Today Include:   Imaging Personally Reviewed by Myself Includes:      Recent Cultures (last 7 days):       Results from last 7 days  Lab Units 07/29/18  2039 07/29/18 2027   BLOOD CULTURE  No Growth at 24 hrs  No Growth at 24 hrs         Last 24 Hours Medication List:     Current Facility-Administered Medications:  acetaminophen 650 mg Oral Q6H PRN Kandace Tiragiu, DO    aspirin 81 mg Oral Daily Kandace Tiragiu, DO    atorvastatin 80 mg Oral Daily With [employer], DO    cholecalciferol 2,000 Units Oral Daily Kandace Oldenburg, DO    hydrALAZINE 10 mg Intravenous Q6H PRN Kandace Oldenburg, DO    HYDROmorphone 0 5 mg Intravenous Q4H PRN Kandace Oldenburg, DO    insulin glargine 24 Units Subcutaneous HS Jory Donnelly MD    insulin lispro 1-5 Units Subcutaneous TID AC Aneudy Lange MD    insulin lispro 1-5 Units Subcutaneous HS Aneudy Lange MD    insulin lispro 1-5 Units Subcutaneous 0200 Aneudy Lange MD    insulin lispro 3 Units Subcutaneous TID With [employer]als Aneudy Lange MD    lisinopril 20 mg Oral Daily Shamika Margareth, DO    metoclopramide 10 mg Intravenous Q6H PRN Shamika Margareth, DO    metoprolol succinate 25 mg Oral Daily Shamika Margareth, DO    ondansetron 4 mg Intravenous Q6H PRN Shamika Margareth, DO    pantoprazole 40 mg Intravenous Q12H [address] Toby Soila Gess, DO    sertraline 100 mg Oral Daily Shamika Margareth, DO    sodium chloride 100 mL/hr Intravenous Continuous Shamika Margareth, DO Last Rate: 100 mL/hr (07/31/18 8014)   ticagrelor 90 mg Oral Q12H [address], DO    zolpidem 5 mg Oral HS PRN Shamika Margareth, DO         Today, Patient Was Seen By: Lisy Encinas PA-C    ** Please Note: Dictation voice to text software may have been used in the creation of this document   **

## 2018-07-31 NOTE — ASSESSMENT & PLAN NOTE
·  Uncontrolled hypertension initially now improved  ·  continue lisinopril and Toprol, Lasix is currently on hold

## 2018-07-31 NOTE — CASE MANAGEMENT
Thank you,  Yovanny Aqq  [facility] Utilization Review Department  Phone: [phone]; Fax [phone]  ATTENTION: The Network Utilization Review Department is now centralized for our 9 Facilities  Make a note that we have a new phone and fax numbers for our Department  Please call with any questions or concerns to [phone] and carefully follow the prompts so that you are directed to the right person  All voicemails are confidential  Fax any determinations, approvals, denials, and requests for initial or continue stay review clinical to [phone]  Due to HIGH CALL volume, it would be easier if you could please send faxed requests to expedite your requests and in part, help us provide discharge notifications faster    Continued Stay Review    Date: 07/31/2018    Vital Signs: /69 (BP Location: Left arm)   Pulse 64   Temp 99 1 °F (37 3 °C) (Oral)   Resp 18   Ht 5' 6" (1 676 m)   Wt 76 kg (167 lb 8 8 oz)   SpO2 97%   BMI 27 04 kg/m²     Medications:   Scheduled Meds:   Current Facility-Administered Medications:  acetaminophen 650 mg Oral Q6H PRN    aspirin 81 mg Oral Daily    atorvastatin 80 mg Oral Daily With Dinner    cholecalciferol 2,000 Units Oral Daily    hydrALAZINE 10 mg Intravenous Q6H PRN    HYDROmorphone 0 5 mg Intravenous Q4H PRN    insulin lispro 100 Units Subcutaneous Insulin Pump PRN    lisinopril 20 mg Oral Daily    metoclopramide 10 mg Intravenous Q6H PRN    metoprolol succinate 25 mg Oral Daily    ondansetron 4 mg Intravenous Q6H PRN    pantoprazole 40 mg Oral BID AC    patient maintained insulin pump 1 each Subcutaneous Q8H    sertraline 100 mg Oral Daily    sodium chloride 100 mL/hr Intravenous Continuous Last Rate: 100 mL/hr (07/31/18 0605)   ticagrelor 90 mg Oral Q12H [facility]    zolpidem 5 mg Oral HS PRN      Continuous Infusions:   sodium chloride 100 mL/hr Last Rate: 100 mL/hr (07/31/18 0605)     PRN Meds:   acetaminophen    hydrALAZINE   HYDROmorphone    insulin lispro    Metoclopramide x1 IV    Ondansetron x1 IV    zolpidemx1 PO    Abnormal Labs/Diagnostic Results: glucose 259,315    Age/Sex: 47 y o  male     Assessment/Plan:   Intractable nausea and vomiting   Assessment & Plan     ·  Likely viral gastroenteritis, now self-resolving  ·  differential diagnosis would be diabetic gastroparesis  ·  advanced diet as tolerated and observed today  ·  continue supportive fluids for the time being and IV Reglan          T1DM (type 1 diabetes mellitus) [facility])   Assessment & Plan             Lab Results   Component Value Date     HGBA1C 9 1 (H) 07/30/2018                Recent Labs      07/30/18   1711  07/30/18   2044  07/31/18   0204  07/31/18   0642   POCGLU  104  152*  104  96         Blood Sugar Average: Last 72 hrs:  (P) 103 [id number]       severe hyperglycemia present on admission has resolved  Appreciate Endocrinology input    Observe today with advancement in diet and resume insulin pump if able          Hypertension   Assessment & Plan     ·  Uncontrolled hypertension initially now improved  ·  continue lisinopril and Toprol, Lasix is currently on hold          Coronary artery disease involving native coronary artery of native heart without angina pectoris   Assessment & Plan     ·  Continue home meds       Discharge Plan: Pt requires additional inpatient hospital due to observation with advancement in diet; DC to be determined

## 2018-07-31 NOTE — DISCHARGE SUMMARY
PLEASE SEE PROGRESS NOTE FROM EAR[facility]ER TODAY    Discharging Physician / Practitioner: Lisy Encinas PA-C  PCP: Jamel Dowd DO  Admission Date:   Admission Orders     Ordered        07/29/18 1627  Inpatient Admission (expected length of stay for this patient is greater than two midnights)  Once             Discharge Date: 07/31/18    Resolved Problems  Date Reviewed: 7/31/2018    None        Consultations During Hospital Stay:  · Endocrinology    Procedures Performed:     · July 29 CT of the head no acute intracranial abnormality  · July 29 CT abdomen and pelvis without contrastNo acute intra-abdominal pelvic abnormality identified  Prior splenectomy  Significant Findings / Test Results:     none    Incidental Findings:     · Lobulated cystic structure in the tail of the pancreas, mostly stable compared to 2017 but mostly obscured by prior streak artifact related to metallic clips in the left upper quadrant  Suggest nonemergent dedicated CT pancreatic protocol exam     Test Results Pending at Discharge (will require follow up):   · none     Outpatient Tests Requested:  ·     Complications:      Reason for Admission: nausea and vomiting    Hospital Course:     Caroline Merrill is a 47 y o  male patient who originally presented to the hospital on 7/29/2018 due to intractable nausea and vomiting  He is a type 1 diabetic with an insulin pump and had been feeling very poorly when he came in for evaluation  He had severe hyperglycemia noted on admission  CT scan of the abdomen and pelvis did not reveal any acute changes  He was given supportive care with antiemetics and IV fluids and seen in consultation by Endocrinology for management of his insulin  Over the course of his hospital stay his diet was advanced and he was feeling significantly improved  His insulin pump was resumed  He will continue to follow up with Endocrinology as an outpatient    In addition during his stay we held off on providing him Lasix as he did appear to be dehydrated  On day of discharge I would suggest we continue to hold it for the time being as he is still noticing some lightheadedness upon standing  I have asked him to monitor his weights closely and resume it if he notices any water weight gain, but he states this has not been an issue for him for some time  Please see above list of diagnoses and related plan for additional information  Condition at Discharge: stable     Discharge Day Visit / Exam:     * Please refer to separate progress note for these details *    Discussion with Family: declined    Discharge instructions/Information to patient and family:   See after visit summary for information provided to patient and family  Provisions for Follow-Up Care:  See after visit summary for information related to follow-up care and any pertinent home health orders  Disposition:     Home    For Discharges to [facility] SNF:   · Not Applicable to this Patient - Not Applicable to this Patient    Planned Readmission: none     Discharge Statement:  I spent 40 minutes discharging the patient  This time was spent on the day of discharge  I had direct contact with the patient on the day of discharge  Greater than 50% of the total time was spent examining patient, answering all patient questions, arranging and discussing plan of care with patient as well as directly providing post-discharge instructions  Additional time then spent on discharge activities  I SPOKE WITH ENDOCRINOLOGY ATTENDING [facility] TODAY  I CAME BACK AND REASSESS THE PATIENT LATER IN THE AFTERNOON TO ENSURE THAT HE WAS FEELING MUCH BETTER  HE IS ANXIOUS TO GO AND IS FEELING WELL  I reviewed his discharge plan with him    Discharge Medications:  See after visit summary for reconciled discharge medications provided to patient and family        ** Please Note: This note has been constructed using a voice recognition system **

## 2018-07-31 NOTE — PROGRESS NOTES
The pantoprazole has / have been converted to Oral per [facility] for Adults as approved by the Pharmacy and Therapeutics Committee  The patient met all eligible criteria:  3 Age = 25years old   2) Received at least one dose of the IV form   3) Receiving at least one other scheduled oral/enteral medication   4) Tolerating an oral/enteral diet   and did not have any exclusions:   1) Critical care patient   2) Active GI bleed (IF assessing H2RAs or PPIs)   3) Continuous tube feeding (IF assessing cipro, doxycycline, levofloxacin, minocycline, rifampin, or voriconazole)   4) Receiving PO vancomycin (IF assessing metronidazole)   5) Persistent nausea and/or vomiting   6) Ileus or gastrointestinal obstruction   7) Lesley/nasogastric tube set for continuous suction   8) Specific order not to automatically convert to PO (in the order's comments or if discussed in the most recent Infectious Disease or primary team's progress notes)

## 2018-07-31 NOTE — DISCHARGE INSTRUCTIONS
Hold off on taking your lasix for now given dehydration and lightheadedness   Check your weight every day and talk to your doctor immediately if you start noticing any water weight gain, leg swelling or shortness of breath

## 2018-07-31 NOTE — PROGRESS NOTES
Received a call from primary team about orders for insulin pump, pt has his all supplies for insulin pump    His nausea and vomiting is improved    Discussed to restart insulin pump, now, will discontinue Lantus and Humalog scale   as per his last note from endocrine, setting are    current basal rates are midnight 1 3 units/hour and 7:00 a m  1 unit/hour   His insulin to carbohydrate ratio is 1 unit per 10 g for breakfast and lunch and 1 unit per 9 g for dinner   His insulin sensitivity factor is 1 unit per 30 mg/dL with a target of 100 to 120 mg/dL    Orders placed in  Epic, we use current settings for insulin pump to the continued,  Discussed to monitor blood sugars for couple of hours to ensure his blood sugars are acceptable  before discharge     Chema Day MD

## 2018-07-31 NOTE — PROGRESS NOTES
Patient asleep in bed  No signs of discomfort noted at this time  Will allow patient to sleep and re-visit shortly  Bed low and locked; call bell within reach  Will continue to monitor   Desire You RN

## 2018-08-03 NOTE — CASE MANAGEMENT
Nick Villalba RN Registered Nurse Signed   Case Management Date of Service: 7/30/2018 10:33 AM         []Hide copied text  Initial Clinical Review     Thank you,  Yovanny Beltre  [address] Utilization Review Department  Phone: [phone]; Fax [phone]  ATTENTION: The Network Utilization Review Department is now centralized for our 9 Facilities  Make a note that we have a new phone and fax numbers for our Department  Please call with any questions or concerns to [phone] and carefully follow the prompts so that you are directed to the right person  All voicemails are confidential  Fax any determinations, approvals, denials, and requests for initial or continue stay review clinical to [phone]  Due to HIGH CALL volume, it would be easier if you could please send faxed requests to expedite your requests and in part, help us provide discharge notifications faster         Admission: Date/Time/Statement: 7/29/18 @ 1627            Orders Placed This Encounter   Procedures    Inpatient Admission (expected length of stay for this patient is greater than two midnights)       Standing Status:   Standing       Number of Occurrences:   1       Order Specific Question:   Admitting Physician       Answer:   Julio Castaneda [[id number]]       Order Specific Question:   Level of Care       Answer:   Med Surg [16]       Order Specific Question:   Estimated length of stay       Answer:   More than 2 Midnights       Order Specific Question:   Certification       Answer:   I certify that inpatient services are medically necessary for this patient for a duration of greater than two midnights   See H&P and MD Progress Notes for additional information about the patient's course of treatment          ED: Date/Time/Mode of Arrival:             ED Arrival Information      Expected Arrival Acuity Means of Arrival Escorted By Service Admission Type     - 7/29/2018 13:08 Emergent Ambulance [facility] General Medicine Urgent     Arrival Complaint     -             Chief Complaint:        Chief Complaint   Patient presents with    Vomiting       Pt reports vomitting beginning this AM  Pt shut off BG machine when bg was 53  Pt BG pre-hospital was 451           History of Illness:  47 y  o  male who presents with intractable nausea and vomiting   The patient was in his usual state of health until last night when he began to get nauseous at the end of his shift   Normally when he is done working he will stop to get food but states that last night he just went right to bed  [facility], today when he woke up he began vomiting   The patient is unable to tell me how many times he has vomited but states that he has been vomiting a lot   The emesis he describes as mostly liquid and bilious and at this point is mostly dry heaves   He the patient, when asked if he has coffee-ground emesis or blood seems to provide an ambiguous answer and states "possibly"   The patient states that he has abdominal pain but only as he is vomiting   The patient denies any fevers or chills at home but currently appears to have chills and states he feels cold   The patient states he had a little bit of diarrhea   No noted melena or hematochezia   The patient does not seem very willing to continue answering multiple questions at this time due to dry heaves and general discomfort         ED Vital Signs:            ED Triage Vitals [07/29/18 1317]   Temperature Pulse Respirations Blood Pressure SpO2   97 5 °F (36 4 °C) 84 18 (!) 216/96 99 %       Temp Source Heart Rate Source Patient Position - Orthostatic VS BP Location FiO2 (%)   Oral Monitor Sitting Right arm --       Pain Score           4                Wt Readings from Last 1 Encounters:   07/29/18 76 kg (167 lb 8 8 oz)         Vital Signs (abnormal):  /95     Abnormal Labs/Diagnostic Test Results:     Ref Range & Units 07/29/18 1334   Sodium 136 - 145 mmol/L 135     Potassium 3 5 - 5 3 mmol/L 5 2    Chloride 100 - 108 mmol/L 101    CO2 21 - 32 mmol/L 24    Anion Gap 4 - 13 mmol/L 10    BUN 5 - 25 mg/dL 15    Creatinine 0 60 - 1 30 mg/dL 0 99    Glucose 65 - 140 mg/dL 454     Alkaline Phosphatase 46 - 116 U/L 130        Lipase 73 - 393 u/L 59        WBC 4 31 - 10 16 Thousand/uL 14 39          Ref Range & Units 07/29/18 1334   Acetone, Bld Negative 3+          Ref Range & Units 07/29/18 1458   Color, UA   Yellow    Clarity, UA   Clear    pH, UA 4 5 - 8 0 6 0    Leukocytes, UA Negative Negative    Nitrite, UA Negative Negative    Protein, UA Negative mg/dl Negative    Glucose, UA Negative mg/dl 500 (1/2%)     Ketones, UA Negative mg/dl 40 (2+)     Urobilinogen, UA 0 2, 1 0 E U /dl E U /dl 0 2    Bilirubin, UA Negative Negative    Blood, UA Negative Negative    Specific Gravity, UA 1 003 - 1 030 1 010       Blood cxs -- (P)        ED Treatment:              Medication Administration from 07/29/2018 1307 to 07/29/2018 1758        Date/Time Order Dose Route Action Action by Comments       07/29/2018 1324 ondansetron (ZOFRAN) injection 4 mg 4 mg Intravenous Given Marizol Sousa RN         07/29/2018 1333 sodium chloride 0 9 % bolus 2,000 mL 2,000 mL Intravenous New Bag Marizol Sousa RN         07/29/2018 1403 metoclopramide (REGLAN) injection 5 mg 5 mg Intravenous Given Marizol Sousa RN Diluted in 9 mL NSS       07/29/2018 1713 insulin regular (HumuLIN R,NovoLIN R) 1 Units/mL in sodium chloride 0 9 % 100 mL infusion 4 Units/hr Intravenous Rate/Dose Change Rock Roman RN confirmed with Cary Steve RN       07/29/2018 1459 insulin regular (HumuLIN R,NovoLIN R) 1 Units/mL in sodium chloride 0 9 % 100 mL infusion 8 Units/hr Intravenous New Bag Kath Sousa RN         07/29/2018 1411 sodium chloride 0 9 % bolus 1,000 mL 1,000 mL Intravenous New Bag Kath Sousa RN         07/29/2018 1549 sodium chloride 0 9 % bolus 1,000 mL 1,000 mL Intravenous New Bag Rock Roman RN verbal order by Dr Roger Ross for rate change       07/29/2018 1547 metoclopramide (REGLAN) injection 5 mg 5 mg Intravenous Given Shaquille Ruggiero RN         07/29/2018 1550 labetalol (NORMODYNE) injection 10 mg 10 mg Intravenous Given Shaquille Ruggiero RN /87 HR 93             Past Medical/Surgical History: Active Ambulatory Problems     Diagnosis Date Noted    AS (aortic stenosis) 08/30/2017    Palpitation 08/30/2017    T1DM (type 1 diabetes mellitus) ([facility] ) 08/30/2017    Benign essential HTN 08/30/2017    Abnormal EKG 08/30/2017    Mouth sores 12/01/2017    Coronary artery disease involving native coronary artery of native heart without angina pectoris 12/01/2017    Transaminitis 12/01/2017    Dyslipidemia 06/05/2018           Past Medical History:   Diagnosis Date    Aortic stenosis      Clavicle fracture      Diabetes mellitus (HCC)      Hyperlipidemia      Hypertension      Thrombocytopenic purpura (HCC)           Admitting Diagnosis: Weakness [R53 1]  Nausea and vomiting [R11 2]  Hyperglycemia [R73 9]     Age/Sex: 47 y o  male     Assessment/Plan:   Hospital Problem List:    Principal Problem:    Intractable nausea and vomiting  Active Problems:    T1DM (type 1 diabetes mellitus) ([facility] )    Benign essential HTN    Coronary artery disease involving native coronary artery of native heart without angina pectoris    Hyperglycemia     Plan for the Primary Problem(s):  · Intractable Nausea / Vomiting, Likely Acute Gastroenteritis with SIRS criteria -   ? PPI - Protonix 40 mg IV bid   ? Eval for potential gastroenteritis,   § Assess for antibiotic Use -   § Check rectal temperature   If elevated, start abdomen coverage with IV antibiotics and get blood cultures  § Get stool culture  § Check lactic acid now   Get blood cultures  § With no clear new infection, will hold off on antibiotic dosing currently  § Provide supportive care -   § IV Fluids - will run normal saline at 125 ml/hr    § Antiemetics - Zofran  § Diet - clear liquid diet  § Pain control - IV dilaudid  ? At risk for Diabetic Gastroparesis - Will schedule Reglan 10 mg IV q6h   ? If symptoms persist, consider GI evaluation      Plan for Additional Problems:   · Diabetes Mellitus with Severe Hyperglycemia (glucose 454) -   ? Insulin Drip   IV fluids     ? Once glucose is down can try switching regimen to insulin pump regimen by tomorrow if he is taking PO better     ? Check Hemoglobin A1c     ? Monitor glucose levels  ? Patient has insulin pump  [facility] consult endocrinology  · Essential Hypertension - Home regimen   PRN IV hydralazine      VTE Prophylaxis: Low risk - ambulate   / sequential compression device and reason for no mechanical VTE prophylaxis low risk    Code Status: Level 1 Full Code    POLST: There is no POLST form on file for this patient (pre-hospital)     Anticipated Length of Stay: Kenyetta Giron will be admitted on an Inpatient basis with an anticipated length of stay of  > 2 midnights    Justification for Hospital Stay: Evaluation of intractable Nausea / vomiting with severe hyperglycemia         Admission Orders:  M/S/Tele unit  Telem  Clear liquid diet  Fingerstick glucose checks q2h  Monitor I&O's  SCD's  Up & oob as tolerated  Stool for enteric bacterial panel  Consult endocrinology     Scheduled Meds:   Current Facility-Administered Medications:  acetaminophen 650 mg Oral Q6H PRN Scooby Arango, DO     aspirin 81 mg Oral Daily Scooby Arango, DO     atorvastatin 80 mg Oral Daily With Yodit Loop, DO     cholecalciferol 2,000 Units Oral Daily Scooby Arango, DO     hydrALAZINE 10 mg Intravenous Q6H PRN Scooby Arango, DO     HYDROmorphone 0 5 mg Intravenous Q4H PRN Scooby Arango, DO     insulin regular (HumuLIN R,NovoLIN R) infusion 0 3-21 Units/hr Intravenous Titrated Shahnaz Escobar MD Last Rate: 0 5 Units/hr (07/30/18 8632)   lisinopril 20 mg Oral Daily Scooby Arango, DO     metoclopramide 10 mg Intravenous Q6H PRN Sruthi Harrow, DO     metoprolol succinate 25 mg Oral Daily Sruthi Harrow, DO     ondansetron 4 mg Intravenous Q6H PRN Sruthi Harrow, DO     pantoprazole 40 mg Intravenous Q12H [address] Toby Josafat Carrion, DO     sertraline 100 mg Oral Daily Sruthi Harrow, DO     sodium chloride 125 mL/hr Intravenous Continuous Sruthi Harrow, DO Last Rate: 125 mL/hr (07/29/18 1945)   ticagrelor 90 mg Oral Q12H [address] Toby Carrion, DO     zolpidem 5 mg Oral HS PRN Sruthi Harrow, DO        Continuous Infusions:   insulin regular (HumuLIN R,NovoLIN R) infusion 0 3-21 Units/hr Last Rate: 0 5 Units/hr (07/30/18 0932)   sodium chloride 125 mL/hr Last Rate: 125 mL/hr (07/29/18 1945)      PRN Meds:   acetaminophen    hydrALAZINE    HYDROmorphone    metoclopramide    ondansetron    zolpidem        Martina Garcia RN Registered Nurse Signed   Case Management Date of Service: 7/31/2018  1:21 PM         []Hide copied text  Thank you,  Yovanny [place] [id number] Utilization Review Department  Phone: [phone]; Fax [phone]  ATTENTION: The Network Utilization Review Department is now centralized for our 9 Facilities  Make a note that we have a new phone and fax numbers for our Department  Please call with any questions or concerns to [phone] and carefully follow the prompts so that you are directed to the right person  All voicemails are confidential  Fax any determinations, approvals, denials, and requests for initial or continue stay review clinical to [phone]  Due to HIGH CALL volume, it would be easier if you could please send faxed requests to expedite your requests and in part, help us provide discharge notifications faster    Continued Stay Review     Date: 07/31/2018     Vital Signs: /69 (BP Location: Left arm)   Pulse 64   Temp 99 1 °F (37 3 °C) (Oral)   Resp 18   Ht 5' 6" (1 676 m)   Wt 76 kg (167 lb 8 8 oz)   SpO2 97%   BMI 27 04 kg/m²      Medications:   Scheduled Meds: Current Facility-Administered Medications:  acetaminophen 650 mg Oral Q6H PRN     aspirin 81 mg Oral Daily     atorvastatin 80 mg Oral Daily With Dinner     cholecalciferol 2,000 Units Oral Daily     hydrALAZINE 10 mg Intravenous Q6H PRN     HYDROmorphone 0 5 mg Intravenous Q4H PRN     insulin lispro 100 Units Subcutaneous Insulin Pump PRN     lisinopril 20 mg Oral Daily     metoclopramide 10 mg Intravenous Q6H PRN     metoprolol succinate 25 mg Oral Daily     ondansetron 4 mg Intravenous Q6H PRN     pantoprazole 40 mg Oral BID AC     patient maintained insulin pump 1 each Subcutaneous Q8H     sertraline 100 mg Oral Daily     sodium chloride 100 mL/hr Intravenous Continuous Last Rate: 100 mL/hr (07/31/18 0605)   ticagrelor 90 mg Oral Q12H [facility]     zolpidem 5 mg Oral HS PRN        Continuous Infusions:   sodium chloride 100 mL/hr Last Rate: 100 mL/hr (07/31/18 0605)      PRN Meds:   acetaminophen    hydrALAZINE    HYDROmorphone    insulin lispro    Metoclopramide x1 IV    Ondansetron x1 IV    zolpidemx1 PO     Abnormal Labs/Diagnostic Results: glucose 259,315     Age/Sex: 47 y o  male      Assessment/Plan:       Intractable nausea and vomiting   Assessment & Plan     ·  Likely viral gastroenteritis, now self-resolving  ·  differential diagnosis would be diabetic gastroparesis  ·  advanced diet as tolerated and observed today  ·  continue supportive fluids for the time being and IV Reglan          T1DM (type 1 diabetes mellitus) [facility])   Assessment & Plan                   Lab Results   Component Value Date     HGBA1C 9 1 (H) 07/30/2018                     Recent Labs      07/30/18   5972  07/30/18   0245  07/31/18   0204  07/31/18   4267   POCGLU  104  152*  104  96         Blood Sugar Average: Last 72 hrs:  (P) 963 [id number]       severe hyperglycemia present on admission has resolved   Appreciate Endocrinology input  Mekhi Whitfield today with advancement in diet and resume insulin pump if able        Hypertension   Assessment & Plan     ·  Uncontrolled hypertension initially now improved  ·  continue lisinopril and Toprol, Lasix is currently on hold          Coronary artery disease involving native coronary artery of native heart without angina pectoris   Assessment & Plan     ·  Continue home meds         Discharge Plan: Pt requires additional inpatient hospital due to observation with advancement in diet; DC to be determined        Notification of Discharge  This is a Notification of Discharge from our facility [address]  Please be advised that this patient has been discharge from our facility  Below you will find the admission and discharge date and time including the patients disposition  PRESENTATION DATE: 7/29/2018  1:08 PM  IP ADMISSION DATE: 7/29/18 1627  DISCHARGE DATE: 7/31/2018  5:04 PM  DISPOSITION: [address] [place], [place] [address]  [facility] in the [facility] [place] Utilization Review Department  Phone: [phone]; Fax [phone]  ATTENTION: The Network Utilization Review Department is now centralized for our 9 Facilities  Make a note that we have a new phone and fax numbers for our Department  Please call with any questions or concerns to [phone] and carefully follow the prompts so that you are directed to the right person  All voicemails are confidential  Fax any determinations, approvals, denials, and requests for initial or continue stay review clinical to [phone]  Due to HIGH CALL volume, it would be easier if you could please send faxed requests to expedite your requests and in part, help us provide discharge notifications faster

## 2018-08-04 LAB
BACTERIA BLD CULT: NORMAL
BACTERIA BLD CULT: NORMAL

## 2018-08-08 DIAGNOSIS — F33.2 SEVERE EPISODE OF RECURRENT MAJOR DEPRESSIVE DISORDER, WITHOUT PSYCHOTIC FEATURES (HCC): ICD-10-CM

## 2018-08-08 RX ORDER — SERTRALINE HYDROCHLORIDE 100 MG/1
TABLET, FILM COATED ORAL
Qty: 90 TABLET | Refills: 0 | Status: ON HOLD | OUTPATIENT
Start: 2018-08-08 | End: 2020-09-02 | Stop reason: ALTCHOICE

## 2018-08-22 ENCOUNTER — TELEPHONE (OUTPATIENT)
Dept: ENDOCRINOLOGY | Facility: CLINIC | Age: 53
End: 2018-08-22

## 2018-08-29 ENCOUNTER — TELEPHONE (OUTPATIENT)
Dept: CARDIOLOGY CLINIC | Facility: CLINIC | Age: 53
End: 2018-08-29

## 2018-08-29 NOTE — TELEPHONE ENCOUNTER
Sofy Marquez called c/o dizziness  Symptoms started about 3 weeks ago  Noticed when he did not take Brilinta and ASA he did not have dizziness  Stopped Brilinta/ASA x1 week ago entirely w/ no c/o dizziness  No BP cuff at home, does not know what BP's are running  Continues to take Toprol XL 25 mg daily and Lisinopril 20 mg daily  Do you want him to come in for EKG and BP check? O/v 9/19    Please advise     C/b # [phone]

## 2018-08-29 NOTE — TELEPHONE ENCOUNTER
S/w Sofía Hassan, advised recommendations  Offered appt for EKG/BP check  He wants to wait until o/v 9/19  Advised to c/b if symptoms worsen and he wants to come in

## 2018-09-10 DIAGNOSIS — E10.8 TYPE 1 DIABETES MELLITUS WITH COMPLICATION (HCC): ICD-10-CM

## 2018-09-11 ENCOUNTER — TELEPHONE (OUTPATIENT)
Dept: ENDOCRINOLOGY | Facility: CLINIC | Age: 53
End: 2018-09-11

## 2018-09-11 NOTE — TELEPHONE ENCOUNTER
Left patient message that Yuliana Contour test strips are no longer covered by his insurance     Please call insurance company and find out what is covered and call the office back to advise

## 2018-09-18 PROBLEM — R65.10 SIRS (SYSTEMIC INFLAMMATORY RESPONSE SYNDROME) (HCC): Status: RESOLVED | Noted: 2018-07-29 | Resolved: 2018-09-18

## 2018-09-18 PROBLEM — R94.31 ABNORMAL EKG: Status: RESOLVED | Noted: 2017-08-30 | Resolved: 2018-09-18

## 2018-09-18 PROBLEM — R73.9 HYPERGLYCEMIA: Status: RESOLVED | Noted: 2018-07-29 | Resolved: 2018-09-18

## 2018-09-18 PROBLEM — R11.2 INTRACTABLE NAUSEA AND VOMITING: Status: RESOLVED | Noted: 2018-07-29 | Resolved: 2018-09-18

## 2018-09-18 PROBLEM — R00.2 PALPITATION: Status: RESOLVED | Noted: 2017-08-30 | Resolved: 2018-09-18

## 2018-09-18 NOTE — PROGRESS NOTES
Cardiology Outpatient Follow up Note    Bruce Shetty 47 y o  male MRN: [id number]    09/19/18          Assessment/Plan:      1  Aortic stenosis  Echo 5/17 showed normal LV size and function, EF 55%, no RWMA, normal diastolic function  Normal RV size and function  Trace MR  Aortic valve was trileaflet, but was moderately thickened and calcified with mildly reduced cuspal separation  Mild to moderate stenosis with trace AI  Mean gradient was 16 mm Hg  Calculated MINI was 1 6 cm squared  2  HTN  He is on Lisinopril 20 mg daily  BP controlled  3  DM  He is on Humalog  Hgb A1c 8 6% 7/18  He is following with endocrinology now, Dr Ebenezer Cortes  4  HL  Lipid profile 9/1/17 showed total cholesterol 231, TG 62, , LDL 58  He was started on high dose Atorvastatin 80 8/17 due to CAD  Lipid panel 12/17 showed total cholesterol 200, , LDL 51, TG 98  Lipid panel 7/18 showed total cholesterol 200, , HDL 53,   He admits he is not taking the Atorvastatin consistently, will try taking it in morning along with other medications to remember to take it  5  Palpitations  EKG 5/19/17 showed isolated PVCs  He was started on beta blocker for his CAD, but reports he stopped it due to dizziness  In any case, will not restart due to issues with depression  No current palpitations  6  CAD  Now on aspirin, statin  Did not tolerate Plavix due to severe thrombocytopenia, which resolved after Plavix stopped  Can stop Brilinta completely now  Beta blocker stopped due to dizziness  Will order stress echo to assess for any inducible ischemia and to assess for progression of AS  1  Type 1 diabetes mellitus with complication ([facility] [address] )     2  Essential hypertension     3  Coronary artery disease involving native coronary artery of native heart without angina pectoris  Echo stress test w contrast if indicated   4  Dyslipidemia     5   Nonrheumatic aortic valve stenosis  Echo stress test w contrast if indicated HPI: 47 y o  man with a history of DM (type I, diagnosed at age 6), HTN, and mild aortic stenosis, who is here for follow up of CAD and AS  He reports he saw Dr Glenn Taveras around 2007 for a heart murmur, was told it should be followed  He was at gym with his son in 4-5/17 and he got dizzy and felt like he was going to pass out while they were boxing (hitting heavy bags)  No syncope, took about 5 minutes for symptoms to resolve  Echo 5/17 showed normal LV size and function, EF 55%, no RWMA, normal diastolic function  Normal RV size and function  Trace MR  Aortic valve was trileaflet, but was moderately thickened and calcified with mildly reduced cuspal separation  Mild to moderate stenosis with trace AI  Mean gradient was 16 mm Hg  Calculated MINI was 1 6 cm squared  He was admitted to [facility] 8/17 with sudden onset shortness of breath, EKG in PCP's office showed abnormal EKG with inferior and anterior ST changes  CXR showed small bilateral pleural effusions  Troponins were negative, but echo showed EF of 40-45% with severe hypokinesis of apical anterior, mid anteroseptal, apical inferior, apical septal, apical lateral, and apical walls  Mild LVH, normal RV size and function  Mild MR  Mild to moderate AS with MINI 1 4 cm squared, mean gradient 12 mm Hg, mild AI  Given new LV dysfunction, he underwent cardiac catheterization 8/31/17, which showed 90% stenosis of mid LAD, moderate diffuse plaque of OM1, RCA dominant, with 80% stenosis of mid small PL2 branch  He underwent [employer] MINNIE of 90% mid LAD stenosis  He was admitted to [place] [id number] 12/17 with mouth sores and easy bruising, found to have significant thrombocytopenia with platelets 9K  He had been switched from Brilinta to Plavix about 2 weeks (around Nov 13) prior due to cost of Brilinta  He was seen by Hematology, who felt that Plavix may have caused thrombocytopenia, but also treated him with IV steroids   Platelet count at discharge was 87K  He was restarted on Brilinta at time of discharge  Follow up echo 12/17 showed EF normalized to 60%, mild hypokinesis of mid anteroseptal and apical septal walls  Mild LVH  Normal RV size and function  Mild MR  Mild AI, stenosis not assessed  He reports he stopped taking Metoprolol because he felt it was making him dizzy  He decreased Brilinta to once a day  I told him today he can stop it as it has been over a year since his last stent  No chest pain, no significant shortness of breath  No palpitations  No PND  No LE edema  No dizziness  No bruising or bleeding  He is on Zoloft now, which is helping him sleep, he was diagnosed with depression, he has been taking Zoloft for about 2 months  He is working at [employer] as manager, he walks a lot at work and is able to [facility] without any significant SOB  He reports he quit drinking alcohol in 3/18, and feels better now         Patient Active Problem List   Diagnosis    Nonrheumatic aortic valve stenosis    T1DM (type 1 diabetes mellitus) ([facility] [address] [facility]    Essential hypertension    Mouth sores    Coronary artery disease involving native coronary artery of native heart without angina pectoris    Dyslipidemia       No Known Allergies      Current Outpatient Prescriptions:     aspirin (ECOTRIN LOW STRENGTH) 81 mg EC tablet, Take 1 tablet by mouth daily, Disp: , Rfl: 0    atorvastatin (LIPITOR) 80 mg tablet, Take 1 tablet by mouth daily with dinner, Disp: 30 tablet, Rfl: 0    [employer] MICROLET LANCETS lancets, by Other route 4 (four) times a day Use as instructed, Disp: 400 each, Rfl: 3    Cholecalciferol (VITAMIN D) 2000 units CAPS, TK 1 C PO QD, Disp: , Rfl: 3    glucagon (GLUCAGON EMERGENCY) 1 MG injection, Inject 1 mg under the skin once as needed for low blood sugar for up to 1 dose, Disp: 2 each, Rfl: 3    glucose blood (NEL CONTOUR TEST) test strip, 1 each by Other route 4 (four) times a day, Disp: 400 each, Rfl: 3    Insulin Infusion Pump KIT, 1 Units/hr by Subcutaneous Insulin Pump route continuous, Disp: , Rfl:     Insulin Infusion Pump Supplies (MINIMED INFUSION SET-) MISC, by Does not apply route, Disp: , Rfl:     Insulin Infusion Pump Supplies (MINIMED RESERVOIR 3ML) [facility], by Does not apply route, Disp: , Rfl:     insulin lispro (HUMALOG) 100 units/mL injection, Use up to 100 units per day via insulin pump, Disp: 60 mL, Rfl: 3    INSULIN SYRINGE 1CC/29G 29G X 1/2" 1 ML MISC, Inject as directed 3 (three) times a day with meals Use as directed, Disp: 100 each, Rfl: 0    lisinopril (ZESTRIL) 20 mg tablet, Take 20 mg by mouth daily, Disp: , Rfl:     omeprazole (PriLOSEC) 20 mg delayed release capsule, Take 20 mg by mouth daily, Disp: , Rfl:     sertraline (ZOLOFT) 100 mg tablet, TAKE 1 TABLET BY MOUTH DAILY, Disp: 90 tablet, Rfl: 0    zolpidem (AMBIEN) 5 mg tablet, Take 1 tablet by mouth daily at bedtime as needed for sleep for up to 2 days, Disp: 2 tablet, Rfl: 0    Past Medical History:   Diagnosis Date    Aortic stenosis     Clavicle fracture     LEFT    Diabetes mellitus ([facility] [address] [facility]     Hyperlipidemia     Hypertension     Thrombocytopenic purpura (HCC)        Family History   Problem Relation Age of Onset    Aneurysm Mother         CAREBRAL ARTERY     Coronary artery disease Mother     Diabetes Mother        Past Surgical History:   Procedure Laterality Date    ABDOMINAL SURGERY      ROTATOR CUFF REPAIR      SPLENECTOMY      TONSILLECTOMY      VITRECTOMY Bilateral     BY ANTERIOR APPROACH        Social History     Social History    Marital status: /Civil Union     Spouse name: N/A    Number of children: N/A    Years of education: GRADUATED HIGHSCHOOL      Occupational History    MANAGER AT [employer]       Social History Main Topics    Smoking status: Former Smoker     Types: Cigars    Smokeless tobacco: Never Used    Alcohol use No    Drug use: Yes     Frequency: 3 0 times per week     Types: Marijuana    Sexual activity: Not on file     Other Topics Concern    Not on file     Social History Narrative    No narrative on file       Review of Systems   Constitution: Negative for chills, decreased appetite, diaphoresis, fever, weakness, malaise/fatigue, night sweats, weight gain and weight loss  HENT: Negative for ear pain, hearing loss, hoarse voice, nosebleeds, sore throat and tinnitus  Eyes: Negative for blurred vision and pain  Cardiovascular: Negative  Negative for chest pain, claudication, cyanosis, dyspnea on exertion, irregular heartbeat, leg swelling, near-syncope, orthopnea, palpitations, paroxysmal nocturnal dyspnea and syncope  Respiratory: Negative for cough, hemoptysis, shortness of breath, sleep disturbances due to breathing, snoring, sputum production and wheezing  Hematologic/Lymphatic: Negative for adenopathy and bleeding problem  Does not bruise/bleed easily  Skin: Negative for color change, dry skin, flushing, itching, poor wound healing and rash  Musculoskeletal: Negative for arthritis, back pain, falls, joint pain, muscle cramps, muscle weakness, myalgias and neck pain  Gastrointestinal: Negative for abdominal pain, constipation, diarrhea, dysphagia, heartburn, hematemesis, hematochezia, melena, nausea and vomiting  Genitourinary: Negative for dysuria, frequency, hematuria, hesitancy, non-menstrual bleeding and urgency  Neurological: Negative for excessive daytime sleepiness, dizziness, focal weakness, headaches, light-headedness, loss of balance, numbness, paresthesias, tremors and vertigo  Psychiatric/Behavioral: Negative for altered mental status, depression and memory loss  The patient has insomnia  The patient is not nervous/anxious  Allergic/Immunologic: Negative for environmental allergies and persistent infections         Vitals: /70 (BP Location: Left arm, Patient Position: Sitting, Cuff Size: Adult)   Pulse 86   Ht 5' 6" (1 676 m)   Wt 76 kg (167 lb 8 oz)   SpO2 97%   BMI 27 04 kg/m²       Physical Exam:     GEN: Alert and oriented x 3, in no acute distress  Well appearing and well nourished  HEENT: Sclera anicteric, conjunctivae pink, mucous membranes moist  Oropharynx clear  NECK: Supple, no carotid bruits, no significant JVD  Trachea midline, no thyromegaly  HEART: Regular rhythm, II/VI systolic murmur, no clicks, gallops or rubs  PMI nondisplaced, no thrills  LUNGS: Clear to auscultation bilaterally; no wheezes, rales, or rhonchi  No increased work of breathing or signs of respiratory distress  ABDOMEN: Soft, nontender, nondistended, normoactive bowel sounds  EXTREMITIES: Skin warm and well perfused, no clubbing, cyanosis, or edema  NEURO: No focal findings  Normal gait  Normal speech  Mood and affect normal    SKIN: Normal without suspicious lesions on exposed skin        Lab Results:       Lab Results   Component Value Date    HGBA1C 8 6 (H) 07/31/2018    HGBA1C 9 1 (H) 07/30/2018    HGBA1C 8 6 (H) 07/25/2018     Lab Results   Component Value Date    CHOL 259 (H) 10/15/2013     Lab Results   Component Value Date    HDL 53 07/25/2018     (H) 12/07/2017     (H) 09/01/2017     Lab Results   Component Value Date    LDLCALC 102 (H) 07/25/2018    LDLCALC 51 12/07/2017    LDLCALC 58 09/01/2017     Lab Results   Component Value Date    TRIG 225 (H) 07/25/2018    TRIG 98 12/07/2017    TRIG 62 09/01/2017     No components found for: CHOLHDL

## 2018-09-19 ENCOUNTER — OFFICE VISIT (OUTPATIENT)
Dept: CARDIOLOGY CLINIC | Facility: MEDICAL CENTER | Age: 53
End: 2018-09-19
Payer: COMMERCIAL

## 2018-09-19 VITALS
DIASTOLIC BLOOD PRESSURE: 70 MMHG | SYSTOLIC BLOOD PRESSURE: 124 MMHG | OXYGEN SATURATION: 97 % | WEIGHT: 167.5 LBS | BODY MASS INDEX: 26.92 KG/M2 | HEIGHT: 66 IN | HEART RATE: 86 BPM

## 2018-09-19 DIAGNOSIS — E78.5 DYSLIPIDEMIA: ICD-10-CM

## 2018-09-19 DIAGNOSIS — I25.10 CORONARY ARTERY DISEASE INVOLVING NATIVE CORONARY ARTERY OF NATIVE HEART WITHOUT ANGINA PECTORIS: ICD-10-CM

## 2018-09-19 DIAGNOSIS — I35.0 NONRHEUMATIC AORTIC VALVE STENOSIS: ICD-10-CM

## 2018-09-19 DIAGNOSIS — E10.8 TYPE 1 DIABETES MELLITUS WITH COMPLICATION (HCC): Primary | ICD-10-CM

## 2018-09-19 DIAGNOSIS — I10 ESSENTIAL HYPERTENSION: ICD-10-CM

## 2018-09-19 PROBLEM — R74.01 TRANSAMINITIS: Status: RESOLVED | Noted: 2017-12-01 | Resolved: 2018-09-19

## 2018-09-19 PROCEDURE — 99214 OFFICE O/P EST MOD 30 MIN: CPT | Performed by: INTERNAL MEDICINE

## 2019-11-25 ENCOUNTER — TELEPHONE (OUTPATIENT)
Dept: ENDOCRINOLOGY | Facility: CLINIC | Age: 54
End: 2019-11-25

## 2019-11-25 DIAGNOSIS — E10.9 TYPE 1 DIABETES MELLITUS WITHOUT COMPLICATION (HCC): ICD-10-CM

## 2019-11-25 NOTE — TELEPHONE ENCOUNTER
Needs refill humalog insulin 3 mnth supply called into [facility]  Last seen 7- and fu 11-      Thank you

## 2019-11-29 ENCOUNTER — TELEPHONE (OUTPATIENT)
Dept: OTHER | Facility: HOSPITAL | Age: 54
End: 2019-11-29

## 2019-11-29 ENCOUNTER — OFFICE VISIT (OUTPATIENT)
Dept: ENDOCRINOLOGY | Facility: CLINIC | Age: 54
End: 2019-11-29
Payer: COMMERCIAL

## 2019-11-29 ENCOUNTER — TELEPHONE (OUTPATIENT)
Dept: ENDOCRINOLOGY | Facility: CLINIC | Age: 54
End: 2019-11-29

## 2019-11-29 VITALS
BODY MASS INDEX: 26.82 KG/M2 | HEIGHT: 65 IN | DIASTOLIC BLOOD PRESSURE: 64 MMHG | WEIGHT: 161 LBS | SYSTOLIC BLOOD PRESSURE: 118 MMHG

## 2019-11-29 DIAGNOSIS — E10.8 TYPE 1 DIABETES MELLITUS WITH COMPLICATION (HCC): ICD-10-CM

## 2019-11-29 DIAGNOSIS — I25.10 CORONARY ARTERY DISEASE INVOLVING NATIVE CORONARY ARTERY OF NATIVE HEART WITHOUT ANGINA PECTORIS: ICD-10-CM

## 2019-11-29 DIAGNOSIS — E78.5 DYSLIPIDEMIA: Primary | ICD-10-CM

## 2019-11-29 DIAGNOSIS — I10 ESSENTIAL HYPERTENSION: ICD-10-CM

## 2019-11-29 DIAGNOSIS — E10.311 TYPE 1 DIABETES MELLITUS WITH RETINOPATHY OF BOTH EYES AND MACULAR EDEMA, UNSPECIFIED RETINOPATHY SEVERITY (HCC): ICD-10-CM

## 2019-11-29 PROBLEM — E10.319 TYPE 1 DIABETES MELLITUS WITH RETINOPATHY (HCC): Status: ACTIVE | Noted: 2017-08-30

## 2019-11-29 PROCEDURE — 99214 OFFICE O/P EST MOD 30 MIN: CPT | Performed by: INTERNAL MEDICINE

## 2019-11-29 PROCEDURE — 3074F SYST BP LT 130 MM HG: CPT | Performed by: INTERNAL MEDICINE

## 2019-11-29 PROCEDURE — 3078F DIAST BP <80 MM HG: CPT | Performed by: INTERNAL MEDICINE

## 2019-11-29 NOTE — PROGRESS NOTES
Established Patient Progress Note     CC: Diabetes mellitus type 1    History of Present Illness:   Mica Wong is a 49 y o  male with a history of type 1 diabetes with long term use of insulin since age of 6 years  Reports complication of retinopathy  Denies recent illness or hospitalizations  Denies recent severe hypoglycemic or severe hyperglycemic episodes  Denies any issues with his current regimen  He currently measures his blood sugars only once a day in the morning  Patient works almost 70 hours a week, 6 days a week  He says he does not have a time to enter wizard bolus into his pump  He does not want to go to diabetic education or nutrition classes for carb counting because "it is a waste of time for him"  He says he knows how to carb count, he just does not enter amount of his carbs into his pump because of his time constraints  He has been on the insulin pump for the past 10 years  He is interested to upgrade his pump and get a continuous glucose monitoring system  He has a family history of type 2 diabetes in his mother      home glucose monitoring: glucometer, once a day    Home blood glucose readings:   Before breakfast: 67, 305, 380, >400  Before lunch:  Does not measure  Before dinner:  Does not measure  Bedtime:  Does not measure     Current regimen: Insulin pump settings: Medtronic insulin pump - paradigm revel -723  His current basal rates are midnight 1 3 units/hour and 7:00 a m  1 unit/hour  His insulin to carbohydrate ratio is 1 unit per 10 g for breakfast and lunch and 1 unit per 9 g for dinner  His insulin sensitivity factor is 1 unit per 30 mg/dL with a target of 100 to 120 mg/dL      Last Eye Exam: 11/2019  Last Foot Exam:  11/2019    Has hypertension: no  Has hyperlipidemia: Taking lipitor   Thyroid disorders: no    Patient Active Problem List   Diagnosis    Nonrheumatic aortic valve stenosis    Type 1 diabetes mellitus with retinopathy ([facility] [address]    Essential hypertension  Mouth sores    Coronary artery disease involving native coronary artery of native heart without angina pectoris    Dyslipidemia      Past Medical History:   Diagnosis Date    Aortic stenosis     Clavicle fracture     LEFT    Diabetes mellitus ([facility] [address]     Hyperlipidemia     Hypertension     Thrombocytopenic purpura ([facility] [address] )       Past Surgical History:   Procedure Laterality Date    ABDOMINAL SURGERY      ROTATOR CUFF REPAIR      SPLENECTOMY      TONSILLECTOMY      VITRECTOMY Bilateral     BY ANTERIOR APPROACH       Family History   Problem Relation Age of Onset    Aneurysm Mother         CAREBRAL ARTERY     Coronary artery disease Mother     Diabetes Mother      Social History     Tobacco Use    Smoking status: Former Smoker     Types: Cigars    Smokeless tobacco: Never Used   Substance Use Topics    Alcohol use: No     No Known Allergies      Current Outpatient Medications:     aspirin (ECOTRIN LOW STRENGTH) 81 mg EC tablet, Take 1 tablet by mouth daily, Disp: , Rfl: 0    atorvastatin (LIPITOR) 80 mg tablet, Take 1 tablet by mouth daily with dinner, Disp: 30 tablet, Rfl: 0    Cholecalciferol (VITAMIN D) 2000 units CAPS, TK 1 C PO QD, Disp: , Rfl: 3    glucagon (GLUCAGON EMERGENCY) 1 MG injection, Inject 1 mg under the skin once as needed for low blood sugar for up to 1 dose, Disp: 1 kit, Rfl: 1    glucose blood (NEL CONTOUR TEST) test strip, 1 each by Other route 4 (four) times a day, Disp: 400 each, Rfl: 3    Insulin Infusion Pump KIT, 1 Units/hr by Subcutaneous Insulin Pump route continuous, Disp: , Rfl:     Insulin Infusion Pump Supplies (MINIMED INFUSION SET-) MISC, by Does not apply route, Disp: , Rfl:     Insulin Infusion Pump Supplies (MINIMED RESERVOIR 3ML) MIS, by Does not apply route, Disp: , Rfl:     insulin lispro (HUMALOG) 100 units/mL injection, Use up to 100 units per day via insulin pump, Disp: 60 mL, Rfl: 3    INSULIN SYRINGE 1CC/29G 29G X 1/2" 1 ML MISC, Inject as directed 3 (three) times a day with meals Use as directed, Disp: 100 each, Rfl: 0    omeprazole (PriLOSEC) 20 mg delayed release capsule, Take 20 mg by mouth daily, Disp: , Rfl:     zolpidem (AMBIEN) 5 mg tablet, Take 1 tablet by mouth daily at bedtime as needed for sleep for up to 2 days, Disp: 2 tablet, Rfl: 0    NEL MICROLET LANCETS lancets, by Other route 4 (four) times a day Use as instructed, Disp: 400 each, Rfl: 3    insulin detemir (LEVEMIR FLEXTOUCH) 100 Units/mL injection pen, Inject 30 Units under the skin daily In case of pump failure, Disp: 5 pen, Rfl: 0    lisinopril (ZESTRIL) 20 mg tablet, Take 20 mg by mouth daily, Disp: , Rfl:     sertraline (ZOLOFT) 100 mg tablet, TAKE 1 TABLET BY MOUTH DAILY (Patient not taking: Reported on 11/29/2019), Disp: 90 tablet, Rfl: 0    Review of Systems   Constitutional: Negative for chills, fatigue and fever  HENT: Negative for congestion, hearing loss and sore throat  Eyes: Negative for pain  Respiratory: Negative for cough, chest tightness, shortness of breath and wheezing  Gastrointestinal: Negative for abdominal distention, abdominal pain, blood in stool, constipation, diarrhea, nausea and vomiting  Endocrine: Negative for polydipsia and polyuria  Genitourinary: Negative for dysuria  Musculoskeletal: Negative for arthralgias and back pain  Neurological: Negative for dizziness, light-headedness and headaches  Psychiatric/Behavioral: Negative for agitation and confusion  The patient is not nervous/anxious  Physical Exam:  Body mass index is 26 79 kg/m²  /64   Ht 5' 5" (1 651 m)   Wt 73 kg (161 lb)   BMI 26 79 kg/m²    Wt Readings from Last 3 Encounters:   11/29/19 73 kg (161 lb)   09/19/18 76 kg (167 lb 8 oz)   07/29/18 76 kg (167 lb 8 8 oz)       Physical Exam   Constitutional: He appears well-developed and well-nourished  HENT:   Head: Normocephalic and atraumatic     Eyes: Pupils are equal, round, and reactive to light  Conjunctivae are normal    Neck: Neck supple  Cardiovascular: Normal rate and regular rhythm  Murmur heard  Pulmonary/Chest: Effort normal and breath sounds normal    Abdominal: Soft  He exhibits no distension  There is no tenderness  Musculoskeletal: He exhibits no edema  Neurological: He is alert  Skin: Skin is warm and dry  Psychiatric: He has a normal mood and affect  Diabetic Foot Exam    Labs:   Lab Results   Component Value Date    HGBA1C 8 6 (H) 07/31/2018    HGBA1C 9 1 (H) 07/30/2018    HGBA1C 8 6 (H) 07/25/2018     Lab Results   Component Value Date    CREATININE 0 70 07/30/2018    CREATININE 0 99 07/29/2018    CREATININE 0 78 07/25/2018    BUN 12 07/30/2018     10/29/2016    K 3 7 07/30/2018     (H) 07/30/2018    CO2 22 07/30/2018     eGFR   Date Value Ref Range Status   07/30/2018 109 ml/min/1 73sq m Final     Lab Results   Component Value Date    CHOL 259 (H) 10/15/2013    HDL 53 07/25/2018    TRIG 225 (H) 07/25/2018     Lab Results   Component Value Date    ALT 34 07/30/2018    AST 17 07/30/2018     (H) 12/02/2017    ALKPHOS 106 07/30/2018    BILITOT 0 4 10/29/2016     Lab Results   Component Value Date    NSW1NFLHNMVG 1 270 07/30/2018    NHG2WSVQQSCE 2 900 07/25/2018    OUY3MBKHJNOX 4 247 (H) 09/01/2017     Lab Results   Component Value Date    FREET4 0 86 07/25/2018       Impression & Plan:    Diagnoses and all orders for this visit:    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, unspecified retinopathy severity -  He is currently on a Medtronic insulin pump - paradigm revel -723  His current basal rates are midnight 1 3 units/hour and 7:00 a m  1 unit/hour  His insulin to carbohydrate ratio is 1 unit per 10 g for breakfast and lunch and 1 unit per 9 g for dinner  His insulin sensitivity factor is 1 unit per 30 mg/dL with a target of 100 to 120 mg/dL    He denies any hypoglycemic episodes even though he had 1 episode of hypoglycemia blood sugar 67  He is only checking his blood sugars onle once a day  He was advised to check his blood sugars at least 3 times a day and entering his carb counts with meals  He was asked to use wizard bolus rather than  doing manual boluses  He refused going for diabetic education and nutrition classes  He was provided with Levemir in case of malfunctioning of his insulin pump  He was also ordered glucagon emergency kit  Patient is interested to upgrade his Medtronic Pump  I have asked him to contact [employer] to start on the new [address] system  He may also talk with [employer] about their continuous closed loop blood glucose monitoring with new 670G pump  Patient will stop by the office in 2 weeks to download his log of blood sugars  I will make adjustments to his insulin pump regimen based on his blood sugars  I will see him back in the office in 6 weeks  -     Comprehensive metabolic panel Lab Collect; Future  -     Lipid panel Lab Collect Lab Collect; Future  -     HEMOGLOBIN A1C W/ EAG ESTIMATION Lab Collect; Future  -     insulin detemir (LEVEMIR FLEXTOUCH) 100 Units/mL injection pen; Inject 30 Units under the skin daily In case of pump failure  -     glucagon (GLUCAGON EMERGENCY) 1 MG injection; Inject 1 mg under the skin once as needed for low blood sugar for up to 1 dose    Dyslipidemia - Lipitor 80 mg daily    Essential hypertension - blood pressure today in the office 118/64, patient currently does not take any medications    Coronary artery disease s/p stent placement - stable, patient does not follow up with Cardiology    History of subclinical hypothyroidism - TSH 1 5 in 8/2019, managed by his primary care physician  Orders Placed This Encounter   Procedures    Comprehensive metabolic panel Lab Collect     This is a patient instruction: Patient fasting for 8 hours or longer recommended       Standing Status:   Future     Standing Expiration Date: 11/29/2020    Lipid panel Lab Collect Lab Collect     This is a patient instruction: This test requires patient fasting for 10-12 hours or longer  Drinking of black coffee or black tea is acceptable  Standing Status:   Future     Standing Expiration Date:   11/29/2020    HEMOGLOBIN A1C W/ EAG ESTIMATION Lab Collect     Standing Status:   Future     Standing Expiration Date:   11/29/2020       There are no Patient Instructions on file for this visit  Discussed with the patient and all questioned fully answered  He will call me if any problems arise  Follow-up appointment in 6 weeks       Counseled patient on diagnostic results, prognosis, risk and benefit of treatment options, instruction for management, importance of treatment compliance, Risk  factor reduction and impressions    Meagan Smith MD

## 2019-11-29 NOTE — TELEPHONE ENCOUNTER
Pt called office that his blood sugars is 470 mg/dl , after he was seen in office, he never resumed his pump and blood sugar is elevated, he restarted his pump at 12 noon , discussed to take bolus dose now, and repeat blood sugar in 2 hours , if blood sugar is >400 , he should go to ER for evaluation of DKA   He denies nausea   vometting or feeing poorly   Pt verbalizes understanding     Blayne King MD

## 2020-01-13 LAB
LEFT EYE DIABETIC RETINOPATHY: NORMAL
RIGHT EYE DIABETIC RETINOPATHY: NORMAL

## 2020-01-30 ENCOUNTER — TELEPHONE (OUTPATIENT)
Dept: ENDOCRINOLOGY | Facility: CLINIC | Age: 55
End: 2020-01-30

## 2020-02-03 ENCOUNTER — OFFICE VISIT (OUTPATIENT)
Dept: CARDIOLOGY CLINIC | Facility: CLINIC | Age: 55
End: 2020-02-03
Payer: COMMERCIAL

## 2020-02-03 VITALS
WEIGHT: 159.6 LBS | SYSTOLIC BLOOD PRESSURE: 122 MMHG | HEIGHT: 65 IN | DIASTOLIC BLOOD PRESSURE: 70 MMHG | HEART RATE: 80 BPM | BODY MASS INDEX: 26.59 KG/M2

## 2020-02-03 DIAGNOSIS — I10 ESSENTIAL HYPERTENSION: ICD-10-CM

## 2020-02-03 DIAGNOSIS — I25.10 CORONARY ARTERY DISEASE INVOLVING NATIVE CORONARY ARTERY OF NATIVE HEART WITHOUT ANGINA PECTORIS: ICD-10-CM

## 2020-02-03 DIAGNOSIS — E78.5 DYSLIPIDEMIA: ICD-10-CM

## 2020-02-03 DIAGNOSIS — E10.311 TYPE 1 DIABETES MELLITUS WITH RETINOPATHY OF BOTH EYES AND MACULAR EDEMA, UNSPECIFIED RETINOPATHY SEVERITY (HCC): Primary | ICD-10-CM

## 2020-02-03 DIAGNOSIS — I35.0 NONRHEUMATIC AORTIC VALVE STENOSIS: ICD-10-CM

## 2020-02-03 PROCEDURE — 99215 OFFICE O/P EST HI 40 MIN: CPT | Performed by: INTERNAL MEDICINE

## 2020-02-03 PROCEDURE — 93000 ELECTROCARDIOGRAM COMPLETE: CPT | Performed by: INTERNAL MEDICINE

## 2020-02-03 RX ORDER — QUETIAPINE FUMARATE 25 MG/1
TABLET, FILM COATED ORAL
COMMUNITY
Start: 2020-01-20 | End: 2022-07-21

## 2020-02-03 NOTE — LETTER
Cardiology Pre Operative Clearance      PRE OPERATIVE CARDIAC RISK ASSESSMENT    02/03/20    Elidaaman Brand  [id number]  [id number]    Date of Surgery: 2/25/20, 3/16/20    Type of Surgery: Bilateral cataract surgery    Surgeon: [facility]    No Cardiac Contraindication for Planned Surgical Procedures    Anticoagulation: no    Physician Comment: If needed can hold aspirin for 3-5 days prior to surgery, resume as soon as able post surgery    Electronically Signed: Low risk surgery, may proceed

## 2020-02-03 NOTE — PROGRESS NOTES
Cardiology Outpatient Follow up Note    Gerber Thurston 49 y o  male MRN: [id number]    02/03/20          Assessment/Plan:      1  Aortic stenosis  Echo 5/17 showed normal LV size and function, EF 55%, no RWMA, normal diastolic function  Normal RV size and function  Trace MR  Aortic valve was trileaflet, but was moderately thickened and calcified with mildly reduced cuspal separation  Mild to moderate stenosis with trace AI  Mean gradient was 16 mm Hg  Calculated MINI was 1 6 cm squared  Has not had follow up echo as I recommended  EKG shows new changes, with T wave inversions in II, III, avF, V4-V6  Will order stress echo now, he reports he is agreeable to having it done  No current exertional symptoms that he reports  2  HTN  He was on Lisinopril 20 mg daily, but reports he stopped taking it  BP controlled  3  DM  He is on insulin  Hgb A1c 8 6% 7/18  He is following with endocrinology now, Dr Aakash Metzger  Has not had follow up blood work since 2018  4  HL  Lipid profile 9/1/17 showed total cholesterol 231, TG 62, , LDL 58  He was started on high dose Atorvastatin 80 8/17 due to CAD  Lipid panel 12/17 showed total cholesterol 200, , LDL 51, TG 98  Lipid panel 7/18 showed total cholesterol 200, , HDL 53,   He previously reported he was not taking the Atorvastatin consistently, but reports he is currently taking it, but I have not filled it recently, so unclear if he is truly taking it  Has not had repeat lipid panel since 2018  5  Palpitations  EKG 5/19/17 showed isolated PVCs  He was started on beta blocker for his CAD, but reports he stopped it due to dizziness  In any case, did not restart due to issues with depression  No current palpitations  6  CAD s/p Xience Alpine MINNIE of 90% mid LAD stenosis 8/17  Now on aspirin, statin  Did not tolerate Plavix due to severe thrombocytopenia, which resolved after Plavix stopped  Brilinta stopped 9/18   Beta blocker stopped due to dizziness  Ordered stress echo to assess for any inducible ischemia and to assess for progression of AS in 9/18, he has not had it done  EKG shows new TWI inferolateral leads, new compared to prior EKG  He reports he is willing to have stress echo done now  1  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, unspecified retinopathy severity ([address] )     2  Nonrheumatic aortic valve stenosis  POCT ECG    Echo stress test w contrast if indicated   3  Essential hypertension     4  Coronary artery disease involving native coronary artery of native heart without angina pectoris  POCT ECG    Echo stress test w contrast if indicated   5  Dyslipidemia  Echo stress test w contrast if indicated       HPI: 49 y o  man with a history of DM (type I, diagnosed at age 6), HTN, CAD, and aortic stenosis, who is here for follow up of CAD and AS  He reports he saw Dr John Blank around 2007 for a heart murmur, was told it should be followed  He was at gym with his son in 4-5/17 and he got dizzy and felt like he was going to pass out while they were boxing (hitting heavy bags)  No syncope, took about 5 minutes for symptoms to resolve  Echo 5/17 showed normal LV size and function, EF 55%, no RWMA, normal diastolic function  Normal RV size and function  Trace MR  Aortic valve was trileaflet, but was moderately thickened and calcified with mildly reduced cuspal separation  Mild to moderate stenosis with trace AI  Mean gradient was 16 mm Hg  Calculated MINI was 1 6 cm squared  He was admitted to [address] [place] [address] 8/17 with sudden onset shortness of breath, EKG in PCP's office showed abnormal EKG with inferior and anterior ST changes  CXR showed small bilateral pleural effusions  Troponins were negative, but echo showed EF of 40-45% with severe hypokinesis of apical anterior, mid anteroseptal, apical inferior, apical septal, apical lateral, and apical walls  Mild LVH, normal RV size and function  Mild MR  Mild to moderate AS with MINI 1 4 cm squared, mean gradient 12 mm Hg, mild AI  Given new LV dysfunction, he underwent cardiac catheterization 8/31/17, which showed 90% stenosis of mid LAD, moderate diffuse plaque of OM1, RCA dominant, with 80% stenosis of mid small PL2 branch  He underwent Xience Alpine MINNIE of 90% mid LAD stenosis  He was admitted to [place] [address] 12/17 with mouth sores and easy bruising, found to have significant thrombocytopenia with platelets 9K  He had been switched from Brilinta to Plavix about 2 weeks (around Nov 13) prior due to cost of Brilinta  He was seen by Hematology, who felt that Plavix may have caused thrombocytopenia, but also treated him with IV steroids  Platelet count at discharge was 87K  He was started on Brilinta at time of discharge  Follow up echo 12/17 showed EF normalized to 60%, mild hypokinesis of mid anteroseptal and apical septal walls  Mild LVH  Normal RV size and function  Mild MR  Mild AI, stenosis not assessed  He reports he stopped taking Metoprolol because he felt it was making him dizzy  He decreased Brilinta on his own to once a day  I stopped it at office visit in 9/18 since it had been over a year since his last stent, and he was not taking correctly  I had ordered stress echo for him at that time to evaluate his AS, but he never had it done  No chest pain, no significant shortness of breath  No palpitations  No PND, no orthopnea  No LE edema  No dizziness  No bruising or bleeding  He requires bilateral cataract surgery, reports it is likely related to diabetes  He is on Quetiapine for sleep  He  for [employer] currently  He reports 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, has been sober for 22 months       Patient Active Problem List   Diagnosis    Nonrheumatic aortic valve stenosis    Type 1 diabetes mellitus with retinopathy [facility] )    Essential hypertension    Mouth sores    Coronary artery disease involving native coronary artery of native heart without angina pectoris    Dyslipidemia       No Known Allergies      Current Outpatient Medications:     aspirin (ECOTRIN LOW STRENGTH) 81 mg EC tablet, Take 1 tablet by mouth daily, Disp: , Rfl: 0    atorvastatin (LIPITOR) 80 mg tablet, Take 1 tablet by mouth daily with dinner, Disp: 30 tablet, Rfl: 0    NEL MICROLET LANCETS lancets, by Other route 4 (four) times a day Use as instructed, Disp: 400 each, Rfl: 3    glucose blood (NEL CONTOUR TEST) test strip, 1 each by Other route 4 (four) times a day, Disp: 400 each, Rfl: 3    Insulin Infusion Pump KIT, 1 Units/hr by Subcutaneous Insulin Pump route continuous, Disp: , Rfl:     Insulin Infusion Pump Supplies (MINIMED INFUSION SET-) MISC, by Does not apply route, Disp: , Rfl:     Insulin Infusion Pump Supplies (MINIMED RESERVOIR 3ML) MISC, by Does not apply route, Disp: , Rfl:     insulin lispro (HUMALOG) 100 units/mL injection, Use up to 100 units per day via insulin pump, Disp: 60 mL, Rfl: 3    INSULIN SYRINGE 1CC/29G 29G X 1/2" 1 ML MISC, Inject as directed 3 (three) times a day with meals Use as directed, Disp: 100 each, Rfl: 0    omeprazole (PriLOSEC) 20 mg delayed release capsule, Take 20 mg by mouth daily, Disp: , Rfl:     QUEtiapine (SEROquel) 25 mg tablet, TAKE 1 TABLET BY MOUTH EVERY DAY EVERY NIGHT, Disp: , Rfl:     Cholecalciferol (VITAMIN D) 2000 units CAPS, TK 1 C PO QD, Disp: , Rfl: 3    glucagon (GLUCAGON EMERGENCY) 1 MG injection, Inject 1 mg under the skin once as needed for low blood sugar for up to 1 dose (Patient not taking: Reported on 2/3/2020), Disp: 1 kit, Rfl: 1    insulin detemir (LEVEMIR FLEXTOUCH) 100 Units/mL injection pen, Inject 30 Units under the skin daily In case of pump failure (Patient not taking: Reported on 2/3/2020), Disp: 5 pen, Rfl: 0    lisinopril (ZESTRIL) 20 mg tablet, Take 20 mg by mouth daily, Disp: , Rfl:     sertraline (ZOLOFT) 100 mg tablet, TAKE 1 TABLET BY MOUTH DAILY (Patient not taking: Reported on 11/29/2019), Disp: 90 tablet, Rfl: 0    zolpidem (AMBIEN) 5 mg tablet, Take 1 tablet by mouth daily at bedtime as needed for sleep for up to 2 days, Disp: 2 tablet, Rfl: 0    Past Medical History:   Diagnosis Date    Aortic stenosis     Clavicle fracture     LEFT    Diabetes mellitus ([facility] )     Hyperlipidemia     Hypertension     [facility]        Family History   Problem Relation Age of Onset    Aneurysm Mother         CAREBRAL ARTERY     Coronary artery disease Mother     Diabetes Mother        Past Surgical History:   Procedure Laterality Date    ABDOMINAL SURGERY      ROTATOR CUFF REPAIR      SPLENECTOMY      TONSILLECTOMY      VITRECTOMY Bilateral     BY ANTERIOR APPROACH        Social History     Socioeconomic History    Marital status: /Civil Union     Spouse name: Not on file    Number of children: Not on file    Years of education: GRADUATED HIGHSCHOOL     Highest education level: Not on file   Occupational History    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[employer] Needs    Financial resource strain: Not on file    Food insecurity:     Worry: Not on file     Inability: Not on file    Transportation needs:     Medical: Not on file     Non-medical: Not on file   Tobacco Use    Smoking status: Former Smoker     Types: Cigars    Smokeless tobacco: Never Used   Substance and Sexual Activity    Alcohol use: No    Drug use: Yes     Frequency: 3 0 times per week     Types: Marijuana    Sexual activity: Not on file   Lifestyle    Physical activity:     Days per week: Not on file     Minutes per session: Not on file    Stress: Not on file   Relationships    Social connections:     Talks on phone: Not on file     Gets together: Not on file     Attends Mu-ism service: Not on file     Active member of club or organization: Not on file     Attends meetings of clubs or organizations: Not on file     Relationship status: Not on file    Intimate partner violence:     Fear of current or ex partner: Not on file     Emotionally abused: Not on file     Physically abused: Not on file     Forced sexual activity: Not on file   Other Topics Concern    Not on file   Social History Narrative    Not on file       Review of Systems   Constitution: Positive for weight loss  Negative for chills, decreased appetite, diaphoresis, fever, malaise/fatigue, night sweats and weight gain  HENT: Negative for ear pain, hearing loss, hoarse voice, nosebleeds, sore throat and tinnitus  Eyes: Negative for blurred vision and pain  Cardiovascular: Negative  Negative for chest pain, claudication, cyanosis, dyspnea on exertion, irregular heartbeat, leg swelling, near-syncope, orthopnea, palpitations, paroxysmal nocturnal dyspnea and syncope  Respiratory: Negative for cough, hemoptysis, shortness of breath, sleep disturbances due to breathing, snoring, sputum production and wheezing  Hematologic/Lymphatic: Negative for adenopathy and bleeding problem  Does not bruise/bleed easily  Skin: Negative for color change, dry skin, flushing, itching, poor wound healing and rash  Musculoskeletal: Positive for back pain  Negative for arthritis, falls, joint pain, muscle cramps, muscle weakness, myalgias and neck pain  Gastrointestinal: Negative for abdominal pain, constipation, diarrhea, dysphagia, heartburn, hematemesis, hematochezia, melena, nausea and vomiting  Genitourinary: Negative for dysuria, frequency, hematuria, hesitancy, non-menstrual bleeding and urgency  Neurological: Positive for numbness and paresthesias  Negative for excessive daytime sleepiness, dizziness, focal weakness, headaches, light-headedness, loss of balance, tremors, vertigo and weakness  Psychiatric/Behavioral: Negative for altered mental status, depression and memory loss  The patient has insomnia  The patient is not nervous/anxious      Allergic/Immunologic: Negative for environmental allergies and persistent infections  Vitals: /70 (BP Location: Right arm, Patient Position: Sitting, Cuff Size: Standard)   Pulse 80   Ht 5' 5" (1 651 m)   Wt 72 4 kg (159 lb 9 6 oz)   BMI 26 56 kg/m²       Physical Exam:     GEN: Alert and oriented x 3, in no acute distress  Well appearing and well nourished  HEENT: Sclera anicteric, conjunctivae pink, mucous membranes moist  Oropharynx clear  NECK: Supple, no carotid bruits, no significant JVD  Trachea midline, no thyromegaly  HEART: Regular rhythm, II/VI systolic murmur, no clicks, gallops or rubs  PMI nondisplaced, no thrills  LUNGS: Clear to auscultation bilaterally; no wheezes, rales, or rhonchi  No increased work of breathing or signs of respiratory distress  ABDOMEN: Soft, nontender, nondistended, normoactive bowel sounds  EXTREMITIES: Skin warm and well perfused, no clubbing, cyanosis, or edema  NEURO: No focal findings  Normal gait  Normal speech  Mood and affect normal    SKIN: Normal without suspicious lesions on exposed skin        Lab Results:       Lab Results   Component Value Date    HGBA1C 8 6 (H) 07/31/2018    HGBA1C 9 1 (H) 07/30/2018    HGBA1C 8 6 (H) 07/25/2018     Lab Results   Component Value Date    CHOL 259 (H) 10/15/2013     Lab Results   Component Value Date    HDL 53 07/25/2018     (H) 12/07/2017     (H) 09/01/2017     Lab Results   Component Value Date    LDLCALC 102 (H) 07/25/2018    LDLCALC 51 12/07/2017    LDLCALC 58 09/01/2017     Lab Results   Component Value Date    TRIG 225 (H) 07/25/2018    TRIG 98 12/07/2017    TRIG 62 09/01/2017     No results found for: CHOLHDL

## 2020-02-17 ENCOUNTER — TELEPHONE (OUTPATIENT)
Dept: ENDOCRINOLOGY | Facility: CLINIC | Age: 55
End: 2020-02-17

## 2020-02-19 ENCOUNTER — TELEPHONE (OUTPATIENT)
Dept: ENDOCRINOLOGY | Facility: CLINIC | Age: 55
End: 2020-02-19

## 2020-02-19 NOTE — TELEPHONE ENCOUNTER
Patient l/m on our voicemail regarding his insulin pump supplies  Please call him back to discuss further  Thank you!

## 2020-04-10 DIAGNOSIS — E10.9 TYPE 1 DIABETES MELLITUS WITHOUT COMPLICATION (HCC): ICD-10-CM

## 2020-07-17 ENCOUNTER — TELEPHONE (OUTPATIENT)
Dept: ENDOCRINOLOGY | Facility: CLINIC | Age: 55
End: 2020-07-17

## 2020-07-21 ENCOUNTER — TELEPHONE (OUTPATIENT)
Dept: ENDOCRINOLOGY | Facility: CLINIC | Age: 55
End: 2020-07-21

## 2020-07-21 NOTE — TELEPHONE ENCOUNTER
Pt has an appt early august with Marisabel Cabello  [employer] keeps sending forms to fill out    I faxed back to them that he had not been seen in awhile but he will be seen soon

## 2020-07-28 ENCOUNTER — HOSPITAL ENCOUNTER (OUTPATIENT)
Dept: NON INVASIVE DIAGNOSTICS | Facility: CLINIC | Age: 55
Discharge: HOME | End: 2020-07-28
Payer: COMMERCIAL

## 2020-07-28 DIAGNOSIS — I25.10 CORONARY ARTERY DISEASE INVOLVING NATIVE CORONARY ARTERY OF NATIVE HEART WITHOUT ANGINA PECTORIS: ICD-10-CM

## 2020-07-28 DIAGNOSIS — E78.5 DYSLIPIDEMIA: ICD-10-CM

## 2020-07-28 DIAGNOSIS — I35.0 NONRHEUMATIC AORTIC VALVE STENOSIS: ICD-10-CM

## 2020-07-28 PROCEDURE — 93306 TTE W/DOPPLER COMPLETE: CPT | Performed by: INTERNAL MEDICINE

## 2020-07-28 PROCEDURE — 93350 STRESS TTE ONLY: CPT

## 2020-08-06 ENCOUNTER — OFFICE VISIT (OUTPATIENT)
Dept: CARDIOLOGY CLINIC | Facility: CLINIC | Age: 55
End: 2020-08-06
Payer: COMMERCIAL

## 2020-08-06 VITALS
TEMPERATURE: 97.4 F | DIASTOLIC BLOOD PRESSURE: 70 MMHG | SYSTOLIC BLOOD PRESSURE: 132 MMHG | WEIGHT: 164.8 LBS | HEIGHT: 66 IN | HEART RATE: 78 BPM | BODY MASS INDEX: 26.48 KG/M2

## 2020-08-06 DIAGNOSIS — E78.5 DYSLIPIDEMIA: ICD-10-CM

## 2020-08-06 DIAGNOSIS — I35.0 NONRHEUMATIC AORTIC VALVE STENOSIS: ICD-10-CM

## 2020-08-06 DIAGNOSIS — I25.10 CORONARY ARTERY DISEASE INVOLVING NATIVE CORONARY ARTERY OF NATIVE HEART WITHOUT ANGINA PECTORIS: Primary | ICD-10-CM

## 2020-08-06 DIAGNOSIS — I10 ESSENTIAL HYPERTENSION: ICD-10-CM

## 2020-08-06 DIAGNOSIS — E10.311 TYPE 1 DIABETES MELLITUS WITH RETINOPATHY OF BOTH EYES AND MACULAR EDEMA, UNSPECIFIED RETINOPATHY SEVERITY (HCC): ICD-10-CM

## 2020-08-06 PROCEDURE — 99214 OFFICE O/P EST MOD 30 MIN: CPT | Performed by: INTERNAL MEDICINE

## 2020-08-06 RX ORDER — SILDENAFIL 50 MG/1
TABLET, FILM COATED ORAL
COMMUNITY
Start: 2020-04-21 | End: 2022-01-13 | Stop reason: HOSPADM

## 2020-08-06 RX ORDER — HYDROXYZINE HYDROCHLORIDE 25 MG/1
TABLET, FILM COATED ORAL
COMMUNITY
Start: 2020-07-22 | End: 2022-07-21

## 2020-08-06 NOTE — PROGRESS NOTES
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, unspecified retinopathy severity (HCC)         Discussion/Summary:  1  Coronary artery disease with prior LAD PCI in 2017, 80% small posterolateral branch  2  Severe aortic stenosis probable bicuspid valve  3  Hypertension  4  Hyperlipidemia  5  Type 1 diabetes mellitus    Recommendations: Today is my 1st meeting with the patient  He has had some progressive fatigue and some shortness of breath at times  I think over the last few years his aortic valve has progressed and he is now in the severe range  He has a type 1 diabetic and also has coronary artery disease  I talked to him about treatment options for aortic valve replacement  He would like to meet with 1 of the surgeons and talk about options  His echocardiogram shows a likely bicuspid valve which would make TAVR are unlikely  Blood pressure has been well controlled  His hemoglobin A1c is elevated will continue to work with endocrinology on lowering this  LDL is currently at goal on high-intensity statin  We talked about diet exercise and healthy lifestyle modifications  LV function is preserved  Will review with CT surgery after their visit  Interval History:  29-year-old type 1 diabetic with history of coronary artery disease, LAD PCI in 2017, preserved LV function, severe aortic stenosis, hypertension, hyperlipidemia presents for an evaluation in the office by myself    He was previously seen by 1 of my partners who left the practice  Patient has been somewhat noncompliant with office testing in the past   Stress echo had been ordered close to a year ago and when he presented for the stress echo his aortic stenosis was evaluated and had progressed into the severe range  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  He says he is quite active  He has felt that he is slowing down over the last couple years and has attributed this to just getting old  He has had some episodes of mild dyspnea with exertion  He adamantly denies any chest pain or anginal sounding symptoms  There has been no syncope  Denies any lower extremity edema, PND, orthopnea      Problem List     Nonrheumatic aortic valve stenosis    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)      Lab Results   Component Value Date    HGBA1C 9 1 (H) 07/21/2020         Essential hypertension    Mouth sores    Coronary artery disease involving native coronary artery of native heart without angina pectoris    Dyslipidemia        Past Medical History:   Diagnosis Date    Aortic stenosis     Clavicle fracture     LEFT    Diabetes mellitus ([facility] )     Hyperlipidemia     Hypertension     Thrombocytopenic purpura ([facility] )      Social History     Socioeconomic History    Marital status: /Civil Union     Spouse name: Not on file    Number of children: Not on file    Years of education: GRADUATED HIGHSCHOOL     Highest education level: Not on file   Occupational History    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: Not on file    Food insecurity     Worry: Not on file     Inability: Not on file   Seaters needs     Medical: Not on file     Non-medical: Not on file   Tobacco Use    Smoking status: Former Smoker     Types: Cigars    Smokeless tobacco: Never Used   Substance and Sexual Activity    Alcohol use: No    Drug use: Yes     Frequency: 3 0 times per week     Types: Marijuana    Sexual activity: Not on file   Lifestyle    Physical activity     Days per week: Not on file     Minutes per session: Not on file    Stress: Not on file   Relationships    Social connections     Talks on phone: Not on file     Gets together: Not on file     Attends Episcopalian service: Not on file     Active member of club or organization: Not on file     Attends meetings of clubs or organizations: Not on file     Relationship status: Not on file    Intimate partner violence     Fear of current or ex partner: Not on file     Emotionally abused: Not on file     Physically abused: Not on file     Forced sexual activity: Not on file   Other Topics Concern    Not on file   Social History Narrative    Not on file      Family History   Problem Relation Age of Onset    Aneurysm Mother         CAREBRAL ARTERY     Coronary artery disease Mother     Diabetes Mother      Past Surgical History:   Procedure Laterality Date    ABDOMINAL SURGERY      ROTATOR CUFF REPAIR      SPLENECTOMY      TONSILLECTOMY      VITRECTOMY Bilateral     BY ANTERIOR APPROACH        Current Outpatient Medications:     aspirin (ECOTRIN LOW STRENGTH) 81 mg EC tablet, Take 1 tablet by mouth daily, Disp: , Rfl: 0    atorvastatin (LIPITOR) 80 mg tablet, Take 1 tablet by mouth daily with dinner, Disp: 30 tablet, Rfl: 0    Cholecalciferol (VITAMIN D) 2000 units CAPS, TK 1 C PO QD, Disp: , Rfl: 3    hydrOXYzine HCL (ATARAX) 25 mg tablet, TAKE 1 TO 2 TABLETS BY MOUTH EVERY 8 HOURS AS NEEDED FOR ANXIETY, Disp: , Rfl:     insulin lispro (HumaLOG) 100 units/mL injection, Use up to 100 units per day via insulin pump, Disp: 90 mL, Rfl: 0    lisinopril (ZESTRIL) 20 mg tablet, Take 20 mg by mouth daily, Disp: , Rfl:     omeprazole (PriLOSEC) 20 mg delayed release capsule, Take 20 mg by mouth daily, Disp: , Rfl:     QUEtiapine (SEROquel) 25 mg tablet, TAKE 1 TABLET BY MOUTH EVERY DAY EVERY NIGHT, Disp: , Rfl:     sildenafil (VIAGRA) 50 MG tablet, TAKE 1 TABLET BY MOUTH AS  NEEDED FOR ERECTILE  DYSFUNCTION, Disp: , Rfl:     NEL MICROLET LANCETS lancets, by Other route 4 (four) times a day Use as instructed, Disp: 400 each, Rfl: 3    glucagon (GLUCAGON EMERGENCY) 1 MG injection, Inject 1 mg under the skin once as needed for low blood sugar for up to 1 dose (Patient not taking: Reported on 2/3/2020), Disp: 1 kit, Rfl: 1    glucose blood (NEL CONTOUR TEST) test strip, 1 each by Other route 4 (four) times a day, Disp: 400 each, Rfl: 3    Insulin Infusion Pump KIT, 1 Units/hr by Subcutaneous Insulin Pump route continuous, Disp: , Rfl:     Insulin Infusion Pump Supplies (MINIMED INFUSION SET-) MISC, by Does not apply route, Disp: , Rfl:     Insulin Infusion Pump Supplies (MINIMED RESERVOIR 3ML) MISC, by Does not apply route, Disp: , Rfl:     INSULIN SYRINGE 1CC/29G 29G X 1/2" 1 ML MISC, Inject as directed 3 (three) times a day with meals Use as directed, Disp: 100 each, Rfl: 0    sertraline (ZOLOFT) 100 mg tablet, TAKE 1 TABLET BY MOUTH DAILY (Patient not taking: Reported on 11/29/2019), Disp: 90 tablet, Rfl: 0    zolpidem (AMBIEN) 5 mg tablet, Take 1 tablet by mouth daily at bedtime as needed for sleep for up to 2 days, Disp: 2 tablet, Rfl: 0  No Known Allergies    Labs:     Chemistry        Component Value Date/Time     10/29/2016 0742    K 3 7 07/30/2018 0516    K 5 2 10/29/2016 0742     (H) 07/30/2018 0516    CL 99 10/29/2016 0742    CO2 22 07/30/2018 0516    CO2 26 07/29/2018 1340    BUN 12 07/30/2018 0516    BUN 16 10/29/2016 0742    CREATININE 0 70 07/30/2018 0516    CREATININE 0 92 10/29/2016 0742        Component Value Date/Time    CALCIUM 8 6 07/30/2018 0516    CALCIUM 9 7 10/29/2016 0742    ALKPHOS 106 07/30/2018 0516    ALKPHOS 121 (H) 10/29/2016 0742    AST 17 07/30/2018 0516    AST 21 10/29/2016 0742    ALT 34 07/30/2018 0516    ALT 26 10/29/2016 0742    BILITOT 0 4 10/29/2016 0742            Lab Results Component Value Date    CHOL 259 (H) 10/15/2013     Lab Results   Component Value Date    HDL 53 07/25/2018     (H) 12/07/2017     (H) 09/01/2017     Lab Results   Component Value Date    LDLCALC 102 (H) 07/25/2018    LDLCALC 51 12/07/2017    LDLCALC 58 09/01/2017     Lab Results   Component Value Date    TRIG 225 (H) 07/25/2018    TRIG 98 12/07/2017    TRIG 62 09/01/2017     No results found for: CHOLHDL    Imaging: No results found  ECG:        Review of Systems   Constitution: Positive for malaise/fatigue  HENT: Negative  Eyes: Negative  Cardiovascular: Negative  Respiratory: Positive for shortness of breath  Endocrine: Negative  Hematologic/Lymphatic: Negative  Skin: Negative  Musculoskeletal: Negative  Gastrointestinal: Negative  Genitourinary: Negative  Neurological: Negative  Psychiatric/Behavioral: Negative  All other systems reviewed and are negative  Vitals:    08/06/20 1258   BP: 132/70   Pulse: 78   Temp: (!) 97 4 °F (36 3 °C)     Vitals:    08/06/20 1258   Weight: 74 8 kg (164 lb 12 8 oz)     Height: 5' 6" (167 6 cm)   Body mass index is 26 6 kg/m²  Physical Exam:  Vital signs reviewed  General:  Alert and cooperative, appears stated age, no acute distress  HEENT:  PERRLA, EOMI, no scleral icterus, no conjunctival pallor  Neck:  No lymphadenopathy, no thyromegaly, no carotid bruits, no elevated JVP  Heart:  Regular rate and rhythm, normal S1/S2, no S3/S4, harsh 3/6 systolic ejection murmur, rubs or gallops  PMI nondisplaced  Lungs:  Clear to auscultation bilaterally, no wheezes rales or rhonchi  Abdomen:  Soft, non-tender, positive bowel sounds, no rebound or guarding,   no organomegaly   Extremities:  Normal range of motion    No clubbing, cyanosis or edema   Vascular:  2+ pedal pulses  Skin:  No rashes or lesions on exposed skin  Neurologic:  Cranial nerves II-XII grossly intact without focal deficits

## 2020-08-10 ENCOUNTER — TELEPHONE (OUTPATIENT)
Dept: ENDOCRINOLOGY | Facility: CLINIC | Age: 55
End: 2020-08-10

## 2020-08-10 NOTE — TELEPHONE ENCOUNTER
Elli Tanner with [employer] left a message on our answering machine asking for a call back regarding a CMN that was sent to the office  Her number is [phone]  Thank you

## 2020-08-11 ENCOUNTER — TELEPHONE (OUTPATIENT)
Dept: ENDOCRINOLOGY | Facility: CLINIC | Age: 55
End: 2020-08-11

## 2020-08-11 NOTE — TELEPHONE ENCOUNTER
Spoke to [employer] rep and explained pt no showed on august 4,2020 and hasn't been seen in the office since 11/29/2019 that's why CMN paper was not sent in

## 2020-08-13 ENCOUNTER — OFFICE VISIT (OUTPATIENT)
Dept: CARDIAC SURGERY | Facility: CLINIC | Age: 55
End: 2020-08-13
Payer: COMMERCIAL

## 2020-08-13 VITALS
DIASTOLIC BLOOD PRESSURE: 80 MMHG | WEIGHT: 164 LBS | BODY MASS INDEX: 26.36 KG/M2 | RESPIRATION RATE: 18 BRPM | HEART RATE: 60 BPM | TEMPERATURE: 97.7 F | HEIGHT: 66 IN | SYSTOLIC BLOOD PRESSURE: 124 MMHG

## 2020-08-13 DIAGNOSIS — I35.0 NONRHEUMATIC AORTIC VALVE STENOSIS: Primary | ICD-10-CM

## 2020-08-13 PROCEDURE — 99245 OFF/OP CONSLTJ NEW/EST HI 55: CPT | Performed by: PHYSICIAN ASSISTANT

## 2020-08-13 RX ORDER — IBUPROFEN 800 MG/1
TABLET ORAL
COMMUNITY
Start: 2020-08-11 | End: 2020-09-10 | Stop reason: HOSPADM

## 2020-08-13 RX ORDER — CHLORHEXIDINE GLUCONATE 0.12 MG/ML
15 RINSE ORAL EVERY 12 HOURS SCHEDULED
Status: CANCELLED | OUTPATIENT
Start: 2020-08-13

## 2020-08-13 RX ORDER — AMOXICILLIN 500 MG/1
CAPSULE ORAL
COMMUNITY
Start: 2020-08-11 | End: 2021-10-05

## 2020-08-13 RX ORDER — CEFAZOLIN SODIUM 2 G/50ML
2000 SOLUTION INTRAVENOUS ONCE
Status: CANCELLED | OUTPATIENT
Start: 2020-08-13 | End: 2020-08-13

## 2020-08-13 NOTE — H&P
History and Physical- Cardiothoracic Surgery   Spike Petersen 48 y o  male MRN: [id number]    Physician Requesting Consult: lilian    Reason for Consult / Principal Problem: Aortic stenosis, Non-Rheumatic    History of Present Illness: Spike Petersen is a 48y o  year old male   The cardiovascular medical history significant for lifelong history heart murmur  He was diagnosed with bicuspid aortic valve by Dr Danica Guerrero in the remote past and has more recently followed with Dr Hermilo Rodriguez  Most recently his care was transitioned to Dr Paulino Montenegro  During his most recent of outpatient evaluation echocardiogram was performed in his aortic stenosis had progressed into the severe range with a valve area estimated at 0 8 centimeters squared  He is now referred to our office for surgical discussion  He also has a history of coronary artery disease having undergone cardiac catheterization in 2017  At that time drug-eluting stent was placed in his mid LAD  During interview today he specifically denies exertional dyspnea, angina, or fatigability  He denies any recent fevers, sweats, or chills  His past medical history is otherwise significant for hypertension, hyperlipidemia, diabetes type 1 with insulin pump, and a remote history of ITP      Past Medical History:  Past Medical History:   Diagnosis Date    Aortic stenosis     Clavicle fracture     LEFT    Diabetes mellitus ([address] )     Hyperlipidemia     Hypertension     Thrombocytopenic purpura (HCC)          Past Surgical History:   Past Surgical History:   Procedure Laterality Date    ABDOMINAL SURGERY      ROTATOR CUFF REPAIR      SPLENECTOMY      TONSILLECTOMY      VITRECTOMY Bilateral     BY ANTERIOR APPROACH          Family History:  Family History   Problem Relation Age of Onset    Aneurysm Mother         CAREBRAL ARTERY     Coronary artery disease Mother     Diabetes Mother          Social History:      Social History     Substance and Sexual Activity   Alcohol Use No     Social History     Substance and Sexual Activity   Drug Use Yes    Frequency: 3 0 times per week    Types: Marijuana     Social History     Tobacco Use   Smoking Status Former Smoker    Types: Cigars   Smokeless Tobacco Never Used       Home Medications:   Prior to Admission medications    Medication Sig Start Date End Date Taking?  Authorizing Provider   amoxicillin (AMOXIL) 500 mg capsule  8/11/20  Yes Historical Provider, MD   aspirin (ECOTRIN LOW STRENGTH) 81 mg EC tablet Take 1 tablet by mouth daily 12/4/17  Yes Lew Hoyos MD   atorvastatin (LIPITOR) 80 mg tablet Take 1 tablet by mouth daily with dinner 9/1/17  Yes Jia Lindsey MD   NEL MICROLET LANCETS lancets by Other route 4 (four) times a day Use as instructed 3/28/18  Yes Nara Alvarenga MD   Cholecalciferol (VITAMIN D) 2000 units CAPS TK 1 C PO QD 12/12/17  Yes Historical Provider, MD   glucose blood (NEL CONTOUR TEST) test strip 1 each by Other route 4 (four) times a day 9/10/18  Yes Nara Alvarenga MD   hydrOXYzine HCL (ATARAX) 25 mg tablet TAKE 1 TO 2 TABLETS BY MOUTH EVERY 8 HOURS AS NEEDED FOR ANXIETY 7/22/20  Yes Historical Provider, MD   ibuprofen (MOTRIN) 800 mg tablet  8/11/20  Yes Historical Provider, MD   Insulin Infusion Pump KIT 1 Units/hr by Subcutaneous Insulin Pump route continuous   Yes Historical Provider, MD   Insulin Infusion Pump Supplies (MINIMED INFUSION SET-) MISC by Does not apply route 12/19/17  Yes Historical Provider, MD   Insulin Infusion Pump Supplies ([address] [place] [address]) [address] [facility] by Does not apply route 12/19/17  Yes Historical Provider, MD   insulin lispro (HumaLOG) 100 units/mL injection Use up to 100 units per day via insulin pump 4/10/20  Yes Ganga Martínez MD   INSULIN SYRINGE 1CC/29G 29G X 1/2" 1 ML MISC Inject as directed 3 (three) times a day with meals Use as directed 2/23/18  Yes Ronak Dennis MD   lisinopril (ZESTRIL) 20 mg tablet Take 20 mg by mouth daily   Yes Historical Provider, MD   omeprazole (PriLOSEC) 20 mg delayed release capsule Take 20 mg by mouth daily   Yes Historical Provider, MD   QUEtiapine (SEROquel) 25 mg tablet TAKE 1 TABLET BY MOUTH EVERY DAY EVERY NIGHT 1/20/20  Yes Historical Provider, MD   zolpidem (AMBIEN) 5 mg tablet Take 1 tablet by mouth daily at bedtime as needed for sleep for up to 2 days 9/1/17 8/13/20 Yes Mojgan Nunn MD   glucagon (GLUCAGON EMERGENCY) 1 MG injection Inject 1 mg under the skin once as needed for low blood sugar for up to 1 dose  Patient not taking: Reported on 2/3/2020 11/29/19   Baltazar Rick MD   sertraline (ZOLOFT) 100 mg tablet TAKE 1 TABLET BY MOUTH DAILY  Patient not taking: Reported on 11/29/2019 8/8/18   Trinity Roe DO   sildenafil (VIAGRA) 50 MG tablet TAKE 1 TABLET BY MOUTH AS  NEEDED FOR ERECTILE  DYSFUNCTION 4/21/20   Historical Provider, MD       Allergies:  No Known Allergies    Review of Systems:  Review of Systems   Constitutional: Negative for fatigue  HENT: Negative  Eyes: Negative  Respiratory: Negative for chest tightness and shortness of breath  Cardiovascular: Negative for chest pain and leg swelling  Endocrine: Negative  Genitourinary: Negative  Musculoskeletal: Negative  Skin: Negative  Neurological: Negative  Psychiatric/Behavioral: Negative  Vital Signs:     Vitals:    08/13/20 1013 08/13/20 1021   BP: 122/80 124/80   BP Location: Left arm Right arm   Patient Position: Sitting Sitting   Cuff Size: Standard Standard   Pulse: 60    Resp: 18    Temp: 97 7 °F (36 5 °C)    TempSrc: Tympanic    Weight: 74 4 kg (164 lb)    Height: 5' 6" (1 676 m)        Physical Exam:  Physical Exam  Constitutional:       Appearance: Normal appearance  He is well-developed  HENT:      Head: Normocephalic and atraumatic  Eyes:      Conjunctiva/sclera: Conjunctivae normal       Pupils: Pupils are equal, round, and reactive to light     Neck: Musculoskeletal: Full passive range of motion without pain and normal range of motion  Thyroid: No thyromegaly  Vascular: No carotid bruit or JVD  Trachea: No tracheal deviation  Cardiovascular:      Rate and Rhythm: Normal rate and regular rhythm  Pulses:           Carotid pulses are 2+ on the right side and 2+ on the left side  Dorsalis pedis pulses are 2+ on the right side and 2+ on the left side  Posterior tibial pulses are 2+ on the right side and 2+ on the left side  Heart sounds: S1 normal and S2 normal  Murmur present  Pulmonary:      Effort: No accessory muscle usage or respiratory distress  Breath sounds: No wheezing or rales  Chest:      Chest wall: No tenderness  Abdominal:      General: Bowel sounds are normal       Palpations: Abdomen is soft  Tenderness: There is no abdominal tenderness  Musculoskeletal: Normal range of motion  Skin:     General: Skin is warm and dry  Neurological:      Mental Status: He is alert and oriented to person, place, and time  Cranial Nerves: No cranial nerve deficit  Sensory: No sensory deficit  Psychiatric:         Speech: Speech normal          Behavior: Behavior normal          Lab Results:               Invalid input(s): LABGLOM      Lab Results   Component Value Date    HGBA1C 9 1 (H) 07/21/2020     Lab Results   Component Value Date    CKTOTAL 36 (L) 12/07/2017    TROPONINI <0 02 07/29/2018       Imaging Studies:     Echocardiogram:   EF 60%  Grade 2 diastolic dysfunction  Possible bicuspid aortic valve  Severe aortic stenosis  Aortic valve area estimated 0 8 centimeters squared  Mean gradient of 38  Mild-to-moderate aortic insufficiency  I have personally reviewed pertinent reports     and I have personally reviewed pertinent films in PACS    Assessment:  Patient Active Problem List    Diagnosis Date Noted    Dyslipidemia 06/05/2018    Mouth sores 12/01/2017    Coronary artery disease involving native coronary artery of native heart without angina pectoris 12/01/2017    Nonrheumatic aortic valve stenosis 08/30/2017    Type 1 diabetes mellitus with retinopathy ([facility]) 08/30/2017    Essential hypertension 08/30/2017     Severe aortic stenosis; Ongoing AVR workup    Plan:  Risks and benefits of aortic valve replacement were discussed in detail today with the patient  They understand and wish to proceed with further workup and ultimately surgical intervention  We have ordered routine preoperative laboratory and vascular studies  Pending the results of these tests, they will be scheduled for surgery  with BRIGIDA Zhou was comfortable with our recommendations, and their questions were answered to their satisfaction  Thank you for allowing us to participate in the care of this patient       SIGNATURE: Nicolas Sorenson PA-C  DATE: August 13, 2020  TIME: 10:45 AM

## 2020-08-13 NOTE — PATIENT INSTRUCTIONS
Preoperative instructions:  1  You will receive a phone call from the hospital between 2:00 PM and 8:00 PM the day prior to surgery to confirm arrival time and location  For surgery on Mondays, you will receive a call on Friday  2  Do not drink or eat anything after midnight the night before surgery  That includes no water, candy, gum, lozenges, Lifesavers, etc  We recommend you not eat any "junk" food, consume alcohol or smoke the night before surgery  3  Continue taking aspirin but only 81 mg daily  4  If you take Coumadin and/or Plavix, discontinue it 5 days before surgery  5  If you are diabetic, do not take any of your diabetic pills the morning of surgery  If you take Lantus insulin, you may take it at your regularly scheduled time the day before surgery  Do not take any other insulins the morning of surgery  6  The 2 nights before surgery, take a shower each night using the special antiseptic soap or soap sponges you received from the office or hospital  Damián Mustache your hair with regular shampoo and rinse completely before using the antiseptic sponges  Use the sponge to wash from your neck down, with special attention to the armpits and groin area  Do not use any other soap or cleanser on your skin  Do not use lotions, powder, deodorant or perfume of any kind on your skin after you shower  Use clean bed linens and wear clean pajamas after your shower  7  You will be prescribed Mupirocin nasal ointment  Apply to both nostrils twice a day for 5 days prior to surgery  8  Do not take a shower the morning of her surgery; you'll be given a special" bath" at the hospital   9  Notify the CT surgery office if you develop a cold, sore throat, cough, fever or other health issues before your surgery  10  Other medication changes included the following:  Insulin   Lisinopril

## 2020-08-13 NOTE — H&P (VIEW-ONLY)
Consultation - Cardiothoracic Surgery   Gagan Khan 48 y o  male MRN: [id number]    Physician Requesting Consult: lilian    Reason for Consult / Principal Problem: Aortic stenosis, Non-Rheumatic    History of Present Illness: Gagan Khan is a 48y o  year old male   The cardiovascular medical history significant for lifelong history heart murmur  He was diagnosed with bicuspid aortic valve by Dr Jerson Phipps in the remote past and has more recently followed with Dr Chris Nicholas  Most recently his care was transitioned to Dr Renetta Hernandez  During his most recent of outpatient evaluation echocardiogram was performed in his aortic stenosis had progressed into the severe range with a valve area estimated at 0 8 centimeters squared  He is now referred to our office for surgical discussion  He also has a history of coronary artery disease having undergone cardiac catheterization in 2017  At that time drug-eluting stent was placed in his mid LAD  During interview today he specifically denies exertional dyspnea, angina, or fatigability  He denies any recent fevers, sweats, or chills  His past medical history is otherwise significant for hypertension, hyperlipidemia, diabetes type 1 with insulin pump, and a remote history of ITP      Past Medical History:  Past Medical History:   Diagnosis Date    Aortic stenosis     Clavicle fracture     LEFT    Diabetes mellitus ([address] )     Hyperlipidemia     Hypertension     Thrombocytopenic purpura (HCC)          Past Surgical History:   Past Surgical History:   Procedure Laterality Date    ABDOMINAL SURGERY      ROTATOR CUFF REPAIR      SPLENECTOMY      TONSILLECTOMY      VITRECTOMY Bilateral     BY ANTERIOR APPROACH          Family History:  Family History   Problem Relation Age of Onset    Aneurysm Mother         CAREBRAL ARTERY     Coronary artery disease Mother     Diabetes Mother          Social History:      Social History     Substance and Sexual Activity Alcohol Use No     Social History     Substance and Sexual Activity   Drug Use Yes    Frequency: 3 0 times per week    Types: Marijuana     Social History     Tobacco Use   Smoking Status Former Smoker    Types: Cigars   Smokeless Tobacco Never Used       Home Medications:   Prior to Admission medications    Medication Sig Start Date End Date Taking?  Authorizing Provider   amoxicillin (AMOXIL) 500 mg capsule  8/11/20  Yes Historical Provider, MD   aspirin (ECOTRIN LOW STRENGTH) 81 mg EC tablet Take 1 tablet by mouth daily 12/4/17  Yes Luis Alberto Nunn MD   atorvastatin (LIPITOR) 80 mg tablet Take 1 tablet by mouth daily with dinner 9/1/17  Yes Nehal Harden MD   NEL MICROLET LANCETS lancets by Other route 4 (four) times a day Use as instructed 3/28/18  Yes Winton Mortimer, MD   Cholecalciferol (VITAMIN D) 2000 units CAPS TK 1 C PO QD 12/12/17  Yes Historical Provider, MD   glucose blood (NEL CONTOUR TEST) test strip 1 each by Other route 4 (four) times a day 9/10/18  Yes Winton Mortimer, MD   hydrOXYzine HCL (ATARAX) 25 mg tablet TAKE 1 TO 2 TABLETS BY MOUTH EVERY 8 HOURS AS NEEDED FOR ANXIETY 7/22/20  Yes Historical Provider, MD   ibuprofen (MOTRIN) 800 mg tablet  8/11/20  Yes Historical Provider, MD   Insulin Infusion Pump KIT 1 Units/hr by Subcutaneous Insulin Pump route continuous   Yes Historical Provider, MD   Insulin Infusion Pump Supplies (MINIMED INFUSION SET-) MISC by Does not apply route 12/19/17  Yes Historical Provider, MD   Insulin Infusion Pump Supplies ([address] [place] [address]) [address] [facility] by Does not apply route 12/19/17  Yes Historical Provider, MD   insulin lispro (HumaLOG) 100 units/mL injection Use up to 100 units per day via insulin pump 4/10/20  Yes Sherley Capellan MD   INSULIN SYRINGE 1CC/29G 29G X 1/2" 1 ML MISC Inject as directed 3 (three) times a day with meals Use as directed 2/23/18  Yes Junior Kirti MD   lisinopril (ZESTRIL) 20 mg tablet Take 20 mg by mouth daily Yes Historical Provider, MD   omeprazole (PriLOSEC) 20 mg delayed release capsule Take 20 mg by mouth daily   Yes Historical Provider, MD   QUEtiapine (SEROquel) 25 mg tablet TAKE 1 TABLET BY MOUTH EVERY DAY EVERY NIGHT 1/20/20  Yes Historical Provider, MD   zolpidem (AMBIEN) 5 mg tablet Take 1 tablet by mouth daily at bedtime as needed for sleep for up to 2 days 9/1/17 8/13/20 Yes Francis Keenan MD   glucagon (GLUCAGON EMERGENCY) 1 MG injection Inject 1 mg under the skin once as needed for low blood sugar for up to 1 dose  Patient not taking: Reported on 2/3/2020 11/29/19   Tiffany Go MD   sertraline (ZOLOFT) 100 mg tablet TAKE 1 TABLET BY MOUTH DAILY  Patient not taking: Reported on 11/29/2019 8/8/18   Trinity Roe DO   sildenafil (VIAGRA) 50 MG tablet TAKE 1 TABLET BY MOUTH AS  NEEDED FOR ERECTILE  DYSFUNCTION 4/21/20   Historical Provider, MD       Allergies:  No Known Allergies    Review of Systems:  Review of Systems   Constitutional: Negative for fatigue  HENT: Negative  Eyes: Negative  Respiratory: Negative for chest tightness and shortness of breath  Cardiovascular: Negative for chest pain and leg swelling  Endocrine: Negative  Genitourinary: Negative  Musculoskeletal: Negative  Skin: Negative  Neurological: Negative  Psychiatric/Behavioral: Negative  Vital Signs:     Vitals:    08/13/20 1013 08/13/20 1021   BP: 122/80 124/80   BP Location: Left arm Right arm   Patient Position: Sitting Sitting   Cuff Size: Standard Standard   Pulse: 60    Resp: 18    Temp: 97 7 °F (36 5 °C)    TempSrc: Tympanic    Weight: 74 4 kg (164 lb)    Height: 5' 6" (1 676 m)        Physical Exam:  Physical Exam  Constitutional:       Appearance: Normal appearance  He is well-developed  HENT:      Head: Normocephalic and atraumatic  Eyes:      Conjunctiva/sclera: Conjunctivae normal       Pupils: Pupils are equal, round, and reactive to light     Neck:      Musculoskeletal: Full passive range of motion without pain and normal range of motion  Thyroid: No thyromegaly  Vascular: No carotid bruit or JVD  Trachea: No tracheal deviation  Cardiovascular:      Rate and Rhythm: Normal rate and regular rhythm  Pulses:           Carotid pulses are 2+ on the right side and 2+ on the left side  Dorsalis pedis pulses are 2+ on the right side and 2+ on the left side  Posterior tibial pulses are 2+ on the right side and 2+ on the left side  Heart sounds: S1 normal and S2 normal  Murmur present  Pulmonary:      Effort: No accessory muscle usage or respiratory distress  Breath sounds: No wheezing or rales  Chest:      Chest wall: No tenderness  Abdominal:      General: Bowel sounds are normal       Palpations: Abdomen is soft  Tenderness: There is no abdominal tenderness  Musculoskeletal: Normal range of motion  Skin:     General: Skin is warm and dry  Neurological:      Mental Status: He is alert and oriented to person, place, and time  Cranial Nerves: No cranial nerve deficit  Sensory: No sensory deficit  Psychiatric:         Speech: Speech normal          Behavior: Behavior normal          Lab Results:               Invalid input(s): LABGLOM      Lab Results   Component Value Date    HGBA1C 9 1 (H) 07/21/2020     Lab Results   Component Value Date    CKTOTAL 36 (L) 12/07/2017    TROPONINI <0 02 07/29/2018       Imaging Studies:     Echocardiogram:   EF 60%  Grade 2 diastolic dysfunction  Possible bicuspid aortic valve  Severe aortic stenosis  Aortic valve area estimated 0 8 centimeters squared  Mean gradient of 38  Mild-to-moderate aortic insufficiency  I have personally reviewed pertinent reports     and I have personally reviewed pertinent films in PACS    Assessment:  Patient Active Problem List    Diagnosis Date Noted    Dyslipidemia 06/05/2018    Mouth sores 12/01/2017    Coronary artery disease involving native coronary artery of native heart without angina pectoris 12/01/2017    Nonrheumatic aortic valve stenosis 08/30/2017    Type 1 diabetes mellitus with retinopathy ([facility] [address] ) 08/30/2017    Essential hypertension 08/30/2017     Severe aortic stenosis; Ongoing AVR workup    Plan:  Risks and benefits of aortic valve replacement were discussed in detail today with the patient  They understand and wish to proceed with further workup and ultimately surgical intervention  We have ordered routine preoperative laboratory and vascular studies  Pending the results of these tests, they will be scheduled for surgery  with BRIGIDA Duran was comfortable with our recommendations, and their questions were answered to their satisfaction  Thank you for allowing us to participate in the care of this patient       SIGNATURE: Landon Amaral PA-C  DATE: August 13, 2020  TIME: 10:45 AM

## 2020-08-13 NOTE — PROGRESS NOTES
Consultation - Cardiothoracic Surgery   Enrique Varela 48 y o  male MRN: [id number]    Physician Requesting Consult: lilian    Reason for Consult / Principal Problem: Aortic stenosis, Non-Rheumatic    History of Present Illness: Enrique Varela is a 48y o  year old male   The cardiovascular medical history significant for lifelong history heart murmur  He was diagnosed with bicuspid aortic valve by Dr Dariela Davalos in the remote past and has more recently followed with Dr Miller Officer  Most recently his care was transitioned to Dr Wilberto Miller  During his most recent of outpatient evaluation echocardiogram was performed in his aortic stenosis had progressed into the severe range with a valve area estimated at 0 8 centimeters squared  He is now referred to our office for surgical discussion  He also has a history of coronary artery disease having undergone cardiac catheterization in 2017  At that time drug-eluting stent was placed in his mid LAD  During interview today he specifically denies exertional dyspnea, angina, or fatigability  He denies any recent fevers, sweats, or chills  His past medical history is otherwise significant for hypertension, hyperlipidemia, diabetes type 1 with insulin pump, and a remote history of ITP      Past Medical History:  Past Medical History:   Diagnosis Date    Aortic stenosis     Clavicle fracture     LEFT    Diabetes mellitus ([address] )     Hyperlipidemia     Hypertension     Thrombocytopenic purpura (HCC)          Past Surgical History:   Past Surgical History:   Procedure Laterality Date    ABDOMINAL SURGERY      ROTATOR CUFF REPAIR      SPLENECTOMY      TONSILLECTOMY      VITRECTOMY Bilateral     BY ANTERIOR APPROACH          Family History:  Family History   Problem Relation Age of Onset    Aneurysm Mother         CAREBRAL ARTERY     Coronary artery disease Mother     Diabetes Mother          Social History:      Social History     Substance and Sexual Activity Alcohol Use No     Social History     Substance and Sexual Activity   Drug Use Yes    Frequency: 3 0 times per week    Types: Marijuana     Social History     Tobacco Use   Smoking Status Former Smoker    Types: Cigars   Smokeless Tobacco Never Used       Home Medications:   Prior to Admission medications    Medication Sig Start Date End Date Taking?  Authorizing Provider   amoxicillin (AMOXIL) 500 mg capsule  8/11/20  Yes Historical Provider, MD   aspirin (ECOTRIN LOW STRENGTH) 81 mg EC tablet Take 1 tablet by mouth daily 12/4/17  Yes Ethyl Kayser, MD   atorvastatin (LIPITOR) 80 mg tablet Take 1 tablet by mouth daily with dinner 9/1/17  Yes Christiane Forbes MD   NEL MICROLET LANCETS lancets by Other route 4 (four) times a day Use as instructed 3/28/18  Yes Luis Antonio Sharif MD   Cholecalciferol (VITAMIN D) 2000 units CAPS TK 1 C PO QD 12/12/17  Yes Historical Provider, MD   glucose blood (NEL CONTOUR TEST) test strip 1 each by Other route 4 (four) times a day 9/10/18  Yes Luis Antonio Sharif MD   hydrOXYzine HCL (ATARAX) 25 mg tablet TAKE 1 TO 2 TABLETS BY MOUTH EVERY 8 HOURS AS NEEDED FOR ANXIETY 7/22/20  Yes Historical Provider, MD   ibuprofen (MOTRIN) 800 mg tablet  8/11/20  Yes Historical Provider, MD   Insulin Infusion Pump KIT 1 Units/hr by Subcutaneous Insulin Pump route continuous   Yes Historical Provider, MD   Insulin Infusion Pump Supplies (MINIMED INFUSION SET-) MISC by Does not apply route 12/19/17  Yes Historical Provider, MD   Insulin Infusion Pump Supplies ([address] [place] [address]) [facility] by Does not apply route 12/19/17  Yes Historical Provider, MD   insulin lispro (HumaLOG) 100 units/mL injection Use up to 100 units per day via insulin pump 4/10/20  Yes Gema James MD   INSULIN SYRINGE 1CC/29G 29G X 1/2" 1 ML MISC Inject as directed 3 (three) times a day with meals Use as directed 2/23/18  Yes Jm Arreola MD   lisinopril (ZESTRIL) 20 mg tablet Take 20 mg by mouth daily Yes Historical Provider, MD   omeprazole (PriLOSEC) 20 mg delayed release capsule Take 20 mg by mouth daily   Yes Historical Provider, MD   QUEtiapine (SEROquel) 25 mg tablet TAKE 1 TABLET BY MOUTH EVERY DAY EVERY NIGHT 1/20/20  Yes Historical Provider, MD   zolpidem (AMBIEN) 5 mg tablet Take 1 tablet by mouth daily at bedtime as needed for sleep for up to 2 days 9/1/17 8/13/20 Yes Susan Paiz MD   glucagon (GLUCAGON EMERGENCY) 1 MG injection Inject 1 mg under the skin once as needed for low blood sugar for up to 1 dose  Patient not taking: Reported on 2/3/2020 11/29/19   Reza Penn MD   sertraline (ZOLOFT) 100 mg tablet TAKE 1 TABLET BY MOUTH DAILY  Patient not taking: Reported on 11/29/2019 8/8/18   Trinity Roe DO   sildenafil (VIAGRA) 50 MG tablet TAKE 1 TABLET BY MOUTH AS  NEEDED FOR ERECTILE  DYSFUNCTION 4/21/20   Historical Provider, MD       Allergies:  No Known Allergies    Review of Systems:  Review of Systems   Constitutional: Negative for fatigue  HENT: Negative  Eyes: Negative  Respiratory: Negative for chest tightness and shortness of breath  Cardiovascular: Negative for chest pain and leg swelling  Endocrine: Negative  Genitourinary: Negative  Musculoskeletal: Negative  Skin: Negative  Neurological: Negative  Psychiatric/Behavioral: Negative  Vital Signs:     Vitals:    08/13/20 1013 08/13/20 1021   BP: 122/80 124/80   BP Location: Left arm Right arm   Patient Position: Sitting Sitting   Cuff Size: Standard Standard   Pulse: 60    Resp: 18    Temp: 97 7 °F (36 5 °C)    TempSrc: Tympanic    Weight: 74 4 kg (164 lb)    Height: 5' 6" (1 676 m)        Physical Exam:  Physical Exam  Constitutional:       Appearance: Normal appearance  He is well-developed  HENT:      Head: Normocephalic and atraumatic  Eyes:      Conjunctiva/sclera: Conjunctivae normal       Pupils: Pupils are equal, round, and reactive to light     Neck:      Musculoskeletal: Full passive range of motion without pain and normal range of motion  Thyroid: No thyromegaly  Vascular: No carotid bruit or JVD  Trachea: No tracheal deviation  Cardiovascular:      Rate and Rhythm: Normal rate and regular rhythm  Pulses:           Carotid pulses are 2+ on the right side and 2+ on the left side  Dorsalis pedis pulses are 2+ on the right side and 2+ on the left side  Posterior tibial pulses are 2+ on the right side and 2+ on the left side  Heart sounds: S1 normal and S2 normal  Murmur present  Pulmonary:      Effort: No accessory muscle usage or respiratory distress  Breath sounds: No wheezing or rales  Chest:      Chest wall: No tenderness  Abdominal:      General: Bowel sounds are normal       Palpations: Abdomen is soft  Tenderness: There is no abdominal tenderness  Musculoskeletal: Normal range of motion  Skin:     General: Skin is warm and dry  Neurological:      Mental Status: He is alert and oriented to person, place, and time  Cranial Nerves: No cranial nerve deficit  Sensory: No sensory deficit  Psychiatric:         Speech: Speech normal          Behavior: Behavior normal          Lab Results:               Invalid input(s): LABGLOM      Lab Results   Component Value Date    HGBA1C 9 1 (H) 07/21/2020     Lab Results   Component Value Date    CKTOTAL 36 (L) 12/07/2017    TROPONINI <0 02 07/29/2018       Imaging Studies:     Echocardiogram:   EF 60%  Grade 2 diastolic dysfunction  Possible bicuspid aortic valve  Severe aortic stenosis  Aortic valve area estimated 0 8 centimeters squared  Mean gradient of 38  Mild-to-moderate aortic insufficiency  I have personally reviewed pertinent reports     and I have personally reviewed pertinent films in PACS    Assessment:  Patient Active Problem List    Diagnosis Date Noted    Dyslipidemia 06/05/2018    Mouth sores 12/01/2017    Coronary artery disease involving native coronary artery of native heart without angina pectoris 12/01/2017    Nonrheumatic aortic valve stenosis 08/30/2017    Type 1 diabetes mellitus with retinopathy ([facility] [address] ) 08/30/2017    Essential hypertension 08/30/2017     Severe aortic stenosis; Ongoing AVR workup    Plan:  Risks and benefits of aortic valve replacement were discussed in detail today with the patient  They understand and wish to proceed with further workup and ultimately surgical intervention  We have ordered routine preoperative laboratory and vascular studies  Pending the results of these tests, they will be scheduled for surgery  with BRIGIDA Diallo was comfortable with our recommendations, and their questions were answered to their satisfaction  Thank you for allowing us to participate in the care of this patient       SIGNATURE: Lizzy Hamilton PA-C  DATE: August 13, 2020  TIME: 10:45 AM

## 2020-08-17 ENCOUNTER — TELEPHONE (OUTPATIENT)
Dept: CARDIOLOGY CLINIC | Facility: CLINIC | Age: 55
End: 2020-08-17

## 2020-08-17 NOTE — TELEPHONE ENCOUNTER
He does not need [facility] for his Cath [id number] on 8/31/20 at [place] per [address]  at [facility] 8/17/20 1:10pm

## 2020-08-17 NOTE — TELEPHONE ENCOUNTER
----- Message from Juan Daniel Villela sent at 8/14/2020 11:17 AM EDT -----  Regarding: CATH REFERRAL  This patient was seen yesterday by Dr Anton Haimlton and is scheduled for a AVR on 9/2/2020  Referral was put in for a Right and Left Cath to be done before his surgery date  I told the patient that he will get a call from Cardiology to schedule  Thank you

## 2020-08-17 NOTE — TELEPHONE ENCOUNTER
COVID Pre-Visit Screening     1  Is this a family member screening? Yes  2  Have you traveled outside of your state in the past 2 weeks? No  3  Do you presently have a fever or flu-like symptoms? No  4  Do you have symptoms of an upper respiratory infection like runny nose, sore throat, or cough? No  5  Are you suffering from new headache that you have not had in the past?  No  6  Do you have/have you experienced any new shortness of breath recently? No  7  Do you have any new diarrhea, nausea or vomiting? No  8  Have you been in contact with anyone who has been sick or diagnosed with COVID-19? No  9  Do you have any new loss of taste or smell? No  10  Are you able to wear a mask without a valve for the entire visit? Yes    Patient schedule for [facility] on 8/31/20 with Dr Pavan Rock  Patient aware of general instructions, mediations holds (Humalog) and blood test require  Please Tawanna can you check for insurance approval for this service

## 2020-08-18 ENCOUNTER — HOSPITAL ENCOUNTER (OUTPATIENT)
Dept: CT IMAGING | Facility: HOSPITAL | Age: 55
Discharge: HOME | End: 2020-08-18
Payer: COMMERCIAL

## 2020-08-18 DIAGNOSIS — I35.0 NONRHEUMATIC AORTIC VALVE STENOSIS: ICD-10-CM

## 2020-08-18 PROCEDURE — 71250 CT THORAX DX C-: CPT

## 2020-08-18 PROCEDURE — G1004 CDSM NDSC: HCPCS

## 2020-08-21 ENCOUNTER — OFFICE VISIT (OUTPATIENT)
Dept: ENDOCRINOLOGY | Facility: CLINIC | Age: 55
End: 2020-08-21
Payer: COMMERCIAL

## 2020-08-21 ENCOUNTER — TELEPHONE (OUTPATIENT)
Dept: ENDOCRINOLOGY | Facility: CLINIC | Age: 55
End: 2020-08-21

## 2020-08-21 VITALS
SYSTOLIC BLOOD PRESSURE: 110 MMHG | HEART RATE: 75 BPM | BODY MASS INDEX: 26.16 KG/M2 | DIASTOLIC BLOOD PRESSURE: 68 MMHG | HEIGHT: 66 IN | WEIGHT: 162.8 LBS

## 2020-08-21 DIAGNOSIS — E78.5 DYSLIPIDEMIA: ICD-10-CM

## 2020-08-21 DIAGNOSIS — I10 ESSENTIAL HYPERTENSION: Primary | ICD-10-CM

## 2020-08-21 PROCEDURE — 99214 OFFICE O/P EST MOD 30 MIN: CPT | Performed by: NURSE PRACTITIONER

## 2020-08-21 NOTE — PATIENT INSTRUCTIONS
1  Change carbohydrate ratio from 7pm- midnight to 1:8  2  10 pm- 0700 basal of 2 0  3  Check sugars more frequently until you have CGM  Correct for readings >180  4  Bolus for lunch  As it is the same meal every day, it should be the same every day

## 2020-08-21 NOTE — PROGRESS NOTES
Established Patient Progress Note      Chief Complaint   Patient presents with    Diabetes Type 1        History of Present Illness:   Devon Mcmullen is a 48 y o  male with HTN, HLD, and type 1 diabetes with long term use of insulin since age 6  Reports complications of retinopathy and CAD  Patient presents today for a follow-up  He has been following with cardiology since 2017 when he required placement of a drug-eluding stent (MINNIE) d/t 90% mid LAD stenosis  In addition, he has had a lifelong murmur d/t Nonrheumatic AVS  Recent appointment on 2/03/2020 showed changes in his EKG that warranted a repeat stress echo  Stress echo performed on 7/28/2020 showed possible bicuspid valve and severe aortic stenosis with mild to moderate regurgitation  He is now following with CV surgery for aortic valve replacement  This surgery is scheduled for September 2, 2020  Current HgA1C of 07/21/2020 is elevated at 9 1  Patient is checking his blood sugars more frequently between 3-4 times daily  Previously, he had only been checking once  He is also using his bolus wizard at this time  He reports polyuria and polydipsia when his sugars are greater than 200  He states that he is not always symptomatic of lows  Therefore, he will keep himself above goal in order to avoid them  He is interested in obtaining a CGM in order to gain better control over his diabetes without the concern that he may be dropping below 70  Patient is on a [employer] Paradign Revel pump prescribed by Endocrinology  He has been on a pump for 10 years  He denies any malfunctioning of the pump      Current Insulin pump settings:  Basal rate: midnight-7am: 2 0  7am-midnight: 1 0    Insulin to carb ratio: midnight-7pm: 1:10  7pm- midnight: 1:9     Insulin sensitivity factor: 24 hr 1:30  BG target:100-120  Active Insulin time: 4hrs    Type of insulin:  Humalog    Reviewed pump download from 8/08/2020-8/21/2020  Patient is consistently >200 from [id number]  He states that this is likely due to his eating schedule  He works long shifts as a manager at [employer]  He does not eat breakfast before work  He has a peanut butter and jelly sandwich for lunch nearly daily  When he returns home from work, he eats from 7pm until he goes to sleep at midnight  He states that he does not bolus for his lunchtime meal because he is very physically active at work and fears going low  Last Eye Exam:1/13/2020; proliferative retinopathy b/l  Last Foot Exam: Sees Dr Jeneane Burkitt regularly, last seen 8/2020    For his HTN, he is taking lisinopril  He denies HA and cough  For his HLD, he is taking atorvastatin  He denies myalgias  He recently started buspar to treat his situational anxiety r/t upcoming open heart surgery       Patient Active Problem List   Diagnosis    Nonrheumatic aortic valve stenosis    Type 1 diabetes mellitus with retinopathy ([facility] [address]    Essential hypertension    Mouth sores    Coronary artery disease involving native coronary artery of native heart without angina pectoris    Dyslipidemia      Past Medical History:   Diagnosis Date    Aortic stenosis     Clavicle fracture     LEFT    Diabetes mellitus ([facility] [address] )     Hyperlipidemia     Hypertension     Thrombocytopenic purpura ([facility] [address] )       Past Surgical History:   Procedure Laterality Date    ABDOMINAL SURGERY      ROTATOR CUFF REPAIR      SPLENECTOMY      TONSILLECTOMY      VITRECTOMY Bilateral     BY ANTERIOR APPROACH       Family History   Problem Relation Age of Onset    Aneurysm Mother         CAREBRAL ARTERY     Coronary artery disease Mother     Diabetes Mother      Social History     Tobacco Use    Smoking status: Former Smoker     Types: Cigars    Smokeless tobacco: Never Used   Substance Use Topics    Alcohol use: No     No Known Allergies      Current Outpatient Medications:     aspirin (ECOTRIN LOW STRENGTH) 81 mg EC tablet, Take 1 tablet by mouth daily, Disp: , Rfl: 0   atorvastatin (LIPITOR) 80 mg tablet, Take 1 tablet by mouth daily with dinner, Disp: 30 tablet, Rfl: 0    NEL MICROLET LANCETS lancets, by Other route 4 (four) times a day Use as instructed, Disp: 400 each, Rfl: 3    Cholecalciferol (VITAMIN D) 2000 units CAPS, TK 1 C PO QD, Disp: , Rfl: 3    glucose blood (NEL CONTOUR TEST) test strip, 1 each by Other route 4 (four) times a day, Disp: 400 each, Rfl: 3    hydrOXYzine HCL (ATARAX) 25 mg tablet, TAKE 1 TO 2 TABLETS BY MOUTH EVERY 8 HOURS AS NEEDED FOR ANXIETY, Disp: , Rfl:     Insulin Infusion Pump KIT, 1 Units/hr by Subcutaneous Insulin Pump route continuous, Disp: , Rfl:     Insulin Infusion Pump Supplies (MINIMED INFUSION SET-) MISC, by Does not apply route, Disp: , Rfl:     Insulin Infusion Pump Supplies (MINIMED RESERVOIR 3ML) MISC, by Does not apply route, Disp: , Rfl:     insulin lispro (HumaLOG) 100 units/mL injection, Use up to 100 units per day via insulin pump, Disp: 90 mL, Rfl: 0    INSULIN SYRINGE 1CC/29G 29G X 1/2" 1 ML MISC, Inject as directed 3 (three) times a day with meals Use as directed, Disp: 100 each, Rfl: 0    lisinopril (ZESTRIL) 20 mg tablet, Take 2 5 mg by mouth daily , Disp: , Rfl:     omeprazole (PriLOSEC) 20 mg delayed release capsule, Take 20 mg by mouth daily, Disp: , Rfl:     QUEtiapine (SEROquel) 25 mg tablet, TAKE 1 TABLET BY MOUTH EVERY DAY EVERY NIGHT, Disp: , Rfl:     sildenafil (VIAGRA) 50 MG tablet, TAKE 1 TABLET BY MOUTH AS  NEEDED FOR ERECTILE  DYSFUNCTION, Disp: , Rfl:     amoxicillin (AMOXIL) 500 mg capsule, , Disp: , Rfl:     glucagon (GLUCAGON EMERGENCY) 1 MG injection, Inject 1 mg under the skin once as needed for low blood sugar for up to 1 dose (Patient not taking: Reported on 2/3/2020), Disp: 1 kit, Rfl: 1    ibuprofen (MOTRIN) 800 mg tablet, , Disp: , Rfl:     mupirocin (BACTROBAN) 2 % ointment, Apply 1 application topically 2 (two) times a day for 5 days Apply to each nostril twice daily for five days before your operation  , Disp: 22 g, Rfl: 0    sertraline (ZOLOFT) 100 mg tablet, TAKE 1 TABLET BY MOUTH DAILY (Patient not taking: Reported on 11/29/2019), Disp: 90 tablet, Rfl: 0    zolpidem (AMBIEN) 5 mg tablet, Take 1 tablet by mouth daily at bedtime as needed for sleep for up to 2 days, Disp: 2 tablet, Rfl: 0    Review of Systems   Constitutional: Negative for activity change, appetite change, fatigue and unexpected weight change  Eyes: Negative for visual disturbance  Respiratory: Negative for cough, chest tightness and shortness of breath  Cardiovascular: Negative for chest pain, palpitations and leg swelling  Gastrointestinal: Negative for constipation, diarrhea, nausea and vomiting  Endocrine: Positive for polydipsia and polyuria  Negative for polyphagia  Genitourinary: Positive for frequency  Musculoskeletal: Negative for arthralgias, back pain, joint swelling and myalgias  Skin: Negative for wound  Neurological: Negative for dizziness, weakness, light-headedness, numbness and headaches  Psychiatric/Behavioral: Positive for sleep disturbance  Negative for decreased concentration and dysphoric mood  The patient is nervous/anxious  Physical Exam:  Body mass index is 26 28 kg/m²  /68   Pulse 75   Ht 5' 6" (1 676 m)   Wt 73 8 kg (162 lb 12 8 oz)   BMI 26 28 kg/m²    Wt Readings from Last 3 Encounters:   08/21/20 73 8 kg (162 lb 12 8 oz)   08/13/20 74 4 kg (164 lb)   08/06/20 74 8 kg (164 lb 12 8 oz)       Physical Exam  Constitutional:       Appearance: He is well-developed  HENT:      Head: Normocephalic and atraumatic  Comments: Mask in place  Eyes:      Extraocular Movements: Extraocular movements intact  Conjunctiva/sclera: Conjunctivae normal       Pupils: Pupils are equal, round, and reactive to light  Neck:      Musculoskeletal: Normal range of motion and neck supple  Thyroid: No thyromegaly     Cardiovascular:      Rate and Rhythm: Normal rate and regular rhythm  Pulses: Normal pulses  Heart sounds: Murmur present  Pulmonary:      Effort: Pulmonary effort is normal       Breath sounds: Normal breath sounds  Abdominal:      General: Bowel sounds are normal  There is no distension  Palpations: Abdomen is soft  Tenderness: There is no abdominal tenderness  Musculoskeletal:      Right lower leg: No edema  Left lower leg: No edema  Lymphadenopathy:      Cervical: No cervical adenopathy  Skin:     General: Skin is warm and dry  Capillary Refill: Capillary refill takes less than 2 seconds  Neurological:      Mental Status: He is alert and oriented to person, place, and time  Psychiatric:         Mood and Affect: Mood normal          Behavior: Behavior normal        Labs:   Lab Results   Component Value Date    HGBA1C 9 1 (H) 07/21/2020    HGBA1C 8 6 (H) 07/31/2018    HGBA1C 9 1 (H) 07/30/2018     Lab Results   Component Value Date    CREATININE 0 70 07/30/2018    CREATININE 0 99 07/29/2018    CREATININE 0 78 07/25/2018    BUN 12 07/30/2018     10/29/2016    K 3 7 07/30/2018     (H) 07/30/2018    CO2 22 07/30/2018     eGFR   Date Value Ref Range Status   07/30/2018 109 ml/min/1 73sq m Final     Lab Results   Component Value Date    CHOL 259 (H) 10/15/2013    HDL 53 07/25/2018    TRIG 225 (H) 07/25/2018     Lab Results   Component Value Date    ALT 34 07/30/2018    AST 17 07/30/2018     (H) 12/02/2017    ALKPHOS 106 07/30/2018    BILITOT 0 4 10/29/2016     Lab Results   Component Value Date    SNP0WSTBRCDU 1 270 07/30/2018    HUT7NUNFAXBP 2 900 07/25/2018    IYA2XOIFORND 4 247 (H) 09/01/2017     Lab Results   Component Value Date    FREET4 0 86 07/25/2018       Impression & Plan:    Problem List Items Addressed This Visit        Cardiovascular and Mediastinum    Essential hypertension - Primary     Stable  Continue current regimen  Other    Dyslipidemia     Stable  Continue statin  Patient Instructions   1  Change carbohydrate ratio from 7pm- midnight to 1:8  2  10 pm- 0700 basal of 2 0  3  Check sugars more frequently until you have CGM  Correct for readings >180  4  Bolus for lunch  As it is the same meal every day, it should be the same every day  Discussed with the patient and all questioned fully answered  He will call me if any problems arise  Follow-up appointment in 3 months       Counseled patient on diagnostic results, prognosis, risk and benefit of treatment options, instruction for management, importance of treatment compliance, Risk  factor reduction and impressions    DEVEN Polk

## 2020-08-21 NOTE — ASSESSMENT & PLAN NOTE
Patient remains uncontrolled at this time  He needs a CGM and is in the process of obtaining one  This will be particularly helpful prior to surgery so that his blood sugar can be kept <180 as consistently as possible  Given lack of data, not able to change ISF scale at this time  However, the following changes have been recommended at this time:    Basal:  midnight-0700- 2 0  0700-10pm- 1 0  10pm-midnight- 2 0    Carb ratio:  Midnight-7pm- 1:10  7pm-midnight- 1:8    Once he has his CGM, he knows to contact the office and establish connection to share reports so that further adjustments can be made  Recommended that he bolus more frequently and accurately which, until he has the CGM, will require more frequent f s  He is agreeable to this     Lab Results   Component Value Date    HGBA1C 9 1 (H) 07/21/2020

## 2020-08-26 ENCOUNTER — HOSPITAL ENCOUNTER (OUTPATIENT)
Dept: VASCULAR ULTRASOUND | Facility: HOSPITAL | Age: 55
Discharge: HOME | End: 2020-08-26
Payer: COMMERCIAL

## 2020-08-26 ENCOUNTER — APPOINTMENT (OUTPATIENT)
Dept: LAB | Facility: HOSPITAL | Age: 55
End: 2020-08-26
Payer: COMMERCIAL

## 2020-08-26 ENCOUNTER — LAB REQUISITION (OUTPATIENT)
Dept: LAB | Facility: HOSPITAL | Age: 55
End: 2020-08-26
Payer: COMMERCIAL

## 2020-08-26 ENCOUNTER — TRANSCRIBE ORDERS (OUTPATIENT)
Dept: ADMINISTRATIVE | Facility: HOSPITAL | Age: 55
End: 2020-08-26

## 2020-08-26 ENCOUNTER — HOSPITAL ENCOUNTER (OUTPATIENT)
Dept: PULMONOLOGY | Facility: HOSPITAL | Age: 55
Discharge: HOME | End: 2020-08-26
Payer: COMMERCIAL

## 2020-08-26 DIAGNOSIS — I35.0 NONRHEUMATIC AORTIC VALVE STENOSIS: ICD-10-CM

## 2020-08-26 DIAGNOSIS — Z01.810 PREOPERATIVE CARDIOVASCULAR EXAMINATION: ICD-10-CM

## 2020-08-26 DIAGNOSIS — R09.89 CARDIORESPIRATORY PROBLEMS: ICD-10-CM

## 2020-08-26 DIAGNOSIS — Z01.818 ENCOUNTER FOR OTHER PREPROCEDURAL EXAMINATION: ICD-10-CM

## 2020-08-26 DIAGNOSIS — Z01.818 OTHER SPECIFIED PRE-OPERATIVE EXAMINATION: Primary | ICD-10-CM

## 2020-08-26 DIAGNOSIS — Z01.818 OTHER SPECIFIED PRE-OPERATIVE EXAMINATION: ICD-10-CM

## 2020-08-26 LAB
ABO GROUP BLD: NORMAL
ANION GAP SERPL CALCULATED.3IONS-SCNC: 6 MMOL/L (ref 4–13)
BLD GP AB SCN SERPL QL: NEGATIVE
BUN SERPL-MCNC: 11 MG/DL (ref 6–20)
CALCIUM SERPL-MCNC: 8.6 MG/DL (ref 8.4–10.2)
CHLORIDE SERPL-SCNC: 104 MMOL/L (ref 96–108)
CO2 SERPL-SCNC: 28 MMOL/L (ref 22–33)
CREAT SERPL-MCNC: 0.72 MG/DL (ref 0.5–1.2)
ERYTHROCYTE [DISTWIDTH] IN BLOOD BY AUTOMATED COUNT: 14.2 % (ref 11.6–15.1)
GFR SERPL CREATININE-BSD FRML MDRD: 106 ML/MIN/1.73SQ M
GLUCOSE P FAST SERPL-MCNC: 255 MG/DL (ref 70–100)
HCT VFR BLD AUTO: 37.9 % (ref 36.5–49.3)
HGB BLD-MCNC: 12.9 G/DL (ref 12–17)
INR PPP: 1.01 (ref 0.9–1.1)
MCH RBC QN AUTO: 29.7 PG (ref 26.8–34.3)
MCHC RBC AUTO-ENTMCNC: 34 G/DL (ref 31.4–37.4)
MCV RBC AUTO: 87 FL (ref 82–98)
PLATELET # BLD AUTO: 420 THOUSANDS/UL (ref 149–390)
PMV BLD AUTO: 10.2 FL (ref 8.9–12.7)
POTASSIUM SERPL-SCNC: 4.4 MMOL/L (ref 3.5–5)
PROTHROMBIN TIME: 10.7 SECONDS (ref 9.5–12.1)
RBC # BLD AUTO: 4.34 MILLION/UL (ref 3.88–5.62)
RH BLD: POSITIVE
SODIUM SERPL-SCNC: 138 MMOL/L (ref 133–145)
SPECIMEN EXPIRATION DATE: NORMAL
WBC # BLD AUTO: 7.58 THOUSAND/UL (ref 4.31–10.16)

## 2020-08-26 PROCEDURE — 86901 BLOOD TYPING SEROLOGIC RH(D): CPT | Performed by: PHYSICIAN ASSISTANT

## 2020-08-26 PROCEDURE — 94760 N-INVAS EAR/PLS OXIMETRY 1: CPT

## 2020-08-26 PROCEDURE — 94729 DIFFUSING CAPACITY: CPT

## 2020-08-26 PROCEDURE — 93971 EXTREMITY STUDY: CPT | Performed by: SURGERY

## 2020-08-26 PROCEDURE — 94729 DIFFUSING CAPACITY: CPT | Performed by: INTERNAL MEDICINE

## 2020-08-26 PROCEDURE — U0003 INFECTIOUS AGENT DETECTION BY NUCLEIC ACID (DNA OR RNA); SEVERE ACUTE RESPIRATORY SYNDROME CORONAVIRUS 2 (SARS-COV-2) (CORONAVIRUS DISEASE [COVID-19]), AMPLIFIED PROBE TECHNIQUE, MAKING USE OF HIGH THROUGHPUT TECHNOLOGIES AS DESCRIBED BY CMS-2020-01-R: HCPCS | Performed by: PHYSICIAN ASSISTANT

## 2020-08-26 PROCEDURE — 94010 BREATHING CAPACITY TEST: CPT | Performed by: INTERNAL MEDICINE

## 2020-08-26 PROCEDURE — 93880 EXTRACRANIAL BILAT STUDY: CPT

## 2020-08-26 PROCEDURE — 93880 EXTRACRANIAL BILAT STUDY: CPT | Performed by: SURGERY

## 2020-08-26 PROCEDURE — 93971 EXTREMITY STUDY: CPT

## 2020-08-26 PROCEDURE — 85027 COMPLETE CBC AUTOMATED: CPT

## 2020-08-26 PROCEDURE — 94726 PLETHYSMOGRAPHY LUNG VOLUMES: CPT

## 2020-08-26 PROCEDURE — 94010 BREATHING CAPACITY TEST: CPT

## 2020-08-26 PROCEDURE — 94726 PLETHYSMOGRAPHY LUNG VOLUMES: CPT | Performed by: INTERNAL MEDICINE

## 2020-08-26 PROCEDURE — 85610 PROTHROMBIN TIME: CPT

## 2020-08-26 PROCEDURE — 80048 BASIC METABOLIC PNL TOTAL CA: CPT

## 2020-08-26 PROCEDURE — 86900 BLOOD TYPING SEROLOGIC ABO: CPT | Performed by: PHYSICIAN ASSISTANT

## 2020-08-26 PROCEDURE — 86850 RBC ANTIBODY SCREEN: CPT | Performed by: PHYSICIAN ASSISTANT

## 2020-08-26 PROCEDURE — 36415 COLL VENOUS BLD VENIPUNCTURE: CPT

## 2020-08-27 LAB — SARS-COV-2 RNA SPEC QL NAA+PROBE: NOT DETECTED

## 2020-08-28 ENCOUNTER — TELEPHONE (OUTPATIENT)
Dept: INPATIENT UNIT | Facility: HOSPITAL | Age: 55
End: 2020-08-28

## 2020-08-31 ENCOUNTER — TELEPHONE (OUTPATIENT)
Dept: NON INVASIVE DIAGNOSTICS | Facility: HOSPITAL | Age: 55
End: 2020-08-31

## 2020-08-31 ENCOUNTER — HOSPITAL ENCOUNTER (OUTPATIENT)
Dept: NON INVASIVE DIAGNOSTICS | Facility: HOSPITAL | Age: 55
Discharge: HOME | End: 2020-08-31
Attending: INTERNAL MEDICINE | Admitting: INTERNAL MEDICINE
Payer: COMMERCIAL

## 2020-08-31 ENCOUNTER — DOCUMENTATION (OUTPATIENT)
Dept: CARDIOLOGY CLINIC | Facility: CLINIC | Age: 55
End: 2020-08-31

## 2020-08-31 VITALS
SYSTOLIC BLOOD PRESSURE: 134 MMHG | TEMPERATURE: 97.8 F | HEIGHT: 66 IN | DIASTOLIC BLOOD PRESSURE: 74 MMHG | RESPIRATION RATE: 18 BRPM | OXYGEN SATURATION: 98 % | HEART RATE: 59 BPM | BODY MASS INDEX: 26.15 KG/M2 | WEIGHT: 162.7 LBS

## 2020-08-31 DIAGNOSIS — I35.0 NONRHEUMATIC AORTIC VALVE STENOSIS: ICD-10-CM

## 2020-08-31 LAB
ATRIAL RATE: 76 BPM
GLUCOSE SERPL-MCNC: 91 MG/DL (ref 65–140)
P AXIS: 39 DEGREES
PR INTERVAL: 184 MS
QRS AXIS: 0 DEGREES
QRSD INTERVAL: 106 MS
QT INTERVAL: 412 MS
QTC INTERVAL: 463 MS
T WAVE AXIS: -51 DEGREES
VENTRICULAR RATE: 76 BPM

## 2020-08-31 PROCEDURE — C1894 INTRO/SHEATH, NON-LASER: HCPCS | Performed by: PHYSICIAN ASSISTANT

## 2020-08-31 PROCEDURE — 99152 MOD SED SAME PHYS/QHP 5/>YRS: CPT | Performed by: PHYSICIAN ASSISTANT

## 2020-08-31 PROCEDURE — 93454 CORONARY ARTERY ANGIO S&I: CPT | Performed by: PHYSICIAN ASSISTANT

## 2020-08-31 PROCEDURE — 99153 MOD SED SAME PHYS/QHP EA: CPT | Performed by: PHYSICIAN ASSISTANT

## 2020-08-31 PROCEDURE — 93454 CORONARY ARTERY ANGIO S&I: CPT | Performed by: INTERNAL MEDICINE

## 2020-08-31 PROCEDURE — C1769 GUIDE WIRE: HCPCS | Performed by: PHYSICIAN ASSISTANT

## 2020-08-31 PROCEDURE — 93010 ELECTROCARDIOGRAM REPORT: CPT | Performed by: INTERNAL MEDICINE

## 2020-08-31 PROCEDURE — 82948 REAGENT STRIP/BLOOD GLUCOSE: CPT

## 2020-08-31 PROCEDURE — 93005 ELECTROCARDIOGRAM TRACING: CPT

## 2020-08-31 PROCEDURE — 99152 MOD SED SAME PHYS/QHP 5/>YRS: CPT | Performed by: INTERNAL MEDICINE

## 2020-08-31 RX ORDER — SODIUM CHLORIDE 9 MG/ML
150 INJECTION, SOLUTION INTRAVENOUS CONTINUOUS
Status: DISPENSED | OUTPATIENT
Start: 2020-08-31 | End: 2020-08-31

## 2020-08-31 RX ORDER — ONDANSETRON 2 MG/ML
4 INJECTION INTRAMUSCULAR; INTRAVENOUS EVERY 6 HOURS PRN
Status: DISCONTINUED | OUTPATIENT
Start: 2020-08-31 | End: 2020-08-31 | Stop reason: HOSPADM

## 2020-08-31 RX ORDER — HEPARIN SODIUM 1000 [USP'U]/ML
INJECTION, SOLUTION INTRAVENOUS; SUBCUTANEOUS CODE/TRAUMA/SEDATION MEDICATION
Status: COMPLETED | OUTPATIENT
Start: 2020-08-31 | End: 2020-08-31

## 2020-08-31 RX ORDER — VERAPAMIL HYDROCHLORIDE 2.5 MG/ML
INJECTION, SOLUTION INTRAVENOUS CODE/TRAUMA/SEDATION MEDICATION
Status: COMPLETED | OUTPATIENT
Start: 2020-08-31 | End: 2020-08-31

## 2020-08-31 RX ORDER — ACETAMINOPHEN 325 MG/1
650 TABLET ORAL EVERY 4 HOURS PRN
Status: DISCONTINUED | OUTPATIENT
Start: 2020-08-31 | End: 2020-08-31 | Stop reason: HOSPADM

## 2020-08-31 RX ORDER — MIDAZOLAM HYDROCHLORIDE 2 MG/2ML
INJECTION, SOLUTION INTRAMUSCULAR; INTRAVENOUS CODE/TRAUMA/SEDATION MEDICATION
Status: COMPLETED | OUTPATIENT
Start: 2020-08-31 | End: 2020-08-31

## 2020-08-31 RX ORDER — FENTANYL CITRATE 50 UG/ML
INJECTION, SOLUTION INTRAMUSCULAR; INTRAVENOUS CODE/TRAUMA/SEDATION MEDICATION
Status: COMPLETED | OUTPATIENT
Start: 2020-08-31 | End: 2020-08-31

## 2020-08-31 RX ORDER — LIDOCAINE HYDROCHLORIDE 10 MG/ML
INJECTION, SOLUTION EPIDURAL; INFILTRATION; INTRACAUDAL; PERINEURAL CODE/TRAUMA/SEDATION MEDICATION
Status: COMPLETED | OUTPATIENT
Start: 2020-08-31 | End: 2020-08-31

## 2020-08-31 RX ORDER — SODIUM CHLORIDE 9 MG/ML
INJECTION, SOLUTION INTRAVENOUS
Status: COMPLETED | OUTPATIENT
Start: 2020-08-31 | End: 2020-08-31

## 2020-08-31 RX ADMIN — HEPARIN SODIUM 4000 UNITS: 1000 INJECTION INTRAVENOUS; SUBCUTANEOUS at 08:15

## 2020-08-31 RX ADMIN — MIDAZOLAM 2 MG: 1 INJECTION INTRAMUSCULAR; INTRAVENOUS at 08:10

## 2020-08-31 RX ADMIN — MIDAZOLAM 2 MG: 1 INJECTION INTRAMUSCULAR; INTRAVENOUS at 08:02

## 2020-08-31 RX ADMIN — FENTANYL CITRATE 50 MCG: 50 INJECTION, SOLUTION INTRAMUSCULAR; INTRAVENOUS at 08:02

## 2020-08-31 RX ADMIN — SODIUM CHLORIDE 100 ML/HR: 0.9 INJECTION, SOLUTION INTRAVENOUS at 07:56

## 2020-08-31 RX ADMIN — IOHEXOL 80 ML: 350 INJECTION, SOLUTION INTRAVENOUS at 08:21

## 2020-08-31 RX ADMIN — VERAPAMIL HYDROCHLORIDE 2.5 MG: 2.5 INJECTION INTRAVENOUS at 08:15

## 2020-08-31 RX ADMIN — LIDOCAINE HYDROCHLORIDE 1 ML: 10 INJECTION, SOLUTION EPIDURAL; INFILTRATION; INTRACAUDAL; PERINEURAL at 08:05

## 2020-08-31 RX ADMIN — FENTANYL CITRATE 25 MCG: 50 INJECTION, SOLUTION INTRAMUSCULAR; INTRAVENOUS at 08:11

## 2020-08-31 NOTE — DISCHARGE INSTRUCTIONS
1  Please see the post cardiac catheterization dishcarge instructions  No heavy lifting, greater than 10 lbs  or strenuous  activity for 48 hrs  2 Remove band aid tomorrow  Shower and wash area- wrist gently with soap and water- beginning tomorrow  Rinse and pat dry  Apply new water seal band aid  Repeat this process for 5 days  No powders, creams lotions or antibiotic ointments  for 5 days  No tub baths, hot tubs or swimming for 5 days  3  Please call our office ([phone]) if you have any fever, redness, swelling, discharge from your wrist access site  4 No driving for 1 day    =====================================================================================================================================================    Left Heart Catheterization   WHAT YOU NEED TO KNOW:   A left heart catheterization is a procedure to look at your heart and its arteries  You may need this procedure if you have chest pain, heart disease, or your heart is not working as it should  DISCHARGE INSTRUCTIONS:   Follow up with your healthcare provider as directed:  Write down your questions so you remember to ask them during your visits  Limit activity as directed:   · Avoid unnecessary stair climbing for 48 hours, if a catheter was put in your groin  · Do not place pressure on your arm, hand, or wrist, if the catheter was placed in your wrist  Avoid pushing, pulling, or heavy lifting with that arm  · If you need to cough, support the area where the catheter was inserted with your hand  · Ask your healthcare provider how long you need to limit movement and avoid certain activities  · You may feel like resting more after your procedure  Slowly start to do more each day  Rest when you feel it is needed  Drink liquids as directed:  Liquids help flush the dye used for your procedure out of your body  Ask your healthcare provider how much liquid to drink each day, and which liquids to drink  Some foods, such as soup and fruit, also provide liquid  Wound care:  Ask your healthcare provider about how to care for your incision wound  Ask when you can get into a tub, shower, or pool  Contact your healthcare provider if:   · You have a fever  · The skin around your wound is red, swollen, or has pus coming from it  · You have trouble breathing, or your skin is itchy, swollen, or has a rash  · You have questions or concerns about your condition or care  Seek care immediately or call 911 if:   · The area where the catheter was placed is swollen and filled with blood or is bleeding  · The leg or arm used for the procedure becomes numb or turns white or blue  · You feel lightheaded, short of breath, and have chest pain  · You cough up blood  · You have any of the following signs of a heart attack:      ¨ Squeezing, pressure, or pain in your chest that lasts longer than 5 minutes or returns    ¨ Discomfort or pain in your back, neck, jaw, stomach, or arm     ¨ Trouble breathing    ¨ Nausea or vomiting    ¨ Lightheadedness or a sudden cold sweat, especially with chest pain or trouble breathing    · Your arm or leg feels warm, tender, and painful  It may look swollen and red  · You have any of the following signs of a stroke:     ¨ Part of your face droops or is numb    ¨ Weakness in an arm or leg    ¨ Confusion or difficulty speaking    ¨ Dizziness, a severe headache, or vision loss  © 2017 [facility] Information is for End User's use only and may not be sold, redistributed or otherwise used for commercial purposes  All illustrations and images included in CareNotes® are the copyrighted property of A D A M , Inc  or Madhu Gerber  The above information is an  only  It is not intended as medical advice for individual conditions or treatments   Talk to your doctor, nurse or pharmacist before following any medical regimen to see if it is safe and effective for you

## 2020-08-31 NOTE — PROGRESS NOTES
Received and completed disability forms [facility]  Forms were reviewed and signed by Dr Brenda Pascal, faxed to Monico Hardin at [phone]

## 2020-08-31 NOTE — INTERVAL H&P NOTE
H&P reviewed  After examining the patient, I find no changed to the H&P since it had been written  There were no vitals taken for this visit  Patient re-evaluated   Accept as history and physical     Cassidy Triana MD

## 2020-09-01 ENCOUNTER — OFFICE VISIT (OUTPATIENT)
Dept: DIABETES SERVICES | Facility: CLINIC | Age: 55
End: 2020-09-01

## 2020-09-01 DIAGNOSIS — E10.311 TYPE 1 DIABETES MELLITUS WITH RETINOPATHY OF BOTH EYES AND MACULAR EDEMA, UNSPECIFIED RETINOPATHY SEVERITY (HCC): ICD-10-CM

## 2020-09-01 PROCEDURE — MCGMST CGM TRAINING

## 2020-09-01 PROCEDURE — MSTARU PUMP START TRAINING-UPGRADE, NEW PLATFORM

## 2020-09-01 RX ORDER — HEPARIN SODIUM 1000 [USP'U]/ML
400 INJECTION, SOLUTION INTRAVENOUS; SUBCUTANEOUS ONCE
Status: CANCELLED | OUTPATIENT
Start: 2020-09-02

## 2020-09-01 RX ORDER — HEPARIN SODIUM 1000 [USP'U]/ML
10000 INJECTION, SOLUTION INTRAVENOUS; SUBCUTANEOUS ONCE
Status: CANCELLED | OUTPATIENT
Start: 2020-09-02

## 2020-09-01 NOTE — PATIENT INSTRUCTIONS
Calibration on day 1 of sensor 2 hours when prompted, then with in 6 hours then at least every 12 hours  Calibrate at least every 12 hours daily   Sensor is not approved for bolusing, meter is to be used for the meal bolus  Call and schedule follow up appt  Change infusion set and reservoir every 3 days  Set up a carelink account after set up accept invite to endo office

## 2020-09-01 NOTE — PRE-PROCEDURE INSTRUCTIONS
Pre-Surgery Instructions:   Medication Instructions    amoxicillin (AMOXIL) 500 mg capsule Patient was instructed by Physician and understands   aspirin (ECOTRIN LOW STRENGTH) 81 mg EC tablet Patient was instructed by Physician and understands   atorvastatin (LIPITOR) 80 mg tablet Patient was instructed by Physician and understands   Cholecalciferol (VITAMIN D) 2000 units CAPS Patient was instructed by Physician and understands   hydrOXYzine HCL (ATARAX) 25 mg tablet Patient was instructed by Physician and understands   ibuprofen (MOTRIN) 800 mg tablet Patient was instructed by Physician and understands   Insulin Infusion Pump KIT Patient was instructed by Physician and understands   insulin lispro (HumaLOG) 100 units/mL injection Patient was instructed by Physician and understands   lisinopril (ZESTRIL) 20 mg tablet Patient was instructed by Physician and understands   mupirocin (BACTROBAN) 2 % ointment Patient was instructed by Physician and understands   omeprazole (PriLOSEC) 20 mg delayed release capsule Patient was instructed by Physician and understands   QUEtiapine (SEROquel) 25 mg tablet Patient was instructed by Physician and understands   sertraline (ZOLOFT) 100 mg tablet Patient was instructed by Physician and understands   sildenafil (VIAGRA) 50 MG tablet Patient was instructed by Physician and understands   zolpidem (AMBIEN) 5 mg tablet Patient was instructed by Physician and understands  Review with patient via phone medications and showering instructions given by surgeon office   Verbalized understanding

## 2020-09-01 NOTE — PROGRESS NOTES
Shahnaz Lopez was seen today for pump upgrade from a Medtronic Paradigm to a Medtronic 670G with guardian sensor  He is scheduled for surgery tomorrow and was interested in having the new pump with the sensor  He had transferred his setting from his pump to his new pump with assistance from [employer]  We added his 2nd I:C at 7 pm  ratio that was not transfer over  All other setting were correct  He was able to fill the reservoir, and insert his infusion set, he was instruct in administering a bolus dose, status screen and stopping and resuming his pump  His new pump was entered into the [employer] portal and he was given written instructions on how to set up a [employer] account on his home computer  He was sent an invite to connect with the endo office  Education was completed according to the checklist     Medtronic Guardian CGM Education    Vielka Oropeza completed CGM training on the Medtronic Guardian Sensor  Completed all aspects of training per [employer] Training Enoch Newell left the office wearing the sensor, in warm up mode  Pt verbalizes/demonstrated understanding of calibrating, CGM trends, Sensor Glucose vs Blood glucose, troubleshooting site/ taping issues, site rotation, alarms, proper sensor insertion  Shahnaz Lopez demonstrates correct insertion and use of sensor  Entered sensor settings per patient preference and clinical judgment, Shahnaz Lopez knows how to change settings and can do that at home if alarms are too frequent or not frequent enough  Discussed the importance of using the linking meter for most accurate results  Shahnaz Lopez understands that insulin dosing needs to based off of finger stick readings, not sensor readings at this time  Understands Suspend Delivery feature, is choosing on to use it at this time  Sensor and transmitter inserted by patient, prepped with alcohol pad into abdomen with correct technique  No bleeding or irritation noted at site  Device taped properly and secure  Elieser tolerated procedure well, no complaint pain at site  Sensor connected, 2 hour calibration countdown working properly  Following setting were entered into his pump - smart guard turned on -                                                                              High alert  -   300                                                                              Low alert -   On   70                                                                                               12 am - 6 am -  Suspend on low                                                                                                6 am - 12 am -  Alert before low                                                                                                                          Suspend on low                                                                                                 Snooze low - 20 minutes  Sami Robby will benefit from follow up education on the sensor  He will need to return for the auto mode education after he is recuperated from surgery

## 2020-09-02 ENCOUNTER — ANESTHESIA (OUTPATIENT)
Dept: PERIOP | Facility: HOSPITAL | Age: 55
DRG: 220 | End: 2020-09-02
Payer: COMMERCIAL

## 2020-09-02 ENCOUNTER — APPOINTMENT (OUTPATIENT)
Dept: NON INVASIVE DIAGNOSTICS | Facility: HOSPITAL | Age: 55
DRG: 220 | End: 2020-09-02
Payer: COMMERCIAL

## 2020-09-02 ENCOUNTER — APPOINTMENT (INPATIENT)
Dept: RADIOLOGY | Facility: HOSPITAL | Age: 55
DRG: 220 | End: 2020-09-02
Payer: COMMERCIAL

## 2020-09-02 ENCOUNTER — ANESTHESIA EVENT (OUTPATIENT)
Dept: PERIOP | Facility: HOSPITAL | Age: 55
DRG: 220 | End: 2020-09-02
Payer: COMMERCIAL

## 2020-09-02 ENCOUNTER — HOSPITAL ENCOUNTER (INPATIENT)
Facility: HOSPITAL | Age: 55
LOS: 8 days | Discharge: HOME HEALTH | DRG: 220 | End: 2020-09-10
Attending: THORACIC SURGERY (CARDIOTHORACIC VASCULAR SURGERY) | Admitting: THORACIC SURGERY (CARDIOTHORACIC VASCULAR SURGERY)
Payer: COMMERCIAL

## 2020-09-02 DIAGNOSIS — I35.0 NONRHEUMATIC AORTIC VALVE STENOSIS: ICD-10-CM

## 2020-09-02 DIAGNOSIS — Z95.2 S/P AVR (AORTIC VALVE REPLACEMENT): ICD-10-CM

## 2020-09-02 DIAGNOSIS — R11.10 VOMITING: ICD-10-CM

## 2020-09-02 DIAGNOSIS — E10.311 TYPE 1 DIABETES MELLITUS WITH RETINOPATHY OF BOTH EYES AND MACULAR EDEMA, UNSPECIFIED RETINOPATHY SEVERITY (HCC): ICD-10-CM

## 2020-09-02 DIAGNOSIS — R09.02 HYPOXIA: ICD-10-CM

## 2020-09-02 DIAGNOSIS — Q23.0 AORTIC STENOSIS DUE TO BICUSPID AORTIC VALVE: Chronic | ICD-10-CM

## 2020-09-02 DIAGNOSIS — Z95.1 S/P CABG X 3: Primary | ICD-10-CM

## 2020-09-02 DIAGNOSIS — I35.9 NONRHEUMATIC AORTIC VALVE DISORDER: ICD-10-CM

## 2020-09-02 DIAGNOSIS — K92.0 HEMATEMESIS WITH NAUSEA: ICD-10-CM

## 2020-09-02 DIAGNOSIS — I25.10 CORONARY ARTERY DISEASE INVOLVING NATIVE CORONARY ARTERY OF NATIVE HEART WITHOUT ANGINA PECTORIS: ICD-10-CM

## 2020-09-02 DIAGNOSIS — Q23.1 AORTIC STENOSIS DUE TO BICUSPID AORTIC VALVE: Chronic | ICD-10-CM

## 2020-09-02 DIAGNOSIS — J95.2 ACUTE POSTOPERATIVE PULMONARY INSUFFICIENCY (HCC): ICD-10-CM

## 2020-09-02 PROBLEM — Q23.81 AORTIC STENOSIS DUE TO BICUSPID AORTIC VALVE: Chronic | Status: ACTIVE | Noted: 2020-09-02

## 2020-09-02 PROBLEM — Z86.2 HISTORY OF ITP: Chronic | Status: ACTIVE | Noted: 2020-09-02

## 2020-09-02 PROBLEM — Z95.5 HISTORY OF CORONARY ANGIOPLASTY WITH INSERTION OF STENT: Chronic | Status: ACTIVE | Noted: 2020-09-02

## 2020-09-02 LAB
ABO GROUP BLD: NORMAL
ANION GAP SERPL CALCULATED.3IONS-SCNC: 3 MMOL/L (ref 4–13)
APTT PPP: 28 SECONDS (ref 23–37)
ATRIAL RATE: 76 BPM
BASE EXCESS BLDA CALC-SCNC: -1 MMOL/L (ref -2–3)
BASE EXCESS BLDA CALC-SCNC: -2 MMOL/L (ref -2–3)
BASE EXCESS BLDA CALC-SCNC: -2 MMOL/L (ref -2–3)
BASE EXCESS BLDA CALC-SCNC: 0 MMOL/L (ref -2–3)
BASE EXCESS BLDA CALC-SCNC: 0 MMOL/L (ref -2–3)
BASE EXCESS BLDA CALC-SCNC: 1 MMOL/L (ref -2–3)
BASE EXCESS BLDA CALC-SCNC: 4 MMOL/L (ref -2–3)
BLD GP AB SCN SERPL QL: NEGATIVE
BUN SERPL-MCNC: 7 MG/DL (ref 5–25)
CA-I BLD-SCNC: 1.03 MMOL/L (ref 1.12–1.32)
CA-I BLD-SCNC: 1.03 MMOL/L (ref 1.12–1.32)
CA-I BLD-SCNC: 1.05 MMOL/L (ref 1.12–1.32)
CA-I BLD-SCNC: 1.06 MMOL/L (ref 1.12–1.32)
CA-I BLD-SCNC: 1.1 MMOL/L (ref 1.12–1.32)
CA-I BLD-SCNC: 1.18 MMOL/L (ref 1.12–1.32)
CA-I BLD-SCNC: 1.19 MMOL/L (ref 1.12–1.32)
CA-I BLD-SCNC: 1.2 MMOL/L (ref 1.12–1.32)
CALCIUM SERPL-MCNC: 7.6 MG/DL (ref 8.3–10.1)
CHLORIDE SERPL-SCNC: 116 MMOL/L (ref 100–108)
CO2 SERPL-SCNC: 24 MMOL/L (ref 21–32)
CREAT SERPL-MCNC: 0.49 MG/DL (ref 0.6–1.3)
FIBRINOGEN PPP-MCNC: 291 MG/DL (ref 227–495)
GFR SERPL CREATININE-BSD FRML MDRD: 124 ML/MIN/1.73SQ M
GLUCOSE SERPL-MCNC: 103 MG/DL (ref 65–140)
GLUCOSE SERPL-MCNC: 105 MG/DL (ref 65–140)
GLUCOSE SERPL-MCNC: 107 MG/DL (ref 65–140)
GLUCOSE SERPL-MCNC: 113 MG/DL (ref 65–140)
GLUCOSE SERPL-MCNC: 127 MG/DL (ref 65–140)
GLUCOSE SERPL-MCNC: 128 MG/DL (ref 65–140)
GLUCOSE SERPL-MCNC: 140 MG/DL (ref 65–140)
GLUCOSE SERPL-MCNC: 148 MG/DL (ref 65–140)
GLUCOSE SERPL-MCNC: 148 MG/DL (ref 65–140)
GLUCOSE SERPL-MCNC: 149 MG/DL (ref 65–140)
GLUCOSE SERPL-MCNC: 151 MG/DL (ref 65–140)
GLUCOSE SERPL-MCNC: 159 MG/DL (ref 65–140)
GLUCOSE SERPL-MCNC: 161 MG/DL (ref 65–140)
GLUCOSE SERPL-MCNC: 162 MG/DL (ref 65–140)
GLUCOSE SERPL-MCNC: 166 MG/DL (ref 65–140)
GLUCOSE SERPL-MCNC: 169 MG/DL (ref 65–140)
GLUCOSE SERPL-MCNC: 176 MG/DL (ref 65–140)
GLUCOSE SERPL-MCNC: 84 MG/DL (ref 65–140)
GLUCOSE SERPL-MCNC: 93 MG/DL (ref 65–140)
HCO3 BLDA-SCNC: 24.1 MMOL/L (ref 22–28)
HCO3 BLDA-SCNC: 24.2 MMOL/L (ref 24–30)
HCO3 BLDA-SCNC: 24.4 MMOL/L (ref 22–28)
HCO3 BLDA-SCNC: 25 MMOL/L (ref 22–28)
HCO3 BLDA-SCNC: 25.4 MMOL/L (ref 22–28)
HCO3 BLDA-SCNC: 26 MMOL/L (ref 22–28)
HCO3 BLDA-SCNC: 26.3 MMOL/L (ref 22–28)
HCO3 BLDA-SCNC: 26.6 MMOL/L (ref 22–28)
HCO3 BLDA-SCNC: 28 MMOL/L (ref 22–28)
HCT VFR BLD AUTO: 26 % (ref 36.5–49.3)
HCT VFR BLD AUTO: 33.2 % (ref 36.5–49.3)
HCT VFR BLD AUTO: 34.7 % (ref 36.5–49.3)
HCT VFR BLD CALC: 25 % (ref 36.5–49.3)
HCT VFR BLD CALC: 25 % (ref 36.5–49.3)
HCT VFR BLD CALC: 26 % (ref 36.5–49.3)
HCT VFR BLD CALC: 27 % (ref 36.5–49.3)
HCT VFR BLD CALC: 28 % (ref 36.5–49.3)
HCT VFR BLD CALC: 32 % (ref 36.5–49.3)
HCT VFR BLD CALC: 32 % (ref 36.5–49.3)
HCT VFR BLD CALC: 33 % (ref 36.5–49.3)
HGB BLD-MCNC: 11.1 G/DL (ref 12–17)
HGB BLD-MCNC: 11.5 G/DL (ref 12–17)
HGB BLD-MCNC: 8.4 G/DL (ref 12–17)
HGB BLDA-MCNC: 10.9 G/DL (ref 12–17)
HGB BLDA-MCNC: 10.9 G/DL (ref 12–17)
HGB BLDA-MCNC: 11.2 G/DL (ref 12–17)
HGB BLDA-MCNC: 8.5 G/DL (ref 12–17)
HGB BLDA-MCNC: 8.5 G/DL (ref 12–17)
HGB BLDA-MCNC: 8.8 G/DL (ref 12–17)
HGB BLDA-MCNC: 9.2 G/DL (ref 12–17)
HGB BLDA-MCNC: 9.5 G/DL (ref 12–17)
INR PPP: 1.25 (ref 0.84–1.19)
KCT BLD-ACNC: 127 SEC (ref 89–137)
KCT BLD-ACNC: 138 SEC (ref 89–137)
KCT BLD-ACNC: 460 SEC (ref 89–137)
KCT BLD-ACNC: 472 SEC (ref 89–137)
KCT BLD-ACNC: 478 SEC (ref 89–137)
KCT BLD-ACNC: 478 SEC (ref 89–137)
KCT BLD-ACNC: 484 SEC (ref 89–137)
KCT BLD-ACNC: 543 SEC (ref 89–137)
KCT BLD-ACNC: 543 SEC (ref 89–137)
KCT BLD-ACNC: 611 SEC (ref 89–137)
P AXIS: 68 DEGREES
PCO2 BLD: 26 MMOL/L (ref 21–32)
PCO2 BLD: 27 MMOL/L (ref 21–32)
PCO2 BLD: 27 MMOL/L (ref 21–32)
PCO2 BLD: 28 MMOL/L (ref 21–32)
PCO2 BLD: 28 MMOL/L (ref 21–32)
PCO2 BLD: 29 MMOL/L (ref 21–32)
PCO2 BLD: 37.7 MM HG (ref 36–44)
PCO2 BLD: 40.4 MM HG (ref 36–44)
PCO2 BLD: 42.5 MM HG (ref 36–44)
PCO2 BLD: 42.5 MM HG (ref 42–50)
PCO2 BLD: 42.6 MM HG (ref 36–44)
PCO2 BLD: 45.7 MM HG (ref 36–44)
PCO2 BLD: 45.7 MM HG (ref 36–44)
PCO2 BLD: 47.4 MM HG (ref 36–44)
PCO2 BLD: 48.3 MM HG (ref 36–44)
PH BLD: 7.32 [PH] (ref 7.35–7.45)
PH BLD: 7.33 [PH] (ref 7.35–7.45)
PH BLD: 7.35 [PH] (ref 7.35–7.45)
PH BLD: 7.36 [PH] (ref 7.3–7.4)
PH BLD: 7.37 [PH] (ref 7.35–7.45)
PH BLD: 7.38 [PH] (ref 7.35–7.45)
PH BLD: 7.38 [PH] (ref 7.35–7.45)
PH BLD: 7.42 [PH] (ref 7.35–7.45)
PH BLD: 7.48 [PH] (ref 7.35–7.45)
PLATELET # BLD AUTO: 184 THOUSANDS/UL (ref 149–390)
PLATELET # BLD AUTO: 188 THOUSANDS/UL (ref 149–390)
PLATELET # BLD AUTO: 252 THOUSANDS/UL (ref 149–390)
PMV BLD AUTO: 10.2 FL (ref 8.9–12.7)
PMV BLD AUTO: 10.3 FL (ref 8.9–12.7)
PMV BLD AUTO: 10.7 FL (ref 8.9–12.7)
PO2 BLD: 147 MM HG (ref 75–129)
PO2 BLD: 242 MM HG (ref 75–129)
PO2 BLD: 297 MM HG (ref 75–129)
PO2 BLD: 346 MM HG (ref 75–129)
PO2 BLD: 371 MM HG (ref 75–129)
PO2 BLD: 38 MM HG (ref 35–45)
PO2 BLD: 38 MM HG (ref 35–45)
PO2 BLD: 383 MM HG (ref 75–129)
PO2 BLD: >400 MM HG (ref 75–129)
PO2 BLD: >400 MM HG (ref 75–129)
POTASSIUM BLD-SCNC: 3.3 MMOL/L (ref 3.5–5.3)
POTASSIUM BLD-SCNC: 3.5 MMOL/L (ref 3.5–5.3)
POTASSIUM BLD-SCNC: 3.5 MMOL/L (ref 3.5–5.3)
POTASSIUM BLD-SCNC: 3.6 MMOL/L (ref 3.5–5.3)
POTASSIUM BLD-SCNC: 3.7 MMOL/L (ref 3.5–5.3)
POTASSIUM BLD-SCNC: 3.8 MMOL/L (ref 3.5–5.3)
POTASSIUM BLD-SCNC: 4.4 MMOL/L (ref 3.5–5.3)
POTASSIUM BLD-SCNC: 4.4 MMOL/L (ref 3.5–5.3)
POTASSIUM BLD-SCNC: 4.5 MMOL/L (ref 3.5–5.3)
POTASSIUM BLD-SCNC: 4.6 MMOL/L (ref 3.5–5.3)
POTASSIUM SERPL-SCNC: 4.4 MMOL/L (ref 3.5–5.3)
POTASSIUM SERPL-SCNC: 4.4 MMOL/L (ref 3.5–5.3)
POTASSIUM SERPL-SCNC: 4.6 MMOL/L (ref 3.5–5.3)
PR INTERVAL: 175 MS
PROTHROMBIN TIME: 15.7 SECONDS (ref 11.6–14.5)
QRS AXIS: 79 DEGREES
QRSD INTERVAL: 121 MS
QT INTERVAL: 438 MS
QTC INTERVAL: 493 MS
RH BLD: POSITIVE
SAO2 % BLD FROM PO2: 100 % (ref 60–85)
SAO2 % BLD FROM PO2: 69 % (ref 60–85)
SAO2 % BLD FROM PO2: 99 % (ref 60–85)
SODIUM BLD-SCNC: 138 MMOL/L (ref 136–145)
SODIUM BLD-SCNC: 138 MMOL/L (ref 136–145)
SODIUM BLD-SCNC: 139 MMOL/L (ref 136–145)
SODIUM BLD-SCNC: 140 MMOL/L (ref 136–145)
SODIUM BLD-SCNC: 141 MMOL/L (ref 136–145)
SODIUM BLD-SCNC: 143 MMOL/L (ref 136–145)
SODIUM BLD-SCNC: 143 MMOL/L (ref 136–145)
SODIUM SERPL-SCNC: 143 MMOL/L (ref 136–145)
SPECIMEN EXPIRATION DATE: NORMAL
SPECIMEN SOURCE: ABNORMAL
SPECIMEN SOURCE: NORMAL
T WAVE AXIS: -79 DEGREES
VENTRICULAR RATE: 76 BPM

## 2020-09-02 PROCEDURE — 85014 HEMATOCRIT: CPT | Performed by: PHYSICIAN ASSISTANT

## 2020-09-02 PROCEDURE — 06BQ4ZZ EXCISION OF LEFT SAPHENOUS VEIN, PERCUTANEOUS ENDOSCOPIC APPROACH: ICD-10-PCS | Performed by: THORACIC SURGERY (CARDIOTHORACIC VASCULAR SURGERY)

## 2020-09-02 PROCEDURE — 82947 ASSAY GLUCOSE BLOOD QUANT: CPT

## 2020-09-02 PROCEDURE — 021109W BYPASS CORONARY ARTERY, TWO ARTERIES FROM AORTA WITH AUTOLOGOUS VENOUS TISSUE, OPEN APPROACH: ICD-10-PCS | Performed by: THORACIC SURGERY (CARDIOTHORACIC VASCULAR SURGERY)

## 2020-09-02 PROCEDURE — 84295 ASSAY OF SERUM SODIUM: CPT

## 2020-09-02 PROCEDURE — 86900 BLOOD TYPING SEROLOGIC ABO: CPT | Performed by: THORACIC SURGERY (CARDIOTHORACIC VASCULAR SURGERY)

## 2020-09-02 PROCEDURE — 85018 HEMOGLOBIN: CPT | Performed by: PHYSICIAN ASSISTANT

## 2020-09-02 PROCEDURE — 5A1223Z PERFORMANCE OF CARDIAC PACING, CONTINUOUS: ICD-10-PCS | Performed by: THORACIC SURGERY (CARDIOTHORACIC VASCULAR SURGERY)

## 2020-09-02 PROCEDURE — 82948 REAGENT STRIP/BLOOD GLUCOSE: CPT

## 2020-09-02 PROCEDURE — 94002 VENT MGMT INPAT INIT DAY: CPT

## 2020-09-02 PROCEDURE — 88305 TISSUE EXAM BY PATHOLOGIST: CPT | Performed by: PATHOLOGY

## 2020-09-02 PROCEDURE — 85049 AUTOMATED PLATELET COUNT: CPT | Performed by: PHYSICIAN ASSISTANT

## 2020-09-02 PROCEDURE — 33518 CABG ARTERY-VEIN TWO: CPT | Performed by: THORACIC SURGERY (CARDIOTHORACIC VASCULAR SURGERY)

## 2020-09-02 PROCEDURE — 86901 BLOOD TYPING SEROLOGIC RH(D): CPT | Performed by: THORACIC SURGERY (CARDIOTHORACIC VASCULAR SURGERY)

## 2020-09-02 PROCEDURE — 88313 SPECIAL STAINS GROUP 2: CPT | Performed by: PATHOLOGY

## 2020-09-02 PROCEDURE — 33533 CABG ARTERIAL SINGLE: CPT | Performed by: THORACIC SURGERY (CARDIOTHORACIC VASCULAR SURGERY)

## 2020-09-02 PROCEDURE — 86923 COMPATIBILITY TEST ELECTRIC: CPT

## 2020-09-02 PROCEDURE — 02RF08Z REPLACEMENT OF AORTIC VALVE WITH ZOOPLASTIC TISSUE, OPEN APPROACH: ICD-10-PCS | Performed by: THORACIC SURGERY (CARDIOTHORACIC VASCULAR SURGERY)

## 2020-09-02 PROCEDURE — 93312 ECHO TRANSESOPHAGEAL: CPT

## 2020-09-02 PROCEDURE — 82803 BLOOD GASES ANY COMBINATION: CPT

## 2020-09-02 PROCEDURE — 33533 CABG ARTERIAL SINGLE: CPT | Performed by: PHYSICIAN ASSISTANT

## 2020-09-02 PROCEDURE — 85384 FIBRINOGEN ACTIVITY: CPT | Performed by: PHYSICIAN ASSISTANT

## 2020-09-02 PROCEDURE — 88311 DECALCIFY TISSUE: CPT | Performed by: PATHOLOGY

## 2020-09-02 PROCEDURE — 33508 ENDOSCOPIC VEIN HARVEST: CPT | Performed by: PHYSICIAN ASSISTANT

## 2020-09-02 PROCEDURE — 33508 ENDOSCOPIC VEIN HARVEST: CPT | Performed by: THORACIC SURGERY (CARDIOTHORACIC VASCULAR SURGERY)

## 2020-09-02 PROCEDURE — 02100Z9 BYPASS CORONARY ARTERY, ONE ARTERY FROM LEFT INTERNAL MAMMARY, OPEN APPROACH: ICD-10-PCS | Performed by: THORACIC SURGERY (CARDIOTHORACIC VASCULAR SURGERY)

## 2020-09-02 PROCEDURE — 82330 ASSAY OF CALCIUM: CPT

## 2020-09-02 PROCEDURE — 85018 HEMOGLOBIN: CPT | Performed by: THORACIC SURGERY (CARDIOTHORACIC VASCULAR SURGERY)

## 2020-09-02 PROCEDURE — 86850 RBC ANTIBODY SCREEN: CPT | Performed by: THORACIC SURGERY (CARDIOTHORACIC VASCULAR SURGERY)

## 2020-09-02 PROCEDURE — 33405 REPLACEMENT AORTIC VALVE OPN: CPT | Performed by: THORACIC SURGERY (CARDIOTHORACIC VASCULAR SURGERY)

## 2020-09-02 PROCEDURE — 85014 HEMATOCRIT: CPT

## 2020-09-02 PROCEDURE — 93005 ELECTROCARDIOGRAM TRACING: CPT

## 2020-09-02 PROCEDURE — 80048 BASIC METABOLIC PNL TOTAL CA: CPT | Performed by: PHYSICIAN ASSISTANT

## 2020-09-02 PROCEDURE — NC001 PR NO CHARGE: Performed by: PHYSICIAN ASSISTANT

## 2020-09-02 PROCEDURE — 33518 CABG ARTERY-VEIN TWO: CPT | Performed by: PHYSICIAN ASSISTANT

## 2020-09-02 PROCEDURE — 85049 AUTOMATED PLATELET COUNT: CPT | Performed by: THORACIC SURGERY (CARDIOTHORACIC VASCULAR SURGERY)

## 2020-09-02 PROCEDURE — 71045 X-RAY EXAM CHEST 1 VIEW: CPT

## 2020-09-02 PROCEDURE — 94760 N-INVAS EAR/PLS OXIMETRY 1: CPT

## 2020-09-02 PROCEDURE — 85347 COAGULATION TIME ACTIVATED: CPT

## 2020-09-02 PROCEDURE — 85014 HEMATOCRIT: CPT | Performed by: THORACIC SURGERY (CARDIOTHORACIC VASCULAR SURGERY)

## 2020-09-02 PROCEDURE — 33405 REPLACEMENT AORTIC VALVE OPN: CPT | Performed by: PHYSICIAN ASSISTANT

## 2020-09-02 PROCEDURE — 85610 PROTHROMBIN TIME: CPT | Performed by: PHYSICIAN ASSISTANT

## 2020-09-02 PROCEDURE — 93010 ELECTROCARDIOGRAM REPORT: CPT | Performed by: INTERNAL MEDICINE

## 2020-09-02 PROCEDURE — 99223 1ST HOSP IP/OBS HIGH 75: CPT | Performed by: ANESTHESIOLOGY

## 2020-09-02 PROCEDURE — 02HV33Z INSERTION OF INFUSION DEVICE INTO SUPERIOR VENA CAVA, PERCUTANEOUS APPROACH: ICD-10-PCS | Performed by: ANESTHESIOLOGY

## 2020-09-02 PROCEDURE — 5A1221Z PERFORMANCE OF CARDIAC OUTPUT, CONTINUOUS: ICD-10-PCS | Performed by: THORACIC SURGERY (CARDIOTHORACIC VASCULAR SURGERY)

## 2020-09-02 PROCEDURE — 84132 ASSAY OF SERUM POTASSIUM: CPT | Performed by: PHYSICIAN ASSISTANT

## 2020-09-02 PROCEDURE — 84132 ASSAY OF SERUM POTASSIUM: CPT

## 2020-09-02 PROCEDURE — 85730 THROMBOPLASTIN TIME PARTIAL: CPT | Performed by: PHYSICIAN ASSISTANT

## 2020-09-02 DEVICE — MARKER CORONARY BYPASS VOSS GRAFT: Type: IMPLANTABLE DEVICE | Site: AORTA | Status: FUNCTIONAL

## 2020-09-02 DEVICE — VALVE AORTIC INSPIRIS RESILIA 25MM: Type: IMPLANTABLE DEVICE | Site: HEART | Status: FUNCTIONAL

## 2020-09-02 RX ORDER — OXYCODONE HYDROCHLORIDE 5 MG/1
2.5 TABLET ORAL EVERY 4 HOURS PRN
Status: DISCONTINUED | OUTPATIENT
Start: 2020-09-02 | End: 2020-09-03

## 2020-09-02 RX ORDER — POTASSIUM CHLORIDE 14.9 MG/ML
20 INJECTION INTRAVENOUS
Status: DISCONTINUED | OUTPATIENT
Start: 2020-09-02 | End: 2020-09-03

## 2020-09-02 RX ORDER — LIDOCAINE 50 MG/G
3 PATCH TOPICAL DAILY
Status: DISCONTINUED | OUTPATIENT
Start: 2020-09-02 | End: 2020-09-10 | Stop reason: HOSPADM

## 2020-09-02 RX ORDER — QUETIAPINE FUMARATE 25 MG/1
25 TABLET, FILM COATED ORAL
Status: DISCONTINUED | OUTPATIENT
Start: 2020-09-02 | End: 2020-09-10 | Stop reason: HOSPADM

## 2020-09-02 RX ORDER — CEFAZOLIN SODIUM 2 G/50ML
SOLUTION INTRAVENOUS AS NEEDED
Status: DISCONTINUED | OUTPATIENT
Start: 2020-09-02 | End: 2020-09-02

## 2020-09-02 RX ORDER — FUROSEMIDE 10 MG/ML
40 INJECTION INTRAMUSCULAR; INTRAVENOUS EVERY 6 HOURS PRN
Status: DISCONTINUED | OUTPATIENT
Start: 2020-09-02 | End: 2020-09-03

## 2020-09-02 RX ORDER — SODIUM CHLORIDE 450 MG/100ML
20 INJECTION, SOLUTION INTRAVENOUS CONTINUOUS
Status: DISCONTINUED | OUTPATIENT
Start: 2020-09-02 | End: 2020-09-08

## 2020-09-02 RX ORDER — CHLORHEXIDINE GLUCONATE 0.12 MG/ML
15 RINSE ORAL EVERY 12 HOURS SCHEDULED
Status: DISCONTINUED | OUTPATIENT
Start: 2020-09-02 | End: 2020-09-02

## 2020-09-02 RX ORDER — ALBUMIN, HUMAN INJ 5% 5 %
SOLUTION INTRAVENOUS
Status: COMPLETED
Start: 2020-09-02 | End: 2020-09-02

## 2020-09-02 RX ORDER — PROMETHAZINE HYDROCHLORIDE 25 MG/ML
12.5 INJECTION, SOLUTION INTRAMUSCULAR; INTRAVENOUS ONCE
Status: COMPLETED | OUTPATIENT
Start: 2020-09-02 | End: 2020-09-02

## 2020-09-02 RX ORDER — HYDROMORPHONE HCL/PF 1 MG/ML
0.5 SYRINGE (ML) INJECTION ONCE
Status: COMPLETED | OUTPATIENT
Start: 2020-09-02 | End: 2020-09-02

## 2020-09-02 RX ORDER — PROTAMINE SULFATE 10 MG/ML
INJECTION, SOLUTION INTRAVENOUS AS NEEDED
Status: DISCONTINUED | OUTPATIENT
Start: 2020-09-02 | End: 2020-09-02

## 2020-09-02 RX ORDER — POTASSIUM CHLORIDE 14.9 MG/ML
20 INJECTION INTRAVENOUS ONCE AS NEEDED
Status: DISCONTINUED | OUTPATIENT
Start: 2020-09-02 | End: 2020-09-03

## 2020-09-02 RX ORDER — METOCLOPRAMIDE HYDROCHLORIDE 5 MG/ML
10 INJECTION INTRAMUSCULAR; INTRAVENOUS ONCE
Status: COMPLETED | OUTPATIENT
Start: 2020-09-02 | End: 2020-09-02

## 2020-09-02 RX ORDER — CEFAZOLIN SODIUM 2 G/50ML
2000 SOLUTION INTRAVENOUS ONCE
Status: DISCONTINUED | OUTPATIENT
Start: 2020-09-02 | End: 2020-09-02 | Stop reason: SDUPTHER

## 2020-09-02 RX ORDER — FONDAPARINUX SODIUM 2.5 MG/.5ML
2.5 INJECTION SUBCUTANEOUS DAILY
Status: DISCONTINUED | OUTPATIENT
Start: 2020-09-03 | End: 2020-09-10 | Stop reason: HOSPADM

## 2020-09-02 RX ORDER — CHLORHEXIDINE GLUCONATE 0.12 MG/ML
15 RINSE ORAL 2 TIMES DAILY
Status: DISCONTINUED | OUTPATIENT
Start: 2020-09-02 | End: 2020-09-03

## 2020-09-02 RX ORDER — ALBUMIN, HUMAN INJ 5% 5 %
SOLUTION INTRAVENOUS CONTINUOUS PRN
Status: DISCONTINUED | OUTPATIENT
Start: 2020-09-02 | End: 2020-09-02

## 2020-09-02 RX ORDER — ASPIRIN 325 MG
325 TABLET ORAL DAILY
Status: DISCONTINUED | OUTPATIENT
Start: 2020-09-02 | End: 2020-09-10 | Stop reason: HOSPADM

## 2020-09-02 RX ORDER — ACETAMINOPHEN 650 MG/1
650 SUPPOSITORY RECTAL EVERY 4 HOURS PRN
Status: DISCONTINUED | OUTPATIENT
Start: 2020-09-02 | End: 2020-09-03

## 2020-09-02 RX ORDER — FENTANYL CITRATE 50 UG/ML
INJECTION, SOLUTION INTRAMUSCULAR; INTRAVENOUS AS NEEDED
Status: DISCONTINUED | OUTPATIENT
Start: 2020-09-02 | End: 2020-09-02

## 2020-09-02 RX ORDER — POLYETHYLENE GLYCOL 3350 17 G/17G
17 POWDER, FOR SOLUTION ORAL DAILY
Status: DISCONTINUED | OUTPATIENT
Start: 2020-09-02 | End: 2020-09-10 | Stop reason: HOSPADM

## 2020-09-02 RX ORDER — ONDANSETRON 2 MG/ML
4 INJECTION INTRAMUSCULAR; INTRAVENOUS EVERY 6 HOURS PRN
Status: DISCONTINUED | OUTPATIENT
Start: 2020-09-02 | End: 2020-09-03

## 2020-09-02 RX ORDER — ALBUMIN, HUMAN INJ 5% 5 %
12.5 SOLUTION INTRAVENOUS ONCE
Status: COMPLETED | OUTPATIENT
Start: 2020-09-02 | End: 2020-09-02

## 2020-09-02 RX ORDER — VANCOMYCIN HYDROCHLORIDE 1 G/20ML
INJECTION, POWDER, LYOPHILIZED, FOR SOLUTION INTRAVENOUS AS NEEDED
Status: DISCONTINUED | OUTPATIENT
Start: 2020-09-02 | End: 2020-09-02 | Stop reason: HOSPADM

## 2020-09-02 RX ORDER — ACETAMINOPHEN 325 MG/1
975 TABLET ORAL EVERY 8 HOURS
Status: DISCONTINUED | OUTPATIENT
Start: 2020-09-02 | End: 2020-09-10 | Stop reason: HOSPADM

## 2020-09-02 RX ORDER — ONDANSETRON 2 MG/ML
4 INJECTION INTRAMUSCULAR; INTRAVENOUS ONCE
Status: COMPLETED | OUTPATIENT
Start: 2020-09-02 | End: 2020-09-02

## 2020-09-02 RX ORDER — MAGNESIUM SULFATE HEPTAHYDRATE 40 MG/ML
2 INJECTION, SOLUTION INTRAVENOUS ONCE
Status: DISCONTINUED | OUTPATIENT
Start: 2020-09-02 | End: 2020-09-06

## 2020-09-02 RX ORDER — HEPARIN SODIUM 1000 [USP'U]/ML
INJECTION, SOLUTION INTRAVENOUS; SUBCUTANEOUS AS NEEDED
Status: DISCONTINUED | OUTPATIENT
Start: 2020-09-02 | End: 2020-09-02

## 2020-09-02 RX ORDER — SODIUM CHLORIDE 9 MG/ML
INJECTION, SOLUTION INTRAVENOUS CONTINUOUS PRN
Status: DISCONTINUED | OUTPATIENT
Start: 2020-09-02 | End: 2020-09-02

## 2020-09-02 RX ORDER — FENTANYL CITRATE 50 UG/ML
50 INJECTION, SOLUTION INTRAMUSCULAR; INTRAVENOUS
Status: DISCONTINUED | OUTPATIENT
Start: 2020-09-02 | End: 2020-09-02

## 2020-09-02 RX ORDER — MIDAZOLAM HYDROCHLORIDE 2 MG/2ML
INJECTION, SOLUTION INTRAMUSCULAR; INTRAVENOUS AS NEEDED
Status: DISCONTINUED | OUTPATIENT
Start: 2020-09-02 | End: 2020-09-02

## 2020-09-02 RX ORDER — AMINOCAPROIC ACID 250 MG/ML
INJECTION, SOLUTION INTRAVENOUS AS NEEDED
Status: DISCONTINUED | OUTPATIENT
Start: 2020-09-02 | End: 2020-09-02

## 2020-09-02 RX ORDER — MAGNESIUM HYDROXIDE 1200 MG/15ML
LIQUID ORAL AS NEEDED
Status: DISCONTINUED | OUTPATIENT
Start: 2020-09-02 | End: 2020-09-02 | Stop reason: HOSPADM

## 2020-09-02 RX ORDER — ATORVASTATIN CALCIUM 80 MG/1
80 TABLET, FILM COATED ORAL
Status: DISCONTINUED | OUTPATIENT
Start: 2020-09-02 | End: 2020-09-10 | Stop reason: HOSPADM

## 2020-09-02 RX ORDER — CALCIUM CHLORIDE 100 MG/ML
1 INJECTION INTRAVENOUS; INTRAVENTRICULAR ONCE
Status: DISCONTINUED | OUTPATIENT
Start: 2020-09-02 | End: 2020-09-06

## 2020-09-02 RX ORDER — ROCURONIUM BROMIDE 10 MG/ML
INJECTION, SOLUTION INTRAVENOUS AS NEEDED
Status: DISCONTINUED | OUTPATIENT
Start: 2020-09-02 | End: 2020-09-02

## 2020-09-02 RX ORDER — BISACODYL 10 MG
10 SUPPOSITORY, RECTAL RECTAL DAILY PRN
Status: DISCONTINUED | OUTPATIENT
Start: 2020-09-02 | End: 2020-09-10 | Stop reason: HOSPADM

## 2020-09-02 RX ORDER — AMIODARONE HYDROCHLORIDE 200 MG/1
200 TABLET ORAL EVERY 8 HOURS SCHEDULED
Status: DISCONTINUED | OUTPATIENT
Start: 2020-09-02 | End: 2020-09-03

## 2020-09-02 RX ORDER — BUSPIRONE HYDROCHLORIDE 15 MG/1
TABLET ORAL
COMMUNITY
Start: 2020-08-19 | End: 2022-01-13 | Stop reason: HOSPADM

## 2020-09-02 RX ORDER — CEFAZOLIN SODIUM 2 G/50ML
2000 SOLUTION INTRAVENOUS EVERY 8 HOURS
Status: COMPLETED | OUTPATIENT
Start: 2020-09-02 | End: 2020-09-03

## 2020-09-02 RX ORDER — FENTANYL CITRATE 50 UG/ML
50 INJECTION, SOLUTION INTRAMUSCULAR; INTRAVENOUS ONCE
Status: DISCONTINUED | OUTPATIENT
Start: 2020-09-02 | End: 2020-09-02

## 2020-09-02 RX ORDER — PANTOPRAZOLE SODIUM 40 MG/1
40 TABLET, DELAYED RELEASE ORAL
Status: DISCONTINUED | OUTPATIENT
Start: 2020-09-03 | End: 2020-09-03

## 2020-09-02 RX ORDER — CEFAZOLIN SODIUM 2 G/50ML
2000 SOLUTION INTRAVENOUS ONCE
Status: DISCONTINUED | OUTPATIENT
Start: 2020-09-02 | End: 2020-09-02 | Stop reason: HOSPADM

## 2020-09-02 RX ORDER — MELATONIN
1000 DAILY
Status: DISCONTINUED | OUTPATIENT
Start: 2020-09-02 | End: 2020-09-10 | Stop reason: HOSPADM

## 2020-09-02 RX ORDER — CHLORHEXIDINE GLUCONATE 0.12 MG/ML
15 RINSE ORAL ONCE
Status: COMPLETED | OUTPATIENT
Start: 2020-09-02 | End: 2020-09-02

## 2020-09-02 RX ORDER — OXYCODONE HYDROCHLORIDE 5 MG/1
5 TABLET ORAL EVERY 4 HOURS PRN
Status: DISCONTINUED | OUTPATIENT
Start: 2020-09-02 | End: 2020-09-03

## 2020-09-02 RX ADMIN — ALBUMIN (HUMAN): 12.5 SOLUTION INTRAVENOUS at 11:57

## 2020-09-02 RX ADMIN — ALBUMIN, HUMAN INJ 5% 12.5 G: 5 SOLUTION at 18:15

## 2020-09-02 RX ADMIN — PROTAMINE SULFATE 250 MG: 10 INJECTION, SOLUTION INTRAVENOUS at 11:37

## 2020-09-02 RX ADMIN — CEFAZOLIN SODIUM 2000 MG: 2 SOLUTION INTRAVENOUS at 12:26

## 2020-09-02 RX ADMIN — SODIUM CHLORIDE 20 ML/HR: 0.45 INJECTION, SOLUTION INTRAVENOUS at 14:36

## 2020-09-02 RX ADMIN — ALBUMIN (HUMAN) 12.5 G: 12.5 INJECTION, SOLUTION INTRAVENOUS at 17:21

## 2020-09-02 RX ADMIN — OXYCODONE HYDROCHLORIDE 5 MG: 5 TABLET ORAL at 19:23

## 2020-09-02 RX ADMIN — AMINOCAPROIC ACID 5 G: 250 INJECTION, SOLUTION INTRAVENOUS at 09:12

## 2020-09-02 RX ADMIN — ROCURONIUM BROMIDE 30 MG: 10 INJECTION, SOLUTION INTRAVENOUS at 10:34

## 2020-09-02 RX ADMIN — ALBUMIN (HUMAN) 12.5 G: 12.5 SOLUTION INTRAVENOUS at 18:45

## 2020-09-02 RX ADMIN — ROCURONIUM BROMIDE 70 MG: 10 INJECTION, SOLUTION INTRAVENOUS at 07:52

## 2020-09-02 RX ADMIN — ALBUMIN, HUMAN INJ 5% 12.5 G: 5 SOLUTION at 17:21

## 2020-09-02 RX ADMIN — METOPROLOL TARTRATE 12.5 MG: 25 TABLET ORAL at 07:11

## 2020-09-02 RX ADMIN — FENTANYL CITRATE 250 MCG: 50 INJECTION, SOLUTION INTRAMUSCULAR; INTRAVENOUS at 08:23

## 2020-09-02 RX ADMIN — MIDAZOLAM 4 MG: 1 INJECTION INTRAMUSCULAR; INTRAVENOUS at 07:52

## 2020-09-02 RX ADMIN — SODIUM CHLORIDE: 0.9 INJECTION, SOLUTION INTRAVENOUS at 09:22

## 2020-09-02 RX ADMIN — LIDOCAINE 5% 3 PATCH: 700 PATCH TOPICAL at 19:37

## 2020-09-02 RX ADMIN — TRIMETHOBENZAMIDE HYDROCHLORIDE 200 MG: 100 INJECTION INTRAMUSCULAR at 20:00

## 2020-09-02 RX ADMIN — MIDAZOLAM 2 MG: 1 INJECTION INTRAMUSCULAR; INTRAVENOUS at 07:30

## 2020-09-02 RX ADMIN — CHLORHEXIDINE GLUCONATE 15 ML: 1.2 RINSE ORAL at 17:20

## 2020-09-02 RX ADMIN — FENTANYL CITRATE 50 MCG: 50 INJECTION INTRAMUSCULAR; INTRAVENOUS at 16:10

## 2020-09-02 RX ADMIN — CEFAZOLIN SODIUM 2000 MG: 2 SOLUTION INTRAVENOUS at 08:26

## 2020-09-02 RX ADMIN — ONDANSETRON 4 MG: 2 INJECTION INTRAMUSCULAR; INTRAVENOUS at 21:26

## 2020-09-02 RX ADMIN — SODIUM CHLORIDE 6 UNITS/HR: 9 INJECTION, SOLUTION INTRAVENOUS at 09:23

## 2020-09-02 RX ADMIN — SODIUM CHLORIDE 1 UNITS/HR: 9 INJECTION, SOLUTION INTRAVENOUS at 18:34

## 2020-09-02 RX ADMIN — CHLORHEXIDINE GLUCONATE 0.12% ORAL RINSE 15 ML: 1.2 LIQUID ORAL at 06:35

## 2020-09-02 RX ADMIN — MUPIROCIN 1 APPLICATION: 20 OINTMENT TOPICAL at 06:35

## 2020-09-02 RX ADMIN — ALBUMIN (HUMAN) 12.5 G: 12.5 INJECTION, SOLUTION INTRAVENOUS at 18:15

## 2020-09-02 RX ADMIN — MUPIROCIN 1 APPLICATION: 20 OINTMENT TOPICAL at 17:20

## 2020-09-02 RX ADMIN — METOCLOPRAMIDE 10 MG: 5 INJECTION, SOLUTION INTRAMUSCULAR; INTRAVENOUS at 17:20

## 2020-09-02 RX ADMIN — CEFAZOLIN SODIUM 2000 MG: 2 SOLUTION INTRAVENOUS at 20:20

## 2020-09-02 RX ADMIN — ONDANSETRON 4 MG: 2 INJECTION INTRAMUSCULAR; INTRAVENOUS at 14:34

## 2020-09-02 RX ADMIN — HYDROMORPHONE HYDROCHLORIDE 0.5 MG: 1 INJECTION, SOLUTION INTRAMUSCULAR; INTRAVENOUS; SUBCUTANEOUS at 17:27

## 2020-09-02 RX ADMIN — MUPIROCIN 1 APPLICATION: 20 OINTMENT TOPICAL at 20:20

## 2020-09-02 RX ADMIN — FENTANYL CITRATE 50 MCG: 50 INJECTION INTRAMUSCULAR; INTRAVENOUS at 15:00

## 2020-09-02 RX ADMIN — HEPARIN SODIUM 5000 UNITS: 1000 INJECTION INTRAVENOUS; SUBCUTANEOUS at 09:21

## 2020-09-02 RX ADMIN — HEPARIN SODIUM 25000 UNITS: 1000 INJECTION INTRAVENOUS; SUBCUTANEOUS at 09:34

## 2020-09-02 RX ADMIN — PROMETHAZINE HYDROCHLORIDE 12.5 MG: 25 INJECTION INTRAMUSCULAR; INTRAVENOUS at 22:11

## 2020-09-02 RX ADMIN — SODIUM CHLORIDE 5 MG/HR: 0.9 INJECTION, SOLUTION INTRAVENOUS at 09:23

## 2020-09-02 RX ADMIN — MIDAZOLAM 2 MG: 1 INJECTION INTRAMUSCULAR; INTRAVENOUS at 07:22

## 2020-09-02 NOTE — ANESTHESIA PROCEDURE NOTES
Central Line Insertion  Performed by: Julio Cesar Henriquez MD  Authorized by: Julio Cesar Henriquez MD     Date/Time: 9/2/2020 8:26 AM  Catheter Type:  triple lumen  Consent: Verbal consent obtained  Written consent obtained  Risks and benefits: risks, benefits and alternatives were discussed  Consent given by: patient  Patient understanding: patient states understanding of the procedure being performed  Patient consent: the patient's understanding of the procedure matches consent given  Procedure consent: procedure consent matches procedure scheduled  Relevant documents: relevant documents present and verified  Test results: test results available and properly labeled  Site marked: the operative site was marked  Radiology Images: Radiology Images displayed and confirmed  If images not available, report reviewed  Required items: required blood products, implants, devices, and special equipment available  Patient identity confirmed: verbally with patient, arm band and hospital-assigned identification number  Time out: Immediately prior to procedure a "time out" was called to verify the correct patient, procedure, equipment, support staff and site/side marked as required    Indications: central pressure monitoring  Location details: right internal jugular  Catheter size: 7 Fr  Patient position: Trendelenburg  Assessment: blood return through all ports and free fluid flow  Preparation: Chloraprep  Skin prep agent dried: skin prep agent completely dried prior to procedure  Sterile barriers: all five maximum sterile barriers used - cap, mask, sterile gown, sterile gloves, and large sterile sheet  Hand hygiene: hand hygiene performed prior to central venous catheter insertion  Ultrasound guidance: yes  sterile gel and probe cover used in ultrasound-guided central venous catheter insertionultrasound permanent image saved  Pre-procedure: landmarks identified  Number of attempts: 1  Successful placement: yes  Post-procedure: chlorhexidine patch applied,  dressing applied and line sutured  Patient tolerance: Patient tolerated the procedure well with no immediate complications

## 2020-09-02 NOTE — OP NOTE
OPERATIVE REPORT  PATIENT NAME: Gagan Khan    :  1965  MRN: [id number]  Pt Location: [facility] OR ROOM 16    SURGERY DATE: 2020    SURGEON: Raisa Gómez MD    ASSISTANT: Meera Fairchild PA-C    ADDITIONAL ASSISTANT: n/a    PREOPERATIVE DIAGNOSES:   1  Severfe aortic stenosis  2  Multivessel coronary artery disease    POSTOPERATIVE DIAGNOSES:  1  Severe aortic stenosis  2  Multivessel coronary artery disease    NYHA: 2  CCS: 1    PROCEDURE:  1  Aortic valve replacement with a 25mm Fontana Inspiris Resilia pericardial tissue valve  2  Coronary artery bypass grafting x 3 with left internal mammary artery to left anterior descending artery, saphenous vein graft to obtuse marginal 1 and saphenous vein graft to right posterolateral branch    CARDIOPULMONARY BYPASS TIME 99 minutes  CROSSCLAMP TIME 87 minutes  ANESTHESIA General endotracheal anesthesia with transesophageal echocardiogram guidance, Dr Janki Lazcano     INDICATIONS:  The patient is a 48y o  year-old male diagnosed with Multivessel coronary artery disease and with clinical and echocardiographic findings consistent with Symptomatic severe aortic stenosis  Coronary angiography showed LAD 70%, OM1 80%, RCA 80%, PDA small, large RPLB 80%  I saw the patient in consultation and recommended the procedure described previously  FINDINGS:  1  Intraoperative transesophageal echocardiogram revealed normal EF, severe AS, mild AI  2  Epiaortic ultrasound showed less than 5 mm non-mobile plaque  3  Aortic valve was trileaflet, heavily calcified  4  Coronary anatomy as described in coronary angiography, PDA small non amenable for bypass  5  Final transesophageal echocardiogram demonstrated prosthetic valve with normal function without evidence of perivalvular leak, npo other changes  OPERATIVE TECHNIQUE:    The patient was taken to the operating room and placed supine on the operating table   Following the satisfactory induction of general anesthesia and placement of monitoring lines, the patient was prepped and draped in the usual sterile fashion  A time-out procedure was performed  The patient underwent median sternotomy, pericardiotomy, left internal mammary artery harvest with the standard technique and endoscopic left greater saphenous vein harvest, systemic heparinization, and conduit preparation  Epiaortic ultrasound showed less than 5 mm non-mobile plaque  Cannulation for cardiopulmonary bypass was performed with an arterial dispersion cannula, double stage venous cannula and a vented antegrade cardioplegia cannula  Once the ACT was greater than 480 seconds we placed the patient on cardiopulmonary bypass, the ascending aorta was crossclamped and cardiac arrest was obtained without complications with [place] cardioplegia  A left ventricular vent was placed through the right superior pulmonary vein while giving cardioplegia  A CO2 flooded field was used during the entire case  The following grafts were completed, left internal mamamry artery to left anterior descending artery with excellent flow, saphenous vein graft to obtuse marginal 1 with flow of 120 ml/min and saphenous vein graft to right posterolateral branch with flow of 120 ml/min  All the distal anastomoses were performed in end-to-side fashion with 7-0 Prolene  Aortotomy was performed and the aortic valve was exposed  The aortic valve was found to be trileaflet, heavily calcified  The leaflets were excised and the annulus was debrided of calcium  The annulus was sized, I choose a 25mm Fontana Inspiris Resilia pericardial tissue valve  Pledgeted 2-0 Ethibond sutures were placed around the aortic valve annulus  The prosthetic valve was brought to the field, the sutures were passed through the sewing ring, the prosthetic valve was seated in a supra annular fashion and the sutures were tied down  Clearance of the coronary ostia was confirmed   Extensive irrigation of the aortic root and LVOT was performed to remove any debris  While de-airing the heart, the aortotomy was closed with 2 layers of running 4-0 Prolene suture  A total of 2 proximal anastomoses were completed on the ascending aorta in an end-to-side fashion using 6-0 Prolene suture  The heart was extensively de-aired and the crossclamp was removed  Atrial and ventricular pacing wires were placed  Following a period of reperfusion, the patient was weaned from cardiopulmonary bypass and decannulated  Protamine was administered with normalization of the ACT  Hemostasis was confirmed in all fields  Thoracostomy tubes were placed  The sternum was closed with stainless steel wires  The fascia, subdermis and skin were closed as multiple layers of running absorbable suture  COMPLICATIONS: None  PACKS/TUBES/DRAINS: Chest tubes x 3  SPECIMENS: Aortic valve  EBL: 200 cc  TRANSFUSION: None  As the attending surgeon, I was present and scrubbed for all critical portions of this procedure  Sponge, needle and instrument counts were reported as correct by the nursing staff  There was no qualified surgical resident available  The assistance of a PA was required to complete this case, specifically for assistance with cannulation, decannulation, construction of the bypass grafts, exposure during aortic valve replacement, and endoscopic saphenous vein harvesting         SIGNATURE: Liam Valentine MD  DATE: September 2, 2020  TIME: 12:07 PM

## 2020-09-02 NOTE — ANESTHESIA PROCEDURE NOTES
Introducer/[place]  Performed by: Annalisa Ramsey MD  Authorized by: Annalisa Ramsey MD     Date/Time: 9/2/2020 8:26 AM  Consent: Verbal consent obtained  Written consent obtained  Risks and benefits: risks, benefits and alternatives were discussed  Consent given by: patient  Patient understanding: patient states understanding of the procedure being performed  Patient consent: the patient's understanding of the procedure matches consent given  Procedure consent: procedure consent matches procedure scheduled  Relevant documents: relevant documents present and verified  Test results: test results available and properly labeled  Site marked: the operative site was marked  Radiology Images: Radiology Images displayed and confirmed  If images not available, report reviewed  Required items: required blood products, implants, devices, and special equipment available  Patient identity confirmed: verbally with patient, arm band, provided demographic data and hospital-assigned identification number  Time out: Immediately prior to procedure a "time out" was called to verify the correct patient, procedure, equipment, support staff and site/side marked as required    Indications: central pressure monitoring  Location details: right internal jugular  Catheter size: 9 Fr  Patient position: Trendelenburg  Assessment: blood return through all ports and free fluid flow  Preparation: Chloraprep  Skin prep agent dried: skin prep agent completely dried prior to procedure  Sterile barriers: all five maximum sterile barriers used - cap, mask, sterile gown, sterile gloves, and large sterile sheet  Hand hygiene: hand hygiene performed prior to central venous catheter insertion  Ultrasound guidance: yes  ultrasound permanent image saved  Pre-procedure: landmarks identified  Number of attempts: 1  Successful placement: yes  Post-procedure: line sutured and dressing applied  Patient tolerance: Patient tolerated the procedure well with no immediate complications

## 2020-09-02 NOTE — ANESTHESIA PROCEDURE NOTES
Procedure Performed: NORMAN Anesthesia  Start Time:  9/2/2020 8:26 AM        Preanesthesia Checklist    Patient identified, IV assessed, risks and benefits discussed and procedure being performed at surgeon's request       Procedure    Diagnostic Indications for NORMAN:  assessment of surgical repair  Type of NORMAN: interventional NORMAN with real time guidance of intracardiac procedure  Images Saved: ultrasound permanent image saved  Physician Requesting Echo: Matias Stewart MD  Location performed: OR  Intubated  Bite block not placed  Heart visualized  Insertion of NORMAN Probe:  Atraumatic  Probe Type:  Epiaortic, biplane and multiplane  Modalities:  3D, color flow mapping, continuous wave Doppler and pulse wave Doppler  Echocardiographic and Doppler Measurements    PREPROCEDURE    LEFT VENTRICLE:  Systolic Function: normal  Ejection Fraction: 60%  Cavity size: normal  Regional Wall Motion Abnormalities: none  RIGHT VENTRICLE:  Systolic Function: normal   Cavity size normal  No hypertrophy              AORTIC VALVE:  Leaflets: fused LCC and RCC and trileaflet  Leaflet motions restricted  Regurgitation: mild  MITRAL VALVE:  Leaflets: normal  Leaflet Motions: normal  Regurgitation: mild  Stenosis: none  TRICUSPID VALVE:  Leaflets: normal  Leaflet Motions: normal  Stenosis: none  Regurgitation: trace  PULMONIC VALVE:  Leaflets: normal  Regurgitation: none  Stenosis: none  ASCENDING AORTA:  Size:  normal  Diameter: 3 3 cm  Dissection not present  AORTIC ARCH:  Size:  normal  dissection not present  DESCENDING AORTA:  Size: normal  Dissection not present  Grade 1: normal to mild intimal thickening  RIGHT ATRIUM:  Size:  normal  No spontaneous echo contrast     LEFT ATRIUM:  Size: normal  No spontaneous echo contrast     LEFT ATRIAL APPENDAGE:  Size: normal  No spontaneous echo contrast Emptying Velocity: 50 cm/sec          ATRIAL SEPTUM:  Intra-atrial septal morphology: normal  VENTRICULAR SEPTUM:  Intra-ventricular septum morphology: normal          EPIAORTIC:  Plaque Thickness: 0-5 mm  OTHER FINDINGS:  Pericardium:  normal  Pleural Effusion:  none  POSTPROCEDURE    LEFT VENTRICLE: Unchanged   Ejection Fraction: 60 %  Regional Wall Motion Abnormalities: No RWMA  RIGHT VENTRICLE: Unchanged   AORTIC VALVE:   Leaflets: bioprosthetic  Stenosis: none  Mean Gradient: 8 mmHg  Regurgitation: none  Valve Size: 25 mm  MITRAL VALVE: Unchanged   Regurgitation: trace  Stenosis: none  PULMONIC VALVE: Unchanged           ATRIA: Unchanged   AORTA: Unchanged   REMOVAL:  Probe Removal: atraumatic

## 2020-09-02 NOTE — ANESTHESIA POSTPROCEDURE EVALUATION
Post-Op Assessment Note    CV Status:  Stable  Pain Score: 0    Pain management: adequate     Mental Status:  Somnolent   Hydration Status:  Stable   PONV Controlled:  None   Airway Patency:  Patent  Airway: intubated      Post Op Vitals Reviewed: Yes      Staff: Anesthesiologist         No complications documented      BP   142/75   Temp  36   Pulse  80   Resp   12   SpO2   100     CI 2 8

## 2020-09-02 NOTE — RESPIRATORY THERAPY NOTE
RT Ventilator Management Note  Eliana Smith 48 y o  male MRN: [id number]  Unit/Bed#: [facility] 416-01 Encounter: [id number]      Daily Screen     No data found in the last 10 encounters  Physical Exam:   Assessment Type: Assess only  General Appearance: Sedated  Respiratory Pattern: Assisted  Chest Assessment: Chest expansion symmetrical, Trachea midline  Bilateral Breath Sounds: Clear  R Breath Sounds: Clear  L Breath Sounds: Clear      Resp Comments: (P) Pt recevied from OR s/p CABGx3 and placed on vent on AC  VS are stable and pt appears comfortable  Evaluated per resp protocol  Pt has no documented pulmonary hx nor does he have any pulmonary meds listed for home use  There is no current CXR and no indication for bronchodilators @ this time  Per ACP will begin IS upon extubation  Will continue to monitor

## 2020-09-02 NOTE — CONSULTS
[address] 48 y o  male MRN: [id number]  Unit/Bed#: [facility] 416-01 Encounter: [id number]    Inpatient consult to Kenzie Gudino performed by: DEVEN Dunn  Consult ordered by: Gevena Schirmer, PA-C          Physician Requesting Consult: Smiley Arredondo MD    Reason for Consult / Principal Problem: Nonrheumatic aortic valve stenosis    HPI: Raisa Maurice is a 48 y o  male who presents for scheduled AVR  Patient has had a lifelong heart murmur, diagnosed with bicuspid aortic valve  Outpatient serial echocardiograms showed that his aortic stenosis had progressed to severe range with valve area of 0 8cm  He was therefore referred to CT Surgery for evaluation as an outpatient  Cardiac catheterization demonstrated stenosis of mid LAD at site of prior PCA, right PDA stenosis and posterior lateral segment stenosis  Today he presents s/p 25 mm Font[place] Inspiris tissue AVR and CABG x 3 (LIMA to LAD, SVG to Om1, SVG to right posterolateral branch)  He arrives on no continuous infusions  Cardiopulmonary bypass time: 99 minutes  Crossclamp time: 87 minutes    Past medical history of CAD with MINNIE to LAD in 2017, hypertension, hyperlipidemia, DM type 1, remote history of ITP    History obtained from chart review due to patient being intubated and sedated  ---------------------------------------------------------------------------------------------------------------------------------------------------------------------  Impressions:  1  Bicuspid aortic valve with severe AS s/p AVR  2  CAD s/p CABG x 3  3  HTN  4  HLD  5  DM 1  6  Hx of ITP    Plan:    Neuro: D/C continuous sedation  PRN oxycode for pain  Trend neuro exam   Delirium precautions  CV: MAP goal >65  SBP goal <130  CI>2 2  Post-op medications: None  Volume resuscitation as needed  Monitor rhythm on telemetry  Epicardial pacing wires Ax1,Vx1  Intra-op NORMAN LVEF 60%       Lung: Check STAT post-op ABG and CXR  Wean vent with spontaneous breathing trial with goal to extubate  GI: GI prophylaxis with PPI  Bowel regimen  Zofran PRN for nausea  FEN: NPO  Replenish K >4 0, mag >2 0 and calcium >7 0  : Check STAT post-op BMP  Guillen in place  Monitor UOP with goal >0 5cc/kg/hour  Lasix versus volume resuscitate as needed depending on hemodynamics and volume status  ID: Prophylactic post-op abx  Maintain normothermia  Trend temps  Heme: Check STAT post-op H/H and platelets  Monitor incision site, invasive lines, and chest tube outputs for bleeding  Send coag panel if needed  Endo: Insulin gtt for blood sugar control       Results from last 6 Months   Lab Units 07/21/20  1138   HEMOGLOBIN A1C % 9 1*     Disposition: ICU Care   ---------------------------------------------------------------------------------------------------------------------------------------------------------------------  Historical Information   Past Medical History:   Diagnosis Date    Aortic stenosis     Clavicle fracture     LEFT    Diabetes mellitus ([facility] )     Hyperlipidemia     Hypertension     Myocardial infarction ([facility] )     Thrombocytopenic purpura ([place] [address] )      Past Surgical History:   Procedure Laterality Date    ABDOMINAL SURGERY      ROTATOR CUFF REPAIR      SPLENECTOMY      TONSILLECTOMY      VITRECTOMY Bilateral     BY ANTERIOR APPROACH      Social History   Social History     Substance and Sexual Activity   Alcohol Use No     Social History     Substance and Sexual Activity   Drug Use Yes    Frequency: 3 0 times per week    Types: Marijuana     Social History     Tobacco Use   Smoking Status Former Smoker    Types: Cigars   Smokeless Tobacco Never Used     Family History   Problem Relation Age of Onset    Aneurysm Mother         CAREBRAL ARTERY     Coronary artery disease Mother     Diabetes Mother      I have reviewed this patient's family history and commented on sigificant items within the HPI    ROS: ROS unable to be obtained due to patient being intubated and sedated  Allergies: No Known Allergies    Home Medications:   Prior to Admission medications    Medication Sig Start Date End Date Taking? Authorizing Provider   aspirin (ECOTRIN LOW STRENGTH) 81 mg EC tablet Take 1 tablet by mouth daily 12/4/17  Yes Marcela Keenan MD   atorvastatin (LIPITOR) 80 mg tablet Take 1 tablet by mouth daily with dinner 9/1/17  Yes Efrain Velasco MD   busPIRone (BUSPAR) 15 mg tablet  8/19/20  Yes Historical Provider, MD   Cholecalciferol (VITAMIN D) 2000 units CAPS TK 1 C PO QD 12/12/17  Yes Historical Provider, MD   hydrOXYzine HCL (ATARAX) 25 mg tablet TAKE 1 TO 2 TABLETS BY MOUTH EVERY 8 HOURS AS NEEDED FOR ANXIETY 7/22/20  Yes Historical Provider, MD   ibuprofen (MOTRIN) 800 mg tablet Prior dental 8/11/20  Yes Historical Provider, MD   insulin lispro (HumaLOG) 100 units/mL injection Use up to 100 units per day via insulin pump 4/10/20  Yes Gely Vee MD   lisinopril (ZESTRIL) 20 mg tablet Take 2 5 mg by mouth daily    Yes Historical Provider, MD   mupirocin (BACTROBAN) 2 % ointment Apply 1 application topically 2 (two) times a day for 5 days Apply to each nostril twice daily for five days before your operation   8/13/20 9/1/20 Yes Liliya Daugherty PA-C   omeprazole (PriLOSEC) 20 mg delayed release capsule Take 20 mg by mouth daily   Yes Historical Provider, MD   QUEtiapine (SEROquel) 25 mg tablet TAKE 1 TABLET BY MOUTH EVERY DAY EVERY NIGHT 1/20/20  Yes Historical Provider, MD   sildenafil (VIAGRA) 50 MG tablet TAKE 1 TABLET BY MOUTH AS  NEEDED FOR ERECTILE  DYSFUNCTION 4/21/20  Yes Historical Provider, MD   zolpidem (AMBIEN) 5 mg tablet Take 1 tablet by mouth daily at bedtime as needed for sleep for up to 2 days 9/1/17 9/1/20 Yes Efrain Velasco MD   amoxicillin (AMOXIL) 500 mg capsule Prior dental 8/11/20   Historical Provider, MD NEUMANN MICROLET LANCETS lancets by Other route 4 (four) times a day Use as instructed 3/28/18   Wendi Ocampo MD   glucagon (GLUCAGON EMERGENCY) 1 MG injection Inject 1 mg under the skin once as needed for low blood sugar for up to 1 dose  Patient not taking: Reported on 2/3/2020 11/29/19   Pooja Pillai MD   glucose blood (NEL CONTOUR TEST) test strip 1 each by Other route 4 (four) times a day 9/10/18   Wendi Ocampo MD   Insulin Infusion Pump KIT 1 Units/hr by Subcutaneous Insulin Pump route continuous    Historical Provider, MD   Insulin Infusion Pump Supplies ([address] SET-) MISC by Does not apply route 12/19/17   Historical Provider, MD   Insulin Infusion Pump Supplies ([address] [place] [address]) [facility] by Does not apply route 12/19/17   Historical Provider, MD   INSULIN SYRINGE 1CC/29G 29G X 1/2" 1 ML MISC Inject as directed 3 (three) times a day with meals Use as directed 2/23/18   Marquise Borden MD   sertraline (ZOLOFT) 100 mg tablet TAKE 1 TABLET BY MOUTH DAILY 8/8/18 9/2/20  Anitha Bravo DO     Inpatient Medications:  Scheduled Meds:  Current Facility-Administered Medications   Medication Dose Route Frequency Provider Last Rate    acetaminophen  650 mg Rectal Q4H PRN Kimber Han PA-C      acetaminophen  975 mg Oral [address], [place]      amiodarone  200 mg Oral [facility] Kimber Han PA-C      aspirin  325 mg Oral Daily Akhil Duncan      atorvastatin  80 mg Oral Daily With [employer], [place]      bisacodyl  10 mg Rectal Daily PRN Kimber Han PA-C      busPIRone  15 mg Oral BID Kimber Han PA-C      calcium chloride  1 g Intravenous Once Kimber Han PA-C      cefazolin  2,000 mg Intravenous [address], [place]      chlorhexidine  15 mL Mouth/Throat BID Kimber Han PA-C      cholecalciferol  1,000 Units Oral Daily Akhil Duncan      fentanyl citrate (PF)  50 mcg Intravenous Once Kimber Han PA-C      fentanyl citrate (PF)  50 mcg Intravenous Q1H PRN Sachi Anis MARILYN Pryor      [START ON 9/3/2020] fondaparinux  2 5 mg Subcutaneous Daily Kristine Sexton PA-C      furosemide  40 mg Intravenous Q6H PRN Kristine Sexton PA-C      insulin regular (HumuLIN R,NovoLIN R) infusion  0 3-21 Units/hr Intravenous Titrated Kristine Sexton PA-C      lactated ringers  500 mL Intravenous Q30 Min PRN Kristine Sexton PA-C      lidocaine (cardiac)  100 mg Intravenous Q30 Min PRN Kristine Sexton PA-C      magnesium sulfate  2 g Intravenous Once Kristine Sexton PA-C      mupirocin  1 application Nasal [id number] [address] Zainab Pryor PA-C      niCARdipine  2 5-15 mg/hr Intravenous Titrated Kristine Sexton PA-C      ondansetron  4 mg Intravenous Q6H PRN Kristine Sexton PA-C      oxyCODONE  2 5 mg Oral Q4H PRN Kristine Sexton PA-C      oxyCODONE  5 mg Oral Q4H PRN Kristine Sexton [place]      [START ON 9/3/2020] pantoprazole  40 mg Oral Daily Before Breakfast Kristine Sexton PA-C      polyethylene glycol  17 g Oral Daily Kristine Sexton [place]      potassium chloride  20 mEq Intravenous Once PRN Kristine Sexton PA-C      potassium chloride  20 mEq Intravenous Q1H PRN Kristine Sexton PA-C      potassium chloride  20 mEq Intravenous Q30 Min PRN Kristine Sexton PA-C      QUEtiapine  25 mg Oral HS Zainab Pryor PA-C      sodium chloride  20 mL/hr Intravenous Continuous Zainab Desai PA-C       Continuous Infusions:insulin regular (HumuLIN R,NovoLIN R) infusion, 0 3-21 Units/hr  niCARdipine, 2 5-15 mg/hr  sodium chloride, 20 mL/hr      PRN Meds:  acetaminophen, 650 mg, Q4H PRN  bisacodyl, 10 mg, Daily PRN  fentanyl citrate (PF), 50 mcg, Q1H PRN  furosemide, 40 mg, Q6H PRN  lactated ringers, 500 mL, Q30 Min PRN  lidocaine (cardiac), 100 mg, Q30 Min PRN  ondansetron, 4 mg, Q6H PRN  oxyCODONE, 2 5 mg, Q4H PRN  oxyCODONE, 5 mg, Q4H PRN  potassium chloride, 20 mEq, Once PRN  potassium chloride, 20 mEq, Q1H PRN  potassium chloride, 20 mEq, Q30 Min PRN      ---------------------------------------------------------------------------------------------------------------------------------------------------------------------  Vitals:   Vitals:    20 4364 20 0647 20 1246   BP: 160/75     Pulse: 72     Resp: 16     Temp: (!) 96 7 °F (35 9 °C)     TempSrc: Tympanic Core     SpO2: 99%  100%   Weight:  71 2 kg (157 lb)    Height:  5' 6" (1 676 m)      Arterial Line:          Temperature: Temp (24hrs), Av 7 °F (35 9 °C), Min:96 7 °F (35 9 °C), Max:96 7 °F (35 9 °C)  Current: Temperature: (!) 96 7 °F (35 9 °C)    Weights: IBW: 63 8 kg  Body mass index is 25 34 kg/m²  Hemodynamic Monitoring:  PAP:  , CVP:  , CO:  , CI:  , SVR:      Ventilator Settings:  Respiratory    Lab Data (Last 4 hours)    None         O2/Vent Data (Last 4 hours)    None              Labs:   Results from last 7 days   Lab Units 20  1215 20  1129 20  1057 20  1053   I STAT HEMOGLOBIN g/dl 9 5* 9 2*  --  8 8*   HEMATOCRIT, ISTAT % 28* 27*  --  26*   PLATELETS Thousands/uL  --   --  252  --      Results from last 7 days   Lab Units 20  1215 20  1129 20  1053   CO2, I-STAT mmol/L 26 27 27   GLUCOSE, ISTAT mg/dl 128 162* 151*     Post-op AB 33/45/147/24/-2/99%  Post-op CXR: Lungs are clear, no obvious pneumo  Lines and tubes in adequate position  I have personally reviewed pertinent films in PACS  Post-op EKG: Normal sinus rhythm with inverted T waves in inferior leads  QTc 492 This was personally reviewed by myself  Physical Exam  Constitutional:       Appearance: He is diaphoretic  Interventions: He is sedated and intubated  HENT:      Head: Normocephalic and atraumatic  Nose: Nose normal       Mouth/Throat:      Mouth: Mucous membranes are moist       Pharynx: Oropharynx is clear  Eyes:      Pupils: Pupils are equal, round, and reactive to light     Cardiovascular:      Rate and Rhythm: Normal rate and regular rhythm  Pulses: Normal pulses  Comments: AV epicardial wires  Pulmonary:      Effort: Pulmonary effort is normal  No respiratory distress  He is intubated  Breath sounds: No wheezing  Comments: CT with sanguineous drainage  Abdominal:      General: Abdomen is flat  There is no distension  Tenderness: There is no abdominal tenderness  There is no guarding  Invasive lines and devices: Invasive Devices     Central Venous Catheter Line            CVC Central Lines 09/02/20 Triple less than 1 day    Introducer 09/02/20 less than 1 day          Peripheral Intravenous Line            Peripheral IV 09/02/20 Left Antecubital less than 1 day    Peripheral IV 09/02/20 Right Antecubital less than 1 day          Arterial Line            Arterial Line 09/02/20 Left Radial less than 1 day          Line            Pacer Wires less than 1 day    Pacer Wires less than 1 day          Drain            Chest Tube 1 Anterior Mediastinal 32 Fr  less than 1 day    Chest Tube 2 Left Pleural 32 Fr  less than 1 day    Chest Tube 3 Posterior Mediastinal 24 Fr  less than 1 day          Airway            ETT  Hi-Lo 8 mm less than 1 day              ---------------------------------------------------------------------------------------------------------------------------------------------------------------------  Care Time Delivered:   Upon my evaluation, this patient had a high probability of imminent or life-threatening deterioration due to immediately post op OHS, which required my direct attention, intervention, and personal management  I have personally provided 17 minutes (1300 to 1325) of critical care time, exclusive of procedures, teaching, family meetings, and any prior time recorded by providers other than myself       SIGNATURE: DEVEN Licona  DATE: September 2, 2020  TIME: 12:59 PM

## 2020-09-02 NOTE — INTERVAL H&P NOTE
H&P reviewed  After examining the patient I find no changes in the patients condition since the H&P had been written  Anticipated Length of Stay:  Patient will be admitted on an Inpatient basis with an anticipated length of stay of  greater than 2 midnights  Justification for Hospital Stay:  Post surgical recovery following open heart surgery      Vitals:    09/02/20 0620   BP: 160/75   Pulse: 72   Resp: 16   Temp: (!) 96 7 °F (35 9 °C)   SpO2: 99%

## 2020-09-02 NOTE — ANESTHESIA PREPROCEDURE EVALUATION
Procedure:  TISSUE AVR (N/A Chest)  CORONARY ARTERY BYPASS GRAFT (CABG)X3 (N/A Chest)    Relevant Problems   CARDIO   (+) Coronary artery disease involving native coronary artery of native heart without angina pectoris   (+) Essential hypertension   (+) Nonrheumatic aortic valve stenosis      ENDO   (+) 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)        Physical Exam    Airway    Mallampati score: II  TM Distance: >3 FB  Neck ROM: full     Dental   lower dentures and upper dentures,     Cardiovascular  Rhythm: regular, Rate: normal, Murmur, Cardiovascular exam normal    Pulmonary  Pulmonary exam normal Breath sounds clear to auscultation,     Other Findings        Anesthesia Plan  ASA Score- 4     Anesthesia Type- general with ASA Monitors  Additional Monitors: arterial line, central venous line and pulmonary artery catheter  Airway Plan: ETT  Plan Factors-Exercise tolerance (METS): >4 METS  Chart reviewed  EKG reviewed  Imaging results reviewed  Existing labs reviewed  Patient summary reviewed  Induction- intravenous  Postoperative Plan- Plan for postoperative opioid use  Planned trial extubation    Informed Consent- Anesthetic plan and risks discussed with patient

## 2020-09-02 NOTE — ANESTHESIA PROCEDURE NOTES
Arterial Line Insertion  Performed by: Ashley Dominguez MD  Authorized by: Ashley Dominguez MD   Consent: Verbal consent obtained  Written consent obtained  Risks and benefits: risks, benefits and alternatives were discussed  Consent given by: patient  Patient understanding: patient states understanding of the procedure being performed  Patient consent: the patient's understanding of the procedure matches consent given  Procedure consent: procedure consent matches procedure scheduled  Relevant documents: relevant documents present and verified  Test results: test results available and properly labeled  Site marked: the operative site was marked  Radiology Images: Radiology Images displayed and confirmed  If images not available, report reviewed  Required items: required blood products, implants, devices, and special equipment available  Patient identity confirmed: verbally with patient, arm band and hospital-assigned identification number  Time out: Immediately prior to procedure a "time out" was called to verify the correct patient, procedure, equipment, support staff and site/side marked as required  Preparation: Patient was prepped and draped in the usual sterile fashion    Indications: hemodynamic monitoring  Orientation:  Left  Location: radial artery  Procedure Details:  Rashawn's test normal: yes  Needle gauge: 20  Seldinger technique: Seldinger technique used  Number of attempts: 2    Post-procedure:  Post-procedure: chlorhexidine patch applied and dressing applied  Waveform: good waveform  Post-procedure CNS: normal  Patient tolerance: Patient tolerated the procedure well with no immediate complications

## 2020-09-03 ENCOUNTER — APPOINTMENT (INPATIENT)
Dept: RADIOLOGY | Facility: HOSPITAL | Age: 55
DRG: 220 | End: 2020-09-03
Payer: COMMERCIAL

## 2020-09-03 PROBLEM — R11.2 POST-OPERATIVE NAUSEA AND VOMITING: Status: ACTIVE | Noted: 2020-09-03

## 2020-09-03 PROBLEM — Z98.890 POST-OPERATIVE NAUSEA AND VOMITING: Status: ACTIVE | Noted: 2020-09-03

## 2020-09-03 PROBLEM — D62 ACUTE BLOOD LOSS AS CAUSE OF POSTOPERATIVE ANEMIA: Status: ACTIVE | Noted: 2020-09-03

## 2020-09-03 LAB
ABO GROUP BLD BPU: NORMAL
ANION GAP SERPL CALCULATED.3IONS-SCNC: 5 MMOL/L (ref 4–13)
ATRIAL RATE: 75 BPM
ATRIAL RATE: 76 BPM
BETA-HYDROXYBUTYRATE: 0.2 MMOL/L
BPU ID: NORMAL
BUN SERPL-MCNC: 11 MG/DL (ref 5–25)
CALCIUM SERPL-MCNC: 8 MG/DL (ref 8.3–10.1)
CHLORIDE SERPL-SCNC: 116 MMOL/L (ref 100–108)
CO2 SERPL-SCNC: 26 MMOL/L (ref 21–32)
CREAT SERPL-MCNC: 0.62 MG/DL (ref 0.6–1.3)
CROSSMATCH: NORMAL
ERYTHROCYTE [DISTWIDTH] IN BLOOD BY AUTOMATED COUNT: 15 % (ref 11.6–15.1)
GFR SERPL CREATININE-BSD FRML MDRD: 112 ML/MIN/1.73SQ M
GLUCOSE SERPL-MCNC: 124 MG/DL (ref 65–140)
GLUCOSE SERPL-MCNC: 124 MG/DL (ref 65–140)
GLUCOSE SERPL-MCNC: 130 MG/DL (ref 65–140)
GLUCOSE SERPL-MCNC: 132 MG/DL (ref 65–140)
GLUCOSE SERPL-MCNC: 138 MG/DL (ref 65–140)
GLUCOSE SERPL-MCNC: 139 MG/DL (ref 65–140)
GLUCOSE SERPL-MCNC: 141 MG/DL (ref 65–140)
GLUCOSE SERPL-MCNC: 151 MG/DL (ref 65–140)
GLUCOSE SERPL-MCNC: 162 MG/DL (ref 65–140)
GLUCOSE SERPL-MCNC: 168 MG/DL (ref 65–140)
GLUCOSE SERPL-MCNC: 170 MG/DL (ref 65–140)
GLUCOSE SERPL-MCNC: 190 MG/DL (ref 65–140)
GLUCOSE SERPL-MCNC: 219 MG/DL (ref 65–140)
HCT VFR BLD AUTO: 23.4 % (ref 36.5–49.3)
HCT VFR BLD AUTO: 23.6 % (ref 36.5–49.3)
HCT VFR BLD AUTO: 24.1 % (ref 36.5–49.3)
HGB BLD-MCNC: 7.8 G/DL (ref 12–17)
HGB BLD-MCNC: 7.8 G/DL (ref 12–17)
HGB BLD-MCNC: 8 G/DL (ref 12–17)
MAGNESIUM SERPL-MCNC: 2.5 MG/DL (ref 1.6–2.6)
MCH RBC QN AUTO: 30.2 PG (ref 26.8–34.3)
MCHC RBC AUTO-ENTMCNC: 33.3 G/DL (ref 31.4–37.4)
MCV RBC AUTO: 91 FL (ref 82–98)
P AXIS: 73 DEGREES
PLATELET # BLD AUTO: 136 THOUSANDS/UL (ref 149–390)
PMV BLD AUTO: 10.6 FL (ref 8.9–12.7)
POTASSIUM SERPL-SCNC: 4.2 MMOL/L (ref 3.5–5.3)
PR INTERVAL: 111 MS
PR INTERVAL: 196 MS
QRS AXIS: 59 DEGREES
QRS AXIS: 69 DEGREES
QRSD INTERVAL: 104 MS
QRSD INTERVAL: 117 MS
QT INTERVAL: 404 MS
QT INTERVAL: 454 MS
QTC INTERVAL: 452 MS
QTC INTERVAL: 511 MS
RBC # BLD AUTO: 2.58 MILLION/UL (ref 3.88–5.62)
SODIUM SERPL-SCNC: 147 MMOL/L (ref 136–145)
T WAVE AXIS: -20 DEGREES
T WAVE AXIS: -35 DEGREES
UNIT DISPENSE STATUS: NORMAL
UNIT PRODUCT CODE: NORMAL
UNIT RH: NORMAL
VENTRICULAR RATE: 75 BPM
VENTRICULAR RATE: 76 BPM
WBC # BLD AUTO: 15.22 THOUSAND/UL (ref 4.31–10.16)

## 2020-09-03 PROCEDURE — 99255 IP/OBS CONSLTJ NEW/EST HI 80: CPT | Performed by: INTERNAL MEDICINE

## 2020-09-03 PROCEDURE — 85014 HEMATOCRIT: CPT | Performed by: PHYSICIAN ASSISTANT

## 2020-09-03 PROCEDURE — 83735 ASSAY OF MAGNESIUM: CPT | Performed by: PHYSICIAN ASSISTANT

## 2020-09-03 PROCEDURE — 93005 ELECTROCARDIOGRAM TRACING: CPT

## 2020-09-03 PROCEDURE — C9113 INJ PANTOPRAZOLE SODIUM, VIA: HCPCS | Performed by: PHYSICIAN ASSISTANT

## 2020-09-03 PROCEDURE — 82948 REAGENT STRIP/BLOOD GLUCOSE: CPT

## 2020-09-03 PROCEDURE — NC001 PR NO CHARGE: Performed by: STUDENT IN AN ORGANIZED HEALTH CARE EDUCATION/TRAINING PROGRAM

## 2020-09-03 PROCEDURE — 99024 POSTOP FOLLOW-UP VISIT: CPT | Performed by: PHYSICIAN ASSISTANT

## 2020-09-03 PROCEDURE — 94760 N-INVAS EAR/PLS OXIMETRY 1: CPT

## 2020-09-03 PROCEDURE — 70450 CT HEAD/BRAIN W/O DYE: CPT

## 2020-09-03 PROCEDURE — 85018 HEMOGLOBIN: CPT | Performed by: PHYSICIAN ASSISTANT

## 2020-09-03 PROCEDURE — 82010 KETONE BODYS QUAN: CPT | Performed by: PHYSICIAN ASSISTANT

## 2020-09-03 PROCEDURE — 85027 COMPLETE CBC AUTOMATED: CPT | Performed by: PHYSICIAN ASSISTANT

## 2020-09-03 PROCEDURE — 93010 ELECTROCARDIOGRAM REPORT: CPT | Performed by: INTERNAL MEDICINE

## 2020-09-03 PROCEDURE — 71045 X-RAY EXAM CHEST 1 VIEW: CPT

## 2020-09-03 PROCEDURE — 80048 BASIC METABOLIC PNL TOTAL CA: CPT | Performed by: PHYSICIAN ASSISTANT

## 2020-09-03 PROCEDURE — G1004 CDSM NDSC: HCPCS

## 2020-09-03 RX ORDER — PANTOPRAZOLE SODIUM 40 MG/1
40 INJECTION, POWDER, FOR SOLUTION INTRAVENOUS EVERY 12 HOURS SCHEDULED
Status: DISCONTINUED | OUTPATIENT
Start: 2020-09-03 | End: 2020-09-06

## 2020-09-03 RX ORDER — FONDAPARINUX SODIUM 2.5 MG/.5ML
2.5 INJECTION SUBCUTANEOUS DAILY
Status: DISCONTINUED | OUTPATIENT
Start: 2020-09-03 | End: 2020-09-03

## 2020-09-03 RX ORDER — SCOLOPAMINE TRANSDERMAL SYSTEM 1 MG/1
1 PATCH, EXTENDED RELEASE TRANSDERMAL
Status: DISCONTINUED | OUTPATIENT
Start: 2020-09-03 | End: 2020-09-10 | Stop reason: HOSPADM

## 2020-09-03 RX ORDER — ONDANSETRON 2 MG/ML
4 INJECTION INTRAMUSCULAR; INTRAVENOUS EVERY 6 HOURS PRN
Status: DISPENSED | OUTPATIENT
Start: 2020-09-03 | End: 2020-09-04

## 2020-09-03 RX ORDER — OXYCODONE HYDROCHLORIDE 5 MG/1
2.5 TABLET ORAL EVERY 4 HOURS PRN
Status: DISCONTINUED | OUTPATIENT
Start: 2020-09-03 | End: 2020-09-10 | Stop reason: HOSPADM

## 2020-09-03 RX ORDER — CALCIUM CARBONATE 200(500)MG
500 TABLET,CHEWABLE ORAL DAILY PRN
Status: DISCONTINUED | OUTPATIENT
Start: 2020-09-03 | End: 2020-09-10 | Stop reason: HOSPADM

## 2020-09-03 RX ORDER — PROMETHAZINE HYDROCHLORIDE 25 MG/ML
12.5 INJECTION, SOLUTION INTRAMUSCULAR; INTRAVENOUS ONCE
Status: COMPLETED | OUTPATIENT
Start: 2020-09-03 | End: 2020-09-03

## 2020-09-03 RX ORDER — SODIUM CHLORIDE, SODIUM GLUCONATE, SODIUM ACETATE, POTASSIUM CHLORIDE, MAGNESIUM CHLORIDE, SODIUM PHOSPHATE, DIBASIC, AND POTASSIUM PHOSPHATE .53; .5; .37; .037; .03; .012; .00082 G/100ML; G/100ML; G/100ML; G/100ML; G/100ML; G/100ML; G/100ML
125 INJECTION, SOLUTION INTRAVENOUS CONTINUOUS
Status: DISCONTINUED | OUTPATIENT
Start: 2020-09-03 | End: 2020-09-06

## 2020-09-03 RX ORDER — HALOPERIDOL 5 MG/ML
2 INJECTION INTRAMUSCULAR ONCE
Status: COMPLETED | OUTPATIENT
Start: 2020-09-03 | End: 2020-09-03

## 2020-09-03 RX ORDER — PANTOPRAZOLE SODIUM 40 MG/1
40 TABLET, DELAYED RELEASE ORAL
Status: DISCONTINUED | OUTPATIENT
Start: 2020-09-04 | End: 2020-09-03

## 2020-09-03 RX ORDER — TEMAZEPAM 15 MG/1
15 CAPSULE ORAL
Status: DISCONTINUED | OUTPATIENT
Start: 2020-09-03 | End: 2020-09-10 | Stop reason: HOSPADM

## 2020-09-03 RX ORDER — DOCUSATE SODIUM 100 MG/1
100 CAPSULE, LIQUID FILLED ORAL 2 TIMES DAILY
Status: DISCONTINUED | OUTPATIENT
Start: 2020-09-03 | End: 2020-09-10 | Stop reason: HOSPADM

## 2020-09-03 RX ORDER — ACETAMINOPHEN 325 MG/1
650 TABLET ORAL EVERY 4 HOURS PRN
Status: DISCONTINUED | OUTPATIENT
Start: 2020-09-03 | End: 2020-09-03

## 2020-09-03 RX ORDER — ACETAMINOPHEN 325 MG/1
975 TABLET ORAL EVERY 8 HOURS
Status: DISCONTINUED | OUTPATIENT
Start: 2020-09-03 | End: 2020-09-03

## 2020-09-03 RX ORDER — DIPHENHYDRAMINE HYDROCHLORIDE 50 MG/ML
25 INJECTION INTRAMUSCULAR; INTRAVENOUS ONCE
Status: COMPLETED | OUTPATIENT
Start: 2020-09-03 | End: 2020-09-03

## 2020-09-03 RX ORDER — OXYCODONE HYDROCHLORIDE 5 MG/1
5 TABLET ORAL EVERY 4 HOURS PRN
Status: DISCONTINUED | OUTPATIENT
Start: 2020-09-03 | End: 2020-09-10 | Stop reason: HOSPADM

## 2020-09-03 RX ADMIN — CEFAZOLIN SODIUM 2000 MG: 2 SOLUTION INTRAVENOUS at 04:44

## 2020-09-03 RX ADMIN — CALCIUM CARBONATE (ANTACID) CHEW TAB 500 MG 500 MG: 500 CHEW TAB at 05:28

## 2020-09-03 RX ADMIN — ONDANSETRON 4 MG: 2 INJECTION INTRAMUSCULAR; INTRAVENOUS at 04:51

## 2020-09-03 RX ADMIN — SODIUM CHLORIDE, SODIUM GLUCONATE, SODIUM ACETATE, POTASSIUM CHLORIDE, MAGNESIUM CHLORIDE, SODIUM PHOSPHATE, DIBASIC, AND POTASSIUM PHOSPHATE 125 ML/HR: .53; .5; .37; .037; .03; .012; .00082 INJECTION, SOLUTION INTRAVENOUS at 18:50

## 2020-09-03 RX ADMIN — PROMETHAZINE HYDROCHLORIDE 12.5 MG: 25 INJECTION INTRAMUSCULAR; INTRAVENOUS at 05:28

## 2020-09-03 RX ADMIN — FONDAPARINUX SODIUM 2.5 MG: 2.5 INJECTION, SOLUTION SUBCUTANEOUS at 08:30

## 2020-09-03 RX ADMIN — PANTOPRAZOLE SODIUM 40 MG: 40 TABLET, DELAYED RELEASE ORAL at 05:55

## 2020-09-03 RX ADMIN — LIDOCAINE 5% 3 PATCH: 700 PATCH TOPICAL at 10:13

## 2020-09-03 RX ADMIN — QUETIAPINE FUMARATE 25 MG: 25 TABLET ORAL at 21:12

## 2020-09-03 RX ADMIN — DIPHENHYDRAMINE HYDROCHLORIDE 25 MG: 50 INJECTION, SOLUTION INTRAMUSCULAR; INTRAVENOUS at 18:11

## 2020-09-03 RX ADMIN — CHLORHEXIDINE GLUCONATE 15 ML: 1.2 RINSE ORAL at 08:30

## 2020-09-03 RX ADMIN — ONDANSETRON 4 MG: 2 INJECTION INTRAMUSCULAR; INTRAVENOUS at 08:31

## 2020-09-03 RX ADMIN — METOPROLOL TARTRATE 25 MG: 25 TABLET, FILM COATED ORAL at 11:12

## 2020-09-03 RX ADMIN — HALOPERIDOL LACTATE 2 MG: 5 INJECTION INTRAMUSCULAR at 13:20

## 2020-09-03 RX ADMIN — ACETAMINOPHEN 975 MG: 325 TABLET, FILM COATED ORAL at 21:12

## 2020-09-03 RX ADMIN — PANTOPRAZOLE SODIUM 40 MG: 40 INJECTION, POWDER, FOR SOLUTION INTRAVENOUS at 21:15

## 2020-09-03 RX ADMIN — METOPROLOL TARTRATE 25 MG: 25 TABLET, FILM COATED ORAL at 21:12

## 2020-09-03 RX ADMIN — SCOPALAMINE 1 PATCH: 1 PATCH, EXTENDED RELEASE TRANSDERMAL at 08:51

## 2020-09-03 RX ADMIN — CEFAZOLIN SODIUM 2000 MG: 2 SOLUTION INTRAVENOUS at 12:29

## 2020-09-03 RX ADMIN — ASPIRIN 325 MG ORAL TABLET 325 MG: 325 PILL ORAL at 11:12

## 2020-09-03 RX ADMIN — AMIODARONE HYDROCHLORIDE 200 MG: 200 TABLET ORAL at 05:55

## 2020-09-03 NOTE — CONSULTS
Consultation - Cardiology   Venessa Paulson 48 y o  male MRN: [id number]  Unit/Bed#: [facility] 416-01 Encounter: [id number]    Assessment/Plan     Principal Problem:    Nonrheumatic aortic valve stenosis  Active Problems:    Type 1 diabetes mellitus with retinopathy ([address] )    Essential hypertension    Coronary artery disease involving native coronary artery of native heart without angina pectoris    Dyslipidemia    Aortic stenosis due to bicuspid aortic valve    History of ITP    History of coronary angioplasty with insertion of stent    Acute blood loss as cause of postoperative anemia      Assessment/Plan    1  Severe AS/multivessel CAD  Status post bioprosthetic AVR/CABG x3  POD #1  Asa 325mg/ lipitor 80mg/ BB- lopressor 25mg BID   Telemetry - sinus tachycardia noted to be more tachycardic today 110's, previously 80-90's  EKG- NSR inferior /lateral T wave inversions  Intractable nausea- management per primary team    2  HTN- nomotensive  Ace inhibitor on hold    3  Type 1 diabetes-per medicine    4  HLD- good control on high intensity statin    History of Present Illness   Physician Requesting Consult: Christo Spears DO  Reason for Consult / Principal Problem: S/P CABG/ bioprosthetic AVR    HPI: Venessa Paulson is a 48y o  year old male with severe symptomatic AS and multivessel CAD who underwent AVR with 25 Font[place] Inspiris pericardial tissue valve as well as CABG x3 to LAD, SVG to OM1 and SVG to right PL branch  PDA small not amenable for bypass  Intraop NORMAN found AV to be trileaflet  Final NORMAN demonstrated prosthetic valve with normal function and no evidence perivalvular leak  He has intractable nausea post op  Dry heaves  Chest incisional discomfort  Felt "tired" getting OOB  He is followed by Dr Brenda Pascal, previously Dr Monik Conteh  Prior PCI 2017 small PL branch  He was having progressive fatigue and worsening dyspnea  Valve disease progressed to the severe range       2D echocardiogram 7/2020- LVEF 60% with grade 2 diastolic dysfunction  Mild MR  Severe stenosis of the aortic valve with a valve mean gradient 30 mm Hg  Peak velocity 404 cm/s  Possibly bicuspid  [facility] 8/2020- mid LAD 70% at site of prior stent 2nd lesion 75% just after diagonal 2  Circumflex stent patent  RCA moderate atherosclerosis  Right PDA small to medium size 80% stenosis and there was a 2nd lesion discrete 95% stenosis  First PL segment small sized discrete 85% stenosis  Inpatient consult to Cardiology  Consult performed by: DEVEN Kulkarni  Consult ordered by: Brenna Gambino PA-C          Review of Systems   Constitutional: Positive for activity change and fatigue  HENT: Negative  Eyes: Negative  Respiratory: Negative for shortness of breath  Preop exertional dyspnea   Cardiovascular: Negative for chest pain, palpitations and leg swelling  Gastrointestinal: Positive for nausea and vomiting  Endocrine: Negative  Genitourinary: Guillen intact   Musculoskeletal: Negative  Skin: Negative  Allergic/Immunologic: Negative  Neurological: Positive for weakness  Hematological: Negative  Psychiatric/Behavioral: Negative  All other systems reviewed and are negative        Historical Information   Past Medical History:   Diagnosis Date    Aortic stenosis     Clavicle fracture     LEFT    Diabetes mellitus [address] [address]     Hyperlipidemia     Hypertension     Myocardial infarction [address] [address]     Thrombocytopenic purpura ([facility] [address] )      Past Surgical History:   Procedure Laterality Date    ABDOMINAL SURGERY      HARVEST VEIN Left 9/2/2020    Procedure: HARVEST VEIN ENDOSCOPIC (EVH) LEFT LEG;  Surgeon: Alexx Lynch MD;  [facility];  Service: Cardiac Surgery    [place] CABG, ARTERY-VEIN, THREE N/A 9/2/2020    Procedure: CORONARY ARTERY BYPASS GRAFT X 3 WITH SVG TO Right Posterior Lateral Branch, OM1 AND LIMA TO LAD ;  Surgeon: Alexx Lynch MD;  Location[facility];  Service: Cardiac Surgery    CO RPLCMT AORTIC VALVE OPN W/STENTLESS TISSUE VALVE N/A 9/2/2020    Procedure: AORTIC VALVE REPLACEMENT WITH A 25MM SANTOYO INSPIRIS RESILIA  TISSUE AORTIC VALVE;  Surgeon: John Pruett MD;  Location: [facility];  Service: Cardiac Surgery    ROTATOR CUFF REPAIR      SPLENECTOMY      TONSILLECTOMY      VITRECTOMY Bilateral     BY ANTERIOR APPROACH      Social History     Substance and Sexual Activity   Alcohol Use No     Social History     Substance and Sexual Activity   Drug Use Yes    Frequency: 3 0 times per week    Types: Marijuana     Social History     Tobacco Use   Smoking Status Former Smoker    Types: Cigars   Smokeless Tobacco Never Used     Family History:   Family History   Problem Relation Age of Onset    Aneurysm Mother         CAREBRAL ARTERY     Coronary artery disease Mother     Diabetes Mother        Meds/Allergies   current meds:   Current Facility-Administered Medications   Medication Dose Route Frequency    acetaminophen (TYLENOL) tablet 975 mg  975 mg Oral Q8H    aspirin tablet 325 mg  325 mg Oral Daily    atorvastatin (LIPITOR) tablet 80 mg  80 mg Oral Daily With Dinner    bisacodyl (DULCOLAX) rectal suppository 10 mg  10 mg Rectal Daily PRN    busPIRone (BUSPAR) tablet 15 mg  15 mg Oral BID    calcium carbonate (TUMS) chewable tablet 500 mg  500 mg Oral Daily PRN    calcium chloride 10 % injection 1 g  1 g Intravenous Once    ceFAZolin (ANCEF) IVPB (premix) 2,000 mg 50 mL  2,000 mg Intravenous Q8H    chlorhexidine (PERIDEX) 0 12 % oral rinse 15 mL  15 mL Mouth/Throat BID    cholecalciferol (VITAMIN D3) tablet 1,000 Units  1,000 Units Oral Daily    docusate sodium (COLACE) capsule 100 mg  100 mg Oral BID    fondaparinux (ARIXTRA) subcutaneous injection 2 5 mg  2 5 mg Subcutaneous Daily    insulin regular (HumuLIN R,NovoLIN R) 1 Units/mL in sodium chloride 0 9 % 100 mL infusion  0 3-21 Units/hr Intravenous Titrated    lidocaine (LIDODERM) 5 % patch 3 patch  3 patch Topical Daily    magnesium sulfate 2 g/50 mL IVPB (premix) 2 g  2 g Intravenous Once    metoprolol tartrate (LOPRESSOR) tablet 25 mg  25 mg Oral Q12H [address]    ondansetron (ZOFRAN) injection 4 mg  4 mg Intravenous Q6H PRN    oxyCODONE (ROXICODONE) IR tablet 2 5 mg  2 5 mg Oral Q4H PRN    oxyCODONE (ROXICODONE) IR tablet 5 mg  5 mg Oral Q4H PRN    [START ON 9/4/2020] pantoprazole (PROTONIX) EC tablet 40 mg  40 mg Oral Early Morning    polyethylene glycol (MIRALAX) packet 17 g  17 g Oral Daily    QUEtiapine (SEROquel) tablet 25 mg  25 mg Oral HS    scopolamine (TRANSDERM-SCOP) 1 5 mg/3 days TD 72 hr patch 1 patch  1 patch Transdermal Q72H    sodium chloride infusion 0 45 %  20 mL/hr Intravenous Continuous    temazepam (RESTORIL) capsule 15 mg  15 mg Oral HS PRN    and PTA meds:    Medications Prior to Admission   Medication    aspirin (ECOTRIN LOW STRENGTH) 81 mg EC tablet    atorvastatin (LIPITOR) 80 mg tablet    busPIRone (BUSPAR) 15 mg tablet    Cholecalciferol (VITAMIN D) 2000 units CAPS    hydrOXYzine HCL (ATARAX) 25 mg tablet    ibuprofen (MOTRIN) 800 mg tablet    insulin lispro (HumaLOG) 100 units/mL injection    lisinopril (ZESTRIL) 20 mg tablet    mupirocin (BACTROBAN) 2 % ointment    omeprazole (PriLOSEC) 20 mg delayed release capsule    QUEtiapine (SEROquel) 25 mg tablet    sildenafil (VIAGRA) 50 MG tablet    zolpidem (AMBIEN) 5 mg tablet    amoxicillin (AMOXIL) 500 mg capsule    NEL MICROLET LANCETS lancets    glucagon (GLUCAGON EMERGENCY) 1 MG injection    glucose blood (U-NOTE CONTOUR TEST) test strip    Insulin Infusion Pump KIT    Insulin Infusion Pump Supplies (MINIMED INFUSION SET-) MISC    Insulin Infusion Pump Supplies (MINIMED RESERVOIR 3ML) MISC    INSULIN SYRINGE 1CC/29G 29G X 1/2" 1 ML MISC     No Known Allergies    Objective   Vitals: Blood pressure 112/72, pulse (!) 112, temperature 97 6 °F (36 4 °C), temperature source Oral, resp   rate (!) 25, height 5' 6" (1 676 m), weight 72 9 kg (160 lb 11 5 oz), SpO2 94 %  Orthostatic Blood Pressures      Most Recent Value   Blood Pressure  112/72 filed at 09/03/2020 1000   Patient Position - Orthostatic VS  Lying filed at 09/03/2020 0400            Intake/Output Summary (Last 24 hours) at 9/3/2020 1045  Last data filed at 9/3/2020 1001  Gross per 24 hour   Intake 2540 49 ml   Output 3140 ml   Net -599 51 ml       Invasive Devices     Central Venous Catheter Line            CVC Central Lines 09/02/20 Triple 1 day          Peripheral Intravenous Line            Peripheral IV 09/02/20 Left Antecubital 1 day    Peripheral IV 09/02/20 Right Antecubital 1 day          Line            Pacer Wires less than 1 day    Pacer Wires less than 1 day          Drain            Urethral Catheter Temperature probe 16 Fr  1 day    Chest Tube 1 Anterior Mediastinal 32 Fr  less than 1 day    Chest Tube 2 Left Pleural 32 Fr  less than 1 day    Chest Tube 3 Posterior Mediastinal 24 Fr  less than 1 day                Physical Exam: /72   Pulse (!) 112   Temp 97 6 °F (36 4 °C) (Oral)   Resp (!) 25   Ht 5' 6" (1 676 m)   Wt 72 9 kg (160 lb 11 5 oz)   SpO2 94%   BMI 25 94 kg/m²   General Appearance:    Alert, cooperative, no distress, appears stated age   Head:    Normocephalic, no scleral icterus   Eyes:    PERRL   Nose:   Nares normal, septum midline, mucosa normal, no drainage    Throat:   Lips, mucosa, and tongue normal   Neck:   Supple, symmetrical, trachea midline           Lungs:     Clear to auscultation bilaterally, respirations unlabored   Poor effort   Chest Wall:    Covered with dsd    Heart:    Regular rate and rhythm, S1 and S2 normal, no murmur, rub   or gallop   Abdomen:     Soft, hypoactive   Extremities:   Extremities normal, atraumatic, no cyanosis , no significant edema       Skin:   Skin warm   Neurologic:   Alert and oriented to person place and time   No obvious focal deficits       Lab Results:   Recent Results (from the past 72 hour(s))   Type and screen    Collection Time: 09/02/20  6:19 AM   Result Value Ref Range    ABO Grouping A     Rh Factor Positive     Antibody Screen Negative     Specimen Expiration Date 20200905    Fingerstick Glucose (POCT)    Collection Time: 09/02/20  6:43 AM   Result Value Ref Range    POC Glucose 93 65 - 140 mg/dl   POCT Blood Gas (CG8+)    Collection Time: 09/02/20  8:42 AM   Result Value Ref Range    pH, Art i-STAT 7 479 (H) 7 350 - 7 450    pCO2, Art i-STAT 37 7 36 0 - 44 0 mm HG    pO2, ART i-STAT >400 0 (H) 75 0 - 129 0 mm HG    BE, i-STAT 4 (H) -2 - 3 mmol/L    HCO3, Art i-STAT 28 0 22 0 - 28 0 mmol/L    CO2, i-STAT 29 21 - 32 mmol/L    O2 Sat, i-STAT      SODIUM, I-STAT 140 136 - 145 mmol/l    Potassium, i-STAT 3 7 3 5 - 5 3 mmol/L    Calcium, Ionized i-STAT 1 18 1 12 - 1 32 mmol/L    Hct, i-STAT 33 (L) 36 5 - 49 3 %    Hgb, i-STAT 11 2 (L) 12 0 - 17 0 g/dl    Glucose, i-STAT 107 65 - 140 mg/dl    Specimen Type ARTERIAL    POCT activated clotting time    Collection Time: 09/02/20  8:42 AM   Result Value Ref Range    Activated Clotting Time, i-STAT 138 (H) 89 - 137 sec    Specimen Type VENOUS    POCT activated clotting time    Collection Time: 09/02/20  9:41 AM   Result Value Ref Range    Activated Clotting Time, i-STAT 484 (H) 89 - 137 sec    Specimen Type ARTERIAL    POCT Blood Gas (CG8+)    Collection Time: 09/02/20  9:42 AM   Result Value Ref Range    pH, Art i-STAT 7 349 (L) 7 350 - 7 450    pCO2, Art i-STAT 48 3 (H) 36 0 - 44 0 mm HG    pO2, ART i-STAT >400 0 (H) 75 0 - 129 0 mm HG    BE, i-STAT 1 -2 - 3 mmol/L    HCO3, Art i-STAT 26 6 22 0 - 28 0 mmol/L    CO2, i-STAT 28 21 - 32 mmol/L    O2 Sat, i-STAT      SODIUM, I-STAT 140 136 - 145 mmol/l    Potassium, i-STAT 3 3 (L) 3 5 - 5 3 mmol/L    Calcium, Ionized i-STAT 1 10 (L) 1 12 - 1 32 mmol/L    Hct, i-STAT 32 (L) 36 5 - 49 3 %    Hgb, i-STAT 10 9 (L) 12 0 - 17 0 g/dl    Glucose, i-STAT 166 (H) 65 - 140 mg/dl    Specimen Type ARTERIAL    POCT activated clotting time    Collection Time: 09/02/20  9:45 AM   Result Value Ref Range    Activated Clotting Time, i-STAT 543 (H) 89 - 137 sec    Specimen Type ARTERIAL    POCT activated clotting time    Collection Time: 09/02/20  9:57 AM   Result Value Ref Range    Activated Clotting Time, i-STAT 478 (H) 89 - 137 sec    Specimen Type ARTERIAL    POCT Blood Gas (CG8+)    Collection Time: 09/02/20  9:58 AM   Result Value Ref Range    pH, Art i-STAT 7 377 7 350 - 7 450    pCO2, Art i-STAT 42 6 36 0 - 44 0 mm HG    pO2, ART i-STAT 297 0 (H) 75 0 - 129 0 mm HG    BE, i-STAT 0 -2 - 3 mmol/L    HCO3, Art i-STAT 25 0 22 0 - 28 0 mmol/L    CO2, i-STAT 26 21 - 32 mmol/L    O2 Sat, i-STAT 100 (H) 60 - 85 %    SODIUM, I-STAT 138 136 - 145 mmol/l    Potassium, i-STAT 3 6 3 5 - 5 3 mmol/L    Calcium, Ionized i-STAT 1 05 (L) 1 12 - 1 32 mmol/L    Hct, i-STAT 26 (L) 36 5 - 49 3 %    Hgb, i-STAT 8 8 (L) 12 0 - 17 0 g/dl    Glucose, i-STAT 148 (H) 65 - 140 mg/dl    Specimen Type ARTERIAL    POCT Blood Gas (CG8+)    Collection Time: 09/02/20 10:05 AM   Result Value Ref Range    ph, Mychal ISTAT      pCO2, Mychal i-STAT      pO2, Mychal i-STAT 38 0 35 0 - 45 0 mm HG    BE, i-STAT      HCO3, Mychal i-STAT      CO2, i-STAT      O2 Sat, i-STAT      SODIUM, I-STAT 139 136 - 145 mmol/l    Potassium, i-STAT 4 4 3 5 - 5 3 mmol/L    Calcium, Ionized i-STAT 1 03 (L) 1 12 - 1 32 mmol/L    Hct, i-STAT 25 (L) 36 5 - 49 3 %    Hgb, i-STAT 8 5 (L) 12 0 - 17 0 g/dl    Glucose, i-STAT 148 (H) 65 - 140 mg/dl    Specimen Type VENOUS    POCT Blood Gas (CG8+)    Collection Time: 09/02/20 10:11 AM   Result Value Ref Range    ph, Mychal ISTAT 7 364 7 300 - 7 400    pCO2, Mychal i-STAT 42 5 42 0 - 50 0 mm HG    pO2, Mychal i-STAT 38 0 35 0 - 45 0 mm HG    BE, i-STAT -1 -2 - 3 mmol/L    HCO3, Mychal i-STAT 24 2 24 0 - 30 0 mmol/L    CO2, i-STAT 26 21 - 32 mmol/L    O2 Sat, i-STAT 69 60 - 85 %    SODIUM, I-STAT 138 136 - 145 mmol/l    Potassium, i-STAT 4 4 3 5 - 5 3 mmol/L    Calcium, Ionized i-STAT 1 03 (L) 1 12 - 1 32 mmol/L    Hct, i-STAT 25 (L) 36 5 - 49 3 %    Hgb, i-STAT 8 5 (L) 12 0 - 17 0 g/dl    Glucose, i-STAT 149 (H) 65 - 140 mg/dl    Specimen Type VENOUS    POCT activated clotting time    Collection Time: 09/02/20 10:13 AM   Result Value Ref Range    Activated Clotting Time, i-STAT 460 (H) 89 - 137 sec    Specimen Type ARTERIAL    POCT Blood Gas (CG8+)    Collection Time: 09/02/20 10:27 AM   Result Value Ref Range    pH, Art i-STAT 7 369 7 350 - 7 450    pCO2, Art i-STAT 45 7 (H) 36 0 - 44 0 mm HG    pO2, ART i-STAT 346 0 (H) 75 0 - 129 0 mm HG    BE, i-STAT 1 -2 - 3 mmol/L    HCO3, Art i-STAT 26 3 22 0 - 28 0 mmol/L    CO2, i-STAT 28 21 - 32 mmol/L    O2 Sat, i-STAT 100 (H) 60 - 85 %    SODIUM, I-STAT 140 136 - 145 mmol/l    Potassium, i-STAT 3 5 3 5 - 5 3 mmol/L    Calcium, Ionized i-STAT 1 05 (L) 1 12 - 1 32 mmol/L    Hct, i-STAT 26 (L) 36 5 - 49 3 %    Hgb, i-STAT 8 8 (L) 12 0 - 17 0 g/dl    Glucose, i-STAT 159 (H) 65 - 140 mg/dl    Specimen Type ARTERIAL    POCT activated clotting time    Collection Time: 09/02/20 10:27 AM   Result Value Ref Range    Activated Clotting Time, i-STAT 543 (H) 89 - 137 sec    Specimen Type ARTERIAL    POCT Blood Gas (CG8+)    Collection Time: 09/02/20 10:53 AM   Result Value Ref Range    pH, Art i-STAT 7 416 7 350 - 7 450    pCO2, Art i-STAT 40 4 36 0 - 44 0 mm HG    pO2, ART i-STAT 371 0 (H) 75 0 - 129 0 mm HG    BE, i-STAT 1 -2 - 3 mmol/L    HCO3, Art i-STAT 26 0 22 0 - 28 0 mmol/L    CO2, i-STAT 27 21 - 32 mmol/L    O2 Sat, i-STAT 100 (H) 60 - 85 %    SODIUM, I-STAT 141 136 - 145 mmol/l    Potassium, i-STAT 3 8 3 5 - 5 3 mmol/L    Calcium, Ionized i-STAT 1 05 (L) 1 12 - 1 32 mmol/L    Hct, i-STAT 26 (L) 36 5 - 49 3 %    Hgb, i-STAT 8 8 (L) 12 0 - 17 0 g/dl    Glucose, i-STAT 151 (H) 65 - 140 mg/dl    Specimen Type ARTERIAL    POCT activated clotting time    Collection Time: 09/02/20 10:53 AM   Result Value Ref Range    Activated Clotting Time, i-STAT 472 (H) 89 - 137 sec    Specimen Type ARTERIAL    Platelet count    Collection Time: 09/02/20 10:57 AM   Result Value Ref Range    Platelets 834 825 - 057 Thousands/uL    MPV 10 3 8 9 - 12 7 fL   POCT activated clotting time    Collection Time: 09/02/20 11:12 AM   Result Value Ref Range    Activated Clotting Time, i-STAT 611 (H) 89 - 137 sec    Specimen Type ARTERIAL    POCT activated clotting time    Collection Time: 09/02/20 11:28 AM   Result Value Ref Range    Activated Clotting Time, i-STAT 478 (H) 89 - 137 sec    Specimen Type ARTERIAL    POCT Blood Gas (CG8+)    Collection Time: 09/02/20 11:29 AM   Result Value Ref Range    pH, Art i-STAT 7 384 7 350 - 7 450    pCO2, Art i-STAT 42 5 36 0 - 44 0 mm HG    pO2, ART i-STAT 383 0 (H) 75 0 - 129 0 mm HG    BE, i-STAT 0 -2 - 3 mmol/L    HCO3, Art i-STAT 25 4 22 0 - 28 0 mmol/L    CO2, i-STAT 27 21 - 32 mmol/L    O2 Sat, i-STAT 100 (H) 60 - 85 %    SODIUM, I-STAT 140 136 - 145 mmol/l    Potassium, i-STAT 4 6 3 5 - 5 3 mmol/L    Calcium, Ionized i-STAT 1 06 (L) 1 12 - 1 32 mmol/L    Hct, i-STAT 27 (L) 36 5 - 49 3 %    Hgb, i-STAT 9 2 (L) 12 0 - 17 0 g/dl    Glucose, i-STAT 162 (H) 65 - 140 mg/dl    Specimen Type ARTERIAL    POCT Blood Gas (CG8+)    Collection Time: 09/02/20 12:15 PM   Result Value Ref Range    pH, Art i-STAT 7 319 (L) 7 350 - 7 450    pCO2, Art i-STAT 47 4 (H) 36 0 - 44 0 mm HG    pO2, ART i-STAT 242 0 (H) 75 0 - 129 0 mm HG    BE, i-STAT -2 -2 - 3 mmol/L    HCO3, Art i-STAT 24 4 22 0 - 28 0 mmol/L    CO2, i-STAT 26 21 - 32 mmol/L    O2 Sat, i-STAT 100 (H) 60 - 85 %    SODIUM, I-STAT 143 136 - 145 mmol/l    Potassium, i-STAT 3 5 3 5 - 5 3 mmol/L    Calcium, Ionized i-STAT 1 19 1 12 - 1 32 mmol/L    Hct, i-STAT 28 (L) 36 5 - 49 3 %    Hgb, i-STAT 9 5 (L) 12 0 - 17 0 g/dl    Glucose, i-STAT 128 65 - 140 mg/dl    Specimen Type ARTERIAL    POCT activated clotting time    Collection Time: 09/02/20 12:15 PM   Result Value Ref Range Activated Clotting Time, i-STAT 127 89 - 137 sec    Specimen Type ARTERIAL    Fingerstick Glucose (POCT)    Collection Time: 09/02/20 12:53 PM   Result Value Ref Range    POC Glucose 84 65 - 140 mg/dl   Platelets Count - If Chest Tube Losses > 200 mL/hr in First Hour Postop    Collection Time: 09/02/20 12:55 PM   Result Value Ref Range    Platelets 776 192 - 343 Thousands/uL    MPV 10 2 8 9 - 12 7 fL   Basic metabolic panel    Collection Time: 09/02/20 12:55 PM   Result Value Ref Range    Sodium 143 136 - 145 mmol/L    Potassium 4 6 3 5 - 5 3 mmol/L    Chloride 116 (H) 100 - 108 mmol/L    CO2 24 21 - 32 mmol/L    ANION GAP 3 (L) 4 - 13 mmol/L    BUN 7 5 - 25 mg/dL    Creatinine 0 49 (L) 0 60 - 1 30 mg/dL    Glucose 105 65 - 140 mg/dL    Calcium 7 6 (L) 8 3 - 10 1 mg/dL    eGFR 124 ml/min/1 73sq m   Hemoglobin and hematocrit, blood    Collection Time: 09/02/20 12:55 PM   Result Value Ref Range    Hemoglobin 11 1 (L) 12 0 - 17 0 g/dL    Hematocrit 33 2 (L) 36 5 - 49 3 %   POCT Blood Gas (CG8+)    Collection Time: 09/02/20 12:57 PM   Result Value Ref Range    pH, Art i-STAT 7 331 (L) 7 350 - 7 450    pCO2, Art i-STAT 45 7 (H) 36 0 - 44 0 mm HG    pO2, ART i-STAT 147 0 (H) 75 0 - 129 0 mm HG    BE, i-STAT -2 -2 - 3 mmol/L    HCO3, Art i-STAT 24 1 22 0 - 28 0 mmol/L    CO2, i-STAT 26 21 - 32 mmol/L    O2 Sat, i-STAT 99 (H) 60 - 85 %    SODIUM, I-STAT 143 136 - 145 mmol/l    Potassium, i-STAT 4 5 3 5 - 5 3 mmol/L    Calcium, Ionized i-STAT 1 20 1  12 - 1 32 mmol/L    Hct, i-STAT 32 (L) 36 5 - 49 3 %    Hgb, i-STAT 10 9 (L) 12 0 - 17 0 g/dl    Glucose, i-STAT 103 65 - 140 mg/dl    Specimen Type ARTERIAL    ECG 12 lead    Collection Time: 09/02/20  1:00 PM   Result Value Ref Range    Ventricular Rate 76 BPM    Atrial Rate 76 BPM    [place] Interval 175 ms    QRSD Interval 121 ms    QT Interval 438 ms    QTC Interval 493 ms    P [place] 68 degrees    QRS Axis 79 degrees    T Wave Axis -79 degrees   Fingerstick Glucose (POCT) Collection Time: 09/02/20  2:10 PM   Result Value Ref Range    POC Glucose 169 (H) 65 - 140 mg/dl   Fibrinogen  - If Chest Tube Losses > 200 mL/hr in First Hour Postop    Collection Time: 09/02/20  3:14 PM   Result Value Ref Range    Fibrinogen 291 227 - 495 mg/dL   aPTT - If Chest Tube Losses > 200 mL/hr in First Hour    Collection Time: 09/02/20  3:14 PM   Result Value Ref Range    PTT 28 23 - 37 seconds   Protime - INR - If Chest Tube Losses > 200 mL/hr in First Hour    Collection Time: 09/02/20  3:14 PM   Result Value Ref Range    Protime 15 7 (H) 11 6 - 14 5 seconds    INR 1 25 (H) 0 84 - 1 19   Potassium    Collection Time: 09/02/20  3:14 PM   Result Value Ref Range    Potassium 4 4 3 5 - 5 3 mmol/L   Platelet count    Collection Time: 09/02/20  3:14 PM   Result Value Ref Range    Platelets 962 960 - 638 Thousands/uL    MPV 10 7 8 9 - 12 7 fL   Hemoglobin and hematocrit, blood    Collection Time: 09/02/20  3:14 PM   Result Value Ref Range    Hemoglobin 11 5 (L) 12 0 - 17 0 g/dL    Hematocrit 34 7 (L) 36 5 - 49 3 %   Fingerstick Glucose (POCT)    Collection Time: 09/02/20  3:18 PM   Result Value Ref Range    POC Glucose 176 (H) 65 - 140 mg/dl   Fingerstick Glucose (POCT)    Collection Time: 09/02/20  4:13 PM   Result Value Ref Range    POC Glucose 161 (H) 65 - 140 mg/dl   Fingerstick Glucose (POCT)    Collection Time: 09/02/20  6:13 PM   Result Value Ref Range    POC Glucose 113 65 - 140 mg/dl   Fingerstick Glucose (POCT)    Collection Time: 09/02/20  7:51 PM   Result Value Ref Range    POC Glucose 140 65 - 140 mg/dl   Hemoglobin and hematocrit, blood    Collection Time: 09/02/20  8:39 PM   Result Value Ref Range    Hemoglobin 8 4 (L) 12 0 - 17 0 g/dL    Hematocrit 26 0 (L) 36 5 - 49 3 %   Potassium    Collection Time: 09/02/20  8:39 PM   Result Value Ref Range    Potassium 4 4 3 5 - 5 3 mmol/L   Fingerstick Glucose (POCT)    Collection Time: 09/02/20 10:05 PM   Result Value Ref Range    POC Glucose 127 65 - 140 mg/dl   Fingerstick Glucose (POCT)    Collection Time: 09/02/20 11:52 PM   Result Value Ref Range    POC Glucose 138 65 - 140 mg/dl   Hemoglobin and hematocrit, blood    Collection Time: 09/02/20 11:54 PM   Result Value Ref Range    Hemoglobin 8 0 (L) 12 0 - 17 0 g/dL    Hematocrit 24 1 (L) 36 5 - 49 3 %   Fingerstick Glucose (POCT)    Collection Time: 09/03/20  1:55 AM   Result Value Ref Range    POC Glucose 151 (H) 65 - 140 mg/dl   Fingerstick Glucose (POCT)    Collection Time: 09/03/20  4:21 AM   Result Value Ref Range    POC Glucose 124 65 - 140 mg/dl   Basic metabolic panel    Collection Time: 09/03/20  4:22 AM   Result Value Ref Range    Sodium 147 (H) 136 - 145 mmol/L    Potassium 4 2 3 5 - 5 3 mmol/L    Chloride 116 (H) 100 - 108 mmol/L    CO2 26 21 - 32 mmol/L    ANION GAP 5 4 - 13 mmol/L    BUN 11 5 - 25 mg/dL    Creatinine 0 62 0 60 - 1 30 mg/dL    Glucose 132 65 - 140 mg/dL    Calcium 8 0 (L) 8 3 - 10 1 mg/dL    eGFR 112 ml/min/1 73sq m   Hemoglobin and hematocrit, blood    Collection Time: 09/03/20  4:22 AM   Result Value Ref Range    Hemoglobin 7 8 (L) 12 0 - 17 0 g/dL    Hematocrit 23 6 (L) 36 5 - 49 3 %   Magnesium    Collection Time: 09/03/20  5:07 AM   Result Value Ref Range    Magnesium 2 5 1 6 - 2 6 mg/dL   CBC-AM POD #1    Collection Time: 09/03/20  5:07 AM   Result Value Ref Range    WBC 15 22 (H) 4 31 - 10 16 Thousand/uL    RBC 2 58 (L) 3 88 - 5 62 Million/uL    Hemoglobin 7 8 (L) 12 0 - 17 0 g/dL    Hematocrit 23 4 (L) 36 5 - 49 3 %    MCV 91 82 - 98 fL    MCH 30 2 26 8 - 34 3 pg    MCHC 33 3 31 4 - 37 4 g/dL    RDW 15 0 11 6 - 15 1 %    Platelets 067 (L) 114 - 390 Thousands/uL    MPV 10 6 8 9 - 12 7 fL   Fingerstick Glucose (POCT)    Collection Time: 09/03/20  6:04 AM   Result Value Ref Range    POC Glucose 170 (H) 65 - 140 mg/dl   Prepare Leukoreduced RBC: 4 Units    Collection Time: 09/03/20  7:24 AM   Result Value Ref Range    Unit Product Code Z8896G29     Unit Number [id number] Unit ABO A     Unit RH POS     Crossmatch Compatible     Unit Dispense Status Return to [facility]     Unit Product Code H3062V96     Unit Number [id number]     Unit ABO A     Unit RH POS     Crossmatch Compatible     Unit Dispense Status Return to [facility]     Unit Product Code T6001K22     Unit Number [id number]     Unit ABO A     Unit RH POS     Crossmatch Compatible     Unit Dispense Status Return to [facility]     Unit Product Code [id number]     Unit Number [id number]     Unit ABO A     Unit RH POS     Crossmatch Compatible     Unit Dispense Status Return to Inv    Fingerstick Glucose (POCT)    Collection Time: 09/03/20  8:08 AM   Result Value Ref Range    POC Glucose 139 65 - 140 mg/dl   Fingerstick Glucose (POCT)    Collection Time: 09/03/20 10:02 AM   Result Value Ref Range    POC Glucose 141 (H) 65 - 140 mg/dl        SUMMARY     CORONARY CIRCULATION:  Left main: The vessel was large sized  Angiography showed no evidence of disease  LAD: Normal  The vessel was medium to large sized  Mid LAD: The vessel was medium sized  There was a discrete 70 % stenosis at the site of a prior stent  ( proximal part of the stented area ) In a second lesion, there was a discrete 75 % stenosis just after D2  Circumflex: The vessel was medium sized  The prev iously placed stent in the mid L Cx is patent  RCA: The vessel was large sized (dominant)  Angiography showed moderate atherosclerosis  Right PDA: The vessel was small to medium sized  There was a discrete 80 % stenosis  In a second lesion, there was a discrete 95 % stenosis  1st posterolateral segment: The vessel was very small sized  There was a discrete 85 % stenosis  Imaging: I have personally reviewed pertinent reports      EKG: nsr inf lateral t wave inversions  VTE Prophylaxis: Fondaparinux (Arixtra)    Code Status: Level 1 - Full Code  Advance Directive and Living Will:      Power of :    POLST:      Counseling / Coordination of Care  Total floor / unit time spent today 35 minutes  Greater than 50% of total time was spent with the patient and / or family counseling and / or coordination of care

## 2020-09-03 NOTE — CONSULTS
[address] Endocrinology   Eli Catalan 48 y o  male MRN: [id number]  [facility] Encounter: [id number]    Code Status: Level 1 - Full Code  POA:      Reason for Admission / Principal Problem: Aortic valve replacement   Reason for Consult:  Type 1 DM    Impression:  Patient Active Problem List   Diagnosis    Nonrheumatic aortic valve stenosis    Type 1 diabetes mellitus with retinopathy ([address] )    Essential hypertension    Mouth sores    Coronary artery disease involving native coronary artery of native heart without angina pectoris    Dyslipidemia    Aortic stenosis due to bicuspid aortic valve    History of ITP    History of coronary angioplasty with insertion of stent    Acute blood loss as cause of postoperative anemia    Post-operative nausea and vomiting       A/P:    48year old male with long standing history of Type 1 Diabetes currently post op from aortic valve replacement, endocrine consulted for management of diabetes    Type 1 Diabetes Mellitus  · Patient with long standing history of type 1 Diabetes complicated by diabetic retinopathy  · Last A1c: 9 1  · On home insulin pump with settings as below:  ? Basal rate: Midnight- 7am- 2 0, 7am to midnight- 1 0  ? Insulin to carb ratio: midnight- 7am- 1:10, 7pm to midnight- 1:9  ? Insulin sensitivity factor: 24 hour 1:30  ? BG target: 100-120  ? Active insulin time: 4 hours  ? Type of insulin: Humalog   · Currently on an insulin drip, continue for now and can plan to switch to basal bolus when patient is eating more     HPI: Eli Catalan is a 48 y o  male with a past medical history of type 1 diabetes, Aortic stenosis secondary to bicuspid aortic valve, hypertension, hyperlipidemia who has been admitted for aortic valve replacement, currently post op   Patient reports significant nausea postoperatively and has not been able to tolerate a diet yet  Patient has a longstanding history of type 1 diabetes since the age of 6 and has been on a continue subcutaneous insulin pump since the past 15-20 years  Checks his blood sugars at home twice a day and does not have episodes of hypoglycemia  He recently had pump upgraded to a Medtronic 670 G with guardian sensor       History obtained from patient     PMH:  Past Medical History:   Diagnosis Date    Aortic stenosis     Clavicle fracture     LEFT    Diabetes mellitus [facility]     Hyperlipidemia     Hypertension     Myocardial infarction [facility]     Thrombocytopenic purpura (HCC)         PSH:  Past Surgical History:   Procedure Laterality Date    ABDOMINAL SURGERY      HARVEST VEIN Left 9/2/2020    Procedure: HARVEST VEIN ENDOSCOPIC ([facility]) LEFT LEG;  Surgeon: Joseph Graham MD;  [facility];  Service: Cardiac Surgery    DE CABG, ARTERY-VEIN, THREE N/A 9/2/2020    Procedure: CORONARY ARTERY BYPASS GRAFT X 3 WITH SVG TO Right Posterior Lateral Branch, OM1 AND LIMA TO LAD ;  Surgeon: Joseph Graham MD;  [facility];  Service: Cardiac Surgery    DE RPLCMT AORTIC VALVE OPN W/STENTLESS TISSUE VALVE N/A 9/2/2020    Procedure: AORTIC VALVE REPLACEMENT WITH A 25MM SANTOYO INSPIRIS RESILIA  TISSUE AORTIC VALVE;  Surgeon: Joseph Graham MD;  [facility];  Service: Cardiac Surgery    ROTATOR CUFF REPAIR      SPLENECTOMY      TONSILLECTOMY      VITRECTOMY Bilateral     BY ANTERIOR APPROACH        Allergies: No Known Allergies    Family History:   Family History   Problem Relation Age of Onset    Aneurysm Mother         CAREBRAL ARTERY     Coronary artery disease Mother     Diabetes Mother        Social History:   Social History     Tobacco Use   Smoking Status Former Smoker    Types: Cigars   Smokeless Tobacco Never Used      Social History     Substance and Sexual Activity   Alcohol Use No      Social History     Substance and Sexual Activity   Drug Use Yes    Frequency: 3 0 times per week    Types: Marijuana        Home Medications:   Prior to Admission medications    Medication Sig Start Date End Date Taking? Authorizing Provider   aspirin (ECOTRIN LOW STRENGTH) 81 mg EC tablet Take 1 tablet by mouth daily 12/4/17  Yes Pola Wright MD   atorvastatin (LIPITOR) 80 mg tablet Take 1 tablet by mouth daily with dinner 9/1/17  Yes Kim Anderson MD   busPIRone (BUSPAR) 15 mg tablet  8/19/20  Yes Historical Provider, MD   Cholecalciferol (VITAMIN D) 2000 units CAPS TK 1 C PO QD 12/12/17  Yes Historical Provider, MD   hydrOXYzine HCL (ATARAX) 25 mg tablet TAKE 1 TO 2 TABLETS BY MOUTH EVERY 8 HOURS AS NEEDED FOR ANXIETY 7/22/20  Yes Historical Provider, MD   ibuprofen (MOTRIN) 800 mg tablet Prior dental 8/11/20  Yes Historical Provider, MD   insulin lispro (HumaLOG) 100 units/mL injection Use up to 100 units per day via insulin pump 4/10/20  Yes Kenyetta Price MD   lisinopril (ZESTRIL) 20 mg tablet Take 2 5 mg by mouth daily    Yes Historical Provider, MD   mupirocin (BACTROBAN) 2 % ointment Apply 1 application topically 2 (two) times a day for 5 days Apply to each nostril twice daily for five days before your operation   8/13/20 9/1/20 Yes Mary Kay Damon PA-C   omeprazole (PriLOSEC) 20 mg delayed release capsule Take 20 mg by mouth daily   Yes Historical Provider, MD   QUEtiapine (SEROquel) 25 mg tablet TAKE 1 TABLET BY MOUTH EVERY DAY EVERY NIGHT 1/20/20  Yes Historical Provider, MD   sildenafil (VIAGRA) 50 MG tablet TAKE 1 TABLET BY MOUTH AS  NEEDED FOR ERECTILE  DYSFUNCTION 4/21/20  Yes Historical Provider, MD   zolpidem (AMBIEN) 5 mg tablet Take 1 tablet by mouth daily at bedtime as needed for sleep for up to 2 days 9/1/17 9/1/20 Yes Kim Anderson MD   amoxicillin (AMOXIL) 500 mg capsule Prior dental 8/11/20   Historical Provider, MD   NEL MICROLET LANCETS lancets by Other route 4 (four) times a day Use as instructed 3/28/18   Fatuma Kong MD glucagon (GLUCAGON EMERGENCY) 1 MG injection Inject 1 mg under the skin once as needed for low blood sugar for up to 1 dose  Patient not taking: Reported on 2/3/2020 11/29/19   Stanislav Contreras MD   glucose blood (NEL CONTOUR TEST) test strip 1 each by Other route 4 (four) times a day 9/10/18   Lilli White MD   Insulin Infusion Pump KIT 1 Units/hr by Subcutaneous Insulin Pump route continuous    Historical Provider, MD   Insulin Infusion Pump Supplies (MINIMED INFUSION SET-) MISC by Does not apply route 12/19/17   Historical Provider, MD   Insulin Infusion Pump Supplies ([address] [place] [address]) [address] [facility] by Does not apply route 12/19/17   Historical Provider, MD   INSULIN SYRINGE 1CC/29G 29G X 1/2" 1 ML MISC Inject as directed 3 (three) times a day with meals Use as directed 2/23/18   Edvin Amaral MD       ROS:   Review of Systems   Constitutional: Positive for fatigue  Gastrointestinal: Positive for nausea and vomiting  All other systems reviewed and are negative  Vitals:   Vitals:    09/03/20 0800 09/03/20 0900 09/03/20 1000 09/03/20 1100   BP: 133/76 129/71 112/72 153/80   BP Location:       Pulse: (!) 108 (!) 108 (!) 112 (!) 118   Resp: (!) 23 21 (!) 25 (!) 23   Temp:  97 6 °F (36 4 °C)     TempSrc:  Oral     SpO2: 94% 95% 94% 96%   Weight:       Height:         Temp  Min: 96 3 °F (35 7 °C)  Max: 98 °F (36 7 °C)  IBW: 63 8 kg  Body mass index is 25 94 kg/m²  PHYSICAL EXAM:  Physical Exam  Constitutional:       Appearance: Normal appearance  Eyes:      Pupils: Pupils are equal, round, and reactive to light  Cardiovascular:      Rate and Rhythm: Normal rate and regular rhythm  Pulses: Normal pulses  Pulmonary:      Effort: Pulmonary effort is normal       Breath sounds: Normal breath sounds  Abdominal:      General: Abdomen is flat  Bowel sounds are normal       Palpations: Abdomen is soft  Skin:     General: Skin is warm     Neurological:      General: No focal deficit present  Mental Status: He is alert and oriented to person, place, and time  Coordination: Abnormal coordination:          Labs:   Results from last 7 days   Lab Units 09/03/20  0507 09/03/20  0422 09/02/20  2354  09/02/20  1514  09/02/20  1255   WBC Thousand/uL 15 22*  --   --   --   --   --   --    HEMOGLOBIN g/dL 7 8* 7 8* 8 0*   < > 11 5*  --  11 1*   I STAT HEMOGLOBIN   --   --   --   --   --    < >  --    HEMATOCRIT % 23 4* 23 6* 24 1*   < > 34 7*  --  33 2*   HEMATOCRIT, ISTAT   --   --   --   --   --    < >  --    PLATELETS Thousands/uL 136*  --   --   --  188  --  184    < > = values in this interval not displayed  Results from last 7 days   Lab Units 09/03/20  0422 09/02/20  2039 09/02/20  1514 09/02/20  1257 09/02/20  1255  09/02/20  1215 09/02/20  1129   POTASSIUM mmol/L 4 2 4 4 4 4  --  4 6   < >  --   --    CHLORIDE mmol/L 116*  --   --   --  116*  --   --   --    CO2 mmol/L 26  --   --   --  24  --   --   --    CO2, I-STAT mmol/L  --   --   --  26  --   --  26 27   BUN mg/dL 11  --   --   --  7  --   --   --    CREATININE mg/dL 0 62  --   --   --  0 49*  --   --   --    CALCIUM mg/dL 8 0*  --   --   --  7 6*  --   --   --    GLUCOSE, ISTAT mg/dl  --   --   --  103  --   --  128 162*    < > = values in this interval not displayed  Results from last 7 days   Lab Units 09/03/20  0507   MAGNESIUM mg/dL 2 5     No results found for: PHOS   Results from last 7 days   Lab Units 09/02/20  1514   INR  1 25*   PTT seconds 28     No results found for: TROPONINT  ABG:No results found for: PHART, DGF8NTE, PO2ART, WZC5YUU, N9JOOHVG, BEART, SOURCE    Imaging: I have personally reviewed pertinent reports  EKG: This was personally reviewed by myself  Micro:  Blood Culture:   Lab Results   Component Value Date    BLOODCX No Growth After 5 Days  07/29/2018    BLOODCX No Growth After 5 Days   07/29/2018     Urine Culture: No results found for: URINECX  Sputum Culture: No components found for: SPUTUMCX  Wound Culure: No results found for: Kandace Cortez MD  9/3/2020 1:15 PM

## 2020-09-03 NOTE — UTILIZATION REVIEW
Initial Clinical Review    Elective inpatient surgical procedure  Age/Sex: 48 y o  male  Surgery Date: 9/2/20  Procedure: Aortic valve replacement ,Coronary artery bypass grafting   Anesthesia: General endotracheal anesthesia with transesophageal echocardiogram guidance  Operative Findings: Intraoperative transesophageal echocardiogram revealed normal EF, severe AS, mild AI  2  Epiaortic ultrasound showed less than 5 mm non-mobile plaque  3  Aortic valve was trileaflet, heavily calcified  4  Coronary anatomy as described in coronary angiography, PDA small non amenable for bypass  5  Final transesophageal echocardiogram demonstrated prosthetic valve with normal function without evidence of perivalvular leak, npo other changes  POD#1 Progress Note: Pt has been extubated ,poor inspiratory effert with decreased breath sounds bilat bases, CXR shows DILEEP consolidation  Plan to d/c [place] and A line  , maintain pacer wires and iv acess x 24 hours with cardio consult for post op mgmt  Monitor shows sinus tach with controlled bp/ ht rate  I/O -330 ml , to add prn iv lasix  with potasium supplement  Continues with n/v overnight   zofran reordered and maintain 1800 ml FR  Continues on insulin drip with stable poct glucose  Cardio 9/3- ACE on hold, pt is normotensive  Tele=  tachy in 110s today and was 80-90's prior, is currently on beta blocker, continues with nausea and feels tired      Admission Orders: Date/Time/Statement:   Admission Orders (From admission, onward)     Ordered        09/02/20 0718  Inpatient Admission  Once                   Orders Placed This Encounter   Procedures    Inpatient Admission     Standing Status:   Standing     Number of Occurrences:   1     Order Specific Question:   Admitting Physician     Answer:   Jaziel Wilks     Order Specific Question:   Level of Care     Answer:   Critical Care [15]     Order Specific Question:   Estimated length of stay     Answer:   More than 2 Midnights Order Specific Question:   Certification     Answer:   I certify that inpatient services are medically necessary for this patient for a duration of greater than two midnights  See H&P and MD Progress Notes for additional information about the patient's course of treatment  Vital Signs: /80   Pulse (!) 118   Temp 97 6 °F (36 4 °C) (Oral)   Resp (!) 23   Ht 5' 6" (1 676 m)   Wt 72 9 kg (160 lb 11 5 oz)   SpO2 96%   BMI 25 94 kg/m²       cxr 9/3-Sternotomy wires are intact  Prosthetic heart valve unchanged  [place] catheter, ET tube and NG tube have been removed  There is a right-sided central line to the cavoatrial junction which is unchanged  There is a mediastinal drain and a   left basilar chest tube which remain    Stable mild cardiac enlargement    New opacity within the left upper and lower lobe with small left basilar effusion  The right lung field is clear  No pneumothorax    Osseous structures appear within normal limits for patient age      Diet: cardiovasc, CHO controlled , 1800 ml FR  Mobility: Ambulatory  DVT Prophylaxis: scd's Arixtra  Medications/Pain Control:   Scheduled Medications:  acetaminophen, 975 mg, Oral, Q8H  aspirin, 325 mg, Oral, Daily  atorvastatin, 80 mg, Oral, Daily With Dinner  busPIRone, 15 mg, Oral, BID  calcium chloride, 1 g, Intravenous, Once  cefazolin, 2,000 mg, Intravenous, Q8H  chlorhexidine, 15 mL, Mouth/Throat, BID  cholecalciferol, 1,000 Units, Oral, Daily  docusate sodium, 100 mg, Oral, BID  fondaparinux, 2 5 mg, Subcutaneous, Daily  lidocaine, 3 patch, Topical, Daily  magnesium sulfate, 2 g, Intravenous, Once  metoprolol tartrate, 25 mg, Oral, Q12H [facility]  [START ON 9/4/2020] pantoprazole, 40 mg, Oral, Early Morning  polyethylene glycol, 17 g, Oral, Daily  QUEtiapine, 25 mg, Oral, HS  scopolamine, 1 patch, Transdermal, Q72H      Continuous IV Infusions:  insulin regular (HumuLIN R,NovoLIN R) infusion, 0 3-21 Units/hr, Intravenous, Titrated  sodium chloride, 20 mL/hr, Intravenous, Continuous      PRN Meds:  bisacodyl, 10 mg, Rectal, Daily PRN  calcium carbonate, 500 mg, Oral, Daily PRN  ondansetron, 4 mg, Intravenous, Q6H PRN x1 9/2, x2 9/3  oxyCODONE, 2 5 mg, Oral, Q4H PRN  oxyCODONE, 5 mg, Oral, Q4H PRN x2 9/2  temazepam, 15 mg, Oral, HS PRN    Network Utilization Review Department  [email or web address]  ATTENTION: Please call with any questions or concerns to [phone] and carefully listen to the prompts so that you are directed to the right person  All voicemails are confidential   Colton Morejon all requests for admission clinical reviews, approved or denied determinations and any other requests to dedicated fax number below belonging to the campus where the patient is receiving treatment   List of dedicated fax numbers for the Facilities:  [address] DENIALS (Administrative/Medical Necessity) [phone]   [address] (Maternity/NICU/Pediatrics) [phone]   Sesar Boots [phone]   Ladean Ros [phone]   Jayden Davis [phone]   Aguilar Shruthi [phone]   [address] [phone]   [facility]  [phone]   [address] [facility] [address]  2401 [facility] [address] [facility] [phone]

## 2020-09-03 NOTE — PROGRESS NOTES
Evaluated pt for persistent nausea and vomiting over the last day which has been refractory to treatment with multiple medications  A thorough neurologic exam was performed which revealed a mild dysconjugate gaze and a horizontal skew deviation of the the eyes bilaterally  Given the pt has no history of BPPV, his recent aortic valve repair, and the refractoriness of his symptoms, neuroimaging will be performed as his symptoms may represent ischemia in the posterior cerebral circulation  Plan was discussed with Dr Law Booth and GRACIELA Cortes, DO

## 2020-09-03 NOTE — PLAN OF CARE
Problem: Prexisting or High Potential for Compromised Skin Integrity  Goal: Skin integrity is maintained or improved  Description: INTERVENTIONS:  - Identify patients at risk for skin breakdown  - Assess and monitor skin integrity  - Assess and monitor nutrition and hydration status  - Monitor labs   - Assess for incontinence   - Turn and reposition patient  - Assist with mobility/ambulation  - Relieve pressure over bony prominences  - Avoid friction and shearing  - Provide appropriate hygiene as needed including keeping skin clean and dry  - Evaluate need for skin moisturizer/barrier cream  - Collaborate with interdisciplinary team   - Patient/family teaching  - Consider wound care consult   Outcome: Progressing     Problem: PAIN - ADULT  Goal: Verbalizes/displays adequate comfort level or baseline comfort level  Description: Interventions:  - Encourage patient to monitor pain and request assistance  - Assess pain using appropriate pain scale  - Administer analgesics based on type and severity of pain and evaluate response  - Implement non-pharmacological measures as appropriate and evaluate response  - Consider cultural and social influences on pain and pain management  - Notify physician/advanced practitioner if interventions unsuccessful or patient reports new pain  Outcome: Progressing     Problem: INFECTION - ADULT  Goal: Absence or prevention of progression during hospitalization  Description: INTERVENTIONS:  - Assess and monitor for signs and symptoms of infection  - Monitor lab/diagnostic results  - Monitor all insertion sites, i e  indwelling lines, tubes, and drains  - Monitor endotracheal if appropriate and nasal secretions for changes in amount and color  - [place] appropriate cooling/warming therapies per order  - Administer medications as ordered  - Instruct and encourage patient and family to use good hand hygiene technique  - Identify and instruct in appropriate isolation precautions for identified infection/condition  Outcome: Progressing  Goal: Absence of fever/infection during neutropenic period  Description: INTERVENTIONS:  - Monitor WBC    Outcome: Progressing     Problem: SAFETY ADULT  Goal: Patient will remain free of falls  Description: INTERVENTIONS:  - Assess patient frequently for physical needs  -  Identify cognitive and physical deficits and behaviors that affect risk of falls    -  [place] fall precautions as indicated by assessment   - Educate patient/family on patient safety including physical limitations  - Instruct patient to call for assistance with activity based on assessment  - Modify environment to reduce risk of injury  - Consider OT/PT consult to assist with strengthening/mobility  Outcome: Progressing  Goal: Maintain or return to baseline ADL function  Description: INTERVENTIONS:  -  Assess patient's ability to carry out ADLs; assess patient's baseline for ADL function and identify physical deficits which impact ability to perform ADLs (bathing, care of mouth/teeth, toileting, grooming, dressing, etc )  - Assess/evaluate cause of self-care deficits   - Assess range of motion  - Assess patient's mobility; develop plan if impaired  - Assess patient's need for assistive devices and provide as appropriate  - Encourage maximum independence but intervene and supervise when necessary  - Involve family in performance of ADLs  - Assess for home care needs following discharge   - Consider OT consult to assist with ADL evaluation and planning for discharge  - Provide patient education as appropriate  Outcome: Progressing  Goal: Maintain or return mobility status to optimal level  Description: INTERVENTIONS:  - Assess patient's baseline mobility status (ambulation, transfers, stairs, etc )    - Identify cognitive and physical deficits and behaviors that affect mobility  - Identify mobility aids required to assist with transfers and/or ambulation (gait belt, sit-to-stand, lift, walker, cane, etc )  - [place] fall precautions as indicated by assessment  - Record patient progress and toleration of activity level on Mobility SBAR; progress patient to next Phase/Stage  - Instruct patient to call for assistance with activity based on assessment  - Consider rehabilitation consult to assist with strengthening/weightbearing, etc   Outcome: Progressing     Problem: DISCHARGE PLANNING  Goal: Discharge to home or other facility with appropriate resources  Description: INTERVENTIONS:  - Identify barriers to discharge w/patient and caregiver  - Arrange for needed discharge resources and transportation as appropriate  - Identify discharge learning needs (meds, wound care, etc )  - Arrange for interpretive services to assist at discharge as needed  - Refer to Case Management Department for coordinating discharge planning if the patient needs post-hospital services based on physician/advanced practitioner order or complex needs related to functional status, cognitive ability, or social support system  Outcome: Progressing     Problem: Knowledge Deficit  Goal: Patient/family/caregiver demonstrates understanding of disease process, treatment plan, medications, and discharge instructions  Description: Complete learning assessment and assess knowledge base    Interventions:  - Provide teaching at level of understanding  - Provide teaching via preferred learning methods  Outcome: Progressing

## 2020-09-03 NOTE — PROGRESS NOTES
Progress Note - Cardiothoracic Surgery   [facility] Benedict 48 y o  male MRN: [id number]  Unit/Bed#: [facility] 416-01 Encounter: [id number]  Aortic stenosis, Non-Rheumatic, Coronary artery disease  S/P aortic valve replacement and coronary artery bypass grafting; POD # 1    24 Hour Events: Extubated in timely fashion       Medications:   Scheduled Meds:  Current Facility-Administered Medications   Medication Dose Route Frequency Provider Last Rate    acetaminophen  650 mg Rectal Q4H PRN Enedina MARILYN Burgos      acetaminophen  975 mg Oral [address] [place], [place]-      amiodarone  200 mg Oral [place], [place]      aspirin  325 mg Oral Daily [place], [place]      atorvastatin  80 mg Oral Daily With [address] [place] [address]      bisacodyl  10 mg Rectal Daily PRN [facility]      busPIRone  15 mg Oral BID [place], [place]      calcium carbonate  500 mg Oral Daily PRN Darío Saba PA-C      calcium chloride  1 g Intravenous Once Enedina MARILYN Burgos      cefazolin  2,000 mg Intravenous [address] [place] 2,000 mg (09/03/20 0444)    chlorhexidine  15 mL Mouth/Throat BID Enedina MARILYN Burgos      cholecalciferol  1,000 Units Oral Daily Enedina GradMARILYN      fondaparinux  2 5 mg Subcutaneous Daily [place], [place]      furosemide  40 mg Intravenous Q6H PRN Enedina MARILYN Burgos      insulin regular (HumuLIN R,NovoLIN R) infusion  0 3-21 Units/hr Intravenous Titrated Enedina MOISE BurgosC 4 Units/hr (09/03/20 2321)    lactated ringers  500 mL Intravenous Q30 Min PRN Enedina GRACIELA Burgos-C 250 mL/hr at 09/02/20 1600    lidocaine (cardiac)  100 mg Intravenous Q30 Min PRN Enedina MARILYN Burgos      lidocaine  3 patch Topical Daily Darío Saba PA-C      magnesium sulfate  2 g Intravenous Once Enedina MARILYN Burgos      mupirocin  1 application Nasal V13J [address] [facility]      niCARdipine  2 5-15 mg/hr Intravenous Titrated Enedina MARILYN Burgos Stopped (09/02/20 1436)    ondansetron  4 mg Intravenous Q6H PRN [place], PA-C      oxyCODONE  2 5 mg Oral Q4H PRN [place], PA-C      oxyCODONE  5 mg Oral Q4H PRN [place], PA-C      pantoprazole  40 mg Oral Daily Before Breakfast [place], PA-C      polyethylene glycol  17 g Oral Daily [place]      potassium chloride  20 mEq Intravenous Once PRN [place], PA-C      potassium chloride  20 mEq Intravenous Q1H PRN [place], PA-C      potassium chloride  20 mEq Intravenous Q30 Min PRN [place], PA-C      QUEtiapine  25 mg Oral HS [facility], PA-C      sodium chloride  20 mL/hr Intravenous Continuous [place], PA-C 20 mL/hr (09/02/20 1436)     Continuous Infusions:insulin regular (HumuLIN R,NovoLIN R) infusion, 0 3-21 Units/hr, Last Rate: 4 Units/hr (09/03/20 4576)  niCARdipine, 2 5-15 mg/hr, Last Rate: Stopped (09/02/20 1436)  sodium chloride, 20 mL/hr, Last Rate: 20 mL/hr (09/02/20 1436)      PRN Meds:   acetaminophen    bisacodyl    calcium carbonate    furosemide    lactated ringers    lidocaine (cardiac)    ondansetron    oxyCODONE    oxyCODONE    potassium chloride    potassium chloride    potassium chloride    Vitals:   Vitals:    09/03/20 0400 09/03/20 0500 09/03/20 0559 09/03/20 0600   BP: 132/90 149/76  125/73   BP Location: Left arm      Pulse: 102 (!) 106  (!) 120   Resp: 20 18  21   Temp: 98 °F (36 7 °C)      TempSrc: Probe      SpO2: 98% 92%  96%   Weight:   72 9 kg (160 lb 11 5 oz)    Height:           Hemodynamics:  PAP: (23-38)/(9-20) 24/13  CVP:  [7 mmHg-15 mmHg] 14 mmHg  CO:  [3 8 L/min-6 8 L/min] 5 6 L/min  CI:  [2 1 L/min/m2-3 8 L/min/m2] 3 1 L/min/m2    Respiratory:   SpO2: SpO2: 96 %;  Room Air    Intake/Output:   I/O       09/01 0701 - 09/02 0700 09/02 0701 - 09/03 0700    I V  (mL/kg)  477 2 (6 5)    IV Piggyback  1800    Cell Saver  250    Total Intake(mL/kg)  2527 2 (34 7)    Urine (mL/kg/hr) 1995 (1 1)    Chest Tube  870    Total Output  2865    Net  -337 8                Chest tube Output:    Mediastinal tubes: 130 mL/8 hours  660 mL/24 hours   Pleural tubes: 50 mL/8 hours  120 mL/24 hours     Weights:   Weight (last 2 days)     Date/Time   Weight    09/03/20 0559   72 9 (160 72)    09/02/20 0647   71 2 (157)            Admit Weight: 71 2    Results:   Results from last 7 days   Lab Units 09/03/20  0507 09/03/20  0422 09/02/20  2354  09/02/20  1514  09/02/20  1255   WBC Thousand/uL 15 22*  --   --   --   --   --   --    HEMOGLOBIN g/dL 7 8* 7 8* 8 0*   < > 11 5*  --  11 1*   I STAT HEMOGLOBIN   --   --   --   --   --    < >  --    HEMATOCRIT % 23 4* 23 6* 24 1*   < > 34 7*  --  33 2*   HEMATOCRIT, ISTAT   --   --   --   --   --    < >  --    PLATELETS Thousands/uL 136*  --   --   --  188  --  184    < > = values in this interval not displayed       Results from last 7 days   Lab Units 09/03/20  0422 09/02/20  2039 09/02/20  1514 09/02/20  1257 09/02/20  1255   POTASSIUM mmol/L 4 2 4 4 4 4  --  4 6   CHLORIDE mmol/L 116*  --   --   --  116*   CO2 mmol/L 26  --   --   --  24   CO2, I-STAT mmol/L  --   --   --  26  --    BUN mg/dL 11  --   --   --  7   CREATININE mg/dL 0 62  --   --   --  0 49*   GLUCOSE, ISTAT mg/dl  --   --   --  103  --    CALCIUM mg/dL 8 0*  --   --   --  7 6*     Results from last 7 days   Lab Units 09/02/20  1514   INR  1 25*   PTT seconds 28       Studies:  CXR:       EKG: NSR    Invasive Lines/Tubes:  Invasive Devices     Central Venous Catheter Line            CVC Central Lines 09/02/20 Triple less than 1 day          Peripheral Intravenous Line            Peripheral IV 09/02/20 Left Antecubital less than 1 day    Peripheral IV 09/02/20 Right Antecubital less than 1 day          Line            Pacer Wires less than 1 day    Pacer Wires less than 1 day          Drain            Chest Tube 1 Anterior Mediastinal 32 Fr  less than 1 day    Chest Tube 2 Left Pleural 32 Fr  less than 1 day    Chest Tube 3 Posterior Mediastinal 24 Fr  less than 1 day    Urethral Catheter Temperature probe 16 Fr  less than 1 day                Physical Exam:    HEENT/NECK:  PERRLA  No jugular venous distention  Cardiac: Regular rate and rhythm  No rubs/murmurs/gallops  Pulmonary:  Poor inspiration  Breath sounds slightly diminished at the bases bilaterally  Abdomen:  Non-tender, Non-distended  Positive bowel sounds  Incisions: Sternum is stable  Incision is clean, dry, and intact  and Saphenectomy incision dressed with Acticoat  No erythema or drainage   Lower extremities: Extremities warm/dry  Radial/PT/DP pulses 2+ bilaterally  No edema B/L  Neuro: Alert and oriented X 3  Sensation is grossly intact  No focal deficits  Skin: Warm/Dry, without rashes or lesions  Assessment:  Patient Active Problem List   Diagnosis    Nonrheumatic aortic valve stenosis    Type 1 diabetes mellitus with retinopathy ([facility] [address] )    Essential hypertension    Mouth sores    Coronary artery disease involving native coronary artery of native heart without angina pectoris    Dyslipidemia    Aortic stenosis due to bicuspid aortic valve    History of ITP    History of coronary angioplasty with insertion of stent       Aortic stenosis, Non-Rheumatic, Coronary artery disease  S/P aortic valve replacement and coronary artery bypass grafting; POD # 1    Plan:    1  Cardiac:   Cardiac infusions: None  Cardiac index > 2 2   D/C [place] Lyn[place]te catheter   D/C Arterial line  Sinus Tachycardia; HR/BP well-controlled  Lopressor, 25 mg PO BID  Continue ASA and Statin therapy  Maintain epicardial pacing wires for 24h  Maintain central IV access today for 24h  Continue DVT prophylaxis  Consult cardiology for postoperative medical management    2  Pulmonary:   Extubated  CXR findings: L upper lobe patchy consolidation     Good Room air oxygen saturation; Continue incentive spirometry/Coughing/Deep breathing exercises  Chest tube output remains persistently high; Continue chest tubes to suction today    3  Renal:   Intake/Output net: -330 mL/24 hours  Post op volume overload: Add Lasix, 40 mg IV PRN  Add Potassium supplementation, 20 mEq QD  Post op Creatinine stable; Follow up labs prn  Discontinue potassium replacement scales  Discontinue story catheter    4  Neuro:  Neurologically intact; No active issues  Incisional pain well-controlled; Continue prn Percocet    5  GI:  Persistent N/V overnight  Not responsive to reglan/phenergan   Resume zofran  Maintain 1800 mL daily fluid restriction   Continue stool softeners and prn suppository  Continue GI prophylaxis    6  Endo:    History of DM1 on insulin pump   Will consult endocrine to transition off insulin gtt, patient with poor PO  intake thus far  7  Hematology:   Hgb stable past two draws, decreased from immediate post op  Transfuse 2uPRBC today  Trend Hgb   Acute post op blood loss anemia       8  Disposition:  Transfer from ICU to telemetry today    VTE Pharmacologic Prophylaxis: Fondaparinux (Arixtra)  VTE Mechanical Prophylaxis: sequential compression device    Collaborative rounds completed with BRIGIDA Quintero , and ICU team    SIGNATURE: Linda Hall PA-C  DATE: September 3, 2020  TIME: 6:56 AM

## 2020-09-03 NOTE — PHYSICAL THERAPY NOTE
Physical Therapy Cancellation Note      PT order received; chart reviewed; attempted to see pt in PM; pt is observed reclined in the chair; reports not feeling well w/ ongoing nausea and recently coughing up some blood; pt declines any mobilization at this time; nsg informed of the above via restorative staff; will follow as clinical course allows      Larue Kawasaki, PT

## 2020-09-04 ENCOUNTER — APPOINTMENT (INPATIENT)
Dept: RADIOLOGY | Facility: HOSPITAL | Age: 55
DRG: 220 | End: 2020-09-04
Payer: COMMERCIAL

## 2020-09-04 LAB
ANION GAP SERPL CALCULATED.3IONS-SCNC: 4 MMOL/L (ref 4–13)
APTT PPP: 32 SECONDS (ref 23–37)
BACTERIA UR QL AUTO: ABNORMAL /HPF
BILIRUB UR QL STRIP: NEGATIVE
BUN SERPL-MCNC: 17 MG/DL (ref 5–25)
CALCIUM SERPL-MCNC: 7.6 MG/DL (ref 8.3–10.1)
CHLORIDE SERPL-SCNC: 112 MMOL/L (ref 100–108)
CLARITY UR: ABNORMAL
CO2 SERPL-SCNC: 27 MMOL/L (ref 21–32)
COLOR UR: YELLOW
CREAT SERPL-MCNC: 0.6 MG/DL (ref 0.6–1.3)
ERYTHROCYTE [DISTWIDTH] IN BLOOD BY AUTOMATED COUNT: 15.1 % (ref 11.6–15.1)
GFR SERPL CREATININE-BSD FRML MDRD: 114 ML/MIN/1.73SQ M
GLUCOSE SERPL-MCNC: 105 MG/DL (ref 65–140)
GLUCOSE SERPL-MCNC: 116 MG/DL (ref 65–140)
GLUCOSE SERPL-MCNC: 120 MG/DL (ref 65–140)
GLUCOSE SERPL-MCNC: 128 MG/DL (ref 65–140)
GLUCOSE SERPL-MCNC: 133 MG/DL (ref 65–140)
GLUCOSE SERPL-MCNC: 136 MG/DL (ref 65–140)
GLUCOSE SERPL-MCNC: 142 MG/DL (ref 65–140)
GLUCOSE SERPL-MCNC: 149 MG/DL (ref 65–140)
GLUCOSE SERPL-MCNC: 153 MG/DL (ref 65–140)
GLUCOSE SERPL-MCNC: 157 MG/DL (ref 65–140)
GLUCOSE SERPL-MCNC: 169 MG/DL (ref 65–140)
GLUCOSE SERPL-MCNC: 184 MG/DL (ref 65–140)
GLUCOSE SERPL-MCNC: 230 MG/DL (ref 65–140)
GLUCOSE SERPL-MCNC: 234 MG/DL (ref 65–140)
GLUCOSE SERPL-MCNC: 93 MG/DL (ref 65–140)
GLUCOSE UR STRIP-MCNC: ABNORMAL MG/DL
HCT VFR BLD AUTO: 18.2 % (ref 36.5–49.3)
HCT VFR BLD AUTO: 28.6 % (ref 36.5–49.3)
HGB BLD-MCNC: 6 G/DL (ref 12–17)
HGB BLD-MCNC: 9.5 G/DL (ref 12–17)
HGB UR QL STRIP.AUTO: ABNORMAL
HYALINE CASTS #/AREA URNS LPF: ABNORMAL /LPF
INR PPP: 1.27 (ref 0.84–1.19)
KETONES UR STRIP-MCNC: NEGATIVE MG/DL
LEUKOCYTE ESTERASE UR QL STRIP: NEGATIVE
MCH RBC QN AUTO: 30.5 PG (ref 26.8–34.3)
MCHC RBC AUTO-ENTMCNC: 33 G/DL (ref 31.4–37.4)
MCV RBC AUTO: 92 FL (ref 82–98)
NITRITE UR QL STRIP: NEGATIVE
NON-SQ EPI CELLS URNS QL MICRO: ABNORMAL /HPF
PH UR STRIP.AUTO: 5.5 [PH]
PLATELET # BLD AUTO: 137 THOUSANDS/UL (ref 149–390)
PMV BLD AUTO: 11.7 FL (ref 8.9–12.7)
POTASSIUM SERPL-SCNC: 3.7 MMOL/L (ref 3.5–5.3)
PROT UR STRIP-MCNC: NEGATIVE MG/DL
PROTHROMBIN TIME: 15.9 SECONDS (ref 11.6–14.5)
RBC # BLD AUTO: 1.97 MILLION/UL (ref 3.88–5.62)
RBC #/AREA URNS AUTO: ABNORMAL /HPF
SODIUM SERPL-SCNC: 143 MMOL/L (ref 136–145)
SP GR UR STRIP.AUTO: 1.02 (ref 1–1.03)
UROBILINOGEN UR QL STRIP.AUTO: 0.2 E.U./DL
WBC # BLD AUTO: 21.18 THOUSAND/UL (ref 4.31–10.16)
WBC #/AREA URNS AUTO: ABNORMAL /HPF

## 2020-09-04 PROCEDURE — 97163 PT EVAL HIGH COMPLEX 45 MIN: CPT

## 2020-09-04 PROCEDURE — 81001 URINALYSIS AUTO W/SCOPE: CPT | Performed by: PHYSICIAN ASSISTANT

## 2020-09-04 PROCEDURE — P9016 RBC LEUKOCYTES REDUCED: HCPCS

## 2020-09-04 PROCEDURE — 80048 BASIC METABOLIC PNL TOTAL CA: CPT | Performed by: PHYSICIAN ASSISTANT

## 2020-09-04 PROCEDURE — C9113 INJ PANTOPRAZOLE SODIUM, VIA: HCPCS | Performed by: PHYSICIAN ASSISTANT

## 2020-09-04 PROCEDURE — 99024 POSTOP FOLLOW-UP VISIT: CPT | Performed by: PHYSICIAN ASSISTANT

## 2020-09-04 PROCEDURE — 85610 PROTHROMBIN TIME: CPT | Performed by: PHYSICIAN ASSISTANT

## 2020-09-04 PROCEDURE — 71045 X-RAY EXAM CHEST 1 VIEW: CPT

## 2020-09-04 PROCEDURE — 30233N1 TRANSFUSION OF NONAUTOLOGOUS RED BLOOD CELLS INTO PERIPHERAL VEIN, PERCUTANEOUS APPROACH: ICD-10-PCS | Performed by: THORACIC SURGERY (CARDIOTHORACIC VASCULAR SURGERY)

## 2020-09-04 PROCEDURE — 99232 SBSQ HOSP IP/OBS MODERATE 35: CPT | Performed by: INTERNAL MEDICINE

## 2020-09-04 PROCEDURE — 82948 REAGENT STRIP/BLOOD GLUCOSE: CPT

## 2020-09-04 PROCEDURE — 85730 THROMBOPLASTIN TIME PARTIAL: CPT | Performed by: PHYSICIAN ASSISTANT

## 2020-09-04 PROCEDURE — 85018 HEMOGLOBIN: CPT | Performed by: PHYSICIAN ASSISTANT

## 2020-09-04 PROCEDURE — 85014 HEMATOCRIT: CPT | Performed by: PHYSICIAN ASSISTANT

## 2020-09-04 PROCEDURE — 99254 IP/OBS CNSLTJ NEW/EST MOD 60: CPT | Performed by: INTERNAL MEDICINE

## 2020-09-04 PROCEDURE — 85027 COMPLETE CBC AUTOMATED: CPT | Performed by: PHYSICIAN ASSISTANT

## 2020-09-04 RX ORDER — FERROUS SULFATE 325(65) MG
325 TABLET ORAL
Status: DISCONTINUED | OUTPATIENT
Start: 2020-09-04 | End: 2020-09-10 | Stop reason: HOSPADM

## 2020-09-04 RX ORDER — METOCLOPRAMIDE HYDROCHLORIDE 5 MG/ML
10 INJECTION INTRAMUSCULAR; INTRAVENOUS EVERY 6 HOURS PRN
Status: DISCONTINUED | OUTPATIENT
Start: 2020-09-04 | End: 2020-09-10 | Stop reason: HOSPADM

## 2020-09-04 RX ORDER — MAGNESIUM HYDROXIDE/ALUMINUM HYDROXICE/SIMETHICONE 120; 1200; 1200 MG/30ML; MG/30ML; MG/30ML
30 SUSPENSION ORAL EVERY 4 HOURS PRN
Status: DISCONTINUED | OUTPATIENT
Start: 2020-09-04 | End: 2020-09-10 | Stop reason: HOSPADM

## 2020-09-04 RX ORDER — ASCORBIC ACID 500 MG
500 TABLET ORAL DAILY
Status: DISCONTINUED | OUTPATIENT
Start: 2020-09-04 | End: 2020-09-10 | Stop reason: HOSPADM

## 2020-09-04 RX ORDER — FUROSEMIDE 10 MG/ML
40 INJECTION INTRAMUSCULAR; INTRAVENOUS ONCE
Status: COMPLETED | OUTPATIENT
Start: 2020-09-04 | End: 2020-09-04

## 2020-09-04 RX ORDER — DEXAMETHASONE SODIUM PHOSPHATE 4 MG/ML
4 INJECTION, SOLUTION INTRA-ARTICULAR; INTRALESIONAL; INTRAMUSCULAR; INTRAVENOUS; SOFT TISSUE ONCE
Status: COMPLETED | OUTPATIENT
Start: 2020-09-04 | End: 2020-09-04

## 2020-09-04 RX ADMIN — SODIUM CHLORIDE, SODIUM GLUCONATE, SODIUM ACETATE, POTASSIUM CHLORIDE, MAGNESIUM CHLORIDE, SODIUM PHOSPHATE, DIBASIC, AND POTASSIUM PHOSPHATE 125 ML/HR: .53; .5; .37; .037; .03; .012; .00082 INJECTION, SOLUTION INTRAVENOUS at 02:32

## 2020-09-04 RX ADMIN — METOPROLOL TARTRATE 25 MG: 25 TABLET, FILM COATED ORAL at 20:20

## 2020-09-04 RX ADMIN — METOPROLOL TARTRATE 25 MG: 25 TABLET, FILM COATED ORAL at 09:15

## 2020-09-04 RX ADMIN — FUROSEMIDE 40 MG: 10 INJECTION, SOLUTION INTRAMUSCULAR; INTRAVENOUS at 12:13

## 2020-09-04 RX ADMIN — SODIUM CHLORIDE 2 UNITS/HR: 9 INJECTION, SOLUTION INTRAVENOUS at 01:52

## 2020-09-04 RX ADMIN — FONDAPARINUX SODIUM 2.5 MG: 2.5 INJECTION, SOLUTION SUBCUTANEOUS at 10:47

## 2020-09-04 RX ADMIN — POLYETHYLENE GLYCOL 3350 17 G: 17 POWDER, FOR SOLUTION ORAL at 10:47

## 2020-09-04 RX ADMIN — SODIUM CHLORIDE, SODIUM GLUCONATE, SODIUM ACETATE, POTASSIUM CHLORIDE, MAGNESIUM CHLORIDE, SODIUM PHOSPHATE, DIBASIC, AND POTASSIUM PHOSPHATE 125 ML/HR: .53; .5; .37; .037; .03; .012; .00082 INJECTION, SOLUTION INTRAVENOUS at 20:00

## 2020-09-04 RX ADMIN — DOCUSATE SODIUM 100 MG: 100 CAPSULE, LIQUID FILLED ORAL at 10:45

## 2020-09-04 RX ADMIN — ASPIRIN 325 MG ORAL TABLET 325 MG: 325 PILL ORAL at 10:46

## 2020-09-04 RX ADMIN — DEXAMETHASONE SODIUM PHOSPHATE 4 MG: 4 INJECTION, SOLUTION INTRAMUSCULAR; INTRAVENOUS at 12:31

## 2020-09-04 RX ADMIN — METOCLOPRAMIDE 10 MG: 5 INJECTION, SOLUTION INTRAMUSCULAR; INTRAVENOUS at 09:07

## 2020-09-04 RX ADMIN — DOCUSATE SODIUM 100 MG: 100 CAPSULE, LIQUID FILLED ORAL at 18:24

## 2020-09-04 RX ADMIN — ATORVASTATIN CALCIUM 80 MG: 80 TABLET, FILM COATED ORAL at 18:24

## 2020-09-04 RX ADMIN — SODIUM CHLORIDE, SODIUM GLUCONATE, SODIUM ACETATE, POTASSIUM CHLORIDE, MAGNESIUM CHLORIDE, SODIUM PHOSPHATE, DIBASIC, AND POTASSIUM PHOSPHATE 125 ML/HR: .53; .5; .37; .037; .03; .012; .00082 INJECTION, SOLUTION INTRAVENOUS at 11:18

## 2020-09-04 RX ADMIN — QUETIAPINE FUMARATE 25 MG: 25 TABLET ORAL at 22:00

## 2020-09-04 RX ADMIN — FERROUS SULFATE TAB 325 MG (65 MG ELEMENTAL FE) 325 MG: 325 (65 FE) TAB at 10:47

## 2020-09-04 RX ADMIN — Medication 1000 UNITS: at 10:46

## 2020-09-04 RX ADMIN — TEMAZEPAM 15 MG: 15 CAPSULE ORAL at 20:26

## 2020-09-04 RX ADMIN — OXYCODONE HYDROCHLORIDE AND ACETAMINOPHEN 500 MG: 500 TABLET ORAL at 10:45

## 2020-09-04 RX ADMIN — ACETAMINOPHEN 975 MG: 325 TABLET, FILM COATED ORAL at 05:02

## 2020-09-04 RX ADMIN — BUSPIRONE HYDROCHLORIDE 15 MG: 10 TABLET ORAL at 10:46

## 2020-09-04 RX ADMIN — PANTOPRAZOLE SODIUM 40 MG: 40 INJECTION, POWDER, FOR SOLUTION INTRAVENOUS at 09:15

## 2020-09-04 RX ADMIN — LIDOCAINE 5% 3 PATCH: 700 PATCH TOPICAL at 09:16

## 2020-09-04 RX ADMIN — METOCLOPRAMIDE 10 MG: 5 INJECTION, SOLUTION INTRAMUSCULAR; INTRAVENOUS at 15:34

## 2020-09-04 RX ADMIN — ACETAMINOPHEN 975 MG: 325 TABLET, FILM COATED ORAL at 22:00

## 2020-09-04 RX ADMIN — BUSPIRONE HYDROCHLORIDE 15 MG: 10 TABLET ORAL at 18:24

## 2020-09-04 RX ADMIN — PANTOPRAZOLE SODIUM 40 MG: 40 INJECTION, POWDER, FOR SOLUTION INTRAVENOUS at 20:20

## 2020-09-04 NOTE — PLAN OF CARE
Problem: PHYSICAL THERAPY ADULT  Goal: Performs mobility at highest level of function for planned discharge setting  See evaluation for individualized goals  Description: Treatment/Interventions: Functional transfer training, LE strengthening/ROM, Therapeutic exercise, Elevations, Endurance training, Patient/family training, Equipment eval/education, Bed mobility, Gait training  Equipment Recommended: Anastasia Arevalo       See flowsheet documentation for full assessment, interventions and recommendations  Note: Prognosis: Good  Problem List: Decreased strength, Decreased endurance, Impaired balance, Decreased mobility, Pain  Assessment: Pt seen for high complexity physical therapy evaluation  Pt is a 49 y/o male w/ history/comorbidities of heart murmur, CAD, stent, HTN, HLD, DM I who is now admitted w/ known AS for AVR, CABG x 3 9/2/20  Due to acute medical issues, acute surgery, pain, fall risk, note unstable clinical picture  PT consulted to assess mobility, d/c needs  Pt presents w/ decreased functional mob, standing balance, endurance, B LE strength, barriers at home  will benefit from skilled PT to correct for the above problems  Anticipate that pending progress, can d/c home  will follow for needs, may need home PT        PT Discharge Recommendation: Return to previous environment with social support(pending progress)     PT - OK to Discharge: No    See flowsheet documentation for full assessment

## 2020-09-04 NOTE — CASE MANAGEMENT
CM obtained all the following info about the pt  HOME: Pt resides in a 2-story house w/ 13 steps between floors and 3 steps to enter at front door  LIVES W/: Wife and children  : Pt's wife Daniela Blackmon (c: [phone])  INDEPENDENCE: Pt is independent at baseline w/ ambulating and performing his ADLS  DME: None reported  HHC: No hx of services reported  I/P REHAB: No hx of placements reported  PCP: Dr Donal Schultz through [facility]  PHARMACY: [facility] pharmacy located at [place] [address] in [address] [facility]  INCOME SOURCE: Full-time employment  INSURANCE: [employer] plan through is employer  MEDICAL POA: No one is pre-designated / Pt's wife is his POA by legal default only if needed  TRANSPORTATION AT D/C: Pt's wife will provide transport  CM reviewed d/c planning process including the following: identifying help at home, patient preference for d/c planning needs, Discharge Lounge, [facility] Meds to Bed program, availability of treatment team to discuss questions or concerns patient and/or family may have regarding understanding medications and recognizing signs and symptoms once discharged  CM also encouraged patient to follow up with all recommended appointments after discharge  Patient advised of importance for patient and family to participate in managing patients medical well being  Patient/caregiver received discharge checklist  Content reviewed  Patient/caregiver encouraged to participate in discharge plan of care prior to discharge home

## 2020-09-04 NOTE — QUICK NOTE
09/04/20    Procedure: Epicardial Pacing Wire removal    Natasha Montes was returned to bed and informed of mandatory one hour post-procedure bed rest   The assigned nurse was notified  Epicardial pacing wires removed in routine fashion, without incident  The patient tolerated the procedure well  Vital signs ordered  q 15 minutes for one hour, as per protocol      SIGNATURE: Akhil Caro  DATE: September 4, 2020  TIME: 8:57 AM

## 2020-09-04 NOTE — CONSULTS
Consultation - [facility]  Destiney Rock 48 y o  male MRN: [id number]  Unit/Bed#: [facility] 410-01 Encounter: [id number]              Inpatient consult to gastroenterology     Performed by  Vic Colby MD     Authorized by Martir Nina PA-C              Reason for Consult / Principal Problem:     Intractable nausea and vomiting      ASSESSMENT AND PLAN:      Postop intractable nausea and vomiting most likely from general anesthesia and opioids pain meds, poor oral intake  -Intractable N,V started after surgery  -still very nauseous, unable to tolerate oral intake  -Patient looks dry and volume depleted  -BBG well controlled, no evidence of DKA with negative ketones and beta hydroxybutyrate  -CT head showed no acute intracranial abnormality  -patient was Zofran with no improvement, currently on a scopolamine patch  -Can use Mylanta, IV reglan prn  -Continue IV Protonix 40mg BID  -Continue laxatives prn  -Avoid narcotics    Hematamesis most likely from excessive retching, vomiting  -Vomiting several times over the past 2 days, noted blood in the vomitus later  -Vomiting stopped this morning per patient, no evidence of blood when patient was spitting  -Hb drop can be contributed by multifactorial etiology - post op, blood loss from drainage  -follow clinically    Acute postop blood loss anemia  -Hb dropped to 6, pt is symptomatic - dizzy, weakness, lethargy  -cardiothoracic surgery on board - plan to transfuse 2units PRBC  -monitor vital signs, follow clinically  ______________________________________________________________________    HPI:  The patient is 48years old male with PMH significant of AS with bicuspid AV s/p AVR, CAD s/p CABG x 3, type 1 DM on insulin pump, HTN, HLD, history of ITP who was admitted to [facility] for elective AVR  Patient developed postop intractable nausea and vomiting which is associated with dizziness  Patient stated he had blood in the vomitus for the past 2 days  Vomiting stopped this morning but he is still very nauseous and unable to tolerate oral intake  He is not sure whether scopolamine patch is helping or not  Patient felt dizzy when he moves his head  Patient has generalized weakness, lethargy but denies any acute focal neurological deficit  Patient has not been eating for 3 days  Patient has been on IV insulin since surgery, BBG has been well controlled  He denies any light headache, blurred vision, palpitation, shortness of breath, abdominal pain  His last bowel movement was Tuesday  No history of melena or hematochezia  REVIEW OF SYSTEMS:    CONSTITUTIONAL: Denies any fever, chills, rigors, and weight loss  HEENT: No earache or tinnitus  Denies hearing loss or visual disturbances  CARDIOVASCULAR: No chest pain or palpitations  RESPIRATORY: Denies any cough, hemoptysis, shortness of breath or dyspnea on exertion  GASTROINTESTINAL: As noted in the History of Present Illness  GENITOURINARY: No problems with urination  Denies any hematuria or dysuria  NEUROLOGIC: No dizziness or vertigo, denies headaches  MUSCULOSKELETAL: Denies any muscle or joint pain  SKIN: Denies skin rashes or itching  ENDOCRINE: Denies excessive thirst  Denies intolerance to heat or cold  PSYCHOSOCIAL: Denies depression or anxiety  Denies any recent memory loss         Historical Information   Past Medical History:   Diagnosis Date    Aortic stenosis     Clavicle fracture     LEFT    Diabetes mellitus ([facility] [address] )     Hyperlipidemia     Hypertension     Myocardial infarction ([facility] [address] [facility]     Thrombocytopenic purpura ([facility] [address] )      Past Surgical History:   Procedure Laterality Date    ABDOMINAL SURGERY      HARVEST VEIN Left 9/2/2020    Procedure: HARVEST VEIN ENDOSCOPIC (EVH) LEFT LEG;  Surgeon: Barbara Pisano MD;  [facility];  Service: Cardiac Surgery    PA CABG, ARTERY-VEIN, THREE N/A 9/2/2020    Procedure: CORONARY ARTERY BYPASS GRAFT X 3 WITH SVG TO Right Posterior Lateral Branch, OM1 AND CLARK TO LAD ;  Surgeon: Debria Hatchet, MD;  [facility];  Service: Cardiac Surgery    MT RPLCMT AORTIC VALVE OPN W/STENTLESS TISSUE VALVE N/A 9/2/2020    Procedure: AORTIC VALVE REPLACEMENT WITH A 25MM SANTOYO INSPIRIS RESILIA  TISSUE AORTIC VALVE;  Surgeon: Debria Hatchet, MD;  [facility];  Service: Cardiac Surgery    ROTATOR CUFF REPAIR      SPLENECTOMY      TONSILLECTOMY      VITRECTOMY Bilateral     BY ANTERIOR APPROACH      Social History   Social History     Substance and Sexual Activity   Alcohol Use No     Social History     Substance and Sexual Activity   Drug Use Yes    Frequency: 3 0 times per week    Types: Marijuana     Social History     Tobacco Use   Smoking Status Former Smoker    Types: Cigars   Smokeless Tobacco Never Used     Family History   Problem Relation Age of Onset    Aneurysm Mother         CAREBRAL ARTERY     Coronary artery disease Mother     Diabetes Mother        Meds/Allergies     Medications Prior to Admission   Medication    aspirin (ECOTRIN LOW STRENGTH) 81 mg EC tablet    atorvastatin (LIPITOR) 80 mg tablet    busPIRone (BUSPAR) 15 mg tablet    Cholecalciferol (VITAMIN D) 2000 units CAPS    hydrOXYzine HCL (ATARAX) 25 mg tablet    ibuprofen (MOTRIN) 800 mg tablet    insulin lispro (HumaLOG) 100 units/mL injection    lisinopril (ZESTRIL) 20 mg tablet    mupirocin (BACTROBAN) 2 % ointment    omeprazole (PriLOSEC) 20 mg delayed release capsule    QUEtiapine (SEROquel) 25 mg tablet    sildenafil (VIAGRA) 50 MG tablet    zolpidem (AMBIEN) 5 mg tablet    amoxicillin (AMOXIL) 500 mg capsule    NEL MICROLET LANCETS lancets    glucagon (GLUCAGON EMERGENCY) 1 MG injection    glucose blood (NEL CONTOUR TEST) test strip    Insulin Infusion Pump KIT    Insulin Infusion Pump Supplies (MINIMED INFUSION SET-) MISC    Insulin Infusion Pump Supplies (MINIMED RESERVOIR 3ML) MISC    INSULIN SYRINGE 1CC/29G 29G X 1/2" 1 ML MISC     Current Facility-Administered Medications   Medication Dose Route Frequency    acetaminophen (TYLENOL) tablet 975 mg  975 mg Oral Q8H    aluminum-magnesium hydroxide-simethicone (MYLANTA) 200-200-20 mg/5 mL oral suspension 30 mL  30 mL Oral Q4H PRN    ascorbic acid (VITAMIN C) tablet 500 mg  500 mg Oral Daily    aspirin tablet 325 mg  325 mg Oral Daily    atorvastatin (LIPITOR) tablet 80 mg  80 mg Oral Daily With Dinner    bisacodyl (DULCOLAX) rectal suppository 10 mg  10 mg Rectal Daily PRN    busPIRone (BUSPAR) tablet 15 mg  15 mg Oral BID    calcium carbonate (TUMS) chewable tablet 500 mg  500 mg Oral Daily PRN    calcium chloride 10 % injection 1 g  1 g Intravenous Once    cholecalciferol (VITAMIN D3) tablet 1,000 Units  1,000 Units Oral Daily    docusate sodium (COLACE) capsule 100 mg  100 mg Oral BID    ferrous sulfate tablet 325 mg  325 mg Oral Daily With Breakfast    fondaparinux (ARIXTRA) subcutaneous injection 2 5 mg  2 5 mg Subcutaneous Daily    furosemide (LASIX) injection 40 mg  40 mg Intravenous Once    insulin regular (HumuLIN R,NovoLIN R) 1 Units/mL in sodium chloride 0 9 % 100 mL infusion  0 3-21 Units/hr Intravenous Titrated    lidocaine (LIDODERM) 5 % patch 3 patch  3 patch Topical Daily    magnesium sulfate 2 g/50 mL IVPB (premix) 2 g  2 g Intravenous Once    metoprolol tartrate (LOPRESSOR) tablet 25 mg  25 mg Oral Q12H [address]    multi-electrolyte (PLASMALYTE-A/ISOLYTE-S PH 7 4) IV solution  125 mL/hr Intravenous Continuous    oxyCODONE (ROXICODONE) IR tablet 2 5 mg  2 5 mg Oral Q4H PRN    oxyCODONE (ROXICODONE) IR tablet 5 mg  5 mg Oral Q4H PRN    pantoprazole (PROTONIX) injection 40 mg  40 mg Intravenous Q12H ANA PAULA    polyethylene glycol (MIRALAX) packet 17 g  17 g Oral Daily    QUEtiapine (SEROquel) tablet 25 mg  25 mg Oral HS    scopolamine (TRANSDERM-SCOP) 1 5 mg/3 days TD 72 hr patch 1 patch  1 patch Transdermal Q72H    sodium chloride infusion 0 45 % 20 mL/hr Intravenous Continuous    temazepam (RESTORIL) capsule 15 mg  15 mg Oral HS PRN       No Known Allergies        Objective     Blood pressure 147/69, pulse (!) 115, temperature 98 4 °F (36 9 °C), temperature source Oral, resp  rate 18, height 5' 6" (1 676 m), weight 73 2 kg (161 lb 6 oz), SpO2 94 %  Body mass index is 26 05 kg/m²  Intake/Output Summary (Last 24 hours) at 9/4/2020 0384  Last data filed at 9/4/2020 0636  Gross per 24 hour   Intake 1789 95 ml   Output 1105 ml   Net 684 95 ml         PHYSICAL EXAM:    General:  Middle-age male sitting in chair, breathing well on room air, no acute distress  HEENT: NC/AT, PERRL, EOM - normal  Neck: Supple  Pulm/Chest:  Open heart surgery wound dressing C/D/I, Normal chest wall expansion, clear breath sounds on both side, no wheezing/rhonchi or crackles appreciated  CVS:  Tachycardia, regular rate, normal S1&S2, no murmur appreciated, capillary refill <2s  Abd: soft, non tender, non distended, bowel sounds +  MSK: move all 4 extremities spontaneously  Skin: warm  CNS: AAOx3, no acute focal neuro deficit, Motor 5/5 strength, no sensory deficit                                                        Lab Results:   No results displayed because visit has over 200 results  Imaging Studies: I have personally reviewed pertinent imaging studies

## 2020-09-04 NOTE — PROGRESS NOTES
Progress Note - Cardiothoracic Surgery   Chasidy Umanas 48 y o  male MRN: [id number]  Unit/Bed#: [facility] 410-01 Encounter: [id number]    Aortic stenosis, Non-Rheumatic, Coronary artery disease  S/P aortic valve replacement and coronary artery bypass grafting; POD # 2      24 Hour Events: Transferred from ICU to telemetry  Dizziness improved, with scopolamine patch       Medications:   Scheduled Meds:  Current Facility-Administered Medications   Medication Dose Route Frequency Provider Last Rate    acetaminophen  975 mg Oral [address], PA-MYKE      aspirin  325 mg Oral Daily [employer], PA-MYKE      atorvastatin  80 mg Oral Daily With [employer], PA-MYKE      bisacodyl  10 mg Rectal Daily PRN [employer], PA-C      busPIRone  15 mg Oral BID [employer], PA-C      calcium carbonate  500 mg Oral Daily PRN [employer], PA-C      calcium chloride  1 g Intravenous Once [employer], PA-C      cholecalciferol  1,000 Units Oral Daily [employer], PA-C      docusate sodium  100 mg Oral BID [employer], PA-C      fondaparinux  2 5 mg Subcutaneous Daily [employer], PA-C      insulin regular (HumuLIN R,NovoLIN R) infusion  0 3-21 Units/hr Intravenous Titrated [employer], PA-C 1 Units/hr (09/04/20 6694)    lidocaine  3 patch Topical Daily [employer], PA-C      magnesium sulfate  2 g Intravenous Once [employer], PA-C      metoprolol tartrate  25 mg Oral Q12H [address] [employer], PA-C      multi-electrolyte  125 mL/hr Intravenous Continuous Lisandra Overall, PA-C 125 mL/hr (09/04/20 0232)    ondansetron  4 mg Intravenous Q6H PRN [employer], PA-C      oxyCODONE  2 5 mg Oral Q4H PRN [employer], PA-C      oxyCODONE  5 mg Oral Q4H PRN [employer], PA-C      pantoprazole  40 mg Intravenous Q12H [address] Thad Manzo, PA-C      polyethylene glycol  17 g Oral Daily [employer], PA-C      QUEtiapine  25 mg Oral HS [employer] [facility], PAMarizaC      scopolamine  1 patch Transdermal Q72H [employer], MARILYN      sodium chloride  20 mL/hr Intravenous Continuous Nakia Chaudhry PA-C 20 mL/hr (09/02/20 1436)    temazepam  15 mg Oral HS PRN Nakia Chaudhry PA-C       Continuous Infusions:insulin regular (HumuLIN R,NovoLIN R) infusion, 0 3-21 Units/hr, Last Rate: 1 Units/hr (09/04/20 1102)  multi-electrolyte, 125 mL/hr, Last Rate: 125 mL/hr (09/04/20 0232)  sodium chloride, 20 mL/hr, Last Rate: 20 mL/hr (09/02/20 1436)      PRN Meds: bisacodyl    calcium carbonate    ondansetron    oxyCODONE    oxyCODONE    temazepam    Vitals:   Vitals:    09/03/20 1500 09/03/20 1900 09/03/20 2355 09/04/20 0411   BP: 113/65 147/79 107/59 105/58   BP Location: Right arm Right arm Right arm Right arm   Pulse: 104 (!) 118 99 (!) 110   Resp: 17 17 17 17   Temp: 98 7 °F (37 1 °C) 98 7 °F (37 1 °C) 98 7 °F (37 1 °C) 98 °F (36 7 °C)   TempSrc: Oral Oral Oral Oral   SpO2: 95% 93% 94% 93%   Weight:       Height:           Telemetry: Sinus Tachycardia;  Heart Rate: 106    Respiratory:   SpO2: SpO2: 93 %, SpO2 Activity: SpO2 Activity: At Rest; 2 LPM    Intake/Output:   I/O       09/02 0701 - 09/03 0700 09/03 0701 - 09/04 0700 09/04 0701 - 09/05 0700    I V  (mL/kg) 477 2 (6 5) 1799 5 (24 7)     IV Piggyback 1800      Cell Saver 250      Total Intake(mL/kg) 2527 2 (34 7) 1799 5 (24 7)     Urine (mL/kg/hr) 1995 (1 1) 775 (0 4)     Chest Tube 870 520     Total Output 2865 1295     Net -337 8 +504 5                  Chest tube Output:    Mediastinal tubes: 150 mL/8 hours  230 mL/24 hours   Pleural tubes: 260 mL/8 hours  290 mL/24 hours     Weights:   Weight (last 2 days)     Date/Time   Weight    09/03/20 0559   72 9 (160 72)    09/02/20 0647   71 2 (157)            Results:   Results from last 7 days   Lab Units 09/04/20  0501 09/03/20  0507 09/03/20  0422  09/02/20  1514   WBC Thousand/uL 21 18* 15 22*  --   --   --    HEMOGLOBIN g/dL 6 0* 7 8* 7 8*   < > 11 5*   HEMATOCRIT % 18 2* 23 4* 23 6*   < > 34 7*   PLATELETS Thousands/uL 137* 136*  -- --  188    < > = values in this interval not displayed  Results from last 7 days   Lab Units 09/04/20  0501 09/03/20  0422 09/02/20  2039  09/02/20  1257 09/02/20  1255   SODIUM mmol/L 143 147*  --   --   --  143   POTASSIUM mmol/L 3 7 4 2 4 4   < >  --  4 6   CHLORIDE mmol/L 112* 116*  --   --   --  116*   CO2 mmol/L 27 26  --   --   --  24   CO2, I-STAT mmol/L  --   --   --   --  26  --    BUN mg/dL 17 11  --   --   --  7   CREATININE mg/dL 0 60 0 62  --   --   --  0 49*   GLUCOSE, ISTAT mg/dl  --   --   --   --  103  --    CALCIUM mg/dL 7 6* 8 0*  --   --   --  7 6*    < > = values in this interval not displayed  Results from last 7 days   Lab Units 09/02/20  1514   INR  1 25*   PTT seconds 28         Invasive Lines/Tubes:  Invasive Devices     Central Venous Catheter Line            CVC Central Lines 09/02/20 Triple 1 day          Peripheral Intravenous Line            Peripheral IV 09/02/20 Left Antecubital 1 day    Peripheral IV 09/02/20 Right Antecubital 1 day          Line            Pacer Wires 1 day    Pacer Wires 1 day          Drain            Chest Tube 1 Anterior Mediastinal 32 Fr  1 day    Chest Tube 2 Left Pleural 32 Fr  1 day    Chest Tube 3 Posterior Mediastinal 24 Fr  1 day                Physical Exam:    HEENT/NECK:  PERRLA  No jugular venous distention  Cardiac: Regular rate and rhythm and No murmurs/rubs/gallops  Pulmonary:  Breath sounds clear bilaterally and No rales/rhonchi/wheezes  Abdomen:  Non-tender, Non-distended and Normal bowel sounds  Incisions: Sternum is stable  Incision dressed with Acticoat  No erythema or drainage and Saphenectomy incision dressed with Acticoat  No erythema or drainage  Extremities: Extremities warm/dry and Trace edema B/L  Neuro: Alert and oriented X 3, Sensation is grossly intact and No focal deficits  Skin: Warm/Dry, without rashes or lesions      Assessment:  Principal Problem:    Nonrheumatic aortic valve stenosis  Active Problems:    Type 1 diabetes mellitus with retinopathy ([facility] [address] )    Essential hypertension    Coronary artery disease involving native coronary artery of native heart without angina pectoris    Dyslipidemia    Aortic stenosis due to bicuspid aortic valve    History of ITP    History of coronary angioplasty with insertion of stent    Acute blood loss as cause of postoperative anemia    Post-operative nausea and vomiting       Aortic stenosis, Non-Rheumatic, Coronary artery disease  S/P aortic valve replacement and coronary artery bypass grafting; POD # 2    Plan:    1  Cardiac:   Sinus Tachycardia; HR/BP well-controlled   Likely secondary to hypovolemia   Lopressor, 25mg PO BID  Continue ASA and Statin therapy  Epicardial pacing wires no longer required  Remove today  Maintain central IV access today for blood transfusion  Continue DVT prophylaxis    2  Pulmonary:     Chest tube output remains persistently high; Continue chest tubes to suction today    3  Renal:   Intake/Output net: +504 mL/24 hours  Post op Creatinine stable; Follow up labs prn    4  Neuro:    Neurologically intact; No active issues  Incisional pain well-controlled  Continue Tylenol, 975 mg PO q 8, standing dose  Continue Oxycodone, 2 5 to 5 mg PO q 4 hours prn pain    5  GI:  Tolerating TLC 2 3 gm sodium diet  Maintain 1800 mL daily fluid restriction   Continue stool softeners and prn suppository  Continue GI prophylaxis    6  Endo:   No history of diabetes; Glucose well-controlled with sliding scale coverage  Resume insulin pump, per endocrine    7    Hematology:    Post-operative acute blood loss anemia; Hemoglobin 6; trend prn     Transfuse  2 units PRBC now    Lasix, 40 mg IV X 1 after first unit    Check CXR to rule out progressive left hemothorax    8     Disposition:      Anticipate discharge to home 2-3 days     VTE Pharmacologic Prophylaxis: Sequential compression device (Venodyne)  and Fondaparinux (Arixtra)  VTE Mechanical Prophylaxis: sequential compression device    Collaborative rounds completed with BRIGIDA Mayes    Plan of care discussed with bedside nurse    SIGNATURE: Ines Carroll PA-C  DATE: September 4, 2020  TIME: 7:04 AM

## 2020-09-04 NOTE — PHYSICAL THERAPY NOTE
Physical Therapy Treatment Note       09/04/20 2701   Note Type   Note type Eval only   Pain Assessment   Pain Assessment Tool 0-10   Pain Score 7   Pain Location/Orientation Location: Chest;Location: Incision   Patient's Stated Pain Goal No pain   Hospital Pain Intervention(s) Repositioned   Home Living   Type of Home House   Additional Comments Resides w/ wife in home  indep self care, ambulates w/ out device   Prior Function   Level of [place] Independent with ADLs and functional mobility   Falls in the last 6 months 0   Restrictions/Precautions   Weight Bearing Precautions Per Order No   Other Precautions Multiple lines; Fall Risk;Pain  (significant nausea)   General   Family/Caregiver Present No   Cognition   Overall Cognitive Status WFL   Arousal/Participation Responsive   Orientation Level Oriented X4   Memory Unable to assess   Following Commands Follows one step commands without difficulty   RLE Assessment   RLE Assessment   (strength grossly 4-/5)   LLE Assessment   LLE Assessment   (strength grossly 4-/5)   Coordination   Movements are Fluid and Coordinated 1   Bed Mobility   Sit to Supine 4  Minimal assistance   Transfers   Sit to Stand 4  Minimal assistance   Additional items Assist x 1   Stand to Sit 4  Minimal assistance   Additional items Assist x 1   Ambulation/Elevation   Gait pattern   (slow, short step length)   Gait Assistance 4  Minimal assist   Additional items Assist x 1  (2nd for lines)   Assistive Device None   Distance 5'x1 from chair to bed- further distance deferred due to increased chest tube drainge, need to pull lines, and need for blood   Balance   Static Sitting Normal   Dynamic Sitting Good   Static Standing Fair   Dynamic Standing Fair -   Ambulatory Fair -   Endurance Deficit   Endurance Deficit Yes   Endurance Deficit Description fatigue, weakness, pain   Activity Tolerance   Activity Tolerance Patient limited by fatigue;Patient limited by pain;Treatment limited secondary to medical complications (Comment)   Nurse Made Aware yes   Assessment   Prognosis Good   Problem List Decreased strength;Decreased endurance; Impaired balance;Decreased mobility;Pain   Assessment Pt seen for high complexity physical therapy evaluation  Pt is a 47 y/o male w/ history/comorbidities of heart murmur, CAD, stent, HTN, HLD, DM I who is now admitted w/ known AS for AVR, CABG x 3 9/2/20  Due to acute medical issues, acute surgery, pain, fall risk, note unstable clinical picture  PT consulted to assess mobility, d/c needs  Pt presents w/ decreased functional mob, standing balance, endurance, B LE strength, barriers at home  will benefit from skilled PT to correct for the above problems  Anticipate that pending progress, can d/c home  will follow for needs, may need home PT   Goals   Patient Goals to feel better   STG Expiration Date 09/18/20   Short Term Goal #1 1-2 wks: bed mob and transfers w/ indep, standing balance to good/normal w/ device vs no device, ambulate 200-300 ft w/ least restrictive vs no device and mod I, increase B LE strength by 1/2 -1 grade, if needed for d/c home- ambulate 1 flight of stairs w/ indep   PT Treatment Day 0   Plan   Treatment/Interventions Functional transfer training;LE strengthening/ROM; Therapeutic exercise;Elevations; Endurance training;Patient/family training;Equipment eval/education; Bed mobility;Gait training   PT Frequency Other (Comment)  (4-6x/wk)   Recommendation   PT Discharge Recommendation Return to previous environment with social support  (pending progress)   Equipment Recommended Walker   PT - OK to Discharge No   Modified Jackson Scale   Modified Jackson Scale 4   Hepzibah Boys PT, DPT CSRS

## 2020-09-04 NOTE — CASE MANAGEMENT
Pt is S/P AVR and S/P CABG x 3  Pt is recommended to have in-home post-op skilled nursing care via an [facility] agency for his aftercare plan  CM met w/ pt to discuss this recommendation  Pt is agreeable w/ recommendation  CM provided pt w/ freedom of choice for [facility] agencies  Pt requested referral to [facility]  CM made Ecin referral to [facility]  CM to follow

## 2020-09-04 NOTE — OCCUPATIONAL THERAPY NOTE
Occupational Therapy Cancellation Note        Patient Name: Hanna Walsh  Today's Date: 9/4/2020      OT orders received, chart reviewed  Noted that pt is currently receiving blood and is not appropriate for therapy at this time  OT will hold and continue to follow and see as medically appropriate and able       GHADA Queen, OTR/L

## 2020-09-04 NOTE — PROGRESS NOTES
Endocrinology Progress Note      PATIENT INFORMATION      Patient: Tova Heath 48 y o  male   MRN: [id number]  PCP: Sarahi Roland DO  Unit/Bed#: [facility] 410-01 Encounter: [id number]  Date Of Visit: 20  Current Length of Stay: 2 day(s)     ASSESSMENTS & PLAN      48year old male with long standing history of Type 1 Diabetes currently post op from aortic valve replacement, endocrine consulted for management of diabetes     Type 1 Diabetes Mellitus  · Patient with long standing history of type 1 Diabetes complicated by diabetic retinopathy  · Last A1c: 9 1  · On home insulin pump with settings as below:  ? Basal rate: Midnight- 7am- 2 0, 7am to midnight- 1 0  ? Insulin to carb ratio: midnight- 7am- 1:10, 7pm to midnight- 1:9  ? Insulin sensitivity factor: 24 hour 1:30  ? BG target: 100-120  ? Active insulin time: 4 hours  ? Type of insulin: Humalog   · Currently on an insulin drip, continue for now and can plan to switch to basal bolus when patient is eating more       SUBJECTIVE     Patient continues to experience nausea and has barely eaten today  OBJECTIVE     Vitals:   Temp (24hrs), Av 5 °F (36 9 °C), Min:98 °F (36 7 °C), Max:99 3 °F (37 4 °C)    Temp:  [98 °F (36 7 °C)-99 3 °F (37 4 °C)] 99 °F (37 2 °C)  HR:  [] 104  Resp:  [17-22] 22  BP: (105-180)/(58-93) 180/86  SpO2:  [90 %-94 %] 91 %  Body mass index is 26 05 kg/m²  Input and Output Summary (last 24 hours): Intake/Output Summary (Last 24 hours) at 2020 1632  Last data filed at 2020 1515  Gross per 24 hour   Intake 2414 92 ml   Output 1910 ml   Net 504 92 ml       Physical Exam:   GENERAL: Appears lethargic   HEENT:  Normocephalic, atraumatic, pupils bilaterally reactive to light  CARDIAC:  Regular rate and rhythm, S1, S2 heard, no murmurs  PULMONARY:  CTA B/L, no wheezing/rales/rhonci, non-labored breathing  ABDOMEN:  Soft, NT/ND, +BS, no rebound/guarding/rigidity  Extremities:  2+ Pulses in DP/PT   No edema, cyanosis, or clubbing  NEUROLOGIC:  Alert/oriented x3  SKIN:  No rashes or erythema          ADDITIONAL DATA     Labs & Recent Cultures:     Results from last 7 days   Lab Units 09/04/20  0501   WBC Thousand/uL 21 18*   HEMOGLOBIN g/dL 6 0*   HEMATOCRIT % 18 2*   PLATELETS Thousands/uL 137*     Results from last 7 days   Lab Units 09/04/20  0501  09/02/20  1257   POTASSIUM mmol/L 3 7   < >  --    CHLORIDE mmol/L 112*   < >  --    CO2 mmol/L 27   < >  --    CO2, I-STAT mmol/L  --   --  26   BUN mg/dL 17   < >  --    CREATININE mg/dL 0 60   < >  --    CALCIUM mg/dL 7 6*   < >  --    GLUCOSE, ISTAT mg/dl  --   --  103    < > = values in this interval not displayed  Results from last 7 days   Lab Units 09/04/20  0829   INR  1 27*                 Nutrition:  Diet Cardiovascular; Cardiac; Fluid Restriction 1800 ML, Consistent Carbohydrate Diet Level 3 (6 carb servings/90 grams CHO/meal)  Radiology Results:   XR chest portable   Final Result by Lovely Perdomo DO (09/04 0827)      New right perihilar opacity favored to represent pneumonia  Persistent left lung consolidation and probable small basilar pleural effusion  Workstation performed: [id number]         CT head wo contrast   Final Result by Val Dyer DO (09/03 1644)      Stable examination with no acute intracranial abnormality  Mild microangiopathic change within the left frontal vertex and a small old lacunar infarct within the left posterior thalamus  Workstation performed: [id number]         XR chest portable   Final Result by Val Dyer DO (09/03 2512)      New left upper and lower lobe opacities with superimposed small basilar effusion  Differential considerations would include atelectasis versus pneumonia  The right lung field is clear  The study was marked in [facility] for immediate notification              Workstation performed: [id number]         XR chest portable ICU   Final Result by  (09/04 1632) Addendum 1 of 1 by Maria R Harvey MD (09/02 4696)   ADDENDUM:      Probable tiny left apical pneumothorax        Final        Scheduled Medications:  acetaminophen, 975 mg, Q8H  ascorbic acid, 500 mg, Daily  aspirin, 325 mg, Daily  atorvastatin, 80 mg, Daily With Dinner  busPIRone, 15 mg, BID  calcium chloride, 1 g, Once  cholecalciferol, 1,000 Units, Daily  docusate sodium, 100 mg, BID  ferrous sulfate, 325 mg, Daily With Breakfast  fondaparinux, 2 5 mg, Daily  lidocaine, 3 patch, Daily  magnesium sulfate, 2 g, Once  metoprolol tartrate, 25 mg, Q12H ANA PAULA  pantoprazole, 40 mg, Q12H ANA PAULA  polyethylene glycol, 17 g, Daily  QUEtiapine, 25 mg, HS  scopolamine, 1 patch, Q72H        PRN MEDS:  aluminum-magnesium hydroxide-simethicone, 30 mL, Q4H PRN  bisacodyl, 10 mg, Daily PRN  calcium carbonate, 500 mg, Daily PRN  metoclopramide, 10 mg, Q6H PRN  oxyCODONE, 2 5 mg, Q4H PRN  oxyCODONE, 5 mg, Q4H PRN  temazepam, 15 mg, HS PRN        Last 24 Hours Medication List:   Current Facility-Administered Medications   Medication Dose Route Frequency Provider Last Rate    acetaminophen  975 mg Oral Q8H Greg Barakat PA-C      aluminum-magnesium hydroxide-simethicone  30 mL Oral Q4H PRN Sabino Alcantar MD      ascorbic acid  500 mg Oral Daily Basilio Combs PA-C      aspirin  325 mg Oral Daily Greg Barakat PA-C      atorvastatin  80 mg Oral Daily With [employer] Barakat PA-C      bisacodyl  10 mg Rectal Daily PRN Sheryl Hay PA-C      busPIRone  15 mg Oral BID Sheryl Hay PA-C      calcium carbonate  500 mg Oral Daily PRN Sheryl Hay PA-C      calcium chloride  1 g Intravenous Once Sheryl Hay PA-C      cholecalciferol  1,000 Units Oral Daily Sheryl Hay PA-C      docusate sodium  100 mg Oral BID Sheryl Hay PA-C      ferrous sulfate  325 mg Oral Daily With Breakfast Basilio Combs PA-C      fondaparinux  2 5 mg Subcutaneous Daily Greg Roshni, PA-C      insulin regular (HumuLIN R,NovoLIN R) infusion  0 3-21 Units/hr Intravenous Titrated Dothan Eve, PA-C 1 5 Units/hr (09/04/20 1500)    lidocaine  3 patch Topical Daily Ara Eve, PA-C      magnesium sulfate  2 g Intravenous Once Ara Eve, PA-C      metoclopramide  10 mg Intravenous Q6H PRN Sabino Alcantar MD      metoprolol tartrate  25 mg Oral Q12H [address] Ara Eve, PA-C      multi-electrolyte  125 mL/hr Intravenous Continuous Morgan Bumpers, PA-C 125 mL/hr (09/04/20 1118)    oxyCODONE  2 5 mg Oral Q4H PRN Ara Eve, PA-C      oxyCODONE  5 mg Oral Q4H PRN Dothan Eve, PA-C      pantoprazole  40 mg Intravenous Q12H [address] Veronqiue Manzo, PA-MYKE      polyethylene glycol  17 g Oral Daily Ara Eve, PA-C      QUEtiapine  25 mg Oral HS Ara Eve, PA-C      scopolamine  1 patch Transdermal Q72H Ara Eve, PA-C      sodium chloride  20 mL/hr Intravenous Continuous Ara Eve, PA-C 20 mL/hr (09/02/20 1436)    temazepam  15 mg Oral HS PRN Dothan Eve, PA-C            Time Spent for Care: 30 mins spent in total   More than 50% of total time spent on counseling and coordination of care as described above  Current Length of Stay: 2 day(s)      Code Status: Level 1 - Full Code          ** Please Note: This note is constructed using a voice recognition dictation system   **

## 2020-09-05 LAB
ABO GROUP BLD BPU: NORMAL
ABO GROUP BLD BPU: NORMAL
ANION GAP SERPL CALCULATED.3IONS-SCNC: 4 MMOL/L (ref 4–13)
BPU ID: NORMAL
BPU ID: NORMAL
BUN SERPL-MCNC: 15 MG/DL (ref 5–25)
CALCIUM SERPL-MCNC: 7.8 MG/DL (ref 8.3–10.1)
CHLORIDE SERPL-SCNC: 105 MMOL/L (ref 100–108)
CO2 SERPL-SCNC: 30 MMOL/L (ref 21–32)
CREAT SERPL-MCNC: 0.52 MG/DL (ref 0.6–1.3)
CROSSMATCH: NORMAL
CROSSMATCH: NORMAL
ERYTHROCYTE [DISTWIDTH] IN BLOOD BY AUTOMATED COUNT: 16.8 % (ref 11.6–15.1)
GFR SERPL CREATININE-BSD FRML MDRD: 121 ML/MIN/1.73SQ M
GLUCOSE SERPL-MCNC: 118 MG/DL (ref 65–140)
GLUCOSE SERPL-MCNC: 130 MG/DL (ref 65–140)
GLUCOSE SERPL-MCNC: 152 MG/DL (ref 65–140)
GLUCOSE SERPL-MCNC: 158 MG/DL (ref 65–140)
GLUCOSE SERPL-MCNC: 177 MG/DL (ref 65–140)
GLUCOSE SERPL-MCNC: 177 MG/DL (ref 65–140)
GLUCOSE SERPL-MCNC: 178 MG/DL (ref 65–140)
GLUCOSE SERPL-MCNC: 189 MG/DL (ref 65–140)
GLUCOSE SERPL-MCNC: 195 MG/DL (ref 65–140)
GLUCOSE SERPL-MCNC: 230 MG/DL (ref 65–140)
GLUCOSE SERPL-MCNC: 234 MG/DL (ref 65–140)
GLUCOSE SERPL-MCNC: 256 MG/DL (ref 65–140)
GLUCOSE SERPL-MCNC: 98 MG/DL (ref 65–140)
HCT VFR BLD AUTO: 25.7 % (ref 36.5–49.3)
HGB BLD-MCNC: 8.8 G/DL (ref 12–17)
MCH RBC QN AUTO: 29.6 PG (ref 26.8–34.3)
MCHC RBC AUTO-ENTMCNC: 34.2 G/DL (ref 31.4–37.4)
MCV RBC AUTO: 87 FL (ref 82–98)
PLATELET # BLD AUTO: 152 THOUSANDS/UL (ref 149–390)
PMV BLD AUTO: 11.9 FL (ref 8.9–12.7)
POTASSIUM SERPL-SCNC: 3.5 MMOL/L (ref 3.5–5.3)
RBC # BLD AUTO: 2.97 MILLION/UL (ref 3.88–5.62)
SODIUM SERPL-SCNC: 139 MMOL/L (ref 136–145)
UNIT DISPENSE STATUS: NORMAL
UNIT DISPENSE STATUS: NORMAL
UNIT PRODUCT CODE: NORMAL
UNIT PRODUCT CODE: NORMAL
UNIT RH: NORMAL
UNIT RH: NORMAL
WBC # BLD AUTO: 23.66 THOUSAND/UL (ref 4.31–10.16)

## 2020-09-05 PROCEDURE — 85027 COMPLETE CBC AUTOMATED: CPT | Performed by: PHYSICIAN ASSISTANT

## 2020-09-05 PROCEDURE — C9113 INJ PANTOPRAZOLE SODIUM, VIA: HCPCS | Performed by: PHYSICIAN ASSISTANT

## 2020-09-05 PROCEDURE — 99024 POSTOP FOLLOW-UP VISIT: CPT | Performed by: THORACIC SURGERY (CARDIOTHORACIC VASCULAR SURGERY)

## 2020-09-05 PROCEDURE — 80048 BASIC METABOLIC PNL TOTAL CA: CPT | Performed by: PHYSICIAN ASSISTANT

## 2020-09-05 PROCEDURE — 97116 GAIT TRAINING THERAPY: CPT

## 2020-09-05 PROCEDURE — 99232 SBSQ HOSP IP/OBS MODERATE 35: CPT | Performed by: INTERNAL MEDICINE

## 2020-09-05 PROCEDURE — 82948 REAGENT STRIP/BLOOD GLUCOSE: CPT

## 2020-09-05 PROCEDURE — 97166 OT EVAL MOD COMPLEX 45 MIN: CPT

## 2020-09-05 RX ORDER — POTASSIUM CHLORIDE 20 MEQ/1
40 TABLET, EXTENDED RELEASE ORAL ONCE
Status: COMPLETED | OUTPATIENT
Start: 2020-09-05 | End: 2020-09-05

## 2020-09-05 RX ORDER — METOPROLOL TARTRATE 50 MG/1
50 TABLET, FILM COATED ORAL EVERY 12 HOURS SCHEDULED
Status: DISCONTINUED | OUTPATIENT
Start: 2020-09-05 | End: 2020-09-10 | Stop reason: HOSPADM

## 2020-09-05 RX ADMIN — OXYCODONE HYDROCHLORIDE 5 MG: 5 TABLET ORAL at 17:47

## 2020-09-05 RX ADMIN — OXYCODONE HYDROCHLORIDE AND ACETAMINOPHEN 500 MG: 500 TABLET ORAL at 09:09

## 2020-09-05 RX ADMIN — ACETAMINOPHEN 975 MG: 325 TABLET, FILM COATED ORAL at 22:44

## 2020-09-05 RX ADMIN — Medication 1000 UNITS: at 09:09

## 2020-09-05 RX ADMIN — SODIUM CHLORIDE, SODIUM GLUCONATE, SODIUM ACETATE, POTASSIUM CHLORIDE, MAGNESIUM CHLORIDE, SODIUM PHOSPHATE, DIBASIC, AND POTASSIUM PHOSPHATE 125 ML/HR: .53; .5; .37; .037; .03; .012; .00082 INJECTION, SOLUTION INTRAVENOUS at 13:00

## 2020-09-05 RX ADMIN — BUSPIRONE HYDROCHLORIDE 15 MG: 10 TABLET ORAL at 09:08

## 2020-09-05 RX ADMIN — FONDAPARINUX SODIUM 2.5 MG: 2.5 INJECTION, SOLUTION SUBCUTANEOUS at 09:09

## 2020-09-05 RX ADMIN — DOCUSATE SODIUM 100 MG: 100 CAPSULE, LIQUID FILLED ORAL at 09:09

## 2020-09-05 RX ADMIN — TEMAZEPAM 15 MG: 15 CAPSULE ORAL at 20:20

## 2020-09-05 RX ADMIN — POTASSIUM CHLORIDE 40 MEQ: 1500 TABLET, EXTENDED RELEASE ORAL at 09:08

## 2020-09-05 RX ADMIN — BUSPIRONE HYDROCHLORIDE 15 MG: 10 TABLET ORAL at 17:46

## 2020-09-05 RX ADMIN — FERROUS SULFATE TAB 325 MG (65 MG ELEMENTAL FE) 325 MG: 325 (65 FE) TAB at 09:08

## 2020-09-05 RX ADMIN — ATORVASTATIN CALCIUM 80 MG: 80 TABLET, FILM COATED ORAL at 17:47

## 2020-09-05 RX ADMIN — QUETIAPINE FUMARATE 25 MG: 25 TABLET ORAL at 22:44

## 2020-09-05 RX ADMIN — DOCUSATE SODIUM 100 MG: 100 CAPSULE, LIQUID FILLED ORAL at 17:47

## 2020-09-05 RX ADMIN — POLYETHYLENE GLYCOL 3350 17 G: 17 POWDER, FOR SOLUTION ORAL at 09:09

## 2020-09-05 RX ADMIN — ASPIRIN 325 MG ORAL TABLET 325 MG: 325 PILL ORAL at 09:08

## 2020-09-05 RX ADMIN — ACETAMINOPHEN 975 MG: 325 TABLET, FILM COATED ORAL at 05:37

## 2020-09-05 RX ADMIN — PANTOPRAZOLE SODIUM 40 MG: 40 INJECTION, POWDER, FOR SOLUTION INTRAVENOUS at 20:18

## 2020-09-05 RX ADMIN — PANTOPRAZOLE SODIUM 40 MG: 40 INJECTION, POWDER, FOR SOLUTION INTRAVENOUS at 09:08

## 2020-09-05 RX ADMIN — METOPROLOL TARTRATE 50 MG: 50 TABLET, FILM COATED ORAL at 09:08

## 2020-09-05 RX ADMIN — METOCLOPRAMIDE 10 MG: 5 INJECTION, SOLUTION INTRAMUSCULAR; INTRAVENOUS at 13:00

## 2020-09-05 RX ADMIN — METOPROLOL TARTRATE 50 MG: 50 TABLET, FILM COATED ORAL at 20:27

## 2020-09-05 RX ADMIN — LIDOCAINE 5% 3 PATCH: 700 PATCH TOPICAL at 09:09

## 2020-09-05 NOTE — PROGRESS NOTES
Progress Note - Vielka Oropeza 48 y o  male MRN: [id number]    Unit/Bed#: [facility] 410-01 Encounter: [id number]      CC: diabetes f/u    Subjective:   Vielka Oropeza is a 48y o  year old male with type 1 diabetes  Feels well  No complaints  No hypoglycemia  Has poor appetite  Objective:     Vitals: Blood pressure 142/71, pulse 96, temperature 98 5 °F (36 9 °C), temperature source Oral, resp  rate 18, height 5' 6" (1 676 m), weight 74 8 kg (164 lb 14 5 oz), SpO2 97 %  ,Body mass index is 26 62 kg/m²  Intake/Output Summary (Last 24 hours) at 9/5/2020 1200  Last data filed at 9/5/2020 0915  Gross per 24 hour   Intake 5022 21 ml   Output 3695 ml   Net 1327 21 ml       Physical Exam:  General Appearance: awake, appears stated age and cooperative  Head: Normocephalic, without obvious abnormality, atraumatic  Extremities: moves all extremities  Skin: Skin color and temperature normal    Pulm: no labored breathing    Lab, Imaging and other studies: I have personally reviewed pertinent reports  POC Glucose (mg/dl)   Date Value   09/05/2020 195 (H)   09/05/2020 130   09/05/2020 177 (H)   09/05/2020 230 (H)   09/05/2020 256 (H)   09/04/2020 142 (H)   09/04/2020 105   09/04/2020 153 (H)   09/04/2020 169 (H)   09/04/2020 157 (H)       Assessment:  Uncontrolled type 1 diabetes - patient is using [employer] insulin pump as an outpatient  Status post AVR    Plan:  Uncontrolled type 1 diabetes - most recent A1c 9 1  Patient is using about 30 units of basal insulin from his insulin pump  Patient is using insulin to carb ratio 1:10  He used about 60 units over the past 24 hours  He is currently on 90 g carbohydrate diet  I will continue insulin drip for now b/c of poor appetite  Wife wants pt to be discharged on insulin pump  Patient just got a new version of Medtronic insulin pump and he does not know how to use it yet    He had an appointment with diabetic educator but he needs to see diabetic educator again for sensor and pump calibration  There are technical issues with the pump for which he needs to see diabetic educator  Portions of the record may have been created with voice recognition software

## 2020-09-05 NOTE — PROGRESS NOTES
Progress Note - Cardiothoracic Surgery   Natacha Mcpherson 48 y o  male MRN: [id number]  Unit/Bed#: [facility] 410-01 Encounter: [id number]    Aortic stenosis, Non-Rheumatic, Coronary artery disease  S/P aortic valve replacement and coronary artery bypass grafting; POD # 3      24 Hour Events: Dizziness resolved  Remains oxygen dependent  No further nausea/vomitting        Medications:   Scheduled Meds:  Current Facility-Administered Medications   Medication Dose Route Frequency Provider Last Rate    acetaminophen  975 mg Oral Q8H Greg Barakat PA-C      aluminum-magnesium hydroxide-simethicone  30 mL Oral Q4H PRN Sabino Alcantar MD      ascorbic acid  500 mg Oral Daily Romy Moya PA-C      aspirin  325 mg Oral Daily Greg Barakat PA-C      atorvastatin  80 mg Oral Daily With [employer] MARILYN Barakat      bisacodyl  10 mg Rectal Daily PRN Yoo Garfield, MARILYN      busPIRone  15 mg Oral BID Yoo Garfield, MARILYN      calcium carbonate  500 mg Oral Daily PRN Yoo Garfield, MARILYN      calcium chloride  1 g Intravenous Once Yoo Garfield, PA-MYKE      cholecalciferol  1,000 Units Oral Daily Yoo Garfield, MARILYN      docusate sodium  100 mg Oral BID Yoo Garfield, MARILYN      ferrous sulfate  325 mg Oral Daily With Breakfast Romy Moya PA-C      fondaparinux  2 5 mg Subcutaneous Daily Yoo Garfield, PA-C      insulin regular (HumuLIN R,NovoLIN R) infusion  0 3-21 Units/hr Intravenous Titrated Yoo Garfield, PA-C 3 Units/hr (09/05/20 0600)    lidocaine  3 patch Topical Daily Yoo Garfield, PA-C      magnesium sulfate  2 g Intravenous Once Yoo Garfield, PA-C      metoclopramide  10 mg Intravenous Q6H PRN Sabino Alcantar MD      metoprolol tartrate  50 mg Oral Q12H [facility] Justine Duque MD      multi-electrolyte  125 mL/hr Intravenous Continuous Cam MARILYN Braxton 125 mL/hr (09/05/20 0600)    oxyCODONE  2 5 mg Oral Q4H PRN Yoo Garfield, MARILYN      oxyCODONE  5 mg Oral Q4H PRN Yoo Garfield, PA-MYKE      pantoprazole 40 mg Intravenous Q12H [facility] Shagufta Manzo PA-C      polyethylene glycol  17 g Oral Daily Benji Max PA-C      potassium chloride  40 mEq Oral Once Sincere Castillo MD      QUEtiapine  25 mg Oral HS Benji Max PA-C      scopolamine  1 patch Transdermal Q72H Benji Max PA-C      sodium chloride  20 mL/hr Intravenous Continuous GRACIELA Wagner-C 20 mL/hr (09/02/20 1436)    temazepam  15 mg Oral HS PRN Benji Max PA-C       Continuous Infusions:insulin regular (HumuLIN R,NovoLIN R) infusion, 0 3-21 Units/hr, Last Rate: 3 Units/hr (09/05/20 0600)  multi-electrolyte, 125 mL/hr, Last Rate: 125 mL/hr (09/05/20 0600)  sodium chloride, 20 mL/hr, Last Rate: 20 mL/hr (09/02/20 1436)      PRN Meds: aluminum-magnesium hydroxide-simethicone    bisacodyl    calcium carbonate    metoclopramide    oxyCODONE    oxyCODONE    temazepam    Vitals:   Vitals:    09/05/20 0302 09/05/20 0304 09/05/20 0600 09/05/20 0728   BP: 111/56   164/77   BP Location: Right arm   Right arm   Pulse: (!) 107   103   Resp: 19   18   Temp:    98 9 °F (37 2 °C)   TempSrc:    Oral   SpO2: (!) 87% 93%  94%   Weight:   74 8 kg (164 lb 14 5 oz)    Height:           Telemetry: Sinus Tachycardia;  Heart Rate: 106    Respiratory:   SpO2: SpO2: 94 %, SpO2 Activity: SpO2 Activity: At Rest; 5 LPM    Intake/Output:   I/O       09/02 0701 - 09/03 0700 09/03 0701 - 09/04 0700 09/04 0701 - 09/05 0700    I V  (mL/kg) 477 2 (6 5) 1799 5 (24 7)     IV Piggyback 1800      Cell Saver 250      Total Intake(mL/kg) 2527 2 (34 7) 1799 5 (24 7)     Urine (mL/kg/hr) 1995 (1 1) 775 (0 4)     Chest Tube 870 520     Total Output 2865 1295     Net -337 8 +504 5                I/O: +927 mL/24    Chest tube Output:    Mediastinal tubes: 30 mL/8 hours  110 mL/24 hours   Pleural tubes: 110 mL/8 hours  480 mL/24 hours     Weights:   Weight (last 2 days)     Date/Time   Weight    09/05/20 0600   74 8 (164 9)    09/04/20 0600   73 2 (161 38)    09/03/20 0559 72 9 (160 72)            Results:   Results from last 7 days   Lab Units 09/05/20  0535 09/04/20  1618 09/04/20  0501 09/03/20  0507   WBC Thousand/uL 23 66*  --  21 18* 15 22*   HEMOGLOBIN g/dL 8 8* 9 5* 6 0* 7 8*   HEMATOCRIT % 25 7* 28 6* 18 2* 23 4*   PLATELETS Thousands/uL 152  --  137* 136*     Results from last 7 days   Lab Units 09/05/20  0535 09/04/20  0501 09/03/20  0422  09/02/20  1257   SODIUM mmol/L 139 143 147*  --   --    POTASSIUM mmol/L 3 5 3 7 4 2   < >  --    CHLORIDE mmol/L 105 112* 116*  --   --    CO2 mmol/L 30 27 26  --   --    CO2, I-STAT mmol/L  --   --   --   --  26   BUN mg/dL 15 17 11  --   --    CREATININE mg/dL 0 52* 0 60 0 62  --   --    GLUCOSE, ISTAT mg/dl  --   --   --   --  103   CALCIUM mg/dL 7 8* 7 6* 8 0*  --   --     < > = values in this interval not displayed  Results from last 7 days   Lab Units 09/04/20  0829 09/02/20  1514   INR  1 27* 1 25*   PTT seconds 32 28         Invasive Lines/Tubes:  Invasive Devices     Central Venous Catheter Line            CVC Central Lines 09/02/20 Triple 2 days          Drain            Chest Tube 1 Anterior Mediastinal 32 Fr  2 days    Chest Tube 2 Left Pleural 32 Fr  2 days    Chest Tube 3 Posterior Mediastinal 24 Fr  2 days              Physical Exam:    HEENT/NECK:  PERRLA  No jugular venous distention  Cardiac: Regular rate and rhythm and No murmurs/rubs/gallops  Pulmonary:  Breath sounds clear bilaterally and No rales/rhonchi/wheezes  Abdomen:  Non-tender, Non-distended and Normal bowel sounds  Incisions: Sternum is stable  Incision dressed with Acticoat  No erythema or drainage and Saphenectomy incision dressed with Acticoat  No erythema or drainage  Extremities: Extremities warm/dry and 1+ edema B/L  Neuro: Alert and oriented X 3, Sensation is grossly intact and No focal deficits  Skin: Warm/Dry, without rashes or lesions        Assessment:  Principal Problem:    Nonrheumatic aortic valve stenosis  Active Problems:    Type 1 diabetes mellitus with retinopathy ([facility] [address] )    Essential hypertension    Coronary artery disease involving native coronary artery of native heart without angina pectoris    Dyslipidemia    Aortic stenosis due to bicuspid aortic valve    History of ITP    History of coronary angioplasty with insertion of stent    Acute blood loss as cause of postoperative anemia    Post-operative nausea and vomiting       Aortic stenosis, Non-Rheumatic, Coronary artery disease  S/P aortic valve replacement and coronary artery bypass grafting; POD # 3    Plan:    1  Cardiac:   Sinus Tachycardia; Tachycardic/Hypertensive   Increase Lopressor, 50 mg PO BID  Continue ASA and Statin therapy  Maintain central IV access today  Continue DVT prophylaxis    2  Pulmonary:     Chest tube output remains persistently high; Continue chest tubes to suction today    3  Renal:   Intake/Output net: +927 mL/24 hours  Post op Creatinine stable; Follow up labs prn    4  Neuro:    Neurologically intact; No active issues  Incisional pain well-controlled  Continue Tylenol, 975 mg PO q 8, standing dose  Continue Oxycodone, 2 5 to 5 mg PO q 4 hours prn pain    5  GI:  Tolerating TLC 2 3 gm sodium diet  Maintain 1800 mL daily fluid restriction   Continue stool softeners and prn suppository  Continue GI prophylaxis    6  Endo:   No history of diabetes; Glucose well-controlled with sliding scale coverage  Resume insulin pump, per endocrine    7    Hematology:    APOBLA    S/P 2 units PRBC yesterday    Hgb stable    Check daily CBC      8  Disposition:      Anticipate discharge to home 2-3 days     VTE Pharmacologic Prophylaxis: Sequential compression device (Venodyne)  and Fondaparinux (Arixtra)  VTE Mechanical Prophylaxis: sequential compression device    Collaborative rounds completed with Dr Alexandria Stewart    Plan of care discussed with bedside nurse    SIGNATURE: Dilcia Hartmann PA-C  DATE: September 5, 2020  TIME: 8:24 AM

## 2020-09-05 NOTE — PLAN OF CARE
Problem: PHYSICAL THERAPY ADULT  Goal: Performs mobility at highest level of function for planned discharge setting  See evaluation for individualized goals  Description: Treatment/Interventions: Functional transfer training, LE strengthening/ROM, Therapeutic exercise, Elevations, Endurance training, Patient/family training, Equipment eval/education, Bed mobility, Gait training  Equipment Recommended: Agnes Hollingsworth       See flowsheet documentation for full assessment, interventions and recommendations  Outcome: Progressing  Note: Prognosis: Good  Problem List: Decreased strength, Decreased endurance, Impaired balance, Decreased mobility, Pain  Assessment: Pt demonstrated improved functional endurance this session, ambulating household & community distances with use of RW, but demonstrted minimal UE support & will likely progress to no AD in upcoming sessions  Pt encountered on 6L O2 nc with SpO2 ~98%, trialed on 4L O2 with steady O2 at rest, which then dropped to 83-85% post 1st gait trial   Pt given seated rest to recover & returned to 6L during 2nd trial with SpO2 remaining >90% when assessed  Pt reports no change in symptoms besides dyspnea  Will continue to benefit from further ambulation to improve lung function to maximize functional endurance without need for supplemental O2  Plan to trial LRAD & steps next session as appropriate to allow for safe access to home & community upon return home  PT Discharge Recommendation: Return to previous environment with social support     PT - OK to Discharge: No    See flowsheet documentation for full assessment

## 2020-09-05 NOTE — PHYSICAL THERAPY NOTE
Physical Therapy Progress Note     09/05/20 1350   Pain Assessment   Pain Assessment Tool Pain Assessment not indicated - pt denies pain   Restrictions/Precautions   Other Precautions Pain; Fall Risk;O2;Telemetry;Multiple lines;Cardiac/sternal   Subjective   Subjective Pt initially declined therapy due to increased fatigue & slight nausea, but did report feeling better overall prior to onset  Returned later & pt reported feeling better & agreeable to treatment  No new complaints offered with activity  Transfers   Sit to Stand 5  Supervision   Additional items Assist x 1; Increased time required   Stand to Sit 5  Supervision   Additional items Assist x 1; Increased time required   Ambulation/Elevation   Gait pattern Excessively slow; Short stride;Decreased foot clearance; Inconsistent gretta   Gait Assistance 4  Minimal assist   Additional items Assist x 1  (+ chair follow)   Assistive Device Rolling walker   Distance 130', 250'   Balance   Static Sitting Good   Static Standing Fair +   Ambulatory Fair -   Endurance Deficit   Endurance Deficit Yes   Endurance Deficit Description fatigue, dyspnea with activity   Activity Tolerance   Activity Tolerance Patient tolerated treatment well;Patient limited by fatigue   Nurse [address]  Malik Fernandez RN   Assessment   Prognosis Good   Problem List Decreased strength;Decreased endurance; Impaired balance;Decreased mobility;Pain   Assessment Pt demonstrated improved functional endurance this session, ambulating household & community distances with use of RW, but demonstrted minimal UE support & will likely progress to no AD in upcoming sessions  Pt encountered on 6L O2 nc with SpO2 ~98%, trialed on 4L O2 with steady O2 at rest, which then dropped to 83-85% post 1st gait trial   Pt given seated rest to recover & returned to 6L during 2nd trial with SpO2 remaining >90% when assessed  Pt reports no change in symptoms besides dyspnea    Will continue to benefit from further ambulation to improve lung function to maximize functional endurance without need for supplemental O2  Plan to trial LRAD & steps next session as appropriate to allow for safe access to home & community upon return home  Goals   Patient Goals to feel better tomorrow & walk without AD   STG Expiration Date 09/18/20   PT Treatment Day 1   Plan   Treatment/Interventions Functional transfer training;LE strengthening/ROM; Elevations; Therapeutic exercise; Endurance training;Patient/family training;Equipment eval/education; Bed mobility;Gait training   Progress Progressing toward goals   PT Frequency   (4-6x/week)   Recommendation   PT Discharge Recommendation Return to previous environment with social support   Equipment Recommended Walker  (likely will progress to no AD)   PT - OK to Discharge No     Nelson Spar, PTA

## 2020-09-05 NOTE — PROGRESS NOTES
Cardiology Progress Note - Vera Mohs 48 y o  male MRN: [id number]    Unit/Bed#: [facility] 410-01 Encounter: [id number]      Assessment:  Principal Problem:    Nonrheumatic aortic valve stenosis  Active Problems:    Type 1 diabetes mellitus with retinopathy ([address] )    Essential hypertension    Coronary artery disease involving native coronary artery of native heart without angina pectoris    Dyslipidemia    Aortic stenosis due to bicuspid aortic valve    History of ITP    History of coronary angioplasty with insertion of stent    Acute blood loss as cause of postoperative anemia    Post-operative nausea and vomiting      Plan:  Patient postop day three after aortic valve replacement and CABG  Sinus tachycardia noted on telemetry  Patient on intravenous fluids and intravenous insulin  Endocrinology note appreciated  BMP today with potassium of 3 5 and creatinine of 0 52  I will supplement potassium  Hemoglobin yesterday 6 0 and after transfusion of packed red blood cells yesterday is 8 8 today  White cell count elevated at 23 6  Patient is afebrile  Blood pressure is elevated today so will titrate dose of beta-blocker  Continues with chest tubes in place  Continues on supplemental oxygen  Subjective:   Patient seen and examined  Objective:     Vitals: Blood pressure 164/77, pulse 103, temperature 98 9 °F (37 2 °C), temperature source Oral, resp  rate 18, height 5' 6" (1 676 m), weight 74 8 kg (164 lb 14 5 oz), SpO2 94 %  , Body mass index is 26 62 kg/m² ,   Orthostatic Blood Pressures      Most Recent Value   Blood Pressure  164/77 filed at 09/05/2020 0728   Patient Position - Orthostatic VS  Sitting filed at 09/05/2020 0728      ,      Intake/Output Summary (Last 24 hours) at 9/5/2020 0749  Last data filed at 9/5/2020 0643  Gross per 24 hour   Intake 4842 21 ml   Output 3915 ml   Net 927 21 ml       No significant arrhythmias seen on telemetry review    Sinus tachycardia      Physical Exam:    GEN: [facility] [place]  NECK: supple, no carotid bruits, no JVD or HJR  HEART: normal rate, regular rhythm, normal S1 and S2, no murmurs, clicks, gallops or rubs   LUNGS: clear to auscultation bilaterally; no wheezes, rales, or rhonchi   EXTREMITIES: peripheral pulses normal; no clubbing, cyanosis, or edema  SKIN: warm and well perfused, no suspicious lesions on exposed skin    Labs & Results:    No results displayed because visit has over 200 results  Xr Chest Portable    Result Date: 9/4/2020  Narrative: CHEST INDICATION:   Left lung opacities and basilar effusion on previous imaging  COMPARISON:  9/3/2020, CT chest 8/18/2020 EXAM PERFORMED/VIEWS:  XR CHEST PORTABLE FINDINGS:  The patient is rotated to the right  Right IJ central line tip terminates in the region of the SVC  No pneumothorax  Cardiomediastinal silhouette appears stable  Status post median sternotomy and CABG with aortic valve replacement  Mediastinal and left pleural drains  There is new right perihilar airspace opacity with persistent left perihilar and basilar opacities  Hazy opacity left costophrenic angle suggesting pleural effusion  Osseous structures appear within normal limits for patient age  Impression: New right perihilar opacity favored to represent pneumonia  Persistent left lung consolidation and probable small basilar pleural effusion  Workstation performed: [id number]     Xr Chest Portable    Result Date: 9/3/2020  Narrative: CHEST INDICATION:   Post Open Heart Surgey  COMPARISON:  9/2/2020 EXAM PERFORMED/VIEWS:  XR CHEST PORTABLE FINDINGS:  Sternotomy wires are intact  Prosthetic heart valve unchanged  [place]-Lynnette catheter, ET tube and NG tube have been removed  There is a right-sided central line to the cavoatrial junction which is unchanged  There is a mediastinal drain and a  left basilar chest tube which remain  Stable mild cardiac enlargement   New opacity within the left upper and lower lobe with small left basilar effusion  The right lung field is clear  No pneumothorax  Osseous structures appear within normal limits for patient age  Impression: New left upper and lower lobe opacities with superimposed small basilar effusion  Differential considerations would include atelectasis versus pneumonia  The right lung field is clear  The study was marked in [facility] for immediate notification  Workstation performed: [id number]     Ct Head Wo Contrast    Result Date: 9/3/2020  Narrative: CT BRAIN - WITHOUT CONTRAST INDICATION:   Neuro deficit, acute, stroke suspected Dizziness, persistent/recurrent, cardiac or vascular cause suspected persistent N/V, disconjugate gaze, r/o CVA  COMPARISON:  7/29/2018 TECHNIQUE:  CT examination of the brain was performed  In addition to axial images, sagittal and coronal 2D reformatted images were created and submitted for interpretation  Radiation dose length product (DLP) for this visit:  874 1 mGy-cm   This examination, like all CT scans performed in the [facility], was performed utilizing techniques to minimize radiation dose exposure, including the use of iterative reconstruction and automated exposure control  IMAGE QUALITY:  Diagnostic  FINDINGS: PARENCHYMA:  Subtle hypodensities within the left frontal vertex suggestive of mild chronic microangiopathic change  There is a focal hypodensity identified within the posterior medial aspect of the left thalamus on series 2 image 21 which was present on prior study consistent with an old lacunar infarct  No acute territorial infarct  No mass or hemorrhage  VENTRICLES AND EXTRA-AXIAL SPACES:  Normal for the patient's age  VISUALIZED ORBITS AND PARANASAL SINUSES:  Unremarkable  CALVARIUM AND EXTRACRANIAL SOFT TISSUES:  Normal      Impression: Stable examination with no acute intracranial abnormality    Mild microangiopathic change within the left frontal vertex and a small old lacunar infarct within the left posterior thalamus  Workstation performed: [id number]     Ct Chest Wo Contrast    Result Date: 8/20/2020  Narrative: CT CHEST WITHOUT IV CONTRAST INDICATION:   I35 0: Nonrheumatic aortic (valve) stenosis  Former smoker  Preoperative open heart surgery  COMPARISON:  CT scan 8/30/2017 TECHNIQUE: CT examination of the chest was performed without intravenous contrast   Axial, sagittal, and coronal 2D reformatted images were created from the source data and submitted for interpretation  Radiation dose length product (DLP) for this visit:  217 9 mGy-cm   This examination, like all CT scans performed in the [facility], was performed utilizing techniques to minimize radiation dose exposure, including the use of iterative reconstruction and automated exposure control  FINDINGS: LUNGS:  Scattered areas of groundglass opacity in both upper lobes, new from previous exam   No lung nodule  Minimal endotracheal mucous  PLEURA:  Unremarkable  HEART/GREAT VESSELS:  Unremarkable for patient's age  MEDIASTINUM AND DA:  Unremarkable  CHEST WALL AND LOWER NECK:   Unremarkable  VISUALIZED STRUCTURES IN THE UPPER ABDOMEN:  Status post splenectomy  Upper abdomen otherwise unremarkable  OSSEOUS STRUCTURES:  No acute fracture or destructive osseous lesion  Impression: Asymmetric upper lobe groundglass opacities  Nonspecific findings can be seen with pulmonary edema or atypical infection such as viral infection  While not typical of Covid 19 pneumonia distribution, this should be excluded  The study was marked in [facility] for immediate notification  Workstation performed: [id number]     Vas Carotid Complete Study    Result Date: 8/26/2020  Narrative:  [facility] REPORT CLINICAL: Indications:  Patient presents for a general health evaluation secondary to future open heart surgery  Patient is asymptomatic at this time   Risk Factors The patient has history of HTN, DM,  and HLD Clinical Right Pressure:  140/ mm Hg, Left Pressure:  132/ mm Hg  FINDINGS:  Right        Impression  PSV  EDV (cm/s)  Direction of Flow  Ratio  Dist  ICA                 57          13                      0 91  Mid  ICA                  63          16                      1 00  Prox  ICA    1 - 49%      57          13                      0 91  Dist CCA                  47          10                            Mid CCA                   49          11                            Prox CCA                  63           8                            Ext Carotid               87          21                      1 39  Prox Vert                 47           8  Antegrade                 Subclavian                67           7                             Left         Impression  PSV  EDV (cm/s)  Direction of Flow  Ratio  Dist  ICA                 64          20                      0 77  Mid  ICA                  75          28                      0 89  Prox  ICA    1 - 49%      76          20                      0 91  Dist CCA                  68          16                            Mid CCA                   84          16                      0 89  Prox CCA                  94          18                            Ext Carotid               63          10                      0 75  Prox Vert                 55          14  Antegrade                 Subclavian                62           0                               CONCLUSION: Impression RIGHT: There is <50% stenosis noted in the internal carotid artery  Plaque is heterogenous and irregular  Vertebral artery flow is antegrade  There is no significant subclavian artery disease  LEFT: There is <50% stenosis noted in the internal carotid artery  Plaque is heterogenous and irregular  Vertebral artery flow is antegrade  There is no significant subclavian artery disease  Baseline study    Internal carotid artery stenosis determination by consensus criteria from: Tk Parker , et al  Carotid Artery Stenosis: Gray-Scale and Doppler US Diagnosis - [employer] in [address] [place] [address] [address], Radiology 2003; [phone]  SIGNATURE: Electronically Signed by: Izabella Walden MD on 2020-08-26 12:44:13 PM    Vas Lower Limb Vein Mapping Bypass Graft    Result Date: 8/26/2020  Narrative:  [facility] REPORT CLINICAL: Indications: Pre-op Vein Mapping for Future Lower Extremity Bypass Graft  FINDINGS:  Segment         Right        Left                            Diameter AP  Diameter AP  GSV Inguinal            4 4          4 0  GSV Prox Thigh          1 6          2 0  GSV Mid Thigh           2 0          2 6  GSV Dist Thigh          2 0          2 0  GSV Knee                2 1          2 4  GSV Prox Calf                        1 9  GSV Mid Calf            1 9          2 0  GSV Dist Calf                        2 3  GSV Ankle               1 7               SSV Mid Calf            1 4          1 3  SSV Knee                0 9               SSV Ankle               1 5          2 2     CONCLUSION: Impression: RIGHT LOWER LIMB: The great saphenous vein is patent  from the groin to the ankle  The GSV measures from 4 4 mm - 1 7 mm throughout the leg  The small saphenous vein is <2 mm throughout the calf  LEFT LOWER LIMB: The great saphenous vein is patent  from the groin to the ankle  The vein is of inadequate caliber and quality for graft conduit from the groin  The small saphenous vein is not of adequate size or quality  SIGNATURE: Electronically Signed by: Izabella Walden MD on 2020-08-26 12:34:31 PM    Xr Chest Portable Icu    Addendum Date: 9/2/2020 Addendum:   ADDENDUM: Probable tiny left apical pneumothorax  Result Date: 9/2/2020  Narrative: CHEST INDICATION:   S/P open heart  COMPARISON:  8/30/2017 EXAM PERFORMED/VIEWS:  XR CHEST PORTABLE ICU FINDINGS:  Endotracheal tube is present with its tip in satisfactory position above the level of the jonathan    An enteric tube is present with its tip in satisfactory position below the left hemidiaphragm  Right internal jugular central venous catheter is noted  Also noted is a [place]-Lynnette catheter from a right internal jugular approach with its tip overlying the right main pulmonary artery  Mediastinal and pleural drains are present  Cardiomediastinal silhouette appears unremarkable  Aortic valve prosthesis  The lungs are clear  No pneumothorax or pleural effusion  Osseous structures appear within normal limits for patient age  Impression: Unremarkable postop chest  Workstation performed: [id number]       EKG personally reviewed by Jose Perez MD      Counseling / Coordination of Care  Total floor / unit time spent today 30 minutes  Greater than 50% of total time was spent with the patient and / or family counseling and / or coordination of care

## 2020-09-06 LAB
ERYTHROCYTE [DISTWIDTH] IN BLOOD BY AUTOMATED COUNT: 16.5 % (ref 11.6–15.1)
GLUCOSE SERPL-MCNC: 114 MG/DL (ref 65–140)
GLUCOSE SERPL-MCNC: 120 MG/DL (ref 65–140)
GLUCOSE SERPL-MCNC: 133 MG/DL (ref 65–140)
GLUCOSE SERPL-MCNC: 158 MG/DL (ref 65–140)
GLUCOSE SERPL-MCNC: 176 MG/DL (ref 65–140)
GLUCOSE SERPL-MCNC: 178 MG/DL (ref 65–140)
GLUCOSE SERPL-MCNC: 202 MG/DL (ref 65–140)
GLUCOSE SERPL-MCNC: 219 MG/DL (ref 65–140)
GLUCOSE SERPL-MCNC: 226 MG/DL (ref 65–140)
GLUCOSE SERPL-MCNC: 95 MG/DL (ref 65–140)
HCT VFR BLD AUTO: 25.3 % (ref 36.5–49.3)
HGB BLD-MCNC: 8.4 G/DL (ref 12–17)
MCH RBC QN AUTO: 29.2 PG (ref 26.8–34.3)
MCHC RBC AUTO-ENTMCNC: 33.2 G/DL (ref 31.4–37.4)
MCV RBC AUTO: 88 FL (ref 82–98)
PLATELET # BLD AUTO: 190 THOUSANDS/UL (ref 149–390)
PMV BLD AUTO: 11.4 FL (ref 8.9–12.7)
RBC # BLD AUTO: 2.88 MILLION/UL (ref 3.88–5.62)
WBC # BLD AUTO: 16.44 THOUSAND/UL (ref 4.31–10.16)

## 2020-09-06 PROCEDURE — 99232 SBSQ HOSP IP/OBS MODERATE 35: CPT | Performed by: INTERNAL MEDICINE

## 2020-09-06 PROCEDURE — 99024 POSTOP FOLLOW-UP VISIT: CPT | Performed by: THORACIC SURGERY (CARDIOTHORACIC VASCULAR SURGERY)

## 2020-09-06 PROCEDURE — 85027 COMPLETE CBC AUTOMATED: CPT | Performed by: PHYSICIAN ASSISTANT

## 2020-09-06 PROCEDURE — C9113 INJ PANTOPRAZOLE SODIUM, VIA: HCPCS | Performed by: PHYSICIAN ASSISTANT

## 2020-09-06 PROCEDURE — 82948 REAGENT STRIP/BLOOD GLUCOSE: CPT

## 2020-09-06 RX ORDER — INSULIN GLARGINE 100 [IU]/ML
36 INJECTION, SOLUTION SUBCUTANEOUS ONCE
Status: COMPLETED | OUTPATIENT
Start: 2020-09-06 | End: 2020-09-06

## 2020-09-06 RX ORDER — PANTOPRAZOLE SODIUM 40 MG/1
40 TABLET, DELAYED RELEASE ORAL EVERY 12 HOURS SCHEDULED
Status: DISCONTINUED | OUTPATIENT
Start: 2020-09-06 | End: 2020-09-10 | Stop reason: HOSPADM

## 2020-09-06 RX ADMIN — INSULIN GLARGINE 36 UNITS: 100 INJECTION, SOLUTION SUBCUTANEOUS at 11:54

## 2020-09-06 RX ADMIN — BUSPIRONE HYDROCHLORIDE 15 MG: 10 TABLET ORAL at 17:48

## 2020-09-06 RX ADMIN — METOPROLOL TARTRATE 50 MG: 50 TABLET, FILM COATED ORAL at 21:17

## 2020-09-06 RX ADMIN — ASPIRIN 325 MG ORAL TABLET 325 MG: 325 PILL ORAL at 09:10

## 2020-09-06 RX ADMIN — ACETAMINOPHEN 975 MG: 325 TABLET, FILM COATED ORAL at 21:17

## 2020-09-06 RX ADMIN — FONDAPARINUX SODIUM 2.5 MG: 2.5 INJECTION, SOLUTION SUBCUTANEOUS at 09:11

## 2020-09-06 RX ADMIN — INSULIN LISPRO 1 UNITS: 100 INJECTION, SOLUTION INTRAVENOUS; SUBCUTANEOUS at 20:09

## 2020-09-06 RX ADMIN — Medication 1000 UNITS: at 09:10

## 2020-09-06 RX ADMIN — BUSPIRONE HYDROCHLORIDE 15 MG: 10 TABLET ORAL at 09:10

## 2020-09-06 RX ADMIN — OXYCODONE HYDROCHLORIDE AND ACETAMINOPHEN 500 MG: 500 TABLET ORAL at 09:10

## 2020-09-06 RX ADMIN — TEMAZEPAM 15 MG: 15 CAPSULE ORAL at 21:17

## 2020-09-06 RX ADMIN — DOCUSATE SODIUM 100 MG: 100 CAPSULE, LIQUID FILLED ORAL at 09:10

## 2020-09-06 RX ADMIN — ACETAMINOPHEN 975 MG: 325 TABLET, FILM COATED ORAL at 05:51

## 2020-09-06 RX ADMIN — LIDOCAINE 5% 3 PATCH: 700 PATCH TOPICAL at 09:11

## 2020-09-06 RX ADMIN — METOPROLOL TARTRATE 50 MG: 50 TABLET, FILM COATED ORAL at 09:10

## 2020-09-06 RX ADMIN — PANTOPRAZOLE SODIUM 40 MG: 40 INJECTION, POWDER, FOR SOLUTION INTRAVENOUS at 09:19

## 2020-09-06 RX ADMIN — SCOPALAMINE 1 PATCH: 1 PATCH, EXTENDED RELEASE TRANSDERMAL at 09:27

## 2020-09-06 RX ADMIN — METOCLOPRAMIDE 10 MG: 5 INJECTION, SOLUTION INTRAMUSCULAR; INTRAVENOUS at 16:30

## 2020-09-06 RX ADMIN — OXYCODONE HYDROCHLORIDE 2.5 MG: 5 TABLET ORAL at 06:45

## 2020-09-06 RX ADMIN — PANTOPRAZOLE SODIUM 40 MG: 40 TABLET, DELAYED RELEASE ORAL at 21:17

## 2020-09-06 RX ADMIN — FERROUS SULFATE TAB 325 MG (65 MG ELEMENTAL FE) 325 MG: 325 (65 FE) TAB at 09:10

## 2020-09-06 RX ADMIN — OXYCODONE HYDROCHLORIDE 5 MG: 5 TABLET ORAL at 11:53

## 2020-09-06 RX ADMIN — QUETIAPINE FUMARATE 25 MG: 25 TABLET ORAL at 21:17

## 2020-09-06 RX ADMIN — ATORVASTATIN CALCIUM 80 MG: 80 TABLET, FILM COATED ORAL at 17:47

## 2020-09-06 RX ADMIN — POLYETHYLENE GLYCOL 3350 17 G: 17 POWDER, FOR SOLUTION ORAL at 09:10

## 2020-09-06 NOTE — PROGRESS NOTES
Cardiology Progress Note - Devon Mcmullen 48 y o  male MRN: [id number]    Unit/Bed#: [facility] 410-01 Encounter: [id number]      Assessment:  Principal Problem:    Nonrheumatic aortic valve stenosis  Active Problems:    Type 1 diabetes mellitus with retinopathy ([address] )    Essential hypertension    Coronary artery disease involving native coronary artery of native heart without angina pectoris    Dyslipidemia    Aortic stenosis due to bicuspid aortic valve    History of ITP    History of coronary angioplasty with insertion of stent    Acute blood loss as cause of postoperative anemia    Post-operative nausea and vomiting      Plan:  Patient looks improved today  He had some incisional discomfort earlier and was given medication with benefit  He is in sinus rhythm on telemetry with intermittent sinus tachycardia  He is on metoprolol  He is postop day 4 after CABG and aortic valve replacement  Continues with chest tube in place given high output  CBC today with hemoglobin of 8 4 and white cell count of 16 4  Will check BMP in the morning  Subjective:   Patient seen and examined  No significant events overnight   negative  Objective:     Vitals: Blood pressure 141/69, pulse 89, temperature 98 6 °F (37 °C), temperature source Oral, resp  rate 18, height 5' 6" (1 676 m), weight 76 5 kg (168 lb 10 4 oz), SpO2 92 %  , Body mass index is 27 22 kg/m² ,   Orthostatic Blood Pressures      Most Recent Value   Blood Pressure  141/69 filed at 09/06/2020 0720   Patient Position - Orthostatic VS  Sitting filed at 09/06/2020 0720      ,      Intake/Output Summary (Last 24 hours) at 9/6/2020 0919  Last data filed at 9/6/2020 0600  Gross per 24 hour   Intake 3262 72 ml   Output 1790 ml   Net 1472 72 ml       No significant arrhythmias seen on telemetry review         Physical Exam:    GEN: Devon Mcmullen appears well, alert and oriented x 3, pleasant and cooperative   NECK: supple, no carotid bruits, no JVD or HJR  HEART: normal rate, regular rhythm, normal S1 and S2, no murmurs, clicks, gallops or rubs   LUNGS: clear to auscultation bilaterally; no wheezes, rales, or rhonchi   ABDOMEN: normal bowel sounds, soft, no tenderness, no distention  EXTREMITIES: peripheral pulses normal; no clubbing, cyanosis, or edema  SKIN: warm and well perfused, no suspicious lesions on exposed skin    Labs & Results:    No results displayed because visit has over 200 results  Xr Chest Portable    Result Date: 9/4/2020  Narrative: CHEST INDICATION:   Left lung opacities and basilar effusion on previous imaging  COMPARISON:  9/3/2020, CT chest 8/18/2020 EXAM PERFORMED/VIEWS:  XR CHEST PORTABLE FINDINGS:  The patient is rotated to the right  Right IJ central line tip terminates in the region of the SVC  No pneumothorax  Cardiomediastinal silhouette appears stable  Status post median sternotomy and CABG with aortic valve replacement  Mediastinal and left pleural drains  There is new right perihilar airspace opacity with persistent left perihilar and basilar opacities  Hazy opacity left costophrenic angle suggesting pleural effusion  Osseous structures appear within normal limits for patient age  Impression: New right perihilar opacity favored to represent pneumonia  Persistent left lung consolidation and probable small basilar pleural effusion  Workstation performed: [id number]     Xr Chest Portable    Result Date: 9/3/2020  Narrative: CHEST INDICATION:   Post Open Heart Surgey  COMPARISON:  9/2/2020 EXAM PERFORMED/VIEWS:  XR CHEST PORTABLE FINDINGS:  Sternotomy wires are intact  Prosthetic heart valve unchanged  [place]-Lynnette catheter, ET tube and NG tube have been removed  There is a right-sided central line to the cavoatrial junction which is unchanged  There is a mediastinal drain and a  left basilar chest tube which remain  Stable mild cardiac enlargement   New opacity within the left upper and lower lobe with small left basilar effusion  The right lung field is clear  No pneumothorax  Osseous structures appear within normal limits for patient age  Impression: New left upper and lower lobe opacities with superimposed small basilar effusion  Differential considerations would include atelectasis versus pneumonia  The right lung field is clear  The study was marked in [facility] for immediate notification  Workstation performed: [id number]     Ct Head Wo Contrast    Result Date: 9/3/2020  Narrative: CT BRAIN - WITHOUT CONTRAST INDICATION:   Neuro deficit, acute, stroke suspected Dizziness, persistent/recurrent, cardiac or vascular cause suspected persistent N/V, disconjugate gaze, r/o CVA  COMPARISON:  7/29/2018 TECHNIQUE:  CT examination of the brain was performed  In addition to axial images, sagittal and coronal 2D reformatted images were created and submitted for interpretation  Radiation dose length product (DLP) for this visit:  874 1 mGy-cm   This examination, like all CT scans performed in the [facility], was performed utilizing techniques to minimize radiation dose exposure, including the use of iterative reconstruction and automated exposure control  IMAGE QUALITY:  Diagnostic  FINDINGS: PARENCHYMA:  Subtle hypodensities within the left frontal vertex suggestive of mild chronic microangiopathic change  There is a focal hypodensity identified within the posterior medial aspect of the left thalamus on series 2 image 21 which was present on prior study consistent with an old lacunar infarct  No acute territorial infarct  No mass or hemorrhage  VENTRICLES AND EXTRA-AXIAL SPACES:  Normal for the patient's age  VISUALIZED ORBITS AND PARANASAL SINUSES:  Unremarkable  CALVARIUM AND EXTRACRANIAL SOFT TISSUES:  Normal      Impression: Stable examination with no acute intracranial abnormality    Mild microangiopathic change within the left frontal vertex and a small old lacunar infarct within the left posterior thalamus  Workstation performed: [id number]     Ct Chest Wo Contrast    Result Date: 8/20/2020  Narrative: CT CHEST WITHOUT IV CONTRAST INDICATION:   I35 0: Nonrheumatic aortic (valve) stenosis  Former smoker  Preoperative open heart surgery  COMPARISON:  CT scan 8/30/2017 TECHNIQUE: CT examination of the chest was performed without intravenous contrast   Axial, sagittal, and coronal 2D reformatted images were created from the source data and submitted for interpretation  Radiation dose length product (DLP) for this visit:  217 9 mGy-cm   This examination, like all CT scans performed in the [facility], was performed utilizing techniques to minimize radiation dose exposure, including the use of iterative reconstruction and automated exposure control  FINDINGS: LUNGS:  Scattered areas of groundglass opacity in both upper lobes, new from previous exam   No lung nodule  Minimal endotracheal mucous  PLEURA:  Unremarkable  HEART/GREAT VESSELS:  Unremarkable for patient's age  MEDIASTINUM AND DA:  Unremarkable  CHEST WALL AND LOWER NECK:   Unremarkable  VISUALIZED STRUCTURES IN THE UPPER ABDOMEN:  Status post splenectomy  Upper abdomen otherwise unremarkable  OSSEOUS STRUCTURES:  No acute fracture or destructive osseous lesion  Impression: Asymmetric upper lobe groundglass opacities  Nonspecific findings can be seen with pulmonary edema or atypical infection such as viral infection  While not typical of Covid 19 pneumonia distribution, this should be excluded  The study was marked in [facility] for immediate notification  Workstation performed: [id number]     Vas Carotid Complete Study    Result Date: 8/26/2020  Narrative:  [facility] REPORT CLINICAL: Indications:  Patient presents for a general health evaluation secondary to future open heart surgery  Patient is asymptomatic at this time   Risk Factors The patient has history of HTN, DM,  and HLD Clinical Right Pressure:  140/ mm Hg, Left Pressure:  132/ mm Hg  FINDINGS:  Right        Impression  PSV  EDV (cm/s)  Direction of Flow  Ratio  Dist  ICA                 57          13                      0 91  Mid  ICA                  63          16                      1 00  Prox  ICA    1 - 49%      57          13                      0 91  Dist CCA                  47          10                            Mid CCA                   49          11                            Prox CCA                  63           8                            Ext Carotid               87          21                      1 39  Prox Vert                 47           8  Antegrade                 Subclavian                67           7                             Left         Impression  PSV  EDV (cm/s)  Direction of Flow  Ratio  Dist  ICA                 64          20                      0 77  Mid  ICA                  75          28                      0 89  Prox  ICA    1 - 49%      76          20                      0 91  Dist CCA                  68          16                            Mid CCA                   84          16                      0 89  Prox CCA                  94          18                            Ext Carotid               63          10                      0 75  Prox Vert                 55          14  Antegrade                 Subclavian                62           0                               CONCLUSION: Impression RIGHT: There is <50% stenosis noted in the internal carotid artery  Plaque is heterogenous and irregular  Vertebral artery flow is antegrade  There is no significant subclavian artery disease  LEFT: There is <50% stenosis noted in the internal carotid artery  Plaque is heterogenous and irregular  Vertebral artery flow is antegrade  There is no significant subclavian artery disease  Baseline study    Internal carotid artery stenosis determination by consensus criteria from: Sally Jolley et al  Carotid Artery Stenosis: Gray-Scale and Doppler US Diagnosis - [employer] Radi[employer] in [address] [place] [address] [address], Radiology 2003; [phone]  SIGNATURE: Electronically Signed by: Patricia Paniagua MD on 2020-08-26 12:44:13 PM    Vas Lower Limb Vein Mapping Bypass Graft    Result Date: 8/26/2020  Narrative:  [facility] REPORT CLINICAL: Indications: Pre-op Vein Mapping for Future Lower Extremity Bypass Graft  FINDINGS:  Segment         Right        Left                            Diameter AP  Diameter AP  GSV Inguinal            4 4          4 0  GSV Prox Thigh          1 6          2 0  GSV Mid Thigh           2 0          2 6  GSV Dist Thigh          2 0          2 0  GSV Knee                2 1          2 4  GSV Prox Calf                        1 9  GSV Mid Calf            1 9          2 0  GSV Dist Calf                        2 3  GSV Ankle               1 7               SSV Mid Calf            1 4          1 3  SSV Knee                0 9               SSV Ankle               1 5          2 2     CONCLUSION: Impression: RIGHT LOWER LIMB: The great saphenous vein is patent  from the groin to the ankle  The GSV measures from 4 4 mm - 1 7 mm throughout the leg  The small saphenous vein is <2 mm throughout the calf  LEFT LOWER LIMB: The great saphenous vein is patent  from the groin to the ankle  The vein is of inadequate caliber and quality for graft conduit from the groin  The small saphenous vein is not of adequate size or quality  SIGNATURE: Electronically Signed by: Patricia Paniagua MD on 2020-08-26 12:34:31 PM    Xr Chest Portable Icu    Addendum Date: 9/2/2020 Addendum:   ADDENDUM: Probable tiny left apical pneumothorax  Result Date: 9/2/2020  Narrative: CHEST INDICATION:   S/P open heart  COMPARISON:  8/30/2017 EXAM PERFORMED/VIEWS:  XR CHEST PORTABLE ICU FINDINGS:  Endotracheal tube is present with its tip in satisfactory position above the level of the jonathan    An enteric tube is present with its tip in satisfactory position below the left hemidiaphragm  Right internal jugular central venous catheter is noted  Also noted is a [place]-Lynnette catheter from a right internal jugular approach with its tip overlying the right main pulmonary artery  Mediastinal and pleural drains are present  Cardiomediastinal silhouette appears unremarkable  Aortic valve prosthesis  The lungs are clear  No pneumothorax or pleural effusion  Osseous structures appear within normal limits for patient age  Impression: Unremarkable postop chest  Workstation performed: [id number]       EKG personally reviewed by Chay Carlisle MD      Counseling / Coordination of Care  Total floor / unit time spent today 30 minutes  Greater than 50% of total time was spent with the patient and / or family counseling and / or coordination of care

## 2020-09-06 NOTE — PROGRESS NOTES
Progress Note - Enrique Varela 48 y o  male MRN: [id number]    Unit/Bed#: [facility] 410-01 Encounter: [id number]      CC: diabetes f/u    Subjective:   Enrique Varela is a 48y o  year old male with type 1 diabetes  Feels well  No complaints  No hypoglycemia  Appetite has improved, eats most of his meals  Objective:     Vitals: Blood pressure 141/69, pulse 89, temperature 98 6 °F (37 °C), temperature source Oral, resp  rate 18, height 5' 6" (1 676 m), weight 76 5 kg (168 lb 10 4 oz), SpO2 92 %  ,Body mass index is 27 22 kg/m²  Intake/Output Summary (Last 24 hours) at 9/6/2020 1022  Last data filed at 9/6/2020 0720  Gross per 24 hour   Intake 3622 72 ml   Output 2490 ml   Net 1132 72 ml       Physical Exam:  General Appearance: awake, appears stated age and cooperative  Head: Normocephalic, without obvious abnormality, atraumatic  Extremities: moves all extremities  Skin: Skin color and temperature normal    Pulm: no labored breathing    Lab, Imaging and other studies: I have personally reviewed pertinent reports  POC Glucose (mg/dl)   Date Value   09/06/2020 158 (H)   09/06/2020 178 (H)   09/06/2020 219 (H)   09/06/2020 226 (H)   09/06/2020 202 (H)   09/06/2020 120   09/05/2020 158 (H)   09/05/2020 189 (H)   09/05/2020 234 (H)   09/05/2020 178 (H)       Assessment:  Uncontrolled type 1 diabetes - patient is using Medtronic insulin pump as an outpatient  Status post AVR    Plan:  Uncontrolled type 1 diabetes - most recent A1c 9 1  Patient is using about 30 units of basal insulin from his insulin pump  Patient is using insulin to carb ratio 1:10  He used about 52 units over the past 24 hours  He is currently on 90 g carbohydrate diet  I will transition patient to 36 units of Lantus, 8 units of Humalog before meals  He will be started on his old insulin pump tomorrow  Wife insists that pt has to be discharged on insulin pump    Patient just got a new version of Medtronic insulin pump and there are technical issues with the pump for which he needs to see diabetic educator  Portions of the record may have been created with voice recognition software

## 2020-09-06 NOTE — PROGRESS NOTES
Progress Note - Cardiothoracic Surgery   Annie Patton 48 y o  male MRN: [id number]  Unit/Bed#: [facility] 410-01 Encounter: [id number]    Aortic stenosis, Non-Rheumatic, Coronary artery disease  S/P aortic valve replacement and coronary artery bypass grafting; POD # 4      24 Hour Events: Dizziness resolved  Tachycardia improved  Hypotensive overnight       Medications:   Scheduled Meds:  Current Facility-Administered Medications   Medication Dose Route Frequency Provider Last Rate    acetaminophen  975 mg Oral Q8H Greg Barakat PA-C      aluminum-magnesium hydroxide-simethicone  30 mL Oral Q4H PRN Sabino Alcantar MD      ascorbic acid  500 mg Oral Daily Leona Penaloza PA-C      aspirin  325 mg Oral Daily Greg Barakat PA-C      atorvastatin  80 mg Oral Daily With Con-vance Barakat PA-C      bisacodyl  10 mg Rectal Daily PRN Swathi Sen PA-C      busPIRone  15 mg Oral BID Swathi Sen PA-C      calcium carbonate  500 mg Oral Daily PRN Swathi Sen PA-C      calcium chloride  1 g Intravenous Once Swathi Sen PA-C      cholecalciferol  1,000 Units Oral Daily Swathi Sen PA-C      docusate sodium  100 mg Oral BID Swathi Sen PA-C      ferrous sulfate  325 mg Oral Daily With Breakfast Leona Penaloza PA-C      fondaparinux  2 5 mg Subcutaneous Daily Swathi Sen PA-C      insulin regular (HumuLIN R,NovoLIN R) infusion  0 3-21 Units/hr Intravenous Titrated Swathi Sen PA-C 4 Units/hr (09/06/20 0600)    lidocaine  3 patch Topical Daily Swathi Sen PA-C      magnesium sulfate  2 g Intravenous Once Swathi Sen PA-C      metoclopramide  10 mg Intravenous Q6H PRN Sabino Alcantar MD      metoprolol tartrate  50 mg Oral Q12H [address] Chevy Gleason MD      multi-electrolyte  125 mL/hr Intravenous Continuous Israel Patel PA-C 125 mL/hr (09/06/20 0600)    oxyCODONE  2 5 mg Oral Q4H PRN Swathi Sen PA-C      oxyCODONE  5 mg Oral Q4H PRN Swathi Sen PA-C      pantoprazole  40 mg Intravenous Q12H [address] Shila R Anais PA-C      polyethylene glycol  17 g Oral Daily Megan Phenes, PA-C      QUEtiapine  25 mg Oral HS Megan Phenes, PA-C      scopolamine  1 patch Transdermal Q72H Megan Phenes, PA-C      sodium chloride  20 mL/hr Intravenous Continuous Megan Phenes, PA-C 20 mL/hr (09/02/20 1436)    temazepam  15 mg Oral HS PRN Megan Phenes, PA-C       Continuous Infusions:insulin regular (HumuLIN R,NovoLIN R) infusion, 0 3-21 Units/hr, Last Rate: 4 Units/hr (09/06/20 0600)  multi-electrolyte, 125 mL/hr, Last Rate: 125 mL/hr (09/06/20 0600)  sodium chloride, 20 mL/hr, Last Rate: 20 mL/hr (09/02/20 1436)      PRN Meds: aluminum-magnesium hydroxide-simethicone    bisacodyl    calcium carbonate    metoclopramide    oxyCODONE    oxyCODONE    temazepam    Vitals:   Vitals:    09/05/20 2021 09/05/20 2100 09/05/20 2318 09/06/20 0600   BP: 168/89  (!) 85/50    BP Location: Left arm  Right arm    Pulse:   75    Resp:   19    Temp:   98 8 °F (37 1 °C)    TempSrc:   Oral    SpO2:  95% 93%    Weight:    76 5 kg (168 lb 10 4 oz)   Height:           Telemetry: Sinus Tachycardia;  Heart Rate: 75    Respiratory:   SpO2: SpO2: 93 %, SpO2 Activity: SpO2 Activity: At Rest; 5 LPM    Intake/Output:   I/O       09/02 0701 - 09/03 0700 09/03 0701 - 09/04 0700 09/04 0701 - 09/05 0700    I V  (mL/kg) 477 2 (6 5) 1799 5 (24 7)     IV Piggyback 1800      Cell Saver 250      Total Intake(mL/kg) 2527 2 (34 7) 1799 5 (24 7)     Urine (mL/kg/hr) 1995 (1 1) 775 (0 4)     Chest Tube 870 520     Total Output 2865 1295     Net -337 8 +504 5                I/O: +992 mL/24    Chest tube Output:    Mediastinal tubes: 130 mL/8 hours  185 mL/24 hours   Pleural tubes: 120 mL/8 hours  480 mL/24 hours     Weights:   Weight (last 2 days)     Date/Time   Weight    09/06/20 0600   76 5 (168 65)    09/05/20 0600   74 8 (164 9)    09/04/20 0600   73 2 (161 38)            Results:   Results from last 7 days   Lab Units 09/06/20  0551 09/05/20  0535 09/04/20  1618 09/04/20  0501   WBC Thousand/uL 16 44* 23 66*  --  21 18*   HEMOGLOBIN g/dL 8 4* 8 8* 9 5* 6 0*   HEMATOCRIT % 25 3* 25 7* 28 6* 18 2*   PLATELETS Thousands/uL 190 152  --  137*     Results from last 7 days   Lab Units 09/05/20  0535 09/04/20  0501 09/03/20  0422  09/02/20  1257   SODIUM mmol/L 139 143 147*  --   --    POTASSIUM mmol/L 3 5 3 7 4 2   < >  --    CHLORIDE mmol/L 105 112* 116*  --   --    CO2 mmol/L 30 27 26  --   --    CO2, I-STAT mmol/L  --   --   --   --  26   BUN mg/dL 15 17 11  --   --    CREATININE mg/dL 0 52* 0 60 0 62  --   --    GLUCOSE, ISTAT mg/dl  --   --   --   --  103   CALCIUM mg/dL 7 8* 7 6* 8 0*  --   --     < > = values in this interval not displayed  Results from last 7 days   Lab Units 09/04/20  0829 09/02/20  1514   INR  1 27* 1 25*   PTT seconds 32 28         Invasive Lines/Tubes:  Invasive Devices     Central Venous Catheter Line            CVC Central Lines 09/02/20 Triple 3 days          Drain            Chest Tube 1 Anterior Mediastinal 32 Fr  3 days    Chest Tube 2 Left Pleural 32 Fr  3 days    Chest Tube 3 Posterior Mediastinal 24 Fr  3 days              Physical Exam:    HEENT/NECK:  PERRLA  No jugular venous distention  Cardiac: Regular rate and rhythm and No murmurs/rubs/gallops  Pulmonary:  Breath sounds clear bilaterally, Breath sounds diminished in the bases bilaterally  and No rales/rhonchi/wheezes  Abdomen:  Non-tender, Non-distended and Normal bowel sounds  Incisions: Sternum is stable  Incision is clean, dry, and intact  and Saphenectomy incison is clean, dry, and intact  Extremities: Extremities warm/dry and 1+ edema B/L  Neuro: Alert and oriented X 3, Sensation is grossly intact and No focal deficits  Skin: Warm/Dry, without rashes or lesions        Assessment:  Principal Problem:    Nonrheumatic aortic valve stenosis  Active Problems:    Type 1 diabetes mellitus with retinopathy [facility])    Essential hypertension    Coronary artery disease involving native coronary artery of native heart without angina pectoris    Dyslipidemia    Aortic stenosis due to bicuspid aortic valve    History of ITP    History of coronary angioplasty with insertion of stent    Acute blood loss as cause of postoperative anemia    Post-operative nausea and vomiting       Aortic stenosis, Non-Rheumatic, Coronary artery disease  S/P aortic valve replacement and coronary artery bypass grafting; POD # 4    Plan:    1  Cardiac:   Sinus Rhythm; Tachycardia improved with increased beta blocker dose  Lopressor, 50 mg PO BID  Continue ASA and Statin therapy  Maintain central IV access today  Continue DVT prophylaxis    2  Pulmonary:     Chest tube output remains persistently high; Continue chest tubes to suction today    3  Renal:   Intake/Output net: +900 mL/24 hours  Post op Creatinine stable; Follow up labs prn    4  Neuro:    Neurologically intact; No active issues  Incisional pain well-controlled  Continue Tylenol, 975 mg PO q 8, standing dose  Continue Oxycodone, 2 5 to 5 mg PO q 4 hours prn pain    5  GI:  Tolerating TLC 2 3 gm sodium diet  Maintain 1800 mL daily fluid restriction   Continue stool softeners and prn suppository  Continue GI prophylaxis    6  Endo:   No history of diabetes; Glucose well-controlled with sliding scale coverage  Resume insulin pump, per endocrine    7    Hematology:    APOBLA    S/P 2 units PRBC yesterday    Hgb stable    Check daily CBC      8  Disposition:      Anticipate discharge to home 2-3 days     VTE Pharmacologic Prophylaxis: Sequential compression device (Venodyne)  and Fondaparinux (Arixtra)  VTE Mechanical Prophylaxis: sequential compression device    Collaborative rounds completed with Dr Shari Villareal    Plan of care discussed with bedside nurse    SIGNATURE: Leona Penaloza PA-C  DATE: September 6, 2020  TIME: 7:04 AM

## 2020-09-06 NOTE — PLAN OF CARE
Problem: Prexisting or High Potential for Compromised Skin Integrity  Goal: Skin integrity is maintained or improved  Description: INTERVENTIONS:  - Identify patients at risk for skin breakdown  - Assess and monitor skin integrity  - Assess and monitor nutrition and hydration status  - Monitor labs   - Assess for incontinence   - Turn and reposition patient  - Assist with mobility/ambulation  - Relieve pressure over bony prominences  - Avoid friction and shearing  - Provide appropriate hygiene as needed including keeping skin clean and dry  - Evaluate need for skin moisturizer/barrier cream  - Collaborate with interdisciplinary team   - Patient/family teaching  - Consider wound care consult   Outcome: Progressing     Problem: PAIN - ADULT  Goal: Verbalizes/displays adequate comfort level or baseline comfort level  Description: Interventions:  - Encourage patient to monitor pain and request assistance  - Assess pain using appropriate pain scale  - Administer analgesics based on type and severity of pain and evaluate response  - Implement non-pharmacological measures as appropriate and evaluate response  - Consider cultural and social influences on pain and pain management  - Notify physician/advanced practitioner if interventions unsuccessful or patient reports new pain  Outcome: Progressing     Problem: INFECTION - ADULT  Goal: Absence or prevention of progression during hospitalization  Description: INTERVENTIONS:  - Assess and monitor for signs and symptoms of infection  - Monitor lab/diagnostic results  - Monitor all insertion sites, i e  indwelling lines, tubes, and drains  - Monitor endotracheal if appropriate and nasal secretions for changes in amount and color  - [place] appropriate cooling/warming therapies per order  - Administer medications as ordered  - Instruct and encourage patient and family to use good hand hygiene technique  - Identify and instruct in appropriate isolation precautions for identified infection/condition  Outcome: Progressing  Goal: Absence of fever/infection during neutropenic period  Description: INTERVENTIONS:  - Monitor WBC    Outcome: Progressing     Problem: SAFETY ADULT  Goal: Patient will remain free of falls  Description: INTERVENTIONS:  - Assess patient frequently for physical needs  -  Identify cognitive and physical deficits and behaviors that affect risk of falls    -  [place] fall precautions as indicated by assessment   - Educate patient/family on patient safety including physical limitations  - Instruct patient to call for assistance with activity based on assessment  - Modify environment to reduce risk of injury  - Consider OT/PT consult to assist with strengthening/mobility  Outcome: Progressing  Goal: Maintain or return to baseline ADL function  Description: INTERVENTIONS:  -  Assess patient's ability to carry out ADLs; assess patient's baseline for ADL function and identify physical deficits which impact ability to perform ADLs (bathing, care of mouth/teeth, toileting, grooming, dressing, etc )  - Assess/evaluate cause of self-care deficits   - Assess range of motion  - Assess patient's mobility; develop plan if impaired  - Assess patient's need for assistive devices and provide as appropriate  - Encourage maximum independence but intervene and supervise when necessary  - Involve family in performance of ADLs  - Assess for home care needs following discharge   - Consider OT consult to assist with ADL evaluation and planning for discharge  - Provide patient education as appropriate  Outcome: Progressing  Goal: Maintain or return mobility status to optimal level  Description: INTERVENTIONS:  - Assess patient's baseline mobility status (ambulation, transfers, stairs, etc )    - Identify cognitive and physical deficits and behaviors that affect mobility  - Identify mobility aids required to assist with transfers and/or ambulation (gait belt, sit-to-stand, lift, walker, cane, etc )  - [place] fall precautions as indicated by assessment  - Record patient progress and toleration of activity level on Mobility SBAR; progress patient to next Phase/Stage  - Instruct patient to call for assistance with activity based on assessment  - Consider rehabilitation consult to assist with strengthening/weightbearing, etc   Outcome: Progressing     Problem: DISCHARGE PLANNING  Goal: Discharge to home or other facility with appropriate resources  Description: INTERVENTIONS:  - Identify barriers to discharge w/patient and caregiver  - Arrange for needed discharge resources and transportation as appropriate  - Identify discharge learning needs (meds, wound care, etc )  - Arrange for interpretive services to assist at discharge as needed  - Refer to Case Management Department for coordinating discharge planning if the patient needs post-hospital services based on physician/advanced practitioner order or complex needs related to functional status, cognitive ability, or social support system  Outcome: Progressing     Problem: Knowledge Deficit  Goal: Patient/family/caregiver demonstrates understanding of disease process, treatment plan, medications, and discharge instructions  Description: Complete learning assessment and assess knowledge base  Interventions:  - Provide teaching at level of understanding  - Provide teaching via preferred learning methods  Outcome: Progressing     Problem: Potential for Falls  Goal: Patient will remain free of falls  Description: INTERVENTIONS:  - Assess patient frequently for physical needs  -  Identify cognitive and physical deficits and behaviors that affect risk of falls    -  [place] fall precautions as indicated by assessment   - Educate patient/family on patient safety including physical limitations  - Instruct patient to call for assistance with activity based on assessment  - Modify environment to reduce risk of injury  - Consider OT/PT consult to assist with strengthening/mobility  Outcome: Progressing

## 2020-09-06 NOTE — PROGRESS NOTES
Progress Note- Sea Jordan 48 y o  male MRN: [id number]    Unit/Bed#: [facility] 410-01 Encounter: [id number]      Assessment and Plan:    52-year-old with aortic stenosis, bicuspid aortic valve status post aortic valve replacement this hospitalization, complicated by nausea vomiting and constipation  Patient now tolerating regular diet  He has not had a good bowel movement in the last few days since the surgery  He had blood in vomiting initially  No vomiting overnight  1  Nausea/vomiting  Resolved  Patient on scopolamine patch  Likely postop related  2  Hematemesis  Resolved  Plan to do EGD before patient gets discharged from hospital   This is patient's preference  Recommended primary team to keep patient on PPI 40 mg daily  3  Constipation  Unresolved  Patient started on MiraLax today  Already on docusate  Refusing Dulcolax suppository  Encouraged patient to ambulate more  4  Acute anemia  Secondary to blood loss during aortic valve replacement  Aylin Anderson MD  Gastroenterology Fellow  [facility]  Date: September 6, 2020      ______________________________________________________________________    Subjective:     Patient tolerating diet    Has not had bowel movement in the last few days    Medication Administration - last 24 hours from 09/05/2020 1717 to 09/06/2020 1717       Date/Time Order Dose Route Action Action by     09/06/2020 0910 busPIRone (BUSPAR) tablet 15 mg 15 mg Oral Given Guillermo Rivas RN     09/05/2020 1746 busPIRone (BUSPAR) tablet 15 mg 15 mg Oral Given Rosie Mijares RN     09/05/2020 2244 QUEtiapine (SEROquel) tablet 25 mg 25 mg Oral Given Diane Martinez RN     09/06/2020 2262 cholecalciferol (VITAMIN D3) tablet 1,000 Units 1,000 Units Oral Given Gulilermo Rivas RN     09/05/2020 1747 atorvastatin (LIPITOR) tablet 80 mg 80 mg Oral Given Rosie Mijares RN     09/06/2020 0551 acetaminophen (TYLENOL) tablet 975 mg 975 mg Oral Given Tori Singh Jose Marr RN     09/05/2020 2244 acetaminophen (TYLENOL) tablet 975 mg 975 mg Oral Given Jones Mendoza RN     09/06/2020 0910 polyethylene glycol (MIRALAX) packet 17 g 17 g Oral Given Ivana Begum RN     09/06/2020 0911 fondaparinux (ARIXTRA) subcutaneous injection 2 5 mg 2 5 mg Subcutaneous Given Ivana Begum RN     09/06/2020 4415 aspirin tablet 325 mg 325 mg Oral Given Ivana Begum RN     09/06/2020 1400 insulin regular (HumuLIN R,NovoLIN R) 1 Units/mL in sodium chloride 0 9 % 100 mL infusion 0 Units/hr Intravenous Stopped Ivana Begum RN     09/06/2020 1202 insulin regular (HumuLIN R,NovoLIN R) 1 Units/mL in sodium chloride 0 9 % 100 mL infusion 1 5 Units/hr Intravenous Rate/Dose Change Ivana Begum RN     09/06/2020 1022 insulin regular (HumuLIN R,NovoLIN R) 1 Units/mL in sodium chloride 0 9 % 100 mL infusion 3 Units/hr Intravenous Rate/Dose Change Ivana Begum RN     09/06/2020 0800 insulin regular (HumuLIN R,NovoLIN R) 1 Units/mL in sodium chloride 0 9 % 100 mL infusion 3 Units/hr Intravenous Rate/Dose Verify Ivana Begum RN     09/06/2020 0600 insulin regular (HumuLIN R,NovoLIN R) 1 Units/mL in sodium chloride 0 9 % 100 mL infusion 4 Units/hr Intravenous Rate/Dose Change Jones Mendoza RN     09/06/2020 0400 insulin regular (HumuLIN R,NovoLIN R) 1 Units/mL in sodium chloride 0 9 % 100 mL infusion 5 Units/hr Intravenous Rate/Dose Change Jones Mendoza RN     09/06/2020 0200 insulin regular (HumuLIN R,NovoLIN R) 1 Units/mL in sodium chloride 0 9 % 100 mL infusion 2 Units/hr Intravenous Rate/Dose Change Jones Mendoza RN     09/06/2020 0000 insulin regular (HumuLIN R,NovoLIN R) 1 Units/mL in sodium chloride 0 9 % 100 mL infusion 0 5 Units/hr Intravenous Rate/Dose Change Jones Mendoza RN     09/05/2020 2200 insulin regular (HumuLIN R,NovoLIN R) 1 Units/mL in sodium chloride 0 9 % 100 mL infusion 1 Units/hr Intravenous Rate/Dose Change Jones Mendoza RN     09/05/2020 2000 insulin regular (HumuLIN R,NovoLIN R) 1 Units/mL in sodium chloride 0 9 % 100 mL infusion 2 Units/hr Intravenous Rate/Dose Change Nadira Aguilar RN     09/05/2020 1756 insulin regular (HumuLIN R,NovoLIN R) 1 Units/mL in sodium chloride 0 9 % 100 mL infusion 3 Units/hr Intravenous Rate/Dose Change Karissa Parsons, RN     09/06/2020 0911 lidocaine (LIDODERM) 5 % patch 3 patch 3 patch Topical Medication Applied Loretta Mendenhall, OLGA     09/05/2020 2027 lidocaine (LIDODERM) 5 % patch 3 patch 3 patch Topical Patch Removed Nadira Aguilar RN     09/06/2020 1270 scopolamine (TRANSDERM-SCOP) 1 5 mg/3 days TD 72 hr patch 1 patch 1 patch Transdermal Patch Removed Loretta Mendenhall RN     09/06/2020 0927 scopolamine (TRANSDERM-SCOP) 1 5 mg/3 days TD 72 hr patch 1 patch 1 patch Transdermal Medication Applied Loretta Mendenhall RN     09/06/2020 0910 docusate sodium (COLACE) capsule 100 mg 100 mg Oral Given Loretta Mendenhall, RN     09/05/2020 1747 docusate sodium (COLACE) capsule 100 mg 100 mg Oral Given Karissa Parsons, RN     09/05/2020 2020 temazepam (RESTORIL) capsule 15 mg 15 mg Oral Given Nadira Aguilar RN     09/06/2020 0645 oxyCODONE (ROXICODONE) IR tablet 2 5 mg 2 5 mg Oral Given Nadira Aguilar RN     09/06/2020 1153 oxyCODONE (ROXICODONE) IR tablet 5 mg 5 mg Oral Given Loretta Mendenhall RN     09/05/2020 1747 oxyCODONE (ROXICODONE) IR tablet 5 mg 5 mg Oral Given Karissa Parsons, RN     09/06/2020 0600 multi-electrolyte (PLASMALYTE-A/ISOLYTE-S PH 7 4) IV solution 125 mL/hr Intravenous Rate/Dose Change Nadira Aguilar RN     09/06/2020 0400 multi-electrolyte (PLASMALYTE-A/ISOLYTE-S PH 7 4) IV solution 125 mL/hr Intravenous Rate/Dose Change Nadira Aguilar RN     09/06/2020 0220 multi-electrolyte (PLASMALYTE-A/ISOLYTE-S PH 7 4) IV solution 125 mL/hr Intravenous Rate/Dose Change Nadira Aguilar RN     09/06/2020 0000 multi-electrolyte (PLASMALYTE-A/ISOLYTE-S PH 7 4) IV solution 125 mL/hr Intravenous Rate/Dose Change Nadira Aguilar RN     09/05/2020 2200 multi-electrolyte (PLASMALYTE-A/ISOLYTE-S PH 7 4) IV solution 125 mL/hr Intravenous Rate/Dose Change Kd Chicas, OLGA     09/05/2020 2000 multi-electrolyte (PLASMALYTE-A/ISOLYTE-S PH 7 4) IV solution 125 mL/hr Intravenous Rate/Dose Change Kd Chicas, OLGA     09/06/2020 0919 pantoprazole (PROTONIX) injection 40 mg 40 mg Intravenous Given [facility], RN     09/05/2020 2018 pantoprazole (PROTONIX) injection 40 mg 40 mg Intravenous Given Kd Balgenevieve, RN     09/06/2020 0910 ferrous sulfate tablet 325 mg 325 mg Oral Given [facility], RN     09/06/2020 7111 ascorbic acid (VITAMIN C) tablet 500 mg 500 mg Oral Given [facility], RN     09/06/2020 1630 metoclopramide (REGLAN) injection 10 mg 10 mg Intravenous Given [facility], RN     09/06/2020 0910 metoprolol tartrate (LOPRESSOR) tablet 50 mg 50 mg Oral Given [facility], RN     09/05/2020 2027 metoprolol tartrate (LOPRESSOR) tablet 50 mg 50 mg Oral Given Kd Balgenevieve, RN     09/06/2020 1154 insulin glargine (LANTUS) subcutaneous injection 36 Units 0 36 mL 36 Units Subcutaneous Given [facility], RN          Objective:     Vitals: Blood pressure 136/74, pulse 82, temperature 98 4 °F (36 9 °C), temperature source Oral, resp  rate 16, height 5' 6" (1 676 m), weight 76 5 kg (168 lb 10 4 oz), SpO2 97 %  ,Body mass index is 27 22 kg/m²  Intake/Output Summary (Last 24 hours) at 9/6/2020 1717  Last data filed at 9/6/2020 1401  Gross per 24 hour   Intake 2369 13 ml   Output 2540 ml   Net -170 87 ml       Physical Exam:   General Appearance:   Alert, cooperative, no distress   HEENT:   Normocephalic, atraumatic, anicteric  Neck:  Supple, symmetrical, trachea midline   Lungs:   Clear to auscultation bilaterally; no rales, rhonchi or wheezing; respirations unlabored    Heart[de-identified]   Regular rate and rhythm; no murmur, rub, or gallop     Abdomen:   Soft, non-tender, non-distended; normal bowel sounds; no masses, no organomegaly    Genitalia:   Deferred    Rectal:   Deferred Extremities:  No cyanosis, clubbing or edema    Pulses:  2+ and symmetric all extremities    Skin:  No jaundice, rashes, or lesions    Lymph nodes:  No palpable cervical lymphadenopathy          Invasive Devices     Central Venous Catheter Line            CVC Central Lines 09/02/20 Triple 4 days          Drain            Chest Tube 1 Anterior Mediastinal 32 Fr  4 days    Chest Tube 2 Left Pleural 32 Fr  4 days    Chest Tube 3 Posterior Mediastinal 24 Fr  4 days                Lab Results:  No results displayed because visit has over 200 results  Imaging Studies: I have personally reviewed pertinent imaging studies

## 2020-09-07 ENCOUNTER — APPOINTMENT (INPATIENT)
Dept: RADIOLOGY | Facility: HOSPITAL | Age: 55
DRG: 220 | End: 2020-09-07
Payer: COMMERCIAL

## 2020-09-07 LAB
ANION GAP SERPL CALCULATED.3IONS-SCNC: 6 MMOL/L (ref 4–13)
BUN SERPL-MCNC: 8 MG/DL (ref 5–25)
CALCIUM SERPL-MCNC: 7.8 MG/DL (ref 8.3–10.1)
CHLORIDE SERPL-SCNC: 108 MMOL/L (ref 100–108)
CO2 SERPL-SCNC: 28 MMOL/L (ref 21–32)
CREAT SERPL-MCNC: 0.61 MG/DL (ref 0.6–1.3)
ERYTHROCYTE [DISTWIDTH] IN BLOOD BY AUTOMATED COUNT: 16.2 % (ref 11.6–15.1)
GFR SERPL CREATININE-BSD FRML MDRD: 113 ML/MIN/1.73SQ M
GLUCOSE SERPL-MCNC: 164 MG/DL (ref 65–140)
GLUCOSE SERPL-MCNC: 173 MG/DL (ref 65–140)
GLUCOSE SERPL-MCNC: 231 MG/DL (ref 65–140)
GLUCOSE SERPL-MCNC: 69 MG/DL (ref 65–140)
GLUCOSE SERPL-MCNC: 73 MG/DL (ref 65–140)
GLUCOSE SERPL-MCNC: 73 MG/DL (ref 65–140)
HCT VFR BLD AUTO: 25 % (ref 36.5–49.3)
HGB BLD-MCNC: 8.4 G/DL (ref 12–17)
MCH RBC QN AUTO: 29.5 PG (ref 26.8–34.3)
MCHC RBC AUTO-ENTMCNC: 33.6 G/DL (ref 31.4–37.4)
MCV RBC AUTO: 88 FL (ref 82–98)
PLATELET # BLD AUTO: 223 THOUSANDS/UL (ref 149–390)
PMV BLD AUTO: 10.8 FL (ref 8.9–12.7)
POTASSIUM SERPL-SCNC: 3.2 MMOL/L (ref 3.5–5.3)
RBC # BLD AUTO: 2.85 MILLION/UL (ref 3.88–5.62)
SODIUM SERPL-SCNC: 142 MMOL/L (ref 136–145)
WBC # BLD AUTO: 13.81 THOUSAND/UL (ref 4.31–10.16)

## 2020-09-07 PROCEDURE — 71046 X-RAY EXAM CHEST 2 VIEWS: CPT

## 2020-09-07 PROCEDURE — 82948 REAGENT STRIP/BLOOD GLUCOSE: CPT

## 2020-09-07 PROCEDURE — 80048 BASIC METABOLIC PNL TOTAL CA: CPT | Performed by: INTERNAL MEDICINE

## 2020-09-07 PROCEDURE — 85027 COMPLETE CBC AUTOMATED: CPT | Performed by: PHYSICIAN ASSISTANT

## 2020-09-07 PROCEDURE — 97530 THERAPEUTIC ACTIVITIES: CPT

## 2020-09-07 PROCEDURE — 99232 SBSQ HOSP IP/OBS MODERATE 35: CPT | Performed by: INTERNAL MEDICINE

## 2020-09-07 PROCEDURE — 97116 GAIT TRAINING THERAPY: CPT

## 2020-09-07 PROCEDURE — 99024 POSTOP FOLLOW-UP VISIT: CPT | Performed by: PHYSICIAN ASSISTANT

## 2020-09-07 PROCEDURE — NC001 PR NO CHARGE: Performed by: PHYSICIAN ASSISTANT

## 2020-09-07 PROCEDURE — 99024 POSTOP FOLLOW-UP VISIT: CPT | Performed by: INTERNAL MEDICINE

## 2020-09-07 RX ORDER — BISACODYL 10 MG
10 SUPPOSITORY, RECTAL RECTAL ONCE
Status: COMPLETED | OUTPATIENT
Start: 2020-09-07 | End: 2020-09-07

## 2020-09-07 RX ORDER — POTASSIUM CHLORIDE 20 MEQ/1
40 TABLET, EXTENDED RELEASE ORAL 3 TIMES DAILY
Status: COMPLETED | OUTPATIENT
Start: 2020-09-07 | End: 2020-09-07

## 2020-09-07 RX ORDER — INSULIN GLARGINE 100 [IU]/ML
30 INJECTION, SOLUTION SUBCUTANEOUS ONCE
Status: COMPLETED | OUTPATIENT
Start: 2020-09-07 | End: 2020-09-07

## 2020-09-07 RX ADMIN — ACETAMINOPHEN 975 MG: 325 TABLET, FILM COATED ORAL at 21:30

## 2020-09-07 RX ADMIN — DOCUSATE SODIUM 100 MG: 100 CAPSULE, LIQUID FILLED ORAL at 08:31

## 2020-09-07 RX ADMIN — POTASSIUM CHLORIDE 40 MEQ: 1500 TABLET, EXTENDED RELEASE ORAL at 21:31

## 2020-09-07 RX ADMIN — TEMAZEPAM 15 MG: 15 CAPSULE ORAL at 22:19

## 2020-09-07 RX ADMIN — POTASSIUM CHLORIDE 40 MEQ: 1500 TABLET, EXTENDED RELEASE ORAL at 17:20

## 2020-09-07 RX ADMIN — PANTOPRAZOLE SODIUM 40 MG: 40 TABLET, DELAYED RELEASE ORAL at 08:32

## 2020-09-07 RX ADMIN — INSULIN LISPRO 4 UNITS: 100 INJECTION, SOLUTION INTRAVENOUS; SUBCUTANEOUS at 17:22

## 2020-09-07 RX ADMIN — POTASSIUM CHLORIDE 40 MEQ: 1500 TABLET, EXTENDED RELEASE ORAL at 11:00

## 2020-09-07 RX ADMIN — DOCUSATE SODIUM 100 MG: 100 CAPSULE, LIQUID FILLED ORAL at 17:20

## 2020-09-07 RX ADMIN — PANTOPRAZOLE SODIUM 40 MG: 40 TABLET, DELAYED RELEASE ORAL at 21:31

## 2020-09-07 RX ADMIN — ASPIRIN 325 MG ORAL TABLET 325 MG: 325 PILL ORAL at 08:32

## 2020-09-07 RX ADMIN — OXYCODONE HYDROCHLORIDE AND ACETAMINOPHEN 500 MG: 500 TABLET ORAL at 08:32

## 2020-09-07 RX ADMIN — POLYETHYLENE GLYCOL 3350 17 G: 17 POWDER, FOR SOLUTION ORAL at 08:33

## 2020-09-07 RX ADMIN — FONDAPARINUX SODIUM 2.5 MG: 2.5 INJECTION, SOLUTION SUBCUTANEOUS at 08:32

## 2020-09-07 RX ADMIN — ACETAMINOPHEN 975 MG: 325 TABLET, FILM COATED ORAL at 13:22

## 2020-09-07 RX ADMIN — INSULIN LISPRO 1 UNITS: 100 INJECTION, SOLUTION INTRAVENOUS; SUBCUTANEOUS at 11:24

## 2020-09-07 RX ADMIN — ATORVASTATIN CALCIUM 80 MG: 80 TABLET, FILM COATED ORAL at 17:20

## 2020-09-07 RX ADMIN — INSULIN LISPRO 2 UNITS: 100 INJECTION, SOLUTION INTRAVENOUS; SUBCUTANEOUS at 17:22

## 2020-09-07 RX ADMIN — BUSPIRONE HYDROCHLORIDE 15 MG: 10 TABLET ORAL at 08:31

## 2020-09-07 RX ADMIN — FERROUS SULFATE TAB 325 MG (65 MG ELEMENTAL FE) 325 MG: 325 (65 FE) TAB at 08:32

## 2020-09-07 RX ADMIN — BISACODYL 10 MG: 10 SUPPOSITORY RECTAL at 08:32

## 2020-09-07 RX ADMIN — Medication 1000 UNITS: at 08:32

## 2020-09-07 RX ADMIN — METOPROLOL TARTRATE 50 MG: 50 TABLET, FILM COATED ORAL at 21:29

## 2020-09-07 RX ADMIN — ACETAMINOPHEN 975 MG: 325 TABLET, FILM COATED ORAL at 05:07

## 2020-09-07 RX ADMIN — METOPROLOL TARTRATE 50 MG: 50 TABLET, FILM COATED ORAL at 08:32

## 2020-09-07 RX ADMIN — INSULIN LISPRO 1 UNITS: 100 INJECTION, SOLUTION INTRAVENOUS; SUBCUTANEOUS at 21:34

## 2020-09-07 RX ADMIN — INSULIN GLARGINE 30 UNITS: 100 INJECTION, SOLUTION SUBCUTANEOUS at 13:22

## 2020-09-07 RX ADMIN — QUETIAPINE FUMARATE 25 MG: 25 TABLET ORAL at 21:30

## 2020-09-07 RX ADMIN — BUSPIRONE HYDROCHLORIDE 15 MG: 10 TABLET ORAL at 17:20

## 2020-09-07 NOTE — PLAN OF CARE
Problem: PHYSICAL THERAPY ADULT  Goal: Performs mobility at highest level of function for planned discharge setting  See evaluation for individualized goals  Description: Treatment/Interventions: Functional transfer training, LE strengthening/ROM, Therapeutic exercise, Elevations, Endurance training, Patient/family training, Equipment eval/education, Bed mobility, Gait training  Equipment Recommended: Sue Jama       See flowsheet documentation for full assessment, interventions and recommendations  Outcome: Completed  Note: Prognosis: Excellent  Problem List: Decreased endurance  Assessment: The pt  has returned to independence with mobility on level and stairs  He was educated in proper pacing as he attempts to perform all activities at a hastened pace which results in fatigue  Educated him as well on energy conservation techniques  Updated his family about his progress, home safety, energy conservation techniques, and continued mobility at the home  Barriers to Discharge: None     PT Discharge Recommendation: Return to previous environment with social support     PT - OK to Discharge: Yes    See flowsheet documentation for full assessment

## 2020-09-07 NOTE — PROGRESS NOTES
Progress Note - Daisha Carreno 48 y o  male MRN: [id number]    Unit/Bed#: [facility] 410-01 Encounter: [id number]      CC: diabetes f/u    Subjective:   Daisha Carreno is a 48 y o   male with type 1 diabetes  Feels well  No complaints  No hypoglycemia  Patient says he has poor appetite  He wants to be changed to his old insulin pump tomorrow  Objective:     Vitals: Blood pressure 122/62, pulse 80, temperature 98 5 °F (36 9 °C), temperature source Oral, resp  rate 18, height 5' 6" (1 676 m), weight 75 5 kg (166 lb 7 2 oz), SpO2 97 %  ,Body mass index is 26 87 kg/m²  Intake/Output Summary (Last 24 hours) at 9/7/2020 1146  Last data filed at 9/7/2020 0945  Gross per 24 hour   Intake 844 ml   Output 2180 ml   Net -1336 ml       Physical Exam:  General Appearance: awake, appears stated age and cooperative  Head: Normocephalic, without obvious abnormality, atraumatic  Extremities: moves all extremities  Skin: Skin color and temperature normal    Pulm: no labored breathing    Lab, Imaging and other studies: I have personally reviewed pertinent reports  POC Glucose (mg/dl)   Date Value   09/07/2020 164 (H)   09/07/2020 73   09/07/2020 73   09/06/2020 95   09/06/2020 176 (H)   09/06/2020 114   09/06/2020 133   09/06/2020 158 (H)   09/06/2020 178 (H)   09/06/2020 219 (H)       Assessment:  Uncontrolled type 1 diabetes - patient is using [employer] insulin pump as an outpatient  Status post AVR    Plan:  Uncontrolled type 1 diabetes - most recent A1c 9 1  Patient with persistent lower BG  I will decrease lantus to 30 units - administered today at noon + patient is using ICR 10  He will tell nurse his insulin dose based on his carbs  He will be switched to insulin pump tomorrow at noon  He is planned for d/c tomorrow afternoon  Portions of the record may have been created with voice recognition software

## 2020-09-07 NOTE — PROGRESS NOTES
Cardiology Progress Note - Donato Sanchez 48 y o  male MRN: [id number]    Unit/Bed#: [facility] 410-01 Encounter: [id number]      Assessment:  Principal Problem:    Nonrheumatic aortic valve stenosis  Active Problems:    Type 1 diabetes mellitus with retinopathy ([address] )    Essential hypertension    Coronary artery disease involving native coronary artery of native heart without angina pectoris    Dyslipidemia    Aortic stenosis due to bicuspid aortic valve    History of ITP    History of coronary angioplasty with insertion of stent    Acute blood loss as cause of postoperative anemia    Post-operative nausea and vomiting      Plan:   He is in sinus rhythm on telemetry with intermittent sinus tachycardia  He is on metoprolol  He is postop day 5 after CABG and aortic valve replacement  chest tube removed today  CBC today with hemoglobin of 8 4 and white cell count of 13 8  BMP today with potassium of 3 2 and creatinine of 0 61  Will write for potassium supplementation  Will recheck BMP in the morning  Continues on supplemental oxygen  Subjective:   Patient seen and examined  No significant events overnight   negative  Objective:     Vitals: Blood pressure 125/62, pulse 97, temperature 98 6 °F (37 °C), temperature source Oral, resp  rate 18, height 5' 6" (1 676 m), weight 75 5 kg (166 lb 7 2 oz), SpO2 99 %  , Body mass index is 26 87 kg/m² ,   Orthostatic Blood Pressures      Most Recent Value   Blood Pressure  125/62 filed at 09/07/2020 0733   Patient Position - Orthostatic VS  Sitting filed at 09/07/2020 0733      ,      Intake/Output Summary (Last 24 hours) at 9/7/2020 0842  Last data filed at 9/7/2020 0804  Gross per 24 hour   Intake 844 ml   Output 1605 ml   Net -761 ml       No significant arrhythmias seen on telemetry review         Physical Exam:    GEN: Donato Sanchez appears well, alert and oriented x 3, pleasant and cooperative   NECK: supple, no carotid bruits, no JVD or HJR  HEART: normal rate, regular rhythm, normal S1 and S2, no murmurs, clicks, gallops or rubs   LUNGS: clear to auscultation bilaterally; no wheezes, rales, or rhonchi   ABDOMEN: normal bowel sounds, soft, no tenderness, no distention  EXTREMITIES: peripheral pulses normal; no clubbing, cyanosis, or edema  SKIN: warm and well perfused, no suspicious lesions on exposed skin    Labs & Results:    No results displayed because visit has over 200 results  Xr Chest Portable    Result Date: 9/4/2020  Narrative: CHEST INDICATION:   Left lung opacities and basilar effusion on previous imaging  COMPARISON:  9/3/2020, CT chest 8/18/2020 EXAM PERFORMED/VIEWS:  XR CHEST PORTABLE FINDINGS:  The patient is rotated to the right  Right IJ central line tip terminates in the region of the SVC  No pneumothorax  Cardiomediastinal silhouette appears stable  Status post median sternotomy and CABG with aortic valve replacement  Mediastinal and left pleural drains  There is new right perihilar airspace opacity with persistent left perihilar and basilar opacities  Hazy opacity left costophrenic angle suggesting pleural effusion  Osseous structures appear within normal limits for patient age  Impression: New right perihilar opacity favored to represent pneumonia  Persistent left lung consolidation and probable small basilar pleural effusion  Workstation performed: [id number]     Xr Chest Portable    Result Date: 9/3/2020  Narrative: CHEST INDICATION:   Post Open Heart Surgey  COMPARISON:  9/2/2020 EXAM PERFORMED/VIEWS:  XR CHEST PORTABLE FINDINGS:  Sternotomy wires are intact  Prosthetic heart valve unchanged  [place]-Lynnette catheter, ET tube and NG tube have been removed  There is a right-sided central line to the cavoatrial junction which is unchanged  There is a mediastinal drain and a  left basilar chest tube which remain  Stable mild cardiac enlargement  New opacity within the left upper and lower lobe with small left basilar effusion    The right lung field is clear  No pneumothorax  Osseous structures appear within normal limits for patient age  Impression: New left upper and lower lobe opacities with superimposed small basilar effusion  Differential considerations would include atelectasis versus pneumonia  The right lung field is clear  The study was marked in [facility] for immediate notification  Workstation performed: [id number]     Ct Head Wo Contrast    Result Date: 9/3/2020  Narrative: CT BRAIN - WITHOUT CONTRAST INDICATION:   Neuro deficit, acute, stroke suspected Dizziness, persistent/recurrent, cardiac or vascular cause suspected persistent N/V, disconjugate gaze, r/o CVA  COMPARISON:  7/29/2018 TECHNIQUE:  CT examination of the brain was performed  In addition to axial images, sagittal and coronal 2D reformatted images were created and submitted for interpretation  Radiation dose length product (DLP) for this visit:  874 1 mGy-cm   This examination, like all CT scans performed in the [facility], was performed utilizing techniques to minimize radiation dose exposure, including the use of iterative reconstruction and automated exposure control  IMAGE QUALITY:  Diagnostic  FINDINGS: PARENCHYMA:  Subtle hypodensities within the left frontal vertex suggestive of mild chronic microangiopathic change  There is a focal hypodensity identified within the posterior medial aspect of the left thalamus on series 2 image 21 which was present on prior study consistent with an old lacunar infarct  No acute territorial infarct  No mass or hemorrhage  VENTRICLES AND EXTRA-AXIAL SPACES:  Normal for the patient's age  VISUALIZED ORBITS AND PARANASAL SINUSES:  Unremarkable  CALVARIUM AND EXTRACRANIAL SOFT TISSUES:  Normal      Impression: Stable examination with no acute intracranial abnormality  Mild microangiopathic change within the left frontal vertex and a small old lacunar infarct within the left posterior thalamus   Workstation performed: [id number]     Ct Chest Wo Contrast    Result Date: 8/20/2020  Narrative: CT CHEST WITHOUT IV CONTRAST INDICATION:   I35 0: Nonrheumatic aortic (valve) stenosis  Former smoker  Preoperative open heart surgery  COMPARISON:  CT scan 8/30/2017 TECHNIQUE: CT examination of the chest was performed without intravenous contrast   Axial, sagittal, and coronal 2D reformatted images were created from the source data and submitted for interpretation  Radiation dose length product (DLP) for this visit:  217 9 mGy-cm   This examination, like all CT scans performed in the [facility], was performed utilizing techniques to minimize radiation dose exposure, including the use of iterative reconstruction and automated exposure control  FINDINGS: LUNGS:  Scattered areas of groundglass opacity in both upper lobes, new from previous exam   No lung nodule  Minimal endotracheal mucous  PLEURA:  Unremarkable  HEART/GREAT VESSELS:  Unremarkable for patient's age  MEDIASTINUM AND DA:  Unremarkable  CHEST WALL AND LOWER NECK:   Unremarkable  VISUALIZED STRUCTURES IN THE UPPER ABDOMEN:  Status post splenectomy  Upper abdomen otherwise unremarkable  OSSEOUS STRUCTURES:  No acute fracture or destructive osseous lesion  Impression: Asymmetric upper lobe groundglass opacities  Nonspecific findings can be seen with pulmonary edema or atypical infection such as viral infection  While not typical of Covid 19 pneumonia distribution, this should be excluded  The study was marked in [facility] for immediate notification  Workstation performed: [id number]     Vas Carotid Complete Study    Result Date: 8/26/2020  Narrative:  [facility] REPORT CLINICAL: Indications:  Patient presents for a general health evaluation secondary to future open heart surgery  Patient is asymptomatic at this time   Risk Factors The patient has history of HTN, DM,  and HLD Clinical Right Pressure:  140/ mm Hg, Left Pressure:  132/ mm Hg   FINDINGS:  Right        Impression  PSV  EDV (cm/s)  Direction of Flow  Ratio  Dist  ICA                 57          13                      0 91  Mid  ICA                  63          16                      1 00  Prox  ICA    1 - 49%      57          13                      0 91  Dist CCA                  47          10                            Mid CCA                   49          11                            Prox CCA                  63           8                            Ext Carotid               87          21                      1 39  Prox Vert                 47           8  Antegrade                 Subclavian                67           7                             Left         Impression  PSV  EDV (cm/s)  Direction of Flow  Ratio  Dist  ICA                 64          20                      0 77  Mid  ICA                  75          28                      0 89  Prox  ICA    1 - 49%      76          20                      0 91  Dist CCA                  68          16                            Mid CCA                   84          16                      0 89  Prox CCA                  94          18                            Ext Carotid               63          10                      0 75  Prox Vert                 55          14  Antegrade                 Subclavian                62           0                               CONCLUSION: Impression RIGHT: There is <50% stenosis noted in the internal carotid artery  Plaque is heterogenous and irregular  Vertebral artery flow is antegrade  There is no significant subclavian artery disease  LEFT: There is <50% stenosis noted in the internal carotid artery  Plaque is heterogenous and irregular  Vertebral artery flow is antegrade  There is no significant subclavian artery disease  Baseline study    Internal carotid artery stenosis determination by consensus criteria from: Ned Kerns et al  Carotid Artery Stenosis: Gray-Scale and Doppler US Diagnosis - Society of Radiologists in [address] [place] [address], Radiology 2003; [id number]  SIGNATURE: Electronically Signed by: Asa Read MD on 2020-08-26 12:44:13 PM    Vas Lower Limb Vein Mapping Bypass Graft    Result Date: 8/26/2020  Narrative:  [facility] REPORT CLINICAL: Indications: Pre-op Vein Mapping for Future Lower Extremity Bypass Graft  FINDINGS:  Segment         Right        Left                            Diameter AP  Diameter AP  GSV Inguinal            4 4          4 0  GSV Prox Thigh          1 6          2 0  GSV Mid Thigh           2 0          2 6  GSV Dist Thigh          2 0          2 0  GSV Knee                2 1          2 4  GSV Prox Calf                        1 9  GSV Mid Calf            1 9          2 0  GSV Dist Calf                        2 3  GSV Ankle               1 7               SSV Mid Calf            1 4          1 3  SSV Knee                0 9               SSV Ankle               1 5          2 2     CONCLUSION: Impression: RIGHT LOWER LIMB: The great saphenous vein is patent  from the groin to the ankle  The GSV measures from 4 4 mm - 1 7 mm throughout the leg  The small saphenous vein is <2 mm throughout the calf  LEFT LOWER LIMB: The great saphenous vein is patent  from the groin to the ankle  The vein is of inadequate caliber and quality for graft conduit from the groin  The small saphenous vein is not of adequate size or quality  SIGNATURE: Electronically Signed by: Asa Read MD on 2020-08-26 12:34:31 PM    Xr Chest Portable Icu    Addendum Date: 9/2/2020 Addendum:   ADDENDUM: Probable tiny left apical pneumothorax  Result Date: 9/2/2020  Narrative: CHEST INDICATION:   S/P open heart  COMPARISON:  8/30/2017 EXAM PERFORMED/VIEWS:  XR CHEST PORTABLE ICU FINDINGS:  Endotracheal tube is present with its tip in satisfactory position above the level of the jonathan    An enteric tube is present with its tip in satisfactory position below the left hemidiaphragm  Right internal jugular central venous catheter is noted  Also noted is a [place]-Lynnette catheter from a right internal jugular approach with its tip overlying the right main pulmonary artery  Mediastinal and pleural drains are present  Cardiomediastinal silhouette appears unremarkable  Aortic valve prosthesis  The lungs are clear  No pneumothorax or pleural effusion  Osseous structures appear within normal limits for patient age  Impression: Unremarkable postop chest  Workstation performed: [id number]       EKG personally reviewed by Ronnie Shaikh MD      Counseling / Coordination of Care  Total floor / unit time spent today 30 minutes  Greater than 50% of total time was spent with the patient and / or family counseling and / or coordination of care

## 2020-09-07 NOTE — PROCEDURES
09/07/20    Procedure: Chest tube removal    Chest tubes removed in routine fashion without incident  Insertion site dressed with Acticoat  Chris Pion tolerated the procedure well  Nurse notified      SIGNATURE: Sindi Booker PA-C  DATE: September 7, 2020  TIME: 10:31 AM

## 2020-09-07 NOTE — PHYSICAL THERAPY NOTE
Physical Therapy Progress Note     09/07/20 1345   Pain Assessment   Pain Assessment Tool Pain Assessment not indicated - pt denies pain   Pain Score No Pain   Restrictions/Precautions   Other Precautions Cardiac/sternal;Telemetry   Subjective   Subjective The pt  is eager to return home  Transfers   Sit to Stand 7  Independent   Stand to Sit 7  Independent   Ambulation/Elevation   Gait pattern WNL   Gait Assistance 7  Independent   Assistive Device None   Distance 540 feet  Stair Management Assistance 7  Independent   Additional items Verbal cues   Stair Management Technique One rail R;Alternating pattern; Foreward   Number of Stairs 20   Balance   Static Sitting Normal   Dynamic Sitting Normal   Static Standing Good   Ambulatory Good   Activity Tolerance   Activity Tolerance Patient tolerated treatment well   Nurse Zara Alamraz RN  Assessment   Prognosis Excellent   Problem List Decreased endurance   Assessment The pt  has returned to independence with mobility on level and stairs  He was educated in proper pacing as he attempts to perform all activities at a hastened pace which results in fatigue  Educated him as well on energy conservation techniques  Updated his family about his progress, home safety, energy conservation techniques, and continued mobility at the home  Barriers to Discharge None   Goals   Patient Goals To go home tomorrow  STG Expiration Date 09/18/20   PT Treatment Day 2   Plan   Treatment/Interventions Functional transfer training;LE strengthening/ROM; Elevations; Therapeutic exercise; Endurance training;Patient/family training;Bed mobility;Gait training   Progress Discontinue PT   Recommendation   PT Discharge Recommendation Return to previous environment with social support   PT - OK to Discharge Yes     Monae Guaman, PTA

## 2020-09-07 NOTE — PROGRESS NOTES
Progress Note - Cardiothoracic Surgery   Destiney Rock 48 y o  male MRN: [id number]  Unit/Bed#: [facility] 410-01 Encounter: [id number]    Aortic stenosis, Non-Rheumatic, Coronary artery disease  S/P aortic valve replacement and coronary artery bypass grafting; POD # 5      24 Hour Events: No events  Ambulating 400 feet independently       Medications:   Scheduled Meds:  Current Facility-Administered Medications   Medication Dose Route Frequency Provider Last Rate    acetaminophen  975 mg Oral Q8H Greg Barakat PA-C      aluminum-magnesium hydroxide-simethicone  30 mL Oral Q4H PRN Sabino Alcantar MD      ascorbic acid  500 mg Oral Daily Manuelito MARILYN Abbott      aspirin  325 mg Oral Daily Greg Barakat PA-C      atorvastatin  80 mg Oral Daily With [employer] MARILYN Barakat      bisacodyl  10 mg Rectal Daily PRN Halina Marr PA-C      busPIRone  15 mg Oral BID Halina Marr PA-C      calcium carbonate  500 mg Oral Daily PRN Halina Marr PA-C      cholecalciferol  1,000 Units Oral Daily Halina Marr PA-C      docusate sodium  100 mg Oral BID Halina Marr PA-C      ferrous sulfate  325 mg Oral Daily With Breakfast Manuelito Abbott PA-C      fondaparinux  2 5 mg Subcutaneous Daily Greg Barakat PA-C      insulin lispro  1-10 Units Subcutaneous TID With Meals Francis Shaffer MD     Loni Clock insulin lispro  1-5 Units Subcutaneous HS Francis Shaffer MD      insulin lispro  1-6 Units Subcutaneous TID AC Francis Shaffer MD      lidocaine  3 patch Topical Daily Halina Marr PA-C      metoclopramide  10 mg Intravenous Q6H PRN Sabino Alcantar MD      metoprolol tartrate  50 mg Oral Q12H [address] Shirin Cleary MD      oxyCODONE  2 5 mg Oral Q4H PRN Halina Marr PA-C      oxyCODONE  5 mg Oral Q4H PRN Halina Marr PA-C      pantoprazole  40 mg Oral Q12H [address] MD Nick      polyethylene glycol  17 g Oral Daily Halina Marr PA-C      QUEtiapine  25 mg Oral HS Halina Marr PA-C      scopolamine  1 patch Transdermal Q72H Deng Tarango PA-C      sodium chloride  20 mL/hr Intravenous Continuous Deng Tarango PA-C 20 mL/hr (09/02/20 1436)    temazepam  15 mg Oral HS PRN Deng Tarango PA-C       Continuous Infusions:sodium chloride, 20 mL/hr, Last Rate: 20 mL/hr (09/02/20 1436)      PRN Meds: aluminum-magnesium hydroxide-simethicone    bisacodyl    calcium carbonate    metoclopramide    oxyCODONE    oxyCODONE    temazepam    Vitals:   Vitals:    09/06/20 2117 09/06/20 2300 09/07/20 0300 09/07/20 0600   BP: 140/67 (!) 85/50 124/68    BP Location:  Left arm Left arm    Pulse: 102 77 87    Resp:  18 18    Temp:  98 8 °F (37 1 °C) 98 8 °F (37 1 °C)    TempSrc:  Oral Oral    SpO2:  96% 95%    Weight:    75 5 kg (166 lb 7 2 oz)   Height:           Telemetry: Sinus Tachycardia;  Heart Rate:100    Respiratory:   SpO2: SpO2: 95 %, SpO2 Activity: SpO2 Activity: At Rest; 5 LPM    Intake/Output:   I/O       09/02 0701 - 09/03 0700 09/03 0701 - 09/04 0700 09/04 0701 - 09/05 0700    I V  (mL/kg) 477 2 (6 5) 1799 5 (24 7)     IV Piggyback 1800      Cell Saver 250      Total Intake(mL/kg) 2527 2 (34 7) 1799 5 (24 7)     Urine (mL/kg/hr) 1995 (1 1) 775 (0 4)     Chest Tube 870 520     Total Output 2865 1295     Net -337 8 +504 5                I/O: -1400 mL/24    Chest tube Output:    Mediastinal tubes: 30 mL/8 hours  35 mL/24 hours   Pleural tubes: 10 mL/8 hours  20 mL/24 hours     Weights:   Weight (last 2 days)     Date/Time   Weight    09/07/20 0600   75 5 (166 45)    09/06/20 0600   76 5 (168 65)    09/05/20 0600   74 8 (164 9)            Results:   Results from last 7 days   Lab Units 09/07/20  0507 09/06/20  0551 09/05/20  0535   WBC Thousand/uL 13 81* 16 44* 23 66*   HEMOGLOBIN g/dL 8 4* 8 4* 8 8*   HEMATOCRIT % 25 0* 25 3* 25 7*   PLATELETS Thousands/uL 223 190 152     Results from last 7 days   Lab Units 09/07/20  0507 09/05/20  0535 09/04/20  0501  09/02/20  1257   SODIUM mmol/L 142 139 143   < >  --    POTASSIUM mmol/L 3 2* 3 5 3 7   < >  --    CHLORIDE mmol/L 108 105 112*   < >  --    CO2 mmol/L 28 30 27   < >  --    CO2, I-STAT mmol/L  --   --   --   --  26   BUN mg/dL 8 15 17   < >  --    CREATININE mg/dL 0 61 0 52* 0 60   < >  --    GLUCOSE, ISTAT mg/dl  --   --   --   --  103   CALCIUM mg/dL 7 8* 7 8* 7 6*   < >  --     < > = values in this interval not displayed  Results from last 7 days   Lab Units 09/04/20  0829 09/02/20  1514   INR  1 27* 1 25*   PTT seconds 32 28         Invasive Lines/Tubes:  Invasive Devices     Central Venous Catheter Line            CVC Central Lines 09/02/20 Triple 4 days          Drain            Chest Tube 1 Anterior Mediastinal 32 Fr  4 days    Chest Tube 2 Left Pleural 32 Fr  4 days    Chest Tube 3 Posterior Mediastinal 24 Fr  4 days              Physical Exam:    HEENT/NECK:  PERRLA  No jugular venous distention  Cardiac: Regular rate and rhythm and No murmurs/rubs/gallops  Pulmonary:  Breath sounds diminished in the bases bilaterally   Abdomen:  Non-tender, Non-distended and Normal bowel sounds  Incisions: Sternum is stable  Incision is clean, dry, and intact  and Saphenectomy incison is clean, dry, and intact  Extremities: Extremities warm/dry and Trace edema B/L  Neuro: Alert and oriented X 3, Sensation is grossly intact and No focal deficits  Skin: Warm/Dry, without rashes or lesions  Assessment:  Principal Problem:    Nonrheumatic aortic valve stenosis  Active Problems:    Type 1 diabetes mellitus with retinopathy ([address] )    Essential hypertension    Coronary artery disease involving native coronary artery of native heart without angina pectoris    Dyslipidemia    Aortic stenosis due to bicuspid aortic valve    History of ITP    History of coronary angioplasty with insertion of stent    Acute blood loss as cause of postoperative anemia    Post-operative nausea and vomiting       Aortic stenosis, Non-Rheumatic, Coronary artery disease   S/P aortic valve replacement and coronary artery bypass grafting; POD # 5    Plan:    1  Cardiac:   Sinus Rhythm; HR/BP well controlled   Lopressor, 50 mg PO BID  Continue ASA and Statin therapy  D/C central IV access today  Continue DVT prophylaxis    2  Pulmonary:    D/C chest tubes today    3  Renal:   Intake/Output net: -1400 mL/24 hours  Post op Creatinine stable; Follow up labs prn    4  Neuro:    Neurologically intact; No active issues  Incisional pain well-controlled  Continue Tylenol, 975 mg PO q 8, standing dose  Continue Oxycodone, 2 5 to 5 mg PO q 4 hours prn pain    5  GI:  Nausea resolved  No further hematemesis  GI recommending EGD tomorrow; Patient is refusing  Tolerating TLC 2 3 gm sodium diet  Maintain 1800 mL daily fluid restriction   Continue stool softeners and prn suppository  Continue GI prophylaxis    6  Endo:   No history of diabetes; Glucose well-controlled with sliding scale coverage  Resume insulin pump, per endocrine    7    Hematology:    APOBLA    Hgb stable    Check daily CBC      8  Disposition:      Anticipate discharge to home when weaned from oxygen  VTE Pharmacologic Prophylaxis: Sequential compression device (Venodyne)  and Fondaparinux (Arixtra)  VTE Mechanical Prophylaxis: sequential compression device    Collaborative rounds completed with Dr Curtis Host    Plan of care discussed with bedside nurse    SIGNATURE: Jb Encinas PA-C  DATE: September 7, 2020  TIME: 6:58 AM

## 2020-09-08 ENCOUNTER — APPOINTMENT (INPATIENT)
Dept: RADIOLOGY | Facility: HOSPITAL | Age: 55
DRG: 220 | End: 2020-09-08
Payer: COMMERCIAL

## 2020-09-08 LAB
ANION GAP SERPL CALCULATED.3IONS-SCNC: 4 MMOL/L (ref 4–13)
BUN SERPL-MCNC: 8 MG/DL (ref 5–25)
CALCIUM SERPL-MCNC: 7.9 MG/DL (ref 8.3–10.1)
CHLORIDE SERPL-SCNC: 109 MMOL/L (ref 100–108)
CO2 SERPL-SCNC: 26 MMOL/L (ref 21–32)
CREAT SERPL-MCNC: 0.52 MG/DL (ref 0.6–1.3)
ERYTHROCYTE [DISTWIDTH] IN BLOOD BY AUTOMATED COUNT: 16.5 % (ref 11.6–15.1)
GFR SERPL CREATININE-BSD FRML MDRD: 121 ML/MIN/1.73SQ M
GLUCOSE SERPL-MCNC: 110 MG/DL (ref 65–140)
GLUCOSE SERPL-MCNC: 159 MG/DL (ref 65–140)
GLUCOSE SERPL-MCNC: 166 MG/DL (ref 65–140)
GLUCOSE SERPL-MCNC: 170 MG/DL (ref 65–140)
HCT VFR BLD AUTO: 25.3 % (ref 36.5–49.3)
HGB BLD-MCNC: 8.3 G/DL (ref 12–17)
MCH RBC QN AUTO: 29.2 PG (ref 26.8–34.3)
MCHC RBC AUTO-ENTMCNC: 32.8 G/DL (ref 31.4–37.4)
MCV RBC AUTO: 89 FL (ref 82–98)
PLATELET # BLD AUTO: 284 THOUSANDS/UL (ref 149–390)
PMV BLD AUTO: 10.8 FL (ref 8.9–12.7)
POTASSIUM SERPL-SCNC: 4.2 MMOL/L (ref 3.5–5.3)
RBC # BLD AUTO: 2.84 MILLION/UL (ref 3.88–5.62)
SODIUM SERPL-SCNC: 139 MMOL/L (ref 136–145)
WBC # BLD AUTO: 16.21 THOUSAND/UL (ref 4.31–10.16)

## 2020-09-08 PROCEDURE — 99232 SBSQ HOSP IP/OBS MODERATE 35: CPT | Performed by: INTERNAL MEDICINE

## 2020-09-08 PROCEDURE — 71046 X-RAY EXAM CHEST 2 VIEWS: CPT

## 2020-09-08 PROCEDURE — 80048 BASIC METABOLIC PNL TOTAL CA: CPT | Performed by: INTERNAL MEDICINE

## 2020-09-08 PROCEDURE — 99024 POSTOP FOLLOW-UP VISIT: CPT | Performed by: THORACIC SURGERY (CARDIOTHORACIC VASCULAR SURGERY)

## 2020-09-08 PROCEDURE — 82948 REAGENT STRIP/BLOOD GLUCOSE: CPT

## 2020-09-08 PROCEDURE — 99232 SBSQ HOSP IP/OBS MODERATE 35: CPT | Performed by: PHYSICIAN ASSISTANT

## 2020-09-08 PROCEDURE — 85027 COMPLETE CBC AUTOMATED: CPT | Performed by: PHYSICIAN ASSISTANT

## 2020-09-08 RX ORDER — FUROSEMIDE 10 MG/ML
40 INJECTION INTRAMUSCULAR; INTRAVENOUS
Status: DISCONTINUED | OUTPATIENT
Start: 2020-09-08 | End: 2020-09-09

## 2020-09-08 RX ORDER — POTASSIUM CHLORIDE 20 MEQ/1
20 TABLET, EXTENDED RELEASE ORAL 2 TIMES DAILY
Status: DISCONTINUED | OUTPATIENT
Start: 2020-09-08 | End: 2020-09-09

## 2020-09-08 RX ADMIN — ATORVASTATIN CALCIUM 80 MG: 80 TABLET, FILM COATED ORAL at 16:13

## 2020-09-08 RX ADMIN — DOCUSATE SODIUM 100 MG: 100 CAPSULE, LIQUID FILLED ORAL at 17:38

## 2020-09-08 RX ADMIN — OXYCODONE HYDROCHLORIDE 5 MG: 5 TABLET ORAL at 21:25

## 2020-09-08 RX ADMIN — FERROUS SULFATE TAB 325 MG (65 MG ELEMENTAL FE) 325 MG: 325 (65 FE) TAB at 08:31

## 2020-09-08 RX ADMIN — PANTOPRAZOLE SODIUM 40 MG: 40 TABLET, DELAYED RELEASE ORAL at 08:31

## 2020-09-08 RX ADMIN — BUSPIRONE HYDROCHLORIDE 15 MG: 10 TABLET ORAL at 08:31

## 2020-09-08 RX ADMIN — ACETAMINOPHEN 975 MG: 325 TABLET, FILM COATED ORAL at 13:43

## 2020-09-08 RX ADMIN — FUROSEMIDE 40 MG: 10 INJECTION, SOLUTION INTRAMUSCULAR; INTRAVENOUS at 17:40

## 2020-09-08 RX ADMIN — FUROSEMIDE 40 MG: 10 INJECTION, SOLUTION INTRAMUSCULAR; INTRAVENOUS at 13:43

## 2020-09-08 RX ADMIN — PANTOPRAZOLE SODIUM 40 MG: 40 TABLET, DELAYED RELEASE ORAL at 21:25

## 2020-09-08 RX ADMIN — ACETAMINOPHEN 975 MG: 325 TABLET, FILM COATED ORAL at 05:20

## 2020-09-08 RX ADMIN — INSULIN LISPRO 2 UNITS: 100 INJECTION, SOLUTION INTRAVENOUS; SUBCUTANEOUS at 06:39

## 2020-09-08 RX ADMIN — Medication 1000 UNITS: at 08:31

## 2020-09-08 RX ADMIN — ASPIRIN 325 MG ORAL TABLET 325 MG: 325 PILL ORAL at 08:31

## 2020-09-08 RX ADMIN — BUSPIRONE HYDROCHLORIDE 15 MG: 10 TABLET ORAL at 17:38

## 2020-09-08 RX ADMIN — QUETIAPINE FUMARATE 25 MG: 25 TABLET ORAL at 21:25

## 2020-09-08 RX ADMIN — POTASSIUM CHLORIDE 20 MEQ: 1500 TABLET, EXTENDED RELEASE ORAL at 13:43

## 2020-09-08 RX ADMIN — POTASSIUM CHLORIDE 20 MEQ: 1500 TABLET, EXTENDED RELEASE ORAL at 17:38

## 2020-09-08 RX ADMIN — DOCUSATE SODIUM 100 MG: 100 CAPSULE, LIQUID FILLED ORAL at 08:31

## 2020-09-08 RX ADMIN — OXYCODONE HYDROCHLORIDE AND ACETAMINOPHEN 500 MG: 500 TABLET ORAL at 08:31

## 2020-09-08 RX ADMIN — ACETAMINOPHEN 975 MG: 325 TABLET, FILM COATED ORAL at 21:24

## 2020-09-08 RX ADMIN — METOPROLOL TARTRATE 50 MG: 50 TABLET, FILM COATED ORAL at 08:31

## 2020-09-08 RX ADMIN — METOPROLOL TARTRATE 50 MG: 50 TABLET, FILM COATED ORAL at 21:25

## 2020-09-08 RX ADMIN — POLYETHYLENE GLYCOL 3350 17 G: 17 POWDER, FOR SOLUTION ORAL at 08:31

## 2020-09-08 RX ADMIN — FONDAPARINUX SODIUM 2.5 MG: 2.5 INJECTION, SOLUTION SUBCUTANEOUS at 08:31

## 2020-09-08 NOTE — PROGRESS NOTES
General Cardiology   Progress Note -  Team One   Kiya Aguilar 48 y o  male MRN: [id number]    Unit/Bed#: [facility] 410-01 Encounter: [id number]    Assessment:    CAD: s/p CABG x3 with LIMA-LAD, SVG-OM1, SVG-right PLB  POD #6  · Intraoperative NORMAN:  EF 60%, RWMA  · Rhythm management:  Lopressor 50 mg BID  · Volume management:  Autodiuresing  Net output +418  · Aspirin, beta-blocker, statin    Severe AS: s/p bioprosthetic AVR with 25 mm Fontnaa Inspiris pericardial tissue valve  POD #6  · Intraoperative NORMAN with normal device function and no PVL  Respiratory insufficiency:  Currently on 2 L NC  Chest x-ray small left apical pneumothorax unchanged since yesterday  Preserved biventricular systolic function:  Intraoperative NORMAN EF 60%  Volume management as above  Essential hypertension:  Average BP Lopressor 50 mg BID    Hyperlipidemia:  Lipid 07/2018 , , HDL 52,  on atorvastatin 80 mg    Type 1 diabetes:  Hemoglobin A1c 9 1  Management per primary team    Plan/Recommendations:  · Continue current medication regimen  · Wean oxygen as able  · Possible discharge today  · Follow-up with DEVEN Fuentes 09/29/2020  · Will subsequently follow-up with Dr Kylie Franks    ______________________________________________________________    Subjective    Patient seen and examined  No events overnight  He denies any chest pain, shortness breath, palpitations  He is hoping to go home today  Review of Systems   Constitution: Negative  Negative for chills  Cardiovascular: Negative for chest pain, dyspnea on exertion, leg swelling, near-syncope, orthopnea, palpitations, paroxysmal nocturnal dyspnea and syncope  Respiratory: Negative  Negative for shortness of breath  Gastrointestinal: Negative for diarrhea, nausea and vomiting  Neurological: Negative for dizziness, light-headedness and weakness  Psychiatric/Behavioral: Negative for altered mental status     All other systems reviewed and are negative  Objective:   Vitals: Blood pressure 129/58, pulse 90, temperature 99 1 °F (37 3 °C), temperature source Oral, resp  rate 18, height 5' 6" (1 676 m), weight 73 7 kg (162 lb 7 7 oz), SpO2 91 %  ,     Wt Readings from Last 3 Encounters:   09/08/20 73 7 kg (162 lb 7 7 oz)   08/31/20 73 8 kg (162 lb 11 2 oz)   08/21/20 73 8 kg (162 lb 12 8 oz)        Lab Results   Component Value Date    CREATININE 0 52 (L) 09/08/2020    CREATININE 0 61 09/07/2020    CREATININE 0 52 (L) 09/05/2020         Body mass index is 26 22 kg/m²  ,     Systolic (02LDQ), UNM:878 , Min:99 , KOZ:617     Diastolic (14HDW), ZNN:32, Min:58, Max:71        No intake or output data in the 24 hours ending 09/08/20 1025  Weight (last 2 days)     Date/Time   Weight    09/08/20 0600   73 7 (162 48)    09/07/20 0600   75 5 (166 45)    09/06/20 0600   76 5 (168 65)            Telemetry Review: No significant arrhythmias seen on telemetry review  Physical Exam  Vitals signs and nursing note reviewed  Constitutional:       General: He is not in acute distress  Appearance: He is well-developed  Comments: On 2 L NC in NAD   HENT:      Head: Normocephalic and atraumatic  Neck:      Musculoskeletal: Normal range of motion and neck supple  Cardiovascular:      Rate and Rhythm: Normal rate and regular rhythm  Heart sounds: Normal heart sounds  No murmur  No friction rub  Pulmonary:      Effort: Pulmonary effort is normal  No respiratory distress  Breath sounds: No wheezing or rales  Comments: Slightly diminished breath sounds at the bases  Abdominal:      General: Bowel sounds are normal       Palpations: Abdomen is soft  Musculoskeletal: Normal range of motion  Right lower leg: No edema  Left lower leg: No edema  Skin:     General: Skin is warm and dry  Neurological:      Mental Status: He is alert and oriented to person, place, and time     Psychiatric:         Behavior: Behavior normal  Thought Content: Thought content normal          Judgment: Judgment normal          LABORATORY RESULTS      CBC with diff:   Results from last 7 days   Lab Units 09/08/20  0520 09/07/20  0507 09/06/20  0551 09/05/20  0535 09/04/20  1618 09/04/20  0501 09/03/20  0507  09/02/20  1514   WBC Thousand/uL 16 21* 13 81* 16 44* 23 66*  --  21 18* 15 22*  --   --    HEMOGLOBIN g/dL 8 3* 8 4* 8 4* 8 8* 9 5* 6 0* 7 8*   < > 11 5*   HEMATOCRIT % 25 3* 25 0* 25 3* 25 7* 28 6* 18 2* 23 4*   < > 34 7*   MCV fL 89 88 88 87  --  92 91  --   --    PLATELETS Thousands/uL 284 223 190 152  --  137* 136*  --  188   MCH pg 29 2 29 5 29 2 29 6  --  30 5 30 2  --   --    MCHC g/dL 32 8 33 6 33 2 34 2  --  33 0 33 3  --   --    RDW % 16 5* 16 2* 16 5* 16 8*  --  15 1 15 0  --   --    MPV fL 10 8 10 8 11 4 11 9  --  11 7 10 6  --  10 7    < > = values in this interval not displayed         CMP:  Results from last 7 days   Lab Units 09/08/20  0520 09/07/20  0507 09/05/20  0535 09/04/20  0501 09/03/20  0422 09/02/20  2039 09/02/20  1514 09/02/20  1257 09/02/20  1255  09/02/20  1215 09/02/20  1129 09/02/20  1053 09/02/20  1027 09/02/20  1011 09/02/20  1005   POTASSIUM mmol/L 4 2 3 2* 3 5 3 7 4 2 4 4 4 4  --  4 6   < >  --   --   --   --   --   --    CHLORIDE mmol/L 109* 108 105 112* 116*  --   --   --  116*  --   --   --   --   --   --   --    CO2 mmol/L 26 28 30 27 26  --   --   --  24  --   --   --   --   --   --   --    CO2, I-STAT mmol/L  --   --   --   --   --   --   --  26  --   --  26 27 27 28 26  --    BUN mg/dL 8 8 15 17 11  --   --   --  7  --   --   --   --   --   --   --    CREATININE mg/dL 0 52* 0 61 0 52* 0 60 0 62  --   --   --  0 49*  --   --   --   --   --   --   --    GLUCOSE, ISTAT mg/dl  --   --   --   --   --   --   --  103  --   --  128 162* 151* 159* 149* 148*   CALCIUM mg/dL 7 9* 7 8* 7 8* 7 6* 8 0*  --   --   --  7 6*  --   --   --   --   --   --   --    EGFR ml/min/1 73sq m 121 113 121 114 112  --   --   --  124  --   -- --   --   --   --   --     < > = values in this interval not displayed         BMP:  Results from last 7 days   Lab Units 20  0520 20  0507 20  0535 20  0501 20  0422 20  2039 20  1514 20  1257 20  1255   POTASSIUM mmol/L 4 2 3 2* 3 5 3 7 4 2 4 4 4 4  --  4 6   CHLORIDE mmol/L 109* 108 105 112* 116*  --   --   --  116*   CO2 mmol/L 26 28 30 27 26  --   --   --  24   CO2, I-STAT mmol/L  --   --   --   --   --   --   --  26  --    BUN mg/dL 8 8 15 17 11  --   --   --  7   CREATININE mg/dL 0 52* 0 61 0 52* 0 60 0 62  --   --   --  0 49*   GLUCOSE, ISTAT mg/dl  --   --   --   --   --   --   --  103  --    CALCIUM mg/dL 7 9* 7 8* 7 8* 7 6* 8 0*  --   --   --  7 6*       Lab Results   Component Value Date    NTBNP 1,460 (H) 2017             Results from last 7 days   Lab Units 20  0507   MAGNESIUM mg/dL 2 5                     Results from last 7 days   Lab Units 20  0829 20  1514   INR  1 27* 1 25*       Lipid Profile:   Lab Results   Component Value Date    CHOL 259 (H) 10/15/2013     Lab Results   Component Value Date    HDL 53 2018     (H) 2017     (H) 2017     Lab Results   Component Value Date    LDLCALC 102 (H) 2018    LDLCALC 51 2017    LDLCALC 58 2017     Lab Results   Component Value Date    TRIG 225 (H) 2018    TRIG 98 2017    TRIG 62 2017       Cardiac testing:   Results for orders placed in visit on 17   Echo complete with contrast if indicated    Narrative [facility] [address] [facility]   [phone]     Transthoracic Echocardiogram   Limited 2D, Doppler, and Color Doppler     Study date:  21-Dec-2017     Patient: Suri Rosas   MR number: [id number]   Account number: [de-identified]   : 1965   Age: 47 years   Gender: Male   Status: Outpatient   Location: [facility] [place]   Height: 65 in Weight: 174 7 lb   BP: 120/ 62 mmHg     Indications: Limited study; evaluate ventricular function     Diagnoses: I42 9 - Cardiomyopathy, unspecified     Sonographer:  GIOVANNY Fonseca   Primary Physician:  Jeimy Fox   Referring Physician:  Dory Black MD   Group:  [id number]   Interpreting Physician:  Aneudy Rodriguez MD     SUMMARY     LEFT VENTRICLE:   Size was normal    Systolic function was normal  Ejection fraction was estimated to be 60 %  There was mild hypokinesis of the mid anteroseptal and apical septal wall(s)  Wall thickness was increased  There was mild concentric hypertrophy  LEFT ATRIUM:   The atrium was mildly dilated  MITRAL VALVE:   There was mild regurgitation  AORTIC VALVE:   The valve was trileaflet  Leaflets exhibited moderately increased thickness, moderate calcification, and moderately reduced cuspal separation  There was mild regurgitation  TRICUSPID VALVE:   There was mild regurgitation  HISTORY: PRIOR HISTORY: Cardiomyopathy, CAD s/p PCI, hypertension, aortic stenosis, type I diabetes     PROCEDURE: The study was performed in the [facility]  This was a routine study  The transthoracic approach was used  The study included limited 2D imaging, limited spectral Doppler, and color Doppler  The heart rate   was 66 bpm, at the start of the study  Images were obtained from the parasternal, apical, subcostal, and suprasternal notch acoustic windows  Image quality was adequate  LEFT VENTRICLE: Size was normal  Systolic function was normal  Ejection fraction was estimated to be 60 %  There was mild hypokinesis of the mid anteroseptal and apical septal wall(s)  Wall thickness was increased  There was mild   concentric hypertrophy  RIGHT VENTRICLE: The size was normal  Systolic function was normal  Wall thickness was normal      LEFT ATRIUM: The atrium was mildly dilated       RIGHT ATRIUM: Size was normal  MITRAL VALVE: Valve structure was normal  There was normal leaflet separation  DOPPLER: The transmitral velocity was within the normal range  There was no evidence for stenosis  There was mild regurgitation  AORTIC VALVE: The valve was trileaflet  Leaflets exhibited moderately increased thickness, moderate calcification, and moderately reduced cuspal separation  DOPPLER: There was mild regurgitation  TRICUSPID VALVE: DOPPLER: There was mild regurgitation  PULMONIC VALVE: Leaflets exhibited normal thickness, no calcification, and normal cuspal separation  DOPPLER: The transpulmonic velocity was within the normal range  There was no regurgitation  PERICARDIUM: There was no pericardial effusion  The pericardium was normal in appearance  AORTA: The root exhibited normal size  SYSTEM MEASUREMENT TABLES     2D   %FS: 40 72 %   EDV(Teich): 112 95 ml   EF Biplane: 62 17 %   EF(Cube): 79 17 %   EF(Teich): 71 33 %   ESV(Cube): 24 55 ml   ESV(Teich): 32 38 ml   IVSd: 1 19 cm   LVEDV MOD A2C: 92 76 ml   LVEDV MOD A4C: 98 47 ml   LVEDV MOD BP: 101 53 ml   LVEF MOD A2C: 60 95 %   LVEF MOD A4C: 62 61 %   LVESV MOD A2C: 36 22 ml   LVESV MOD A4C: 36 81 ml   LVESV MOD BP: 38 41 ml   LVIDd: 4 9 cm   LVIDs: 2 91 cm   LVLd A2C: 9 35 cm   LVLd A4C: 8 21 cm   LVLs A2C: 7 37 cm   LVLs A4C: 6 61 cm   LVPWd: 1 16 cm   SI(Cube): 49 89 ml/m2   SI(Teich): 43 09 ml/m2   SV MOD A2C: 56 54 ml   SV MOD A4C: 61 65 ml   SV(Cube): 93 29 ml   SV(Teich): 80 57 ml     [employer] Accredited Echocardiography Laboratory     Prepared and electronically signed by     Jackie Lord MD   Signed 21-Dec-2017 15:51:41     No results found for this or any previous visit  No results found for this or any previous visit  No procedure found  No results found for this or any previous visit        Meds/Allergies   all current active meds have been reviewed, current meds:   Current Facility-Administered Medications   Medication Dose Route Frequency    acetaminophen (TYLENOL) tablet 975 mg  975 mg Oral Q8H    aluminum-magnesium hydroxide-simethicone (MYLANTA) 200-200-20 mg/5 mL oral suspension 30 mL  30 mL Oral Q4H PRN    ascorbic acid (VITAMIN C) tablet 500 mg  500 mg Oral Daily    aspirin tablet 325 mg  325 mg Oral Daily    atorvastatin (LIPITOR) tablet 80 mg  80 mg Oral Daily With Dinner    bisacodyl (DULCOLAX) rectal suppository 10 mg  10 mg Rectal Daily PRN    busPIRone (BUSPAR) tablet 15 mg  15 mg Oral BID    calcium carbonate (TUMS) chewable tablet 500 mg  500 mg Oral Daily PRN    cholecalciferol (VITAMIN D3) tablet 1,000 Units  1,000 Units Oral Daily    docusate sodium (COLACE) capsule 100 mg  100 mg Oral BID    ferrous sulfate tablet 325 mg  325 mg Oral Daily With Breakfast    fondaparinux (ARIXTRA) subcutaneous injection 2 5 mg  2 5 mg Subcutaneous Daily    insulin lispro (HumaLOG) 100 units/mL subcutaneous injection 1-10 Units  1-10 Units Subcutaneous TID With Meals    insulin lispro (HumaLOG) 100 units/mL subcutaneous injection 1-5 Units  1-5 Units Subcutaneous HS    insulin lispro (HumaLOG) 100 units/mL subcutaneous injection 1-6 Units  1-6 Units Subcutaneous TID AC    lidocaine (LIDODERM) 5 % patch 3 patch  3 patch Topical Daily    metoclopramide (REGLAN) injection 10 mg  10 mg Intravenous Q6H PRN    metoprolol tartrate (LOPRESSOR) tablet 50 mg  50 mg Oral Q12H ANA PAULA    oxyCODONE (ROXICODONE) IR tablet 2 5 mg  2 5 mg Oral Q4H PRN    oxyCODONE (ROXICODONE) IR tablet 5 mg  5 mg Oral Q4H PRN    pantoprazole (PROTONIX) EC tablet 40 mg  40 mg Oral Q12H ANA PAULA    polyethylene glycol (MIRALAX) packet 17 g  17 g Oral Daily    QUEtiapine (SEROquel) tablet 25 mg  25 mg Oral HS    scopolamine (TRANSDERM-SCOP) 1 5 mg/3 days TD 72 hr patch 1 patch  1 patch Transdermal Q72H    sodium chloride infusion 0 45 %  20 mL/hr Intravenous Continuous    temazepam (RESTORIL) capsule 15 mg  15 mg Oral HS PRN    and PTA meds: Prior to Admission Medications   Prescriptions Last Dose Informant Patient Reported? Taking?    NEL MICROLET LANCETS lancets  Self No No   Sig: by Other route 4 (four) times a day Use as instructed   Cholecalciferol (VITAMIN D) 2000 units CAPS 2020 at Unknown time Self Yes Yes   Sig: TK 1 C PO QD   INSULIN SYRINGE 1CC/29G 29G X 1/2" 1 ML MISC  Self No No   Sig: Inject as directed 3 (three) times a day with meals Use as directed   Insulin Infusion Pump KIT  Self Yes No   Si Units/hr by Subcutaneous Insulin Pump route continuous   Insulin Infusion Pump Supplies (MINIMED INFUSION SET-) MISC  Self Yes No   Sig: by Does not apply route   Insulin Infusion Pump Supplies (MINIMED RESERVOIR 3ML) [facility]  Self Yes No   Sig: by Does not apply route   QUEtiapine (SEROquel) 25 mg tablet 2020 at Unknown time Self Yes Yes   Sig: TAKE 1 TABLET BY MOUTH EVERY DAY EVERY NIGHT   amoxicillin (AMOXIL) 500 mg capsule 2020 Self Yes No   Sig: Prior dental   aspirin (ECOTRIN LOW STRENGTH) 81 mg EC tablet 2020 at Unknown time Self No Yes   Sig: Take 1 tablet by mouth daily   atorvastatin (LIPITOR) 80 mg tablet 2020 at Unknown time Self No Yes   Sig: Take 1 tablet by mouth daily with dinner   busPIRone (BUSPAR) 15 mg tablet 2020 at Unknown time  Yes Yes   glucagon (GLUCAGON EMERGENCY) 1 MG injection  Self No No   Sig: Inject 1 mg under the skin once as needed for low blood sugar for up to 1 dose   Patient not taking: Reported on 2/3/2020   glucose blood (NEL CONTOUR TEST) test strip  Self No No   Si each by Other route 4 (four) times a day   hydrOXYzine HCL (ATARAX) 25 mg tablet 2020 Self Yes Yes   Sig: TAKE 1 TO 2 TABLETS BY MOUTH EVERY 8 HOURS AS NEEDED FOR ANXIETY   ibuprofen (MOTRIN) 800 mg tablet 2020 Self Yes Yes   Sig: Prior dental   insulin lispro (HumaLOG) 100 units/mL injection 2020 at Unknown time Self No Yes   Sig: Use up to 100 units per day via insulin pump   lisinopril (ZESTRIL) 20 mg tablet 8/30/2020 Self Yes Yes   Sig: Take 2 5 mg by mouth daily    mupirocin (BACTROBAN) 2 % ointment   No Yes   Sig: Apply 1 application topically 2 (two) times a day for 5 days Apply to each nostril twice daily for five days before your operation  omeprazole (PriLOSEC) 20 mg delayed release capsule 9/1/2020 at Unknown time Self Yes Yes   Sig: Take 20 mg by mouth daily   sildenafil (VIAGRA) 50 MG tablet 8/28/2020 Self Yes Yes   Sig: TAKE 1 TABLET BY MOUTH AS  NEEDED FOR ERECTILE  DYSFUNCTION   zolpidem (AMBIEN) 5 mg tablet 9/1/2020 at Unknown time Self No Yes   Sig: Take 1 tablet by mouth daily at bedtime as needed for sleep for up to 2 days      Facility-Administered Medications: None     Medications Prior to Admission   Medication    aspirin (ECOTRIN LOW STRENGTH) 81 mg EC tablet    atorvastatin (LIPITOR) 80 mg tablet    busPIRone (BUSPAR) 15 mg tablet    Cholecalciferol (VITAMIN D) 2000 units CAPS    hydrOXYzine HCL (ATARAX) 25 mg tablet    ibuprofen (MOTRIN) 800 mg tablet    insulin lispro (HumaLOG) 100 units/mL injection    lisinopril (ZESTRIL) 20 mg tablet    mupirocin (BACTROBAN) 2 % ointment    omeprazole (PriLOSEC) 20 mg delayed release capsule    QUEtiapine (SEROquel) 25 mg tablet    sildenafil (VIAGRA) 50 MG tablet    zolpidem (AMBIEN) 5 mg tablet    amoxicillin (AMOXIL) 500 mg capsule    NEL MICROLET LANCETS lancets    glucagon (GLUCAGON EMERGENCY) 1 MG injection    glucose blood (NEL CONTOUR TEST) test strip    Insulin Infusion Pump KIT    Insulin Infusion Pump Supplies (MINIMED INFUSION SET-) MISC    Insulin Infusion Pump Supplies (MINIMED RESERVOIR 3ML) MISC    INSULIN SYRINGE 1CC/29G 29G X 1/2" 1 ML MISC       sodium chloride, 20 mL/hr, Last Rate: 20 mL/hr (09/02/20 1436)        Counseling / Coordination of Care  Total floor / unit time spent today 20 minutes    Greater than 50% of total time was spent with the patient and / or family counseling and / or coordination of care  ** Please Note: Dragon BufferBox Dictation voice to text software may have been used in the creation of this document   **

## 2020-09-08 NOTE — UTILIZATION REVIEW
Continued Stay Review    Date: 09/06/2020                       Aortic stenosis, Non-Rheumatic, Coronary artery disease  S/P aortic valve replacement and coronary artery bypass grafting; POD # 4  Current Patient Class: Inpatient Current Level of Care: Med/Surg    HPI:55 y o  male initially admitted on 09/02/2020     Assessment/Plan: Hypotensive overnight,  Continue chest tubes (-20cm suction) for high output  Check BMP        Pertinent Labs/Diagnostic Results:     Results from last 7 days   Lab Units 09/08/20  0520 09/07/20  0507 09/06/20  0551 09/05/20  0535 09/04/20  1618   WBC Thousand/uL 16 21* 13 81* 16 44* 23 66*  --    HEMOGLOBIN g/dL 8 3* 8 4* 8 4* 8 8* 9 5*   HEMATOCRIT % 25 3* 25 0* 25 3* 25 7* 28 6*   PLATELETS Thousands/uL 284 223 190 152  --      Results from last 7 days   Lab Units 09/08/20  0520 09/07/20  0507 09/05/20  0535 09/04/20  0501 09/03/20  0507 09/03/20  0422  09/02/20  1257  09/02/20  1215 09/02/20  1129 09/02/20  1053 09/02/20  1027   SODIUM mmol/L 139 142 139 143  --  147*  --   --    < >  --   --   --   --    POTASSIUM mmol/L 4 2 3 2* 3 5 3 7  --  4 2   < >  --    < >  --   --   --   --    CHLORIDE mmol/L 109* 108 105 112*  --  116*  --   --    < >  --   --   --   --    CO2 mmol/L 26 28 30 27  --  26  --   --    < >  --   --   --   --    CO2, I-STAT mmol/L  --   --   --   --   --   --   --  26  --  26 27 27 28   ANION GAP mmol/L 4 6 4 4  --  5  --   --    < >  --   --   --   --    BUN mg/dL 8 8 15 17  --  11  --   --    < >  --   --   --   --    CREATININE mg/dL 0 52* 0 61 0 52* 0 60  --  0 62  --   --    < >  --   --   --   --    EGFR ml/min/1 73sq m 121 113 121 114  --  112  --   --    < >  --   --   --   --    CALCIUM mg/dL 7 9* 7 8* 7 8* 7 6*  --  8 0*  --   --    < >  --   --   --   --    CALCIUM, IONIZED, ISTAT mmol/L  --   --   --   --   --   --   --  1 20  --  1 19 1 06* 1 05* 1 05*   MAGNESIUM mg/dL  --   --   --   --  2 5  --   --   --   --   --   --   --   --     < > = values in this interval not displayed  Results from last 7 days   Lab Units 09/08/20  1132 09/08/20  0600 09/07/20  2114 09/07/20  1609 09/07/20  1111 09/07/20  0605 09/07/20  0306 09/06/20  2321 09/06/20  2006 09/06/20  1628 09/06/20  1200 09/06/20  1008   POC GLUCOSE mg/dl 110 170* 173* 231* 164* 73 73 95 176* 114 133 158*     Results from last 7 days   Lab Units 09/08/20  0520 09/07/20  0507 09/05/20  0535 09/04/20  0501 09/03/20  0422 09/02/20  1255   GLUCOSE RANDOM mg/dL 166* 69 177* 136 132 105     BETA-HYDROXYBUTYRATE   Date Value Ref Range Status   09/03/2020 0 2 <0 6 mmol/L Final      Results from last 7 days   Lab Units 09/02/20  1257 09/02/20  1215 09/02/20  1129  09/02/20  1011 09/02/20  1005   PH, CURT I-STAT   --   --   --   --  7 364  --    PCO2, CURT ISTAT mm HG  --   --   --   --  42 5  --    PO2, CURT ISTAT mm HG  --   --   --   --  38 0 38 0   HCO3, CURT ISTAT mmol/L  --   --   --   --  24 2  --    I STAT BASE EXC mmol/L -2 -2 0   < > -1  --    I STAT O2 SAT % 99* 100* 100*   < > 69  --    ISTAT PH ART  7 331* 7 319* 7 384   < >  --   --    I STAT ART PCO2 mm HG 45 7* 47 4* 42 5   < >  --   --    I STAT ART PO2 mm  0* 242 0* 383 0*   < >  --   --    I STAT ART HCO3 mmol/L 24 1 24 4 25 4   < >  --   --     < > = values in this interval not displayed       Results from last 7 days   Lab Units 09/04/20  0829 09/02/20  1514   PROTIME seconds 15 9* 15 7*   INR  1 27* 1 25*   PTT seconds 32 28     Results from last 7 days   Lab Units 09/04/20  0551   CLARITY UA  Cloudy   COLOR UA  Yellow   SPEC GRAV UA  1 019   PH UA  5 5   GLUCOSE UA mg/dl 100 (1/10%)*   KETONES UA mg/dl Negative   BLOOD UA  Large*   PROTEIN UA mg/dl Negative   NITRITE UA  Negative   BILIRUBIN UA  Negative   UROBILINOGEN UA E U /dl 0 2   LEUKOCYTES UA  Negative   WBC UA /hpf 4-10*   RBC UA /hpf 30-50*   BACTERIA UA /hpf None Seen   EPITHELIAL CELLS WET PREP /hpf None Seen     Vital Signs:   Date/Time   Temp   Pulse   Resp   BP   MAP (mmHg)   SpO2   Calculated FIO2 (%) - Nasal Cannula   Nasal Cannula O2 Flow Rate (L/min)   O2 Device   Patient Position - Orthostatic VS    09/08/20 1129   98 9 °F (37 2 °C)   77   18   105/56   75   93 %         Nasal cannula   Sitting    09/08/20 0728   99 1 °F (37 3 °C)   90   18   129/58   83   91 %         Nasal cannula   Lying    09/08/20 0317   98 8 °F (37 1 °C)   90   18   128/59   83   87 %Abnormal           Nasal cannula   Lying    09/07/20 2321   100 7 °F (38 2 °C)Abnormal     94   18   99/58   75   92 %         Nasal cannula   Lying    09/07/20 2200                     28   2 L/min   Nasal cannula       09/07/20 2129      104      157/71                      09/07/20 1900   98 7 °F (37 1 °C)   99   17   131/61   88   95 %         Nasal cannula   Sitting    09/07/20 1556   98 7 °F (37 1 °C)   85   18   139/63   91   91 %   28   2 L/min   Nasal cannula   Sitting    09/07/20 1109   98 5 °F (36 9 °C)   80   18   122/62   87   97 %   28   2 L/min   Nasal cannula   Sitting    09/07/20 0800                     28   2 L/min   Nasal cannula       09/07/20 0733   98 6 °F (37 °C)   97   18   125/62   86   99 %   40   5 L/min   Nasal cannula   Sitting    09/07/20 0300   98 8 °F (37 1 °C)   87   18   124/68   91   95 %            Lying    09/06/20 2300   98 8 °F (37 1 °C)   77   18   85/50Abnormal     62   96 %         Nasal cannula   Sitting    09/06/20 2117      102      140/67                      09/06/20 2100                     40   5 L/min   Nasal cannula       09/06/20 1921   98 8 °F (37 1 °C)   96   18   149/72   104   97 %   40   5 L/min   Nasal cannula   Sitting    09/06/20 1513   98 4 °F (36 9 °C)   82   16   136/74   99   97 %   40   5 L/min   Nasal cannula   Sitting    09/06/20 1107   98 4 °F (36 9 °C)   77   18   145/79   107   96 %         Nasal cannula   Sitting    09/06/20 0910      89                            09/06/20 0800                96 %   40   5 L/min   Nasal cannula       09/06/20 0720   98 6 °F (37 °C)   92   18   141/69   99   92 %         Nasal cannula   Sitting            Medications:   Scheduled Medications:  acetaminophen, 975 mg, Oral, Q8H  ascorbic acid, 500 mg, Oral, Daily  aspirin, 325 mg, Oral, Daily  atorvastatin, 80 mg, Oral, Daily With Dinner  busPIRone, 15 mg, Oral, BID  cholecalciferol, 1,000 Units, Oral, Daily  docusate sodium, 100 mg, Oral, BID  ferrous sulfate, 325 mg, Oral, Daily With Breakfast  fondaparinux, 2 5 mg, Subcutaneous, Daily  furosemide, 40 mg, Intravenous, BID (diuretic)  insulin lispro, 1-10 Units, Subcutaneous, TID With Meals  insulin lispro, 1-5 Units, Subcutaneous, HS  insulin lispro, 1-6 Units, Subcutaneous, TID AC  lidocaine, 3 patch, Topical, Daily  metoprolol tartrate, 50 mg, Oral, Q12H ANA PAULA  pantoprazole, 40 mg, Oral, Q12H ANA PAULA  polyethylene glycol, 17 g, Oral, Daily  potassium chloride, 20 mEq, Oral, BID  QUEtiapine, 25 mg, Oral, HS  scopolamine, 1 patch, Transdermal, Q72H      Continuous IV Infusions:     PRN Meds:  aluminum-magnesium hydroxide-simethicone, 30 mL, Oral, Q4H PRN  bisacodyl, 10 mg, Rectal, Daily PRN  calcium carbonate, 500 mg, Oral, Daily PRN  metoclopramide, 10 mg, Intravenous, Q6H PRN  oxyCODONE, 2 5 mg, Oral, Q4H PRN  oxyCODONE, 5 mg, Oral, Q4H PRN  temazepam, 15 mg, Oral, HS PRN        Discharge Plan: D    Network Utilization Review Department  [email or web address]  ATTENTION: Please call with any questions or concerns to [phone] and carefully listen to the prompts so that you are directed to the right person  All voicemails are confidential   Mohan Mcleod all requests for admission clinical reviews, approved or denied determinations and any other requests to dedicated fax number below belonging to the campus where the patient is receiving treatment   List of dedicated fax numbers for the Facilities:  FACILITY NAME UR FAX NUMBER   ADMISSION DENIALS (Administrative/Medical Necessity) [address] [facility] (Maternity/NICU/Pediatrics) [place] [phone]   Masha Sheets [phone]   Veronica Luciano [phone]   [address] [phone]   [facility]  [phone]   [address] [facility]  [address] [place] [address] [phone]

## 2020-09-08 NOTE — PROGRESS NOTES
Progress Note - Eli Catalan 48 y o  male MRN: [id number]    Unit/Bed#: [facility] 410-01 Encounter: [id number]      CC: diabetes f/u    Subjective:   Eli Catalan is a 48y o  year old male with type 2, s/p aortic valve replacement  hypoglycemia this morning 66  He was NPO and wants to eat  He denies any nausea or vomiting   Objective:     Vitals: Blood pressure 105/56, pulse 77, temperature 98 9 °F (37 2 °C), temperature source Oral, resp  rate 18, height 5' 6" (1 676 m), weight 73 7 kg (162 lb 7 7 oz), SpO2 93 %  ,Body mass index is 26 22 kg/m²  No intake or output data in the 24 hours ending 09/08/20 1241    Physical Exam:  General Appearance: awake, appears stated age and cooperative  Head: Normocephalic, without obvious abnormality, atraumatic  Extremities: moves all extremities  Skin: clean surgical wound on anterior chest   Pulm: no labored breathing    Lab, Imaging and other studies: I have personally reviewed pertinent reports  POC Glucose (mg/dl)   Date Value   09/08/2020 110   09/08/2020 170 (H)   09/07/2020 173 (H)   09/07/2020 231 (H)   09/07/2020 164 (H)   09/07/2020 73   09/07/2020 73   09/06/2020 95   09/06/2020 176 (H)   09/06/2020 114       Assessment:  1  Type 2 DM- Hba1c- 9 1         On insulin Pump at home         Currently: lantus 30 units, ICR 1:10, correction scale         Not eating much, have been NPO for possible EGD, but patient refused     2  CAD s/p CABG- POD 6    3  Severe AS S/p Aortic valve replacement - POD 6    3   Hematemesis- resolved per patient     Plan:  Resume Insulin Pump with the following setting, order is written  Basal- 1 unit/hr from 12 am- 12am  ISF- 1:30  Insulin to carb ratio: midnight- 7am- 1:10, 7pm to midnight- 1:9 (snacks)   Target blood glucose- 100-120  Insulin: Humalog  continue to check blood glucose before meals and at bedtime  We will adjust pump settings according to BG readings        Portions of the record may have been created with voice recognition software

## 2020-09-08 NOTE — PROGRESS NOTES
Progress Note- Vielka Oropeza 48 y o  male MRN: [id number]    Unit/Bed#: [facility] 410-01 Encounter: [id number]      Assessment and Plan:    48years old male with PMH of AS status post AVR this hospitalization for whom GI was consulted for postop nausea, vomiting, hematemesis  Nausea/vomiting most likely postop - resolved  -tolerating solid diet well  -currently on scopolamine patch  -continue Reglan, Mylanta p r n  Hematemesis - resolved  -could be from gastric/esophageal erosion from excessive vomiting, PUD, Audra Rios tears  -patient adamantly refused inpatient EGD with the concern of going through another procedure and anesthesia  -outpatient EGD possibly, patient stated he would think later  -continue PPI 40 mg daily    Constipation - improved  -continue MiraLax, docusate  -Encourage ambulation    Acute anemia  -most likely blood loss during AVR  -hemoglobin stable    We will sign off as patient is stable from GI stand point and refusing EGD at this moment  If there is any question or concern, feel free to reach out to us at any time  ______________________________________________________________________    Subjective:     Patient was seen and examined at bedside  The patient denies any pain, headache, blurry vision, chest pain, palpitation, shortness of breath, N/V, abd pain        Medication Administration - last 24 hours from 09/07/2020 0914 to 09/08/2020 0914       Date/Time Order Dose Route Action Action by     09/08/2020 0831 busPIRone (BUSPAR) tablet 15 mg 15 mg Oral Given Paula Rosales RN     09/07/2020 1720 busPIRone (BUSPAR) tablet 15 mg 15 mg Oral Given Eduardo Pelayo RN     09/07/2020 2130 QUEtiapine (SEROquel) tablet 25 mg 25 mg Oral Given Shadia Maldonado     09/08/2020 0831 cholecalciferol (VITAMIN D3) tablet 1,000 Units 1,000 Units Oral Given Paula Rosales RN     09/07/2020 1720 atorvastatin (LIPITOR) tablet 80 mg 80 mg Oral Given Eduardo Pelayo RN     09/08/2020 0543 acetaminophen (TYLENOL) tablet 975 mg 975 mg Oral Given [facility]     09/07/2020 2130 acetaminophen (TYLENOL) tablet 975 mg 975 mg Oral Given [facility]     09/07/2020 1322 acetaminophen (TYLENOL) tablet 975 mg 975 mg Oral Given Kelsi Rodriguez RN     09/08/2020 0831 polyethylene glycol (MIRALAX) packet 17 g 17 g Oral Given Yasmin Smithers, RN     09/08/2020 0831 fondaparinux (ARIXTRA) subcutaneous injection 2 5 mg 2 5 mg Subcutaneous Given Yasmin Smithers, RN     09/08/2020 0831 aspirin tablet 325 mg 325 mg Oral Given Erskine Smithers, RN     09/08/2020 0831 lidocaine (LIDODERM) 5 % patch 3 patch 3 patch Topical Not Given Erskine Rex, RN     09/08/2020 0831 docusate sodium (COLACE) capsule 100 mg 100 mg Oral Given Yasmin Smithers, RN     09/07/2020 1720 docusate sodium (COLACE) capsule 100 mg 100 mg Oral Given Kelsi Rodriguez RN     09/07/2020 2219 temazepam (RESTORIL) capsule 15 mg 15 mg Oral Given [facility]     09/08/2020 0831 ferrous sulfate tablet 325 mg 325 mg Oral Given Erskine Rex, RN     09/08/2020 8579 ascorbic acid (VITAMIN C) tablet 500 mg 500 mg Oral Given Yasmin Smithers, RN     09/08/2020 0831 metoprolol tartrate (LOPRESSOR) tablet 50 mg 50 mg Oral Given Erskine Smithers, RN     09/07/2020 2129 metoprolol tartrate (LOPRESSOR) tablet 50 mg 50 mg Oral Given [facility]     09/08/2020 0831 pantoprazole (PROTONIX) EC tablet 40 mg 40 mg Oral Given Erskine Rex, RN     09/07/2020 2131 pantoprazole (PROTONIX) EC tablet 40 mg 40 mg Oral Given [facility]     09/07/2020 2134 insulin lispro (HumaLOG) 100 units/mL subcutaneous injection 1-5 Units 1 Units Subcutaneous Given [facility]     09/07/2020 1722 insulin lispro (HumaLOG) 100 units/mL subcutaneous injection 1-10 Units 2 Units Subcutaneous Given Kelsi Rdoriguez RN     09/07/2020 1124 insulin lispro (HumaLOG) 100 units/mL subcutaneous injection 1-10 Units 1 Units Subcutaneous Given Kelsi Rodriguez RN     09/08/2020 0306 insulin lispro (HumaLOG) 100 units/mL subcutaneous injection 1-6 Units 2 Units Subcutaneous Given Radha Shaffer     09/07/2020 1722 insulin lispro (HumaLOG) 100 units/mL subcutaneous injection 1-6 Units 4 Units Subcutaneous Given Izabela Palma RN     09/07/2020 1055 insulin lispro (HumaLOG) 100 units/mL subcutaneous injection 1-6 Units 1 Units Subcutaneous Not Given Izabela Palma RN     09/07/2020 2131 potassium chloride (K-DUR,KLOR-CON) CR tablet 40 mEq 40 mEq Oral Given Radha Shaffer     09/07/2020 1720 potassium chloride (K-DUR,KLOR-CON) CR tablet 40 mEq 40 mEq Oral Given Izabela Palma RN     09/07/2020 1100 potassium chloride (K-DUR,KLOR-CON) CR tablet 40 mEq 40 mEq Oral Given Izabela Palma RN     09/07/2020 1322 insulin glargine (LANTUS) subcutaneous injection 30 Units 0 3 mL 30 Units Subcutaneous Given Izabela Palma RN          Objective:     Vitals: Blood pressure 129/58, pulse 90, temperature 99 1 °F (37 3 °C), temperature source Oral, resp  rate 18, height 5' 6" (1 676 m), weight 73 7 kg (162 lb 7 7 oz), SpO2 91 %  ,Body mass index is 26 22 kg/m²  Intake/Output Summary (Last 24 hours) at 9/8/2020 0914  Last data filed at 9/7/2020 0945  Gross per 24 hour   Intake    Output 575 ml   Net -575 ml       Physical Exam:   General:  Middle aged male patient lying in bed, breathing well on room air, no acute distress  HEENT: NC/AT, PERRL, EOM - normal  Neck: Supple  Pulm/Chest: Normal chest wall expansion, clear breath sounds on both side, no wheezing/rhonchi or crackles appreciated  CVS: RRR, normal S1&S2, no murmur appreciated, capillary refill <2s  Abd: soft, non tender, non distended, bowel sounds +  MSK: move all 4 extremities spontaneously  Skin: warm  CNS: AAOx3, no acute focal neuro deficit, Motor 5/5 strength, no sensory deficit      Invasive Devices     Central Venous Catheter Line            CVC Central Lines 09/02/20 Triple 6 days                Lab Results:  No results displayed because visit has over 200 results            Imaging Studies: I have personally reviewed pertinent imaging studies

## 2020-09-08 NOTE — PROGRESS NOTES
Progress Note - Cardiothoracic Surgery   Pat Sheriff 48 y o  male MRN: [id number]  Unit/Bed#: [facility] 410-01 Encounter: [id number]    Aortic stenosis, Non-Rheumatic, Coronary artery disease  S/P aortic valve replacement and coronary artery bypass grafting; POD # 6      24 Hour Events: CXR yesterday showed small left apical pneumothorax  On 4L NC  Pain well controlled  Tolerating diet, (+) BM  Ambulating well      Medications:   Scheduled Meds:  Current Facility-Administered Medications   Medication Dose Route Frequency Provider Last Rate    acetaminophen  975 mg Oral Q8H Greg Barakat, PA-C      aluminum-magnesium hydroxide-simethicone  30 mL Oral Q4H PRN Sabino Alcantar MD      ascorbic acid  500 mg Oral Daily Liliya Daugherty, PA-MYKE      aspirin  325 mg Oral Daily Greg Roshni, PA-C      atorvastatin  80 mg Oral Daily With [employer] Roshni, PA-C      bisacodyl  10 mg Rectal Daily PRN [place], PA-C      busPIRone  15 mg Oral BID [place], PA-C      calcium carbonate  500 mg Oral Daily PRN [place], PA-C      cholecalciferol  1,000 Units Oral Daily [place], PA-C      docusate sodium  100 mg Oral BID [place], PA-C      ferrous sulfate  325 mg Oral Daily With Breakfast Liliya Daugherty, PA-MYKE      fondaparinux  2 5 mg Subcutaneous Daily Greg Barakat, PA-C      insulin lispro  1-10 Units Subcutaneous TID With Meals MD Sharlene Saeed Pall insulin lispro  1-5 Units Subcutaneous HS Veronica Starkey MD      insulin lispro  1-6 Units Subcutaneous TID AC Veronica Starkey MD      lidocaine  3 patch Topical Daily [place], MARILYN      metoclopramide  10 mg Intravenous Q6H PRN Sabino Alcantar MD      metoprolol tartrate  50 mg Oral Q12H [facility] Monica Carvalho MD      oxyCODONE  2 5 mg Oral Q4H PRN [place], MARILYN      oxyCODONE  5 mg Oral Q4H PRN [place], PA-MYKE      pantoprazole  40 mg Oral Q12H [address] [place] MD Nick      polyethylene glycol  17 g Oral Daily [place], PA-C  QUEtiapine  25 mg Oral HS Charlee Dooley PA-C      scopolamine  1 patch Transdermal Q72H Charlee Dooley PA-C      sodium chloride  20 mL/hr Intravenous Continuous Charlee Dooley PA-C 20 mL/hr (09/02/20 1436)    temazepam  15 mg Oral HS PRN Charlee Dooley PA-C       Continuous Infusions:sodium chloride, 20 mL/hr, Last Rate: 20 mL/hr (09/02/20 1436)      PRN Meds: aluminum-magnesium hydroxide-simethicone    bisacodyl    calcium carbonate    metoclopramide    oxyCODONE    oxyCODONE    temazepam    Vitals:   Vitals:    09/07/20 2321 09/08/20 0317 09/08/20 0600 09/08/20 0728   BP: 99/58 128/59  129/58   BP Location: Left arm Left arm  Left arm   Pulse: 94 90  90   Resp: 18 18  18   Temp: (!) 100 7 °F (38 2 °C) 98 8 °F (37 1 °C)  99 1 °F (37 3 °C)   TempSrc: Oral Oral  Oral   SpO2: 92% (!) 87%  91%   Weight:   73 7 kg (162 lb 7 7 oz)    Height:           Telemetry: NSR; Heart Rate:91    Respiratory:   SpO2: SpO2: 91 %, SpO2 Activity: SpO2 Activity: At Rest; 4L    Intake/Output:     Intake/Output Summary (Last 24 hours) at 9/8/2020 0900  Last data filed at 9/7/2020 0945  Gross per 24 hour   Intake    Output 575 ml   Net -575 ml         Weights:   Weight (last 2 days)     Date/Time   Weight    09/08/20 0600   73 7 (162 48)    09/07/20 0600   75 5 (166 45)    09/06/20 0600   76 5 (168 65)            Results:   Results from last 7 days   Lab Units 09/08/20  0520 09/07/20  0507 09/06/20  0551   WBC Thousand/uL 16 21* 13 81* 16 44*   HEMOGLOBIN g/dL 8 3* 8 4* 8 4*   HEMATOCRIT % 25 3* 25 0* 25 3*   PLATELETS Thousands/uL 284 223 190     Results from last 7 days   Lab Units 09/08/20  0520 09/07/20  0507 09/05/20  0535  09/02/20  1257   SODIUM mmol/L 139 142 139   < >  --    POTASSIUM mmol/L 4 2 3 2* 3 5   < >  --    CHLORIDE mmol/L 109* 108 105   < >  --    CO2 mmol/L 26 28 30   < >  --    CO2, I-STAT mmol/L  --   --   --   --  26   BUN mg/dL 8 8 15   < >  --    CREATININE mg/dL 0 52* 0 61 0 52*   < >  --    GLUCOSE, ISTAT mg/dl  --   --   --   --  103   CALCIUM mg/dL 7 9* 7 8* 7 8*   < >  --     < > = values in this interval not displayed  Results from last 7 days   Lab Units 09/04/20  0829 09/02/20  1514   INR  1 27* 1 25*   PTT seconds 32 28         Invasive Lines/Tubes:  Invasive Devices     Central Venous Catheter Line            CVC Central Lines 09/02/20 Triple 6 days              Physical Exam:    HEENT/NECK:  PERRLA  No jugular venous distention  Cardiac: Regular rate and rhythm and No murmurs/rubs/gallops  Pulmonary:  Breath sounds diminished in the bases bilaterally  and No rales/rhonchi/wheezes  Abdomen:  Non-tender, Non-distended and Normal bowel sounds  Incisions: Sternum is stable  Incision is clean, dry, and intact  Extremities: Extremities warm/dry and Trace edema B/L  Neuro: Alert and oriented X 3, Sensation is grossly intact and No focal deficits  Skin: Warm/Dry, without rashes or lesions  Studies:   CXR: 9/7  IMPRESSION:  There is a new small left apical pneumothorax      Diffuse bilateral groundglass opacities which could be due to pneumonia or pulmonary edema  Assessment:  Principal Problem:    Nonrheumatic aortic valve stenosis  Active Problems:    Type 1 diabetes mellitus with retinopathy ([facility] [address] )    Essential hypertension    Coronary artery disease involving native coronary artery of native heart without angina pectoris    Dyslipidemia    Aortic stenosis due to bicuspid aortic valve    History of ITP    History of coronary angioplasty with insertion of stent    Acute blood loss as cause of postoperative anemia    Post-operative nausea and vomiting       Aortic stenosis, Non-Rheumatic, Coronary artery disease  S/P aortic valve replacement and coronary artery bypass grafting; POD # 6    Plan:    1  Cardiac:   Sinus Rhythm; HR/BP well controlled   Lopressor, 50 mg PO BID  Continue ASA and Statin therapy  D/C central IV access today  Continue DVT prophylaxis    2   Pulmonary:    D/C chest tubes today   CXR this AM to assess new left apical PTX    3  Renal:   Intake/Output net: -1400 mL/24 hours  Post op Creatinine stable; Follow up labs prn    4  Neuro:    Neurologically intact; No active issues  Incisional pain well-controlled  Continue Tylenol, 975 mg PO q 8, standing dose  Continue Oxycodone, 2 5 to 5 mg PO q 4 hours prn pain    5  GI:  Nausea resolved  No further hematemesis  GI recommending EGD tomorrow; Patient is refusing  Tolerating TLC 2 3 gm sodium diet  Maintain 1800 mL daily fluid restriction   Continue stool softeners and prn suppository  Continue GI prophylaxis    6  Endo:   No history of diabetes; Glucose well-controlled with sliding scale coverage  Resume insulin pump, per endocrine    7    Hematology:    APOBLA    Hgb stable    Check daily CBC      8  Disposition:      Anticipate discharge to home pending results of CXR this morning and when weaned from oxygen  VTE Pharmacologic Prophylaxis: Sequential compression device (Venodyne)  and Fondaparinux (Arixtra)  VTE Mechanical Prophylaxis: sequential compression device    Collaborative rounds completed with Dr Fregoso Main    Plan of care discussed with bedside nurse    SIGNATURE: Roxane Alvarenga PA-C  DATE: September 8, 2020  TIME: 8:58 AM

## 2020-09-09 ENCOUNTER — APPOINTMENT (INPATIENT)
Dept: RADIOLOGY | Facility: HOSPITAL | Age: 55
DRG: 220 | End: 2020-09-09
Payer: COMMERCIAL

## 2020-09-09 PROBLEM — J95.2 ACUTE POSTOPERATIVE PULMONARY INSUFFICIENCY (HCC): Status: ACTIVE | Noted: 2020-09-09

## 2020-09-09 LAB
ANION GAP SERPL CALCULATED.3IONS-SCNC: 6 MMOL/L (ref 4–13)
BUN SERPL-MCNC: 12 MG/DL (ref 5–25)
CALCIUM SERPL-MCNC: 8 MG/DL (ref 8.3–10.1)
CHLORIDE SERPL-SCNC: 105 MMOL/L (ref 100–108)
CO2 SERPL-SCNC: 28 MMOL/L (ref 21–32)
CREAT SERPL-MCNC: 0.58 MG/DL (ref 0.6–1.3)
ERYTHROCYTE [DISTWIDTH] IN BLOOD BY AUTOMATED COUNT: 16.3 % (ref 11.6–15.1)
GFR SERPL CREATININE-BSD FRML MDRD: 116 ML/MIN/1.73SQ M
GLUCOSE SERPL-MCNC: 138 MG/DL (ref 65–140)
GLUCOSE SERPL-MCNC: 159 MG/DL (ref 65–140)
GLUCOSE SERPL-MCNC: 170 MG/DL (ref 65–140)
GLUCOSE SERPL-MCNC: 181 MG/DL (ref 65–140)
GLUCOSE SERPL-MCNC: 76 MG/DL (ref 65–140)
GLUCOSE SERPL-MCNC: 77 MG/DL (ref 65–140)
HCT VFR BLD AUTO: 26.4 % (ref 36.5–49.3)
HGB BLD-MCNC: 8.9 G/DL (ref 12–17)
MCH RBC QN AUTO: 29.3 PG (ref 26.8–34.3)
MCHC RBC AUTO-ENTMCNC: 33.7 G/DL (ref 31.4–37.4)
MCV RBC AUTO: 87 FL (ref 82–98)
PLATELET # BLD AUTO: 404 THOUSANDS/UL (ref 149–390)
PMV BLD AUTO: 10.8 FL (ref 8.9–12.7)
POTASSIUM SERPL-SCNC: 3.4 MMOL/L (ref 3.5–5.3)
RBC # BLD AUTO: 3.04 MILLION/UL (ref 3.88–5.62)
SODIUM SERPL-SCNC: 139 MMOL/L (ref 136–145)
WBC # BLD AUTO: 16.63 THOUSAND/UL (ref 4.31–10.16)

## 2020-09-09 PROCEDURE — 80048 BASIC METABOLIC PNL TOTAL CA: CPT | Performed by: PHYSICIAN ASSISTANT

## 2020-09-09 PROCEDURE — 99223 1ST HOSP IP/OBS HIGH 75: CPT | Performed by: INTERNAL MEDICINE

## 2020-09-09 PROCEDURE — 71250 CT THORAX DX C-: CPT

## 2020-09-09 PROCEDURE — 99024 POSTOP FOLLOW-UP VISIT: CPT | Performed by: THORACIC SURGERY (CARDIOTHORACIC VASCULAR SURGERY)

## 2020-09-09 PROCEDURE — 99232 SBSQ HOSP IP/OBS MODERATE 35: CPT | Performed by: INTERNAL MEDICINE

## 2020-09-09 PROCEDURE — G1004 CDSM NDSC: HCPCS

## 2020-09-09 PROCEDURE — 82948 REAGENT STRIP/BLOOD GLUCOSE: CPT

## 2020-09-09 PROCEDURE — 85027 COMPLETE CBC AUTOMATED: CPT | Performed by: PHYSICIAN ASSISTANT

## 2020-09-09 RX ORDER — POTASSIUM CHLORIDE 20 MEQ/1
40 TABLET, EXTENDED RELEASE ORAL ONCE
Status: COMPLETED | OUTPATIENT
Start: 2020-09-09 | End: 2020-09-09

## 2020-09-09 RX ORDER — FUROSEMIDE 10 MG/ML
40 INJECTION INTRAMUSCULAR; INTRAVENOUS DAILY
Status: DISCONTINUED | OUTPATIENT
Start: 2020-09-09 | End: 2020-09-10 | Stop reason: HOSPADM

## 2020-09-09 RX ORDER — POTASSIUM CHLORIDE 20 MEQ/1
20 TABLET, EXTENDED RELEASE ORAL 2 TIMES DAILY
Status: DISCONTINUED | OUTPATIENT
Start: 2020-09-09 | End: 2020-09-10 | Stop reason: HOSPADM

## 2020-09-09 RX ORDER — FUROSEMIDE 10 MG/ML
40 INJECTION INTRAMUSCULAR; INTRAVENOUS DAILY
Status: DISCONTINUED | OUTPATIENT
Start: 2020-09-10 | End: 2020-09-09

## 2020-09-09 RX ORDER — POTASSIUM CHLORIDE 20 MEQ/1
20 TABLET, EXTENDED RELEASE ORAL ONCE
Status: DISCONTINUED | OUTPATIENT
Start: 2020-09-09 | End: 2020-09-09

## 2020-09-09 RX ORDER — FUROSEMIDE 10 MG/ML
40 INJECTION INTRAMUSCULAR; INTRAVENOUS ONCE
Status: COMPLETED | OUTPATIENT
Start: 2020-09-09 | End: 2020-09-09

## 2020-09-09 RX ORDER — POTASSIUM CHLORIDE 20 MEQ/1
20 TABLET, EXTENDED RELEASE ORAL 3 TIMES DAILY
Status: DISCONTINUED | OUTPATIENT
Start: 2020-09-09 | End: 2020-09-09

## 2020-09-09 RX ORDER — POTASSIUM CHLORIDE 20 MEQ/1
20 TABLET, EXTENDED RELEASE ORAL DAILY
Status: DISCONTINUED | OUTPATIENT
Start: 2020-09-10 | End: 2020-09-09

## 2020-09-09 RX ADMIN — LIDOCAINE 5% 3 PATCH: 700 PATCH TOPICAL at 10:09

## 2020-09-09 RX ADMIN — ACETAMINOPHEN 975 MG: 325 TABLET, FILM COATED ORAL at 06:35

## 2020-09-09 RX ADMIN — FERROUS SULFATE TAB 325 MG (65 MG ELEMENTAL FE) 325 MG: 325 (65 FE) TAB at 10:00

## 2020-09-09 RX ADMIN — PANTOPRAZOLE SODIUM 40 MG: 40 TABLET, DELAYED RELEASE ORAL at 10:08

## 2020-09-09 RX ADMIN — POTASSIUM CHLORIDE 20 MEQ: 1500 TABLET, EXTENDED RELEASE ORAL at 10:12

## 2020-09-09 RX ADMIN — BUSPIRONE HYDROCHLORIDE 15 MG: 10 TABLET ORAL at 17:42

## 2020-09-09 RX ADMIN — DOCUSATE SODIUM 100 MG: 100 CAPSULE, LIQUID FILLED ORAL at 17:42

## 2020-09-09 RX ADMIN — POTASSIUM CHLORIDE 40 MEQ: 1500 TABLET, EXTENDED RELEASE ORAL at 14:51

## 2020-09-09 RX ADMIN — ACETAMINOPHEN 975 MG: 325 TABLET, FILM COATED ORAL at 14:42

## 2020-09-09 RX ADMIN — DOCUSATE SODIUM 100 MG: 100 CAPSULE, LIQUID FILLED ORAL at 10:09

## 2020-09-09 RX ADMIN — SCOPALAMINE 1 PATCH: 1 PATCH, EXTENDED RELEASE TRANSDERMAL at 10:15

## 2020-09-09 RX ADMIN — PANTOPRAZOLE SODIUM 40 MG: 40 TABLET, DELAYED RELEASE ORAL at 22:15

## 2020-09-09 RX ADMIN — OXYCODONE HYDROCHLORIDE 5 MG: 5 TABLET ORAL at 22:15

## 2020-09-09 RX ADMIN — FONDAPARINUX SODIUM 2.5 MG: 2.5 INJECTION, SOLUTION SUBCUTANEOUS at 09:57

## 2020-09-09 RX ADMIN — ATORVASTATIN CALCIUM 80 MG: 80 TABLET, FILM COATED ORAL at 17:42

## 2020-09-09 RX ADMIN — METOPROLOL TARTRATE 50 MG: 50 TABLET, FILM COATED ORAL at 10:02

## 2020-09-09 RX ADMIN — ASPIRIN 325 MG ORAL TABLET 325 MG: 325 PILL ORAL at 10:08

## 2020-09-09 RX ADMIN — OXYCODONE HYDROCHLORIDE AND ACETAMINOPHEN 500 MG: 500 TABLET ORAL at 10:07

## 2020-09-09 RX ADMIN — POLYETHYLENE GLYCOL 3350 17 G: 17 POWDER, FOR SOLUTION ORAL at 10:09

## 2020-09-09 RX ADMIN — BUSPIRONE HYDROCHLORIDE 15 MG: 10 TABLET ORAL at 10:01

## 2020-09-09 RX ADMIN — POTASSIUM CHLORIDE 20 MEQ: 1500 TABLET, EXTENDED RELEASE ORAL at 17:41

## 2020-09-09 RX ADMIN — QUETIAPINE FUMARATE 25 MG: 25 TABLET ORAL at 22:15

## 2020-09-09 RX ADMIN — ACETAMINOPHEN 975 MG: 325 TABLET, FILM COATED ORAL at 22:15

## 2020-09-09 RX ADMIN — Medication 1000 UNITS: at 10:08

## 2020-09-09 RX ADMIN — FUROSEMIDE 40 MG: 10 INJECTION, SOLUTION INTRAMUSCULAR; INTRAVENOUS at 14:50

## 2020-09-09 RX ADMIN — METOPROLOL TARTRATE 50 MG: 50 TABLET, FILM COATED ORAL at 22:15

## 2020-09-09 NOTE — RESTORATIVE TECHNICIAN NOTE
Restorative Specialist Mobility Note       Activity: Ambulate in ricardo, Chair     Assistive Device: None

## 2020-09-09 NOTE — PROGRESS NOTES
General Cardiology   Progress Note -  Team One   Hanna Walsh 48 y o  male MRN: [id number]    Unit/Bed#: [facility] 410-01 Encounter: [id number]    Assessment:    CAD: s/p CABG x3 with LIMA-LAD, SVG-OM1, SVG-right PLB  POD #7  · Intraoperative NORMAN:  EF 60%, RWMA  · Rhythm management:  Lopressor 50 mg BID  · Volume management:   Lasix 40 mg IV x 2 doses 9/8  Net output +1 1 L which is likely inaccurate as patient reports significant diuresis and weight is down 6 lb today  · Aspirin, beta-blocker, statin     Severe AS: s/p bioprosthetic AVR with 25 mm Fontana Inspiris pericardial tissue valve  POD #7  · Intraoperative NORMAN with normal device function and no PVL      Respiratory insufficiency: Good diuretic response with 6 lb weight loss despite inaccurate I&Os documented  Oxygen weaned off this morning  Chest x-ray small left apical pneumothorax unchanged since yesterday      Preserved biventricular systolic function:  Intraoperative NORMAN EF 60%  Volume management as above      Essential hypertension:  Average /56 on Lopressor 50 mg BID     Hyperlipidemia:  Lipid 07/2018 , , HDL 52,  on atorvastatin 80 mg     Type 1 diabetes:  Hemoglobin A1c 9 1  Management per primary team     Plan/Recommendations:  · Now on room air  If able to maintain oxygen saturations likely discharge later today  · Continue current medication regimen  · Follow-up with Evelyne Spurling, CRNP 09/29/2020  · Will subsequently follow-up with Dr Bong Briones    ______________________________________________________________    Subjective    Patient seen and examined  No acute events overnight  Denies any chest pain, shortness of breath or palpitations  Review of Systems   Constitution: Negative  Negative for chills  Cardiovascular: Negative for chest pain, dyspnea on exertion, leg swelling, near-syncope, orthopnea, palpitations, paroxysmal nocturnal dyspnea and syncope  Respiratory: Negative    Negative for shortness of breath  Gastrointestinal: Negative for diarrhea, nausea and vomiting  Neurological: Negative for dizziness, light-headedness and weakness  Psychiatric/Behavioral: Negative for altered mental status  All other systems reviewed and are negative  Objective:   Vitals: Blood pressure 104/53, pulse 98, temperature 98 9 °F (37 2 °C), temperature source Oral, resp  rate 18, height 5' 6" (1 676 m), weight 70 9 kg (156 lb 4 9 oz), SpO2 93 %  ,     Wt Readings from Last 3 Encounters:   09/09/20 70 9 kg (156 lb 4 9 oz)   08/31/20 73 8 kg (162 lb 11 2 oz)   08/21/20 73 8 kg (162 lb 12 8 oz)        Lab Results   Component Value Date    CREATININE 0 58 (L) 09/09/2020    CREATININE 0 52 (L) 09/08/2020    CREATININE 0 61 09/07/2020         Body mass index is 25 23 kg/m²  ,     Systolic (33KRC), MPC:583 , Min:88 , JAT:351     Diastolic (85JJW), TUU:93, Min:51, Max:64          Intake/Output Summary (Last 24 hours) at 9/9/2020 0942  Last data filed at 9/8/2020 2235  Gross per 24 hour   Intake 720 ml   Output    Net 720 ml     Weight (last 2 days)     Date/Time   Weight    09/09/20 0600   70 9 (156 31)    09/08/20 0600   73 7 (162 48)    09/07/20 0600   75 5 (166 45)            Telemetry Review: No significant arrhythmias seen on telemetry review  Physical Exam  Vitals signs and nursing note reviewed  Constitutional:       General: He is not in acute distress  Appearance: He is well-developed and normal weight  Comments: On RA in NAD   HENT:      Head: Normocephalic and atraumatic  Neck:      Musculoskeletal: Normal range of motion and neck supple  Cardiovascular:      Rate and Rhythm: Normal rate and regular rhythm  Heart sounds: Normal heart sounds  No murmur  No friction rub  Pulmonary:      Effort: Pulmonary effort is normal  No respiratory distress  Breath sounds: Normal breath sounds  No wheezing or rales     Abdominal:      General: Bowel sounds are normal       Palpations: Abdomen is soft    Musculoskeletal: Normal range of motion  Right lower leg: No edema  Left lower leg: No edema  Skin:     General: Skin is warm and dry  Comments: Sternal incision clean and dry   Neurological:      Mental Status: He is alert and oriented to person, place, and time  Psychiatric:         Behavior: Behavior normal          Thought Content:  Thought content normal          Judgment: Judgment normal          LABORATORY RESULTS      CBC with diff:   Results from last 7 days   Lab Units 09/09/20  0456 09/08/20  0520 09/07/20  0507 09/06/20  0551 09/05/20  0535 09/04/20  1618 09/04/20  0501 09/03/20  0507   WBC Thousand/uL 16 63* 16 21* 13 81* 16 44* 23 66*  --  21 18* 15 22*   HEMOGLOBIN g/dL 8 9* 8 3* 8 4* 8 4* 8 8* 9 5* 6 0* 7 8*   HEMATOCRIT % 26 4* 25 3* 25 0* 25 3* 25 7* 28 6* 18 2* 23 4*   MCV fL 87 89 88 88 87  --  92 91   PLATELETS Thousands/uL 404* 284 223 190 152  --  137* 136*   MCH pg 29 3 29 2 29 5 29 2 29 6  --  30 5 30 2   MCHC g/dL 33 7 32 8 33 6 33 2 34 2  --  33 0 33 3   RDW % 16 3* 16 5* 16 2* 16 5* 16 8*  --  15 1 15 0   MPV fL 10 8 10 8 10 8 11 4 11 9  --  11 7 10 6       CMP:  Results from last 7 days   Lab Units 09/09/20  0456 09/08/20  0520 09/07/20  0507 09/05/20  0535 09/04/20  0501 09/03/20  0422 09/02/20  2039  09/02/20  1257 09/02/20  1255 09/02/20  1215 09/02/20  1129 09/02/20  1053 09/02/20  1027 09/02/20  1011 09/02/20  1005   POTASSIUM mmol/L 3 4* 4 2 3 2* 3 5 3 7 4 2 4 4   < >  --  4 6  --   --   --   --   --   --    CHLORIDE mmol/L 105 109* 108 105 112* 116*  --   --   --  116*  --   --   --   --   --   --    CO2 mmol/L 28 26 28 30 27 26  --   --   --  24  --   --   --   --   --   --    CO2, I-STAT mmol/L  --   --   --   --   --   --   --   --  26  --  26 27 27 28 26  --    BUN mg/dL 12 8 8 15 17 11  --   --   --  7  --   --   --   --   --   --    CREATININE mg/dL 0 58* 0 52* 0 61 0 52* 0 60 0 62  --   --   --  0 49*  --   --   --   --   --   --    GLUCOSE, ISTAT mg/dl  --   --   --   --   --   --   --   --  103  --  128 162* 151* 159* 149* 148*   CALCIUM mg/dL 8 0* 7 9* 7 8* 7 8* 7 6* 8 0*  --   --   --  7 6*  --   --   --   --   --   --    EGFR ml/min/1 73sq m 116 121 113 121 114 112  --   --   --  124  --   --   --   --   --   --     < > = values in this interval not displayed  BMP:  Results from last 7 days   Lab Units 09/09/20  0456 09/08/20  0520 09/07/20  0507 09/05/20  0535 09/04/20  0501 09/03/20  0422 09/02/20  2039  09/02/20  1257 09/02/20  1255   POTASSIUM mmol/L 3 4* 4 2 3 2* 3 5 3 7 4 2 4 4   < >  --  4 6   CHLORIDE mmol/L 105 109* 108 105 112* 116*  --   --   --  116*   CO2 mmol/L 28 26 28 30 27 26  --   --   --  24   CO2, I-STAT mmol/L  --   --   --   --   --   --   --   --  26  --    BUN mg/dL 12 8 8 15 17 11  --   --   --  7   CREATININE mg/dL 0 58* 0 52* 0 61 0 52* 0 60 0 62  --   --   --  0 49*   GLUCOSE, ISTAT mg/dl  --   --   --   --   --   --   --   --  103  --    CALCIUM mg/dL 8 0* 7 9* 7 8* 7 8* 7 6* 8 0*  --   --   --  7 6*    < > = values in this interval not displayed         Lab Results   Component Value Date    NTBNP 1,460 (H) 08/31/2017             Results from last 7 days   Lab Units 09/03/20  0507   MAGNESIUM mg/dL 2 5                     Results from last 7 days   Lab Units 09/04/20  0829 09/02/20  1514   INR  1 27* 1 25*       Lipid Profile:   Lab Results   Component Value Date    CHOL 259 (H) 10/15/2013     Lab Results   Component Value Date    HDL 53 07/25/2018     (H) 12/07/2017     (H) 09/01/2017     Lab Results   Component Value Date    LDLCALC 102 (H) 07/25/2018    LDLCALC 51 12/07/2017    LDLCALC 58 09/01/2017     Lab Results   Component Value Date    TRIG 225 (H) 07/25/2018    TRIG 98 12/07/2017    TRIG 62 09/01/2017       Cardiac testing:   Results for orders placed in visit on 12/21/17   Echo complete with contrast if indicated    Narrative [address], [address] [place] [address] [phone]     Transthoracic Echocardiogram   Limited 2D, Doppler, and Color Doppler     Study date:  21-Dec-2017     Patient: Miky Montanez   MR number: [id number]   Account number: [de-identified]   : 1965   Age: 47 years   Gender: Male   Status: Outpatient   Location: [address] [place] [address]   Height: 65 in   Weight: 174 7 lb   BP: 120/ 62 mmHg     Indications: Limited study; evaluate ventricular function     Diagnoses: I42 9 - Cardiomyopathy, unspecified     Sonographer:  Malvin Orellana [facility]   Primary Physician:  Maria Del Carmen Wallis   Referring Physician:  Dafne Nelson MD   Group:  [id number]   Interpreting Physician:  Emi Lui MD     SUMMARY     LEFT VENTRICLE:   Size was normal    Systolic function was normal  Ejection fraction was estimated to be 60 %  There was mild hypokinesis of the mid anteroseptal and apical septal wall(s)  Wall thickness was increased  There was mild concentric hypertrophy  LEFT ATRIUM:   The atrium was mildly dilated  MITRAL VALVE:   There was mild regurgitation  AORTIC VALVE:   The valve was trileaflet  Leaflets exhibited moderately increased thickness, moderate calcification, and moderately reduced cuspal separation  There was mild regurgitation  TRICUSPID VALVE:   There was mild regurgitation  HISTORY: PRIOR HISTORY: Cardiomyopathy, CAD s/p PCI, hypertension, aortic stenosis, type I diabetes     PROCEDURE: The study was performed in the [address] [place] [address]  This was a routine study  The transthoracic approach was used  The study included limited 2D imaging, limited spectral Doppler, and color Doppler  The heart rate   was 66 bpm, at the start of the study  Images were obtained from the parasternal, apical, subcostal, and suprasternal notch acoustic windows  Image quality was adequate       LEFT VENTRICLE: Size was normal  Systolic function was normal  Ejection fraction was estimated to be 60 %  There was mild hypokinesis of the mid anteroseptal and apical septal wall(s)  Wall thickness was increased  There was mild   concentric hypertrophy  RIGHT VENTRICLE: The size was normal  Systolic function was normal  Wall thickness was normal      LEFT ATRIUM: The atrium was mildly dilated  RIGHT ATRIUM: Size was normal      MITRAL VALVE: Valve structure was normal  There was normal leaflet separation  DOPPLER: The transmitral velocity was within the normal range  There was no evidence for stenosis  There was mild regurgitation  AORTIC VALVE: The valve was trileaflet  Leaflets exhibited moderately increased thickness, moderate calcification, and moderately reduced cuspal separation  DOPPLER: There was mild regurgitation  TRICUSPID VALVE: DOPPLER: There was mild regurgitation  PULMONIC VALVE: Leaflets exhibited normal thickness, no calcification, and normal cuspal separation  DOPPLER: The transpulmonic velocity was within the normal range  There was no regurgitation  PERICARDIUM: There was no pericardial effusion  The pericardium was normal in appearance  AORTA: The root exhibited normal size       SYSTEM MEASUREMENT TABLES     2D   %FS: 40 72 %   EDV(Teich): 112 95 ml   EF Biplane: 62 17 %   EF(Cube): 79 17 %   EF(Teich): 71 33 %   ESV(Cube): 24 55 ml   ESV(Teich): 32 38 ml   IVSd: 1 19 cm   LVEDV MOD A2C: 92 76 ml   LVEDV MOD A4C: 98 47 ml   LVEDV MOD BP: 101 53 ml   LVEF MOD A2C: 60 95 %   LVEF MOD A4C: 62 61 %   LVESV MOD A2C: 36 22 ml   LVESV MOD A4C: 36 81 ml   LVESV MOD BP: 38 41 ml   LVIDd: 4 9 cm   LVIDs: 2 91 cm   LVLd A2C: 9 35 cm   LVLd A4C: 8 21 cm   LVLs A2C: 7 37 cm   LVLs A4C: 6 61 cm   LVPWd: 1 16 cm   SI(Cube): 49 89 ml/m2   SI(Teich): 43 09 ml/m2   SV MOD A2C: 56 54 ml   SV MOD A4C: 61 65 ml   SV(Cube): 93 29 ml   SV(Teich): 80 57 ml     [facility]     Prepared and electronically signed by     Steffany Reilly MD   Signed 21-Dec-2017 15:51:41     No results found for this or any previous visit  No results found for this or any previous visit  No procedure found  No results found for this or any previous visit        Meds/Allergies   all current active meds have been reviewed, current meds:   Current Facility-Administered Medications   Medication Dose Route Frequency    acetaminophen (TYLENOL) tablet 975 mg  975 mg Oral Q8H    aluminum-magnesium hydroxide-simethicone (MYLANTA) 200-200-20 mg/5 mL oral suspension 30 mL  30 mL Oral Q4H PRN    ascorbic acid (VITAMIN C) tablet 500 mg  500 mg Oral Daily    aspirin tablet 325 mg  325 mg Oral Daily    atorvastatin (LIPITOR) tablet 80 mg  80 mg Oral Daily With Dinner    bisacodyl (DULCOLAX) rectal suppository 10 mg  10 mg Rectal Daily PRN    busPIRone (BUSPAR) tablet 15 mg  15 mg Oral BID    calcium carbonate (TUMS) chewable tablet 500 mg  500 mg Oral Daily PRN    cholecalciferol (VITAMIN D3) tablet 1,000 Units  1,000 Units Oral Daily    docusate sodium (COLACE) capsule 100 mg  100 mg Oral BID    ferrous sulfate tablet 325 mg  325 mg Oral Daily With Breakfast    fondaparinux (ARIXTRA) subcutaneous injection 2 5 mg  2 5 mg Subcutaneous Daily    [START ON 9/10/2020] furosemide (LASIX) injection 40 mg  40 mg Intravenous Daily    insulin lispro (HumaLOG) FOR PUMP REFILLS 300 Units  300 Units Subcutaneous Insulin Pump Daily PRN    lidocaine (LIDODERM) 5 % patch 3 patch  3 patch Topical Daily    metoclopramide (REGLAN) injection 10 mg  10 mg Intravenous Q6H PRN    metoprolol tartrate (LOPRESSOR) tablet 50 mg  50 mg Oral Q12H ANA PAULA    oxyCODONE (ROXICODONE) IR tablet 2 5 mg  2 5 mg Oral Q4H PRN    oxyCODONE (ROXICODONE) IR tablet 5 mg  5 mg Oral Q4H PRN    pantoprazole (PROTONIX) EC tablet 40 mg  40 mg Oral Q12H [facility]    PATIENT MAINTAINED INSULIN PUMP 1 each  1 each Subcutaneous Q8H    polyethylene glycol (MIRALAX) packet 17 g  17 g Oral Daily    potassium chloride (K-DUR,KLOR-CON) CR tablet 20 mEq  20 mEq Oral TID    [START ON 9/10/2020] potassium chloride (K-DUR,KLOR-CON) CR tablet 20 mEq  20 mEq Oral Daily    QUEtiapine (SEROquel) tablet 25 mg  25 mg Oral HS    scopolamine (TRANSDERM-SCOP) 1 5 mg/3 days TD 72 hr patch 1 patch  1 patch Transdermal Q72H    temazepam (RESTORIL) capsule 15 mg  15 mg Oral HS PRN    and PTA meds:   Prior to Admission Medications   Prescriptions Last Dose Informant Patient Reported? Taking?    NEL MICROLET LANCETS lancets  Self No No   Sig: by Other route 4 (four) times a day Use as instructed   Cholecalciferol (VITAMIN D) 2000 units CAPS 2020 at Unknown time Self Yes Yes   Sig: TK 1 C PO QD   INSULIN SYRINGE 1CC/29G 29G X 1/2" 1 ML MISC  Self No No   Sig: Inject as directed 3 (three) times a day with meals Use as directed   Insulin Infusion Pump KIT  Self Yes No   Si Units/hr by Subcutaneous Insulin Pump route continuous   Insulin Infusion Pump Supplies (MINIMED INFUSION SET-) MISC  Self Yes No   Sig: by Does not apply route   Insulin Infusion Pump Supplies (MINIMED RESERVOIR 3ML) MISC  Self Yes No   Sig: by Does not apply route   QUEtiapine (SEROquel) 25 mg tablet 2020 at Unknown time Self Yes Yes   Sig: TAKE 1 TABLET BY MOUTH EVERY DAY EVERY NIGHT   amoxicillin (AMOXIL) 500 mg capsule 2020 Self Yes No   Sig: Prior dental   aspirin (ECOTRIN LOW STRENGTH) 81 mg EC tablet 2020 at Unknown time Self No Yes   Sig: Take 1 tablet by mouth daily   atorvastatin (LIPITOR) 80 mg tablet 2020 at Unknown time Self No Yes   Sig: Take 1 tablet by mouth daily with dinner   busPIRone (BUSPAR) 15 mg tablet 2020 at Unknown time  Yes Yes   glucagon (GLUCAGON EMERGENCY) 1 MG injection  Self No No   Sig: Inject 1 mg under the skin once as needed for low blood sugar for up to 1 dose   Patient not taking: Reported on 2/3/2020   glucose blood (NEL CONTOUR TEST) test strip  Self No No   Si each by Other route 4 (four) times a day   hydrOXYzine HCL (ATARAX) 25 mg tablet 8/30/2020 Self Yes Yes   Sig: TAKE 1 TO 2 TABLETS BY MOUTH EVERY 8 HOURS AS NEEDED FOR ANXIETY   ibuprofen (MOTRIN) 800 mg tablet 8/26/2020 Self Yes Yes   Sig: Prior dental   insulin lispro (HumaLOG) 100 units/mL injection 9/2/2020 at Unknown time Self No Yes   Sig: Use up to 100 units per day via insulin pump   lisinopril (ZESTRIL) 20 mg tablet 8/30/2020 Self Yes Yes   Sig: Take 2 5 mg by mouth daily    mupirocin (BACTROBAN) 2 % ointment   No Yes   Sig: Apply 1 application topically 2 (two) times a day for 5 days Apply to each nostril twice daily for five days before your operation     omeprazole (PriLOSEC) 20 mg delayed release capsule 9/1/2020 at Unknown time Self Yes Yes   Sig: Take 20 mg by mouth daily   sildenafil (VIAGRA) 50 MG tablet 8/28/2020 Self Yes Yes   Sig: TAKE 1 TABLET BY MOUTH AS  NEEDED FOR ERECTILE  DYSFUNCTION   zolpidem (AMBIEN) 5 mg tablet 9/1/2020 at Unknown time Self No Yes   Sig: Take 1 tablet by mouth daily at bedtime as needed for sleep for up to 2 days      Facility-Administered Medications: None     Medications Prior to Admission   Medication    aspirin (ECOTRIN LOW STRENGTH) 81 mg EC tablet    atorvastatin (LIPITOR) 80 mg tablet    busPIRone (BUSPAR) 15 mg tablet    Cholecalciferol (VITAMIN D) 2000 units CAPS    hydrOXYzine HCL (ATARAX) 25 mg tablet    ibuprofen (MOTRIN) 800 mg tablet    insulin lispro (HumaLOG) 100 units/mL injection    lisinopril (ZESTRIL) 20 mg tablet    mupirocin (BACTROBAN) 2 % ointment    omeprazole (PriLOSEC) 20 mg delayed release capsule    QUEtiapine (SEROquel) 25 mg tablet    sildenafil (VIAGRA) 50 MG tablet    zolpidem (AMBIEN) 5 mg tablet    amoxicillin (AMOXIL) 500 mg capsule    NEL MICROLET LANCETS lancets    glucagon (GLUCAGON EMERGENCY) 1 MG injection    glucose blood (NEL CONTOUR TEST) test strip    Insulin Infusion Pump KIT    Insulin Infusion Pump Supplies (MINIMED INFUSION SET-) MISC    Insulin Infusion Pump Supplies (MINIMED RESERVOIR 3ML) MISC    INSULIN SYRINGE 1CC/29G 29G X 1/2" 1 ML MISC            Counseling / Coordination of Care  Total floor / unit time spent today 20 minutes  Greater than 50% of total time was spent with the patient and / or family counseling and / or coordination of care  ** Please Note: Dragon 360 Dictation voice to text software may have been used in the creation of this document   **

## 2020-09-09 NOTE — PROGRESS NOTES
Progress Note - Daisha Carreno 48 y o  male MRN: [id number]    [facility] Encounter: [id number]      CC: diabetes f/u    Subjective:   Daisha Carreno is a 48y o  year old male with type 2 s/p aortic valve replacement on insulin pump  His last blood glucose;     9/8/2020 15:53 9/8/2020 21:02 9/9/2020 05:47 9/9/2020 11:33 9/9/2020 16:11   POC Glucose 159 (H) 181 (H) 76 159 (H) 138     He states that he bolus himself after 181 last night          Feels well  No complaints  No hypoglycemia  Objective:     Vitals: Blood pressure 112/59, pulse 89, temperature 98 5 °F (36 9 °C), temperature source Oral, resp  rate 18, height 5' 6" (1 676 m), weight 70 9 kg (156 lb 4 9 oz), SpO2 91 %  ,Body mass index is 25 23 kg/m²  Intake/Output Summary (Last 24 hours) at 9/9/2020 1655  Last data filed at 9/9/2020 1200  Gross per 24 hour   Intake 898 ml   Output 1000 ml   Net -102 ml       Physical Exam:  General Appearance: awake, appears stated age and cooperative  Head: Normocephalic, without obvious abnormality, atraumatic  Extremities: moves all extremities  Skin: Skin color and temperature normal    Pulm: no labored breathing    Lab, Imaging and other studies: I have personally reviewed pertinent reports  POC Glucose (mg/dl)   Date Value   09/09/2020 138   09/09/2020 159 (H)   09/09/2020 76   09/08/2020 181 (H)   09/08/2020 159 (H)   09/08/2020 110   09/08/2020 170 (H)   09/07/2020 173 (H)   09/07/2020 231 (H)   09/07/2020 164 (H)       Assessment and plan:    Type 2 diabetes on insulin pump;  Blood glucose is decently controlled, with a low blood sugar in 70 s this morning as he received bolus last night  Will continue with same setting for now, but he might need to change his target if continues to have low sugar    continue to check blood glucose before meals and at bedtime  We will adjust pump settings according to BG readings    CAD s/p CABG- POD 7    Severe AS S/p Aortic valve replacement - POD 7    Portions of the record may have been created with voice recognition software

## 2020-09-09 NOTE — PLAN OF CARE
Problem: Prexisting or High Potential for Compromised Skin Integrity  Goal: Skin integrity is maintained or improved  Description: INTERVENTIONS:  - Identify patients at risk for skin breakdown  - Assess and monitor skin integrity  - Assess and monitor nutrition and hydration status  - Monitor labs   - Assess for incontinence   - Turn and reposition patient  - Assist with mobility/ambulation  - Relieve pressure over bony prominences  - Avoid friction and shearing  - Provide appropriate hygiene as needed including keeping skin clean and dry  - Evaluate need for skin moisturizer/barrier cream  - Collaborate with interdisciplinary team   - Patient/family teaching  - Consider wound care consult   Outcome: Progressing     Problem: PAIN - ADULT  Goal: Verbalizes/displays adequate comfort level or baseline comfort level  Description: Interventions:  - Encourage patient to monitor pain and request assistance  - Assess pain using appropriate pain scale  - Administer analgesics based on type and severity of pain and evaluate response  - Implement non-pharmacological measures as appropriate and evaluate response  - Consider cultural and social influences on pain and pain management  - Notify physician/advanced practitioner if interventions unsuccessful or patient reports new pain  Outcome: Progressing     Problem: INFECTION - ADULT  Goal: Absence or prevention of progression during hospitalization  Description: INTERVENTIONS:  - Assess and monitor for signs and symptoms of infection  - Monitor lab/diagnostic results  - Monitor all insertion sites, i e  indwelling lines, tubes, and drains  - Monitor endotracheal if appropriate and nasal secretions for changes in amount and color  - [place] appropriate cooling/warming therapies per order  - Administer medications as ordered  - Instruct and encourage patient and family to use good hand hygiene technique  - Identify and instruct in appropriate isolation precautions for identified infection/condition  Outcome: Progressing  Goal: Absence of fever/infection during neutropenic period  Description: INTERVENTIONS:  - Monitor WBC    Outcome: Progressing     Problem: SAFETY ADULT  Goal: Patient will remain free of falls  Description: INTERVENTIONS:  - Assess patient frequently for physical needs  -  Identify cognitive and physical deficits and behaviors that affect risk of falls    -  [place] fall precautions as indicated by assessment   - Educate patient/family on patient safety including physical limitations  - Instruct patient to call for assistance with activity based on assessment  - Modify environment to reduce risk of injury  - Consider OT/PT consult to assist with strengthening/mobility  Outcome: Progressing  Goal: Maintain or return to baseline ADL function  Description: INTERVENTIONS:  -  Assess patient's ability to carry out ADLs; assess patient's baseline for ADL function and identify physical deficits which impact ability to perform ADLs (bathing, care of mouth/teeth, toileting, grooming, dressing, etc )  - Assess/evaluate cause of self-care deficits   - Assess range of motion  - Assess patient's mobility; develop plan if impaired  - Assess patient's need for assistive devices and provide as appropriate  - Encourage maximum independence but intervene and supervise when necessary  - Involve family in performance of ADLs  - Assess for home care needs following discharge   - Consider OT consult to assist with ADL evaluation and planning for discharge  - Provide patient education as appropriate  Outcome: Progressing  Goal: Maintain or return mobility status to optimal level  Description: INTERVENTIONS:  - Assess patient's baseline mobility status (ambulation, transfers, stairs, etc )    - Identify cognitive and physical deficits and behaviors that affect mobility  - Identify mobility aids required to assist with transfers and/or ambulation (gait belt, sit-to-stand, lift, walker, cane, etc )  - [place] fall precautions as indicated by assessment  - Record patient progress and toleration of activity level on Mobility SBAR; progress patient to next Phase/Stage  - Instruct patient to call for assistance with activity based on assessment  - Consider rehabilitation consult to assist with strengthening/weightbearing, etc   Outcome: Progressing     Problem: DISCHARGE PLANNING  Goal: Discharge to home or other facility with appropriate resources  Description: INTERVENTIONS:  - Identify barriers to discharge w/patient and caregiver  - Arrange for needed discharge resources and transportation as appropriate  - Identify discharge learning needs (meds, wound care, etc )  - Arrange for interpretive services to assist at discharge as needed  - Refer to Case Management Department for coordinating discharge planning if the patient needs post-hospital services based on physician/advanced practitioner order or complex needs related to functional status, cognitive ability, or social support system  Outcome: Progressing     Problem: Knowledge Deficit  Goal: Patient/family/caregiver demonstrates understanding of disease process, treatment plan, medications, and discharge instructions  Description: Complete learning assessment and assess knowledge base  Interventions:  - Provide teaching at level of understanding  - Provide teaching via preferred learning methods  Outcome: Progressing     Problem: Potential for Falls  Goal: Patient will remain free of falls  Description: INTERVENTIONS:  - Assess patient frequently for physical needs  -  Identify cognitive and physical deficits and behaviors that affect risk of falls    -  [place] fall precautions as indicated by assessment   - Educate patient/family on patient safety including physical limitations  - Instruct patient to call for assistance with activity based on assessment  - Modify environment to reduce risk of injury  - Consider OT/PT consult to assist with strengthening/mobility  Outcome: Progressing

## 2020-09-09 NOTE — CONSULTS
Consult Note - Pulmonary   Pat Sheriff 48 y o  male MRN: [id number]  Unit/Bed#: [facility] 410-01 Encounter: [id number]      Reason for consultation: hypoxia     Requesting physician: Willow Locke    Impressions & Recommendations:   1  Acute hypoxic respiratory insufficiency  · IS, OOB-Chair, Ambulation  · Titrate O2 to keep SpO2 >88%  · Plan formal home O2 evaluation in AM, clinically improving    2  Abnormal CT Chest - diffuse central GGOs with interstitial thickening, left effusion, small left apical PTX  · Very broad differential which includes pulmonary edema, alveolar hemorrhage, EVALI, TRALI, pulmonary alveolar proteinosis, autoimmune lung disease/vaculitis, drug induced lung disease (single dose amiodarone given so unlikely) and infectious pathogens  Overall given timing and current symptom status I do not suspect autoimmune/vasculitis, infectious or PAP as etiologies  · I am most concerned for possible pulmonary edema and EVALI  · I discussed with Dell Ray, CTS PA, attempting to push diuresis  · If clinically improved and pursue further close outpatient follow with radiographic tracking and future PFTs/6MWT etc   · Should clinical decline occur, would plan on rheumatologic evaluation and likely bronchoscopy to r/o DAH (again less likely given stable Hgb, no hemoptysis, no systemic signs/symptoms)  · Would not pursue PFT testing at this time given recent CT surgery and small left apical PTX    3  Active VAPE Use prior to admission  · Stressed need for complete cessation of any vaping products    4  CAD and severe AS post 3V CABG and Bioprosthetic AoVR - per cardiology and CT surgery      History of Present Illness   HPI:  Pat Sheriff is a 48 y o  male who has CAD, severe aortic stenosis (post 3V CABG and Bioprosthetic AoVR on 9/2), HTN, HLP, and DM1  He is POD#7 post surgical repair as listed and noted persistent hypoxia and consultation is requested      He reports feeling improved and eager to go home  Post-operative course reviewed and he was extubated in appropriate time frame and overall course has been uneventful  He has been undergoing diuresis with good weight reduction and resolved LE edema  He reports scant brown sputum production, no chest pain, no pleurisy, no overt hemoptysis  He states he is ambulating well without dyspnea  Prior to surgery he reports he was very active and denied any respiratory symptoms  He denied any history of hemoptysis, wheeze, asthma, ILD, or COPD  He does admit to [facility]  He denied any new arthritic symptoms and no rashes  He denied hematuria, no aspiration events  Review of systems:  12 point review of systems was completed and was otherwise negative except as listed in HPI        Historical Information   Past Medical History:   Diagnosis Date    Aortic stenosis     Clavicle fracture     LEFT    Diabetes mellitus [facility] [address] [facility]     Hyperlipidemia     Hypertension     Myocardial infarction ([facility] [address] [facility]     Thrombocytopenic purpura ([facility] [address] )      Past Surgical History:   Procedure Laterality Date    ABDOMINAL SURGERY      HARVEST VEIN Left 9/2/2020    Procedure: HARVEST VEIN ENDOSCOPIC (EVH) LEFT LEG;  Surgeon: Frederic Lopez MD;  [facility];  Service: Cardiac Surgery    ND CABG, ARTERY-VEIN, THREE N/A 9/2/2020    Procedure: CORONARY ARTERY BYPASS GRAFT X 3 WITH SVG TO Right Posterior Lateral Branch, OM1 AND LIMA TO LAD ;  Surgeon: Frederic Lopez MD;  [facility];  Service: Cardiac Surgery    ND RPLCMT AORTIC VALVE OPN W/STENTLESS TISSUE VALVE N/A 9/2/2020    Procedure: AORTIC VALVE REPLACEMENT WITH A 25MM SANTOYO INSPIRIS RESILIA  TISSUE AORTIC VALVE;  Surgeon: Frederic Lopez MD;  [facility];  Service: Cardiac Surgery    ROTATOR CUFF REPAIR      SPLENECTOMY      TONSILLECTOMY      VITRECTOMY Bilateral     BY ANTERIOR APPROACH      Family History   Problem Relation Age of Onset    Aneurysm Mother         [id number] Coronary artery disease Mother     Diabetes Mother      Denied any family history of ILD    Occupational History: , 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    Social History:  Active VAPING prior to admission, rare/remote tobacco use (reports intermittent and socially), pet dog, no exotic animal exposures    Meds/Allergies   Current Facility-Administered Medications   Medication Dose Route Frequency    acetaminophen (TYLENOL) tablet 975 mg  975 mg Oral Q8H    aluminum-magnesium hydroxide-simethicone (MYLANTA) 200-200-20 mg/5 mL oral suspension 30 mL  30 mL Oral Q4H PRN    ascorbic acid (VITAMIN C) tablet 500 mg  500 mg Oral Daily    aspirin tablet 325 mg  325 mg Oral Daily    atorvastatin (LIPITOR) tablet 80 mg  80 mg Oral Daily With Dinner    bisacodyl (DULCOLAX) rectal suppository 10 mg  10 mg Rectal Daily PRN    busPIRone (BUSPAR) tablet 15 mg  15 mg Oral BID    calcium carbonate (TUMS) chewable tablet 500 mg  500 mg Oral Daily PRN    cholecalciferol (VITAMIN D3) tablet 1,000 Units  1,000 Units Oral Daily    docusate sodium (COLACE) capsule 100 mg  100 mg Oral BID    ferrous sulfate tablet 325 mg  325 mg Oral Daily With Breakfast    fondaparinux (ARIXTRA) subcutaneous injection 2 5 mg  2 5 mg Subcutaneous Daily    furosemide (LASIX) injection 40 mg  40 mg Intravenous Daily    insulin lispro (HumaLOG) FOR PUMP REFILLS 300 Units  300 Units Subcutaneous Insulin Pump Daily PRN    lidocaine (LIDODERM) 5 % patch 3 patch  3 patch Topical Daily    metoclopramide (REGLAN) injection 10 mg  10 mg Intravenous Q6H PRN    metoprolol tartrate (LOPRESSOR) tablet 50 mg  50 mg Oral Q12H ANA PAULA    oxyCODONE (ROXICODONE) IR tablet 2 5 mg  2 5 mg Oral Q4H PRN    oxyCODONE (ROXICODONE) IR tablet 5 mg  5 mg Oral Q4H PRN    pantoprazole (PROTONIX) EC tablet 40 mg  40 mg Oral Q12H [address]    PATIENT MAINTAINED INSULIN PUMP 1 each  1 each Subcutaneous Q8H    polyethylene glycol (MIRALAX) packet 17 g  17 g Oral Daily    potassium chloride (K-DUR,KLOR-CON) CR tablet 20 mEq  20 mEq Oral TID    [START ON 9/10/2020] potassium chloride (K-DUR,KLOR-CON) CR tablet 20 mEq  20 mEq Oral Daily    QUEtiapine (SEROquel) tablet 25 mg  25 mg Oral HS    scopolamine (TRANSDERM-SCOP) 1 5 mg/3 days TD 72 hr patch 1 patch  1 patch Transdermal Q72H    temazepam (RESTORIL) capsule 15 mg  15 mg Oral HS PRN     Medications Prior to Admission   Medication    aspirin (ECOTRIN LOW STRENGTH) 81 mg EC tablet    atorvastatin (LIPITOR) 80 mg tablet    busPIRone (BUSPAR) 15 mg tablet    Cholecalciferol (VITAMIN D) 2000 units CAPS    hydrOXYzine HCL (ATARAX) 25 mg tablet    ibuprofen (MOTRIN) 800 mg tablet    insulin lispro (HumaLOG) 100 units/mL injection    lisinopril (ZESTRIL) 20 mg tablet    mupirocin (BACTROBAN) 2 % ointment    omeprazole (PriLOSEC) 20 mg delayed release capsule    QUEtiapine (SEROquel) 25 mg tablet    sildenafil (VIAGRA) 50 MG tablet    zolpidem (AMBIEN) 5 mg tablet    amoxicillin (AMOXIL) 500 mg capsule    NEL MICROLET LANCETS lancets    glucagon (GLUCAGON EMERGENCY) 1 MG injection    glucose blood (NEL CONTOUR TEST) test strip    Insulin Infusion Pump KIT    Insulin Infusion Pump Supplies (MINIMED INFUSION SET-) MISC    Insulin Infusion Pump Supplies (MINIMED RESERVOIR 3ML) MISC    INSULIN SYRINGE 1CC/29G 29G X 1/2" 1 ML MISC     No Known Allergies    Vitals: Blood pressure 104/61, pulse 75, temperature 98 4 °F (36 9 °C), temperature source Oral, resp  rate 18, height 5' 6" (1 676 m), weight 70 9 kg (156 lb 4 9 oz), SpO2 93 % , RA, Body mass index is 25 23 kg/m²        Intake/Output Summary (Last 24 hours) at 9/9/2020 1324  Last data filed at 9/9/2020 1200  Gross per 24 hour   Intake 1258 ml   Output 1000 ml   Net 258 ml       Physical Exam  General: Middle aged man, WDWN, awake alert and oriented x 3, conversant without conversational dyspnea, NAD, normal affect  HEENT:  PERRL, Sclera noninjected, nonicteric OU, Nares patent, no nasal flaring, no nasal drainage, Mucous membranes moist, no oral lesions, normal dentition  NECK: Trachea midline, no accessory muscle use, no stridor, no cervical or supraclavicular adenopathy, JVP not elevated, healing catheter sites  CARDIAC: Reg, single s1/S2, no m/r/g  PULM: overall clear, no wheeze, no rales, no central rhonchi  CHEST: healing sternal incision and CT sites,  equal chest expansion on inspiration bilaterally, no crepitus  ABD: Normoactive bowel sounds, soft nontender, nondistended, no rebound, no rigidity, no guarding  EXT: No cyanosis, no clubbing, no edema, normal capillary refill  SKIN:  No rashes, no lesions  NEURO: no focal neurologic deficits, AAOx3, moving all extremities appropriately    Labs: I have personally reviewed pertinent lab results    Laboratory and Diagnostics  Results from last 7 days   Lab Units 09/09/20  0456 09/08/20  0520 09/07/20  0507 09/06/20  0551 09/05/20  0535 09/04/20  1618 09/04/20  0501 09/03/20  0507   WBC Thousand/uL 16 63* 16 21* 13 81* 16 44* 23 66*  --  21 18* 15 22*   HEMOGLOBIN g/dL 8 9* 8 3* 8 4* 8 4* 8 8* 9 5* 6 0* 7 8*   HEMATOCRIT % 26 4* 25 3* 25 0* 25 3* 25 7* 28 6* 18 2* 23 4*   PLATELETS Thousands/uL 404* 284 223 190 152  --  137* 136*     Results from last 7 days   Lab Units 09/09/20  0456 09/08/20  0520 09/07/20  0507 09/05/20  0535 09/04/20  0501 09/03/20  0422 09/02/20  2039   SODIUM mmol/L 139 139 142 139 143 147*  --    POTASSIUM mmol/L 3 4* 4 2 3 2* 3 5 3 7 4 2 4 4   CHLORIDE mmol/L 105 109* 108 105 112* 116*  --    CO2 mmol/L 28 26 28 30 27 26  --    ANION GAP mmol/L 6 4 6 4 4 5  --    BUN mg/dL 12 8 8 15 17 11  --    CREATININE mg/dL 0 58* 0 52* 0 61 0 52* 0 60 0 62  --    CALCIUM mg/dL 8 0* 7 9* 7 8* 7 8* 7 6* 8 0*  --    GLUCOSE RANDOM mg/dL 77 166* 69 177* 136 132  --      Results from last 7 days   Lab Units 09/03/20  0507   MAGNESIUM mg/dL 2 5      Results from last 7 days   Lab Units 20  0829 20  1514   INR  1 27* 1 25*   PTT seconds 32 28                      Results from last 7 days   Lab Units 20  1514   FIBRINOGEN mg/dL 291     MICRO  COVID-19 neg     Imaging and other studies: I have personally reviewed pertinent reports  and I have personally reviewed pertinent films in PACS  CT Chest  - anterior mediastinal fluid collection noted with some air - likely post surgical, noted diffuse bilateral central GGOs sparing the periphery with some interlobular septal thickening, small left apical PTX, small left effusion, noted for CT chest 20 - with mild asymmetric RUL/DILEEP GGOs and new compared to CT 2017 with basilar atelectasis and small b/l effusions    Pulmonary function testin20 - Ratio 78%, FVC 3 90 (92%), FEV1 3 06L (91%), TLC 99%, %, DLCO 72% - normal spirometry, normal lung volumes, normal diffusion    EKG, Pathology, and Other Studies: I have personally reviewed pertinent reports  TTE  2020 - EF 60%, grade II diastolic dysfunction, severe AS, normal RV    Code Status: Level 1 - Full Code      Jose A Yee DO, [facility]

## 2020-09-09 NOTE — PLAN OF CARE
Problem: Prexisting or High Potential for Compromised Skin Integrity  Goal: Skin integrity is maintained or improved  Description: INTERVENTIONS:  - Identify patients at risk for skin breakdown  - Assess and monitor skin integrity  - Assess and monitor nutrition and hydration status  - Monitor labs   - Assess for incontinence   - Turn and reposition patient  - Assist with mobility/ambulation  - Relieve pressure over bony prominences  - Avoid friction and shearing  - Provide appropriate hygiene as needed including keeping skin clean and dry  - Evaluate need for skin moisturizer/barrier cream  - Collaborate with interdisciplinary team   - Patient/family teaching  - Consider wound care consult   9/9/2020 1306 by Alina Adorno RN  Outcome: Progressing  9/9/2020 1035 by Alina Adorno RN  Outcome: Progressing     Problem: PAIN - ADULT  Goal: Verbalizes/displays adequate comfort level or baseline comfort level  Description: Interventions:  - Encourage patient to monitor pain and request assistance  - Assess pain using appropriate pain scale  - Administer analgesics based on type and severity of pain and evaluate response  - Implement non-pharmacological measures as appropriate and evaluate response  - Consider cultural and social influences on pain and pain management  - Notify physician/advanced practitioner if interventions unsuccessful or patient reports new pain  9/9/2020 1306 by lAina Adorno RN  Outcome: Progressing  9/9/2020 1035 by Alina Adorno RN  Outcome: Progressing     Problem: INFECTION - ADULT  Goal: Absence or prevention of progression during hospitalization  Description: INTERVENTIONS:  - Assess and monitor for signs and symptoms of infection  - Monitor lab/diagnostic results  - Monitor all insertion sites, i e  indwelling lines, tubes, and drains  - Monitor endotracheal if appropriate and nasal secretions for changes in amount and color  - [place] appropriate cooling/warming therapies per order  - Administer medications as ordered  - Instruct and encourage patient and family to use good hand hygiene technique  - Identify and instruct in appropriate isolation precautions for identified infection/condition  9/9/2020 1306 by Marichuy Smith RN  Outcome: Progressing  9/9/2020 1035 by Marichuy Smith RN  Outcome: Progressing  Goal: Absence of fever/infection during neutropenic period  Description: INTERVENTIONS:  - Monitor WBC    9/9/2020 1306 by Marichuy Smith RN  Outcome: Progressing  9/9/2020 1035 by Marichuy Smith RN  Outcome: Progressing     Problem: SAFETY ADULT  Goal: Patient will remain free of falls  Description: INTERVENTIONS:  - Assess patient frequently for physical needs  -  Identify cognitive and physical deficits and behaviors that affect risk of falls    -  [place] fall precautions as indicated by assessment   - Educate patient/family on patient safety including physical limitations  - Instruct patient to call for assistance with activity based on assessment  - Modify environment to reduce risk of injury  - Consider OT/PT consult to assist with strengthening/mobility  9/9/2020 1306 by Marichuy Smith RN  Outcome: Progressing  9/9/2020 1035 by Marichuy Smith RN  Outcome: Progressing  Goal: Maintain or return to baseline ADL function  Description: INTERVENTIONS:  -  Assess patient's ability to carry out ADLs; assess patient's baseline for ADL function and identify physical deficits which impact ability to perform ADLs (bathing, care of mouth/teeth, toileting, grooming, dressing, etc )  - Assess/evaluate cause of self-care deficits   - Assess range of motion  - Assess patient's mobility; develop plan if impaired  - Assess patient's need for assistive devices and provide as appropriate  - Encourage maximum independence but intervene and supervise when necessary  - Involve family in performance of ADLs  - Assess for home care needs following discharge   - Consider OT consult to assist with ADL evaluation and planning for discharge  - Provide patient education as appropriate  9/9/2020 1306 by Rosario Avery RN  Outcome: Progressing  9/9/2020 1035 by Rosario Avery RN  Outcome: Progressing  Goal: Maintain or return mobility status to optimal level  Description: INTERVENTIONS:  - Assess patient's baseline mobility status (ambulation, transfers, stairs, etc )    - Identify cognitive and physical deficits and behaviors that affect mobility  - Identify mobility aids required to assist with transfers and/or ambulation (gait belt, sit-to-stand, lift, walker, cane, etc )  - [place] fall precautions as indicated by assessment  - Record patient progress and toleration of activity level on Mobility SBAR; progress patient to next Phase/Stage  - Instruct patient to call for assistance with activity based on assessment  - Consider rehabilitation consult to assist with strengthening/weightbearing, etc   9/9/2020 1306 by Rosario Avery RN  Outcome: Progressing  9/9/2020 1035 by Rosario Avery RN  Outcome: Progressing     Problem: DISCHARGE PLANNING  Goal: Discharge to home or other facility with appropriate resources  Description: INTERVENTIONS:  - Identify barriers to discharge w/patient and caregiver  - Arrange for needed discharge resources and transportation as appropriate  - Identify discharge learning needs (meds, wound care, etc )  - Arrange for interpretive services to assist at discharge as needed  - Refer to Case Management Department for coordinating discharge planning if the patient needs post-hospital services based on physician/advanced practitioner order or complex needs related to functional status, cognitive ability, or social support system  9/9/2020 1306 by Rosario Avery RN  Outcome: Progressing  9/9/2020 1035 by Rosario Avery RN  Outcome: Progressing     Problem: Knowledge Deficit  Goal: Patient/family/caregiver demonstrates understanding of disease process, treatment plan, medications, and discharge instructions  Description: Complete learning assessment and assess knowledge base  Interventions:  - Provide teaching at level of understanding  - Provide teaching via preferred learning methods  9/9/2020 1306 by Josue Still RN  Outcome: Progressing  9/9/2020 1035 by Josue Still RN  Outcome: Progressing     Problem: Potential for Falls  Goal: Patient will remain free of falls  Description: INTERVENTIONS:  - Assess patient frequently for physical needs  -  Identify cognitive and physical deficits and behaviors that affect risk of falls    -  [place] fall precautions as indicated by assessment   - Educate patient/family on patient safety including physical limitations  - Instruct patient to call for assistance with activity based on assessment  - Modify environment to reduce risk of injury  - Consider OT/PT consult to assist with strengthening/mobility  9/9/2020 1306 by Josue Still RN  Outcome: Progressing  9/9/2020 1035 by Josue Still RN  Outcome: Progressing

## 2020-09-10 VITALS
DIASTOLIC BLOOD PRESSURE: 62 MMHG | TEMPERATURE: 98.7 F | BODY MASS INDEX: 24.77 KG/M2 | RESPIRATION RATE: 18 BRPM | WEIGHT: 154.1 LBS | OXYGEN SATURATION: 95 % | HEIGHT: 66 IN | HEART RATE: 73 BPM | SYSTOLIC BLOOD PRESSURE: 103 MMHG

## 2020-09-10 LAB
ANION GAP SERPL CALCULATED.3IONS-SCNC: 5 MMOL/L (ref 4–13)
BUN SERPL-MCNC: 11 MG/DL (ref 5–25)
CALCIUM SERPL-MCNC: 8.5 MG/DL (ref 8.3–10.1)
CHLORIDE SERPL-SCNC: 107 MMOL/L (ref 100–108)
CO2 SERPL-SCNC: 29 MMOL/L (ref 21–32)
CREAT SERPL-MCNC: 0.66 MG/DL (ref 0.6–1.3)
GFR SERPL CREATININE-BSD FRML MDRD: 110 ML/MIN/1.73SQ M
GLUCOSE SERPL-MCNC: 173 MG/DL (ref 65–140)
GLUCOSE SERPL-MCNC: 76 MG/DL (ref 65–140)
GLUCOSE SERPL-MCNC: 80 MG/DL (ref 65–140)
POTASSIUM SERPL-SCNC: 4.3 MMOL/L (ref 3.5–5.3)
SODIUM SERPL-SCNC: 141 MMOL/L (ref 136–145)

## 2020-09-10 PROCEDURE — 99024 POSTOP FOLLOW-UP VISIT: CPT | Performed by: THORACIC SURGERY (CARDIOTHORACIC VASCULAR SURGERY)

## 2020-09-10 PROCEDURE — 99233 SBSQ HOSP IP/OBS HIGH 50: CPT | Performed by: INTERNAL MEDICINE

## 2020-09-10 PROCEDURE — 94761 N-INVAS EAR/PLS OXIMETRY MLT: CPT

## 2020-09-10 PROCEDURE — 99232 SBSQ HOSP IP/OBS MODERATE 35: CPT | Performed by: INTERNAL MEDICINE

## 2020-09-10 PROCEDURE — NC001 PR NO CHARGE: Performed by: THORACIC SURGERY (CARDIOTHORACIC VASCULAR SURGERY)

## 2020-09-10 PROCEDURE — 80048 BASIC METABOLIC PNL TOTAL CA: CPT | Performed by: PHYSICIAN ASSISTANT

## 2020-09-10 PROCEDURE — 82948 REAGENT STRIP/BLOOD GLUCOSE: CPT

## 2020-09-10 RX ORDER — TORSEMIDE 20 MG/1
TABLET ORAL
Qty: 21 TABLET | Refills: 1 | Status: SHIPPED | OUTPATIENT
Start: 2020-09-10 | End: 2022-01-13 | Stop reason: HOSPADM

## 2020-09-10 RX ORDER — METOPROLOL TARTRATE 50 MG/1
50 TABLET, FILM COATED ORAL EVERY 12 HOURS SCHEDULED
Qty: 60 TABLET | Refills: 2 | Status: SHIPPED | OUTPATIENT
Start: 2020-09-10 | End: 2020-12-31 | Stop reason: SDUPTHER

## 2020-09-10 RX ORDER — FERROUS SULFATE 325(65) MG
325 TABLET ORAL
Qty: 30 TABLET | Refills: 2 | Status: SHIPPED | OUTPATIENT
Start: 2020-09-11 | End: 2022-01-13 | Stop reason: HOSPADM

## 2020-09-10 RX ORDER — ASPIRIN 325 MG
325 TABLET ORAL DAILY
Qty: 30 TABLET | Refills: 2 | Status: SHIPPED | OUTPATIENT
Start: 2020-09-11

## 2020-09-10 RX ORDER — ACETAMINOPHEN 325 MG/1
650 TABLET ORAL EVERY 6 HOURS PRN
Qty: 30 TABLET | Refills: 0 | Status: SHIPPED | OUTPATIENT
Start: 2020-09-10 | End: 2021-10-05

## 2020-09-10 RX ORDER — ASCORBIC ACID 500 MG
500 TABLET ORAL DAILY
Qty: 30 TABLET | Refills: 2 | Status: SHIPPED | OUTPATIENT
Start: 2020-09-11 | End: 2022-01-13 | Stop reason: HOSPADM

## 2020-09-10 RX ORDER — POTASSIUM CHLORIDE 20 MEQ/1
TABLET, EXTENDED RELEASE ORAL
Qty: 21 TABLET | Refills: 1 | Status: SHIPPED | OUTPATIENT
Start: 2020-09-10 | End: 2022-01-13 | Stop reason: HOSPADM

## 2020-09-10 RX ORDER — OXYCODONE HYDROCHLORIDE 5 MG/1
5 TABLET ORAL EVERY 6 HOURS PRN
Qty: 30 TABLET | Refills: 0 | Status: SHIPPED | OUTPATIENT
Start: 2020-09-10 | End: 2020-09-20

## 2020-09-10 RX ADMIN — POTASSIUM CHLORIDE 20 MEQ: 1500 TABLET, EXTENDED RELEASE ORAL at 09:36

## 2020-09-10 RX ADMIN — FUROSEMIDE 40 MG: 10 INJECTION, SOLUTION INTRAMUSCULAR; INTRAVENOUS at 09:37

## 2020-09-10 RX ADMIN — PANTOPRAZOLE SODIUM 40 MG: 40 TABLET, DELAYED RELEASE ORAL at 09:36

## 2020-09-10 RX ADMIN — POLYETHYLENE GLYCOL 3350 17 G: 17 POWDER, FOR SOLUTION ORAL at 09:37

## 2020-09-10 RX ADMIN — ACETAMINOPHEN 975 MG: 325 TABLET, FILM COATED ORAL at 06:19

## 2020-09-10 RX ADMIN — DOCUSATE SODIUM 100 MG: 100 CAPSULE, LIQUID FILLED ORAL at 09:35

## 2020-09-10 RX ADMIN — METOPROLOL TARTRATE 50 MG: 50 TABLET, FILM COATED ORAL at 09:35

## 2020-09-10 RX ADMIN — BUSPIRONE HYDROCHLORIDE 15 MG: 10 TABLET ORAL at 09:36

## 2020-09-10 RX ADMIN — FERROUS SULFATE TAB 325 MG (65 MG ELEMENTAL FE) 325 MG: 325 (65 FE) TAB at 09:36

## 2020-09-10 RX ADMIN — Medication 1000 UNITS: at 09:35

## 2020-09-10 RX ADMIN — OXYCODONE HYDROCHLORIDE AND ACETAMINOPHEN 500 MG: 500 TABLET ORAL at 09:36

## 2020-09-10 RX ADMIN — LIDOCAINE 5% 3 PATCH: 700 PATCH TOPICAL at 09:30

## 2020-09-10 RX ADMIN — ASPIRIN 325 MG ORAL TABLET 325 MG: 325 PILL ORAL at 09:36

## 2020-09-10 RX ADMIN — FONDAPARINUX SODIUM 2.5 MG: 2.5 INJECTION, SOLUTION SUBCUTANEOUS at 09:37

## 2020-09-10 NOTE — DISCHARGE SUMMARY
Discharge Summary - Cardiothoracic Surgery   Raisa Maurice 48 y o  male MRN: [id number]  Unit/Bed#: [facility] 410-01 Encounter: [id number]    Admission Date: 9/2/2020     Discharge Date: 09/10/20    Admitting Diagnosis: Nonrheumatic aortic valve stenosis [I35 [id number]]    Primary Discharge Diagnosis:   Aortic stenosis, Non-Rheumatic, Coronary artery disease  S/P aortic valve replacement and coronary artery bypass grafting;    Secondary Discharge Diagnosis:   CAD s/p PCI/Stent LAD, Severe AS, BAV, DM1 (insulin pump), HTN, Dyslipidemia, H/O ITP    Attending: BRIGIDA Chang  Consulting Physician(s):   Cardiology  Medical/Critical Care  Pulmonology    Procedures Performed:   Procedure(s):  AORTIC VALVE REPLACEMENT WITH A 25MM SANTOYO INSPIRIS RESILIA  TISSUE AORTIC VALVE  CORONARY ARTERY BYPASS GRAFT X 3 WITH SVG TO Right Posterior Lateral Branch, OM1 AND CLARK TO LAD  HARVEST VEIN [facility]) LEFT LEG     Hospital Course: The patient was seen in consultation prior to this admission for evaluation of Aortic stenosis, Non-Rheumatic, Coronary artery disease  Risks and benefits of aortic valve replacement and coronary artery bypass grafting were discussed in detail, and patient was agreeable  Routine preoperative evaluation was completed and informed consent was obtained prior to admission  9/2: Elective admission for AVR 25 mm, CABG x3  Transferred to ICU supported with no inotropic or pressor support  No significant postoperative bleeding  Wean towards extubation  9/3: Extubated & weaned to RA  Delined  Start Lopressor 25, discontinue swan/cordis/a line  Lasix PRN, discontinue story catheter  Maintain insulin gtt, consult endocrinology  Scopolamine patch for N  Hb 7 8 from 11 post-op but remains stable in 7/8 range QHS, continue to trend  Transfer to telem  PM: Persistent N/V, dysconjugate gaze  CT head shows no acute changes  GI consulted  Given fluids  9/4: Transferred from ICU to telemetry  Dizziness improved, with scopolamine patch  Hemoglobin 6; Transfuse  2 units PRBC  Lasix, 40 mg IV X 1 after first unit  Check PTT/INR  Pacing wires removed  Chest tubes maintained for high output  Check CXR to rule out progressive left hemothorax  Start Iron and Vitamin C  Decadron single dose added for persistent nausea  9/5: Dizziness resolved  Remains oxygen dependent  No further nausea/vomiting  Tachycardic/Hypertensive  Increase Lopressor, 50 mg PO BID  S/P 2 units PRBC yesterday; Hgb stable  Check daily CBC     9/6: Tachycardia improved  Hypotensive overnight  Hgb stable  Chest tubes maintained for high output  Maintain CVC       9/7: Hgb stable  Chest tubes and central venous catheter removed  Dulcolax for constipation  Remains oxygen-dependent; wean as tolerated  PA lateral CXR today  Nausea resolved  No further hematemesis  GI recommending EGD tomorrow; Patient is refusing  Transitioned from IV insulin to SQ dosing  Endo cleared patient to resume home pump, when family brings equipment to the hospital   PM: CXR shows no effusion, but new left apical pneumothorax; Continue continuous supplemental oxygen and repeat PA/Lateral tomorrow  Sooner if clinical indication  9/8: CXR shows stable PTX  On 4L NC  Wean as able today  SpO2 81% on room air with ambulation  Transitioned to insulin pump  9/9: Remains on supplemental oxygen, 2L NC  CT chest and PFTs today with intention of obtaining chronic pulmonary diagnosis and approval for home oxygen  Pulmonology consulted  On personal insulin pump  Lasix 40 IV QD  PM: Seen by pulmonology  They believe he has vaping injury underlying pulmonary congestion  Recommend continuing diuretics and outpatient follow up      9/10: On room air  Pulmonology arranged for outpatient follow up  Net 1 5L/24hrs  Ready for discharge home  Condition at Discharge:   good     Discharge Physical Exam:    HEENT/NECK:  PERRLA  No jugular venous distention      Cardiac: Regular rate and rhythm and No murmurs/rubs/gallops  Pulmonary:  Breath sounds clear bilaterally and No rales/rhonchi/wheezes  Abdomen:  Non-tender, Non-distended and Normal bowel sounds  Incisions: Sternum is stable  Incision is clean, dry, and intact  and Saphenectomy incison is clean, dry, and intact  Extremities: Extremities warm/dry and No edema B/L  Neuro: Alert and oriented X 3, Sensation is grossly intact and No focal deficits  Skin: Warm/Dry, without rashes or lesions  Discharge Data:  Results from last 7 days   Lab Units 09/09/20  0456 09/08/20  0520 09/07/20  0507   WBC Thousand/uL 16 63* 16 21* 13 81*   HEMOGLOBIN g/dL 8 9* 8 3* 8 4*   HEMATOCRIT % 26 4* 25 3* 25 0*   PLATELETS Thousands/uL 404* 284 223     Results from last 7 days   Lab Units 09/10/20  0449 09/09/20  0456 09/08/20  0520   POTASSIUM mmol/L 4 3 3 4* 4 2   CHLORIDE mmol/L 107 105 109*   CO2 mmol/L 29 28 26   BUN mg/dL 11 12 8   CREATININE mg/dL 0 66 0 58* 0 52*   CALCIUM mg/dL 8 5 8 0* 7 9*     Results from last 7 days   Lab Units 09/04/20  0829   INR  1 27*   PTT seconds 32       Discharge instructions/Information to patient and family:   See after visit summary for information provided to patient and family  Kevin Paz was educated on restrictions regarding driving and lifting, and techniques of proper incisional care  They were specifically counselled on signs and symptoms of an incisional infection, and advised to contact our service immediately should they develop fevers, sweats, chill, redness or drainage at the site of any incisions  Provisions for Follow-Up Care:  See after visit summary for information related to follow-up care and any pertinent home health orders  Disposition:  Home    Planned Readmission:   No    Discharge Medications:  See after visit summary for reconciled discharge medications provided to patient and family        Kevin Paz was provided contact information and scheduled a follow up appointment with BRIGIDA Holguin  Additionally, follow up appointments have been scheduled for their primary care physician and primary cardiologist   Contact information was provided  Upon admission, troponins were Not checked    Emmanuel Steen was counseled on the importance of avoiding tobacco products  As with all patients whom have undergone open heart surgery, tobacco cessation medication was contraindicated at the time of discharge  ACE/ARB was Contraindicated secondary to hypotension      The patient was discharged on ongoing diuretic therapy with Torsemide 20 mg, PO BID and Potassium Chloride 20 mEq, PO BID for 1 week, and Torsemide 20 mg, PO QD and Potassium Chloride 20 mEq, PO QD for 1 week  They were advised to continue these medications for 14 days, unless otherwise directed  Narcotic pain medication was prescribed in the form of Roxicodone  Prior to prescribing, their prescription profile was reviewed on the [facility] prescription drug monitoring program     The patient was informed that following their postoperative surgical evaluation, they will be referred to outpatient cardiac rehabilitation  They were counseled that this program is run by specialists who will help them safely strengthen their heart and prevent more heart disease  Cardiac rehabilitation will include exercise, relaxation, stress management, and heart-healthy nutrition  Caregivers will also check to make sure their medication regimen is working  During this admission, the patient was questioned on their use of tobacco, alcohol, and illicit/non-prescription drug use in the  previous 24 months  During this time frame they admit to using illicit/non-prescription drugs  As such they have been counseled on the importance of cessation and abstinence  I spent 20 minutes discharging the patient  This time was spent on the day of discharge   I had direct contact with the patient on the day of discharge  Additional documentation is required if more than 30 minutes were spent on discharge       SIGNATURE: Anais Hilliard PA-C  DATE: September 10, 2020  TIME: 12:10 PM

## 2020-09-10 NOTE — DISCHARGE INSTRUCTIONS
Sternal Precautions   WHAT YOU NEED TO KNOW:   What are sternal precautions? Sternal precautions are used to help protect your sternum (breastbone) after open chest surgery  Wires are placed during surgery to hold the sternum together as it heals  Sternal precautions help prevent the wires from cutting through the sternum  The precautions also help prevent the sternum from coming apart from an injury, and prevent pain and bleeding  You may need to use the precautions for up to 12 weeks after surgery  Your surgeon will give you specific instructions based on the type of surgery you had  It is important to follow the instructions carefully  An injury to the healing sternum can be life-threatening  What are some general sternal precautions? Start slowly and do more as you get stronger  Pain medicine might make it harder for you to know when to slow down or be careful  Stop immediately if you hear a crunch or pop in your sternum  · Protect your sternum  Hug a pillow to your chest or cross your arms over your chest when you laugh, sneeze, or cough  · Be careful when you get into or out of a chair or bed  Hug a pillow or cross your arms when you stand or sit  Do not twist as you move  Use only your legs to sit and stand  You may need to use a raised toilet seat if you have trouble standing up without using your arms  Your healthcare provider may teach you to use your elbow for support as you move from lying to sitting  · Ask when you may take a bath or shower  You may need to use a bath chair if you have trouble getting into or out of the tub  Do not use a grab bar  · Do not lift or carry anything heavier than 5 pounds  For example, a gallon of milk weighs 8 pounds  · Keep your arms down as much as possible  Do not put your arms out to the side, behind you, or over your head  Do not let anyone pull your arms to help you move or dress  Do not reach for items      · Do not push or pull anything  Examples include a car door or a vacuum   · Do not drive while you are healing  Your surgeon will tell you when it is safe for you to start driving again  How do I care for my surgery wound? Always wash your hands before you care for your wound  Wash your wound as directed  Do not rub the wound as you wash or dry the area  Pat the area dry with a clean towel  Change the bandages as directed and when they get wet or dirty  Do not smoke:  Nicotine can damage blood vessels and make it more difficult to heal  Do not use e-cigarettes or smokeless tobacco in place of cigarettes or to help you quit  They still contain nicotine  Ask your healthcare provider for information if you currently smoke and need help quitting  Call 911 for any of the following:   · You have sudden pain in your sternum and hear a crunch or pop  · You have bleeding that does not stop even after you apply pressure for 5 minutes  When should I seek immediate care? · You hear crunching or grinding in your sternum  · You have signs of an infection, such as a fever, red or warm skin, or pus in the surgery wound  When should I contact my healthcare provider? · You continue to feel pain, even after you take your pain medicine  · You have new or worsening pain, or any pain with movement  · You have questions or concerns about your condition or care  CARE AGREEMENT:   You have the right to help plan your care  Learn about your health condition and how it may be treated  Discuss treatment options with your caregivers to decide what care you want to receive  You always have the right to refuse treatment  The above information is an  only  It is not intended as medical advice for individual conditions or treatments  Talk to your doctor, nurse or pharmacist before following any medical regimen to see if it is safe and effective for you    © 2017 [employer] Information is for End User's use only and may not be sold, redistributed or otherwise used for commercial purposes  All illustrations and images included in CareNotes® are the copyrighted property of [employer]  or Madhu Gerber  Heart Healthy Diet   WHAT YOU NEED TO KNOW:   A heart healthy diet is an eating plan low in total fat, unhealthy fats, and sodium (salt)  A heart healthy diet helps decrease your risk for heart disease and stroke  Limit the amount of fat you eat to 25% to 35% of your total daily calories  Limit sodium to less than 2,300 mg each day  DISCHARGE INSTRUCTIONS:   Healthy fats:  Healthy fats can help improve cholesterol levels  The risk for heart disease is decreased when cholesterol levels are normal  Choose healthy fats, such as the following:  · Unsaturated fat  is found in foods such as soybean, canola, olive, corn, and safflower oils  It is also found in soft tub margarine that is made with liquid vegetable oil  · Omega-3 fat  is found in certain fish, such as salmon, tuna, and trout, and in walnuts and flaxseed  Unhealthy fats:  Unhealthy fats can cause unhealthy cholesterol levels in your blood and increase your risk of heart disease  Limit unhealthy fats, such as the following:  · Cholesterol  is found in animal foods, such as eggs and lobster, and in dairy products made from whole milk  Limit cholesterol to less than 300 milligrams (mg) each day  You may need to limit cholesterol to 200 mg each day if you have heart disease  · Saturated fat  is found in meats, such as lugo and hamburger  It is also found in chicken or turkey skin, whole milk, and butter  Limit saturated fat to less than 7% of your total daily calories  Limit saturated fat to less than 6% if you have heart disease or are at increased risk for it  · Trans fat  is found in packaged foods, such as potato chips and cookies  It is also in hard margarine, some fried foods, and shortening   Avoid trans fats as much as possible    Heart healthy foods and drinks to include:  Ask your dietitian or healthcare provider how many servings to have from each of the following food groups:  · Grains:      ¨ Whole-wheat breads, cereals, and pastas, and brown rice    ¨ Low-fat, low-sodium crackers and chips    · Vegetables:      ¨ Broccoli, green beans, green peas, and spinach    ¨ Collards, kale, and lima beans    ¨ Carrots, sweet potatoes, tomatoes, and peppers    ¨ Canned vegetables with no salt added    · Fruits:      ¨ Bananas, peaches, pears, and pineapple    ¨ Grapes, raisins, and dates    ¨ Oranges, tangerines, grapefruit, orange juice, and grapefruit juice    ¨ Apricots, mangoes, melons, and papaya    ¨ Raspberries and strawberries    ¨ Canned fruit with no added sugar    · Low-fat dairy products:      ¨ Nonfat (skim) milk, 1% milk, and low-fat almond, cashew, or soy milks fortified with calcium    ¨ Low-fat cheese, regular or frozen yogurt, and cottage cheese    · Meats and proteins , such as lean cuts of beef and pork (loin, leg, round), skinless chicken and turkey, legumes, soy products, egg whites, and nuts  Foods and drinks to limit or avoid:  Ask your dietitian or healthcare provider about these and other foods that are high in unhealthy fat, sodium, and sugar:  · Snack or packaged foods , such as frozen dinners, cookies, macaroni and cheese, and cereals with more than 300 mg of sodium per serving    · Canned or dry mixes  for cakes, soups, sauces, or gravies    · Vegetables with added sodium , such as instant potatoes, vegetables with added sauces, or regular canned vegetables    · Other foods high in sodium , such as ketchup, barbecue sauce, salad dressing, pickles, olives, soy sauce, and miso    · High-fat dairy foods  such as whole or 2% milk, cream cheese, or sour cream, and cheeses     · High-fat protein foods  such as high-fat cuts of beef (T-bone steaks, ribs), chicken or turkey with skin, and organ meats, such as liver    · Cured or smoked meats , such as hot dogs, lugo, and sausage    · Unhealthy fats and oils , such as butter, stick margarine, shortening, and cooking oils such as coconut or palm oil    · Food and drinks high in sugar , such as soft drinks (soda), sports drinks, sweetened tea, candy, cake, cookies, pies, and doughnuts  Other diet guidelines to follow:   · Eat more foods containing omega-3 fats  Eat fish high in omega-3 fats at least 2 times a week  · Limit alcohol  Too much alcohol can damage your heart and raise your blood pressure  Women should limit alcohol to 1 drink a day  Men should limit alcohol to 2 drinks a day  A drink of alcohol is 12 ounces of beer, 5 ounces of wine, or 1½ ounces of liquor  · Choose low-sodium foods  High-sodium foods can lead to high blood pressure  Add little or no salt to food you prepare  Use herbs and spices in place of salt  · Eat more fiber  to help lower cholesterol levels  Eat at least 5 servings of fruits and vegetables each day  Eat 3 ounces of whole-grain foods each day  Legumes (beans) are also a good source of fiber  Lifestyle guidelines:   · Do not smoke  Nicotine and other chemicals in cigarettes and cigars can cause lung and heart damage  Ask your healthcare provider for information if you currently smoke and need help to quit  E-cigarettes or smokeless tobacco still contain nicotine  Talk to your healthcare provider before you use these products  · Exercise regularly  to help you maintain a healthy weight and improve your blood pressure and cholesterol levels  Ask your healthcare provider about the best exercise plan for you  Do not start an exercise program without asking your healthcare provider  Follow up with your healthcare provider as directed:  Write down your questions so you remember to ask them during your visits     © 2017 [employer] Information is for End User's use only and may not be sold, redistributed or otherwise used for commercial purposes  All illustrations and images included in CareNotes® are the copyrighted property of [employer]  or Madhu Gerber  The above information is an  only  It is not intended as medical advice for individual conditions or treatments  Talk to your doctor, nurse or pharmacist before following any medical regimen to see if it is safe and effective for you  Narcotic Pain Management   WHAT YOU NEED TO KNOW:   It is important to manage your pain so you can rest and heal  It will also help you return to your normal activities  DISCHARGE INSTRUCTIONS:   Call 911 or have someone call 911 for any of the following:   · You are breathing slower than normal, or you have trouble breathing  · You cannot be woken  · You have a seizure  Seek care immediately if:   · Your heart is beating slower than usual     · Your heart feels like it is jumping or fluttering  · You have trouble staying awake  · You have severe muscle pain or weakness  · You see or hear things that are not real   Contact your healthcare provider if:   · You are too dizzy to stand up  · Your pain gets worse or you have new pain  · Your pain does not get better after you use your narcotic medicine  · You cannot do your usual activities because of side effects from the narcotic  · You are constipated or have abdominal pain  · You have questions or concerns about your condition or care  Follow up with your healthcare provider as directed: You may be referred to a pain specialist for more tests and treatment  Write down your questions so you remember to ask them during your visits  Narcotic safety:   · Take your medicine as directed  Ask if you need more information on how to take your medicine correctly  Follow up with your healthcare provider regularly  You may need to have your dose adjusted  Do not use narcotic medicine if you are pregnant or breastfeeding  Narcotic medicines can be transferred to your baby through your blood and breast milk  · Give your healthcare provider a list of all your medicines  Include any over-the-counter medicines, vitamins, and herbs  It can be dangerous to take narcotics with certain other medicines, such as antihistamines  · Keep your medicine in a safe place  Store your narcotic medicine in a locked cabinet to keep it away from children and others  · Do not drink alcohol while you use narcotics  Alcohol use with a narcotic medicine can make you sleepy and slow your breathing rate  You may stop breathing completely  · Do not drive or operate heavy machinery after you take narcotic medicine  Narcotic medicine can make you drowsy and make it hard to concentrate  You may injure yourself or others if you drive or operate heavy machinery while taking your medicine  Drink more liquids and eat high-fiber foods:  Liquids and fiber will help prevent constipation  Ask your healthcare provider what liquids are right for you and how much you should drink  Also ask for a list of foods that contain fiber  © 2017 [employer] Information is for End User's use only and may not be sold, redistributed or otherwise used for commercial purposes  All illustrations and images included in CareNotes® are the copyrighted property of [employer]  or [employer]  The above information is an  only  It is not intended as medical advice for individual conditions or treatments  Talk to your doctor, nurse or pharmacist before following any medical regimen to see if it is safe and effective for you  After Coronary Artery Bypass Graft   AMBULATORY CARE:   After coronary artery bypass graft (CABG) surgery,  you may not be able to do your normal activities for a few months   Your sternum (breastbone) will need time to heal  For 6 to 8 weeks after surgery, you will need to go slowly and follow your healthcare provider's activity instructions  Call 911 for any of the following:   · You have any of the following signs of a heart attack:      ¨ Squeezing, pressure, or pain in your chest that lasts longer than 5 minutes or returns    ¨ Discomfort or pain in your back, neck, jaw, stomach, or arm     ¨ Trouble breathing    ¨ Nausea or vomiting    ¨ Lightheadedness or a sudden cold sweat, especially with chest pain or trouble breathing    · You feel lightheaded, short of breath, and have chest pain  · You cough up blood  · You have a fast heartbeat that flutters  · You feel like you are going to faint  Seek care immediately if:   · Your arm or leg feels warm, tender, and painful  It may look swollen and red  · You have numbness or tingling in your arms or legs  · You have a severe headache  Contact your healthcare provider if:   · You have a fever higher than 101°F (38 4°C)  · You have gained 2 pounds in 24 hours  · Your wound is red, swollen, or draining pus  · Your signs and symptoms that you had before surgery return  · You feel depressed  · You have questions or concerns about your condition or care  Medicines: You may need any of the following:  · Prescription pain medicine  may be given  Ask how to take this medicine safely  · Antiplatelets , such as aspirin, help prevent blood clots  Take your antiplatelet medicine exactly as directed  These medicines make it more likely for you to bleed or bruise  If you are told to take aspirin, do not take acetaminophen or ibuprofen instead  · Cholesterol medicine  helps decrease the amount of cholesterol in your blood  Cholesterol can cause plaque buildup that blocks your arteries  · Antibiotics  help prevent a bacterial infection  · Heart medicine  helps strengthen and regulate your heartbeat  · Blood pressure medicine  helps lower or control your blood pressure  · Take your medicine as directed    Contact your healthcare provider if you think your medicine is not helping or if you have side effects  Tell him of her if you are allergic to any medicine  Keep a list of the medicines, vitamins, and herbs you take  Include the amounts, and when and why you take them  Bring the list or the pill bottles to follow-up visits  Carry your medicine list with you in case of an emergency  Activity:  Your healthcare provider will give you specific activity instructions  The following are general guidelines to follow for up to 8 weeks after surgery:  · Protect your sternum  Hug a pillow to your chest or cross your arms over your chest when you laugh, sneeze, or cough  · Be careful when you get into or out of a chair or bed  Hug a pillow or cross your arms when you stand or sit  Do not twist as you move  Use only your legs to sit and stand  You may need to use a raised toilet seat if you have trouble standing up without using your arms  Your healthcare provider may teach you to use your elbow for support as you move from lying to sitting  · Do not lift anything heavier than 5 pounds  until your healthcare provider says it is okay  For example, a gallon of milk weighs 8 pounds  · Do not play sports that use your shoulder  Examples include tennis and golf  · Do not drive  until your healthcare provider says it is okay  · Keep your arms down as much as possible  Do not put your arms out to the side, behind you, or over your head  Do not let anyone pull your arms to help you move or dress  Do not reach for items  · Do not push or pull anything  Examples include a car door or a vacuum   Care for your wound as directed:  Carefully wash around the wound with soap and water  Let the soap and water run over your wound  Do not scrub the wound  If you do not have a bandage, gently pat the incision dry with a clean towel  If you have a bandage, dry the area and put on a new, clean bandage   Change your bandage if it gets wet or dirty  Go to cardiac rehabilitation (rehab) as directed:  Cardiac rehab is a program run by specialists who help you safely strengthen your heart and prevent more heart disease  The plan includes exercise, relaxation, stress management, and heart-healthy nutrition  Healthcare providers will also check to make sure any medicines you take are working  The plan may also include instructions for when you can drive, return to work, and do other normal daily activities  Prevent another blocked artery:   · Do not smoke  Nicotine and other chemicals in cigarettes and cigars can cause heart and lung damage  Ask your healthcare provider for information if you currently smoke and need help to quit  E-cigarettes or smokeless tobacco still contain nicotine  Talk to your healthcare provider before you use these products  · Limit or do not drink alcohol as directed  Alcohol can raise your blood pressure and make your heart work harder  Alcohol can also increase your risk for heart disease or a stroke  Ask your healthcare provider if alcohol is safe for you  · Eat heart healthy foods  You may need to eat foods that are low in salt, fat, or cholesterol  Healthy foods include fruits, vegetables, whole-grain breads, low-fat dairy products, beans, lean meats, and fish  Ask your healthcare provider for more information about a heart healthy diet  · Maintain a healthy weight  Ask your healthcare provider how much you should weigh  Extra weight can increase the stress on your heart  Ask him to help you create a weight loss plan if you are overweight  Get a flu shot: The flu can be dangerous for a person who has heart disease  To prevent influenza (flu), get the influenza vaccine every year as soon as it becomes available  Follow up with your healthcare provider as directed:  Write down your questions so you remember to ask them during your visits     © 2017 [employer] Information is for End User's use only and may not be sold, redistributed or otherwise used for commercial purposes  All illustrations and images included in CareNotes® are the copyrighted property of [employer]  or Madhu Gerber  The above information is an  only  It is not intended as medical advice for individual conditions or treatments  Talk to your doctor, nurse or pharmacist before following any medical regimen to see if it is safe and effective for you

## 2020-09-10 NOTE — PLAN OF CARE
Problem: Prexisting or High Potential for Compromised Skin Integrity  Goal: Skin integrity is maintained or improved  Description: INTERVENTIONS:  - Identify patients at risk for skin breakdown  - Assess and monitor skin integrity  - Assess and monitor nutrition and hydration status  - Monitor labs   - Assess for incontinence   - Turn and reposition patient  - Assist with mobility/ambulation  - Relieve pressure over bony prominences  - Avoid friction and shearing  - Provide appropriate hygiene as needed including keeping skin clean and dry  - Evaluate need for skin moisturizer/barrier cream  - Collaborate with interdisciplinary team   - Patient/family teaching  - Consider wound care consult   9/10/2020 1411 by Wendy Sheriff RN  Outcome: Adequate for Discharge  9/10/2020 1223 by Wendy Sheriff RN  Outcome: Progressing     Problem: PAIN - ADULT  Goal: Verbalizes/displays adequate comfort level or baseline comfort level  Description: Interventions:  - Encourage patient to monitor pain and request assistance  - Assess pain using appropriate pain scale  - Administer analgesics based on type and severity of pain and evaluate response  - Implement non-pharmacological measures as appropriate and evaluate response  - Consider cultural and social influences on pain and pain management  - Notify physician/advanced practitioner if interventions unsuccessful or patient reports new pain  9/10/2020 1411 by Wendy Sheriff RN  Outcome: Adequate for Discharge  9/10/2020 1223 by Wendy Sheriff RN  Outcome: Progressing     Problem: INFECTION - ADULT  Goal: Absence or prevention of progression during hospitalization  Description: INTERVENTIONS:  - Assess and monitor for signs and symptoms of infection  - Monitor lab/diagnostic results  - Monitor all insertion sites, i e  indwelling lines, tubes, and drains  - Monitor endotracheal if appropriate and nasal secretions for changes in amount and color  - [place] appropriate cooling/warming therapies per order  - Administer medications as ordered  - Instruct and encourage patient and family to use good hand hygiene technique  - Identify and instruct in appropriate isolation precautions for identified infection/condition  9/10/2020 1411 by Krystal Downey RN  Outcome: Adequate for Discharge  9/10/2020 1223 by Krystal Downey RN  Outcome: Progressing  Goal: Absence of fever/infection during neutropenic period  Description: INTERVENTIONS:  - Monitor WBC    9/10/2020 1411 by Krystal Downey RN  Outcome: Adequate for Discharge  9/10/2020 1223 by Krystal Downey RN  Outcome: Progressing     Problem: SAFETY ADULT  Goal: Patient will remain free of falls  Description: INTERVENTIONS:  - Assess patient frequently for physical needs  -  Identify cognitive and physical deficits and behaviors that affect risk of falls    -  [place] fall precautions as indicated by assessment   - Educate patient/family on patient safety including physical limitations  - Instruct patient to call for assistance with activity based on assessment  - Modify environment to reduce risk of injury  - Consider OT/PT consult to assist with strengthening/mobility  9/10/2020 1411 by Krystal Downey RN  Outcome: Adequate for Discharge  9/10/2020 1223 by Krystal Downey RN  Outcome: Progressing  Goal: Maintain or return to baseline ADL function  Description: INTERVENTIONS:  -  Assess patient's ability to carry out ADLs; assess patient's baseline for ADL function and identify physical deficits which impact ability to perform ADLs (bathing, care of mouth/teeth, toileting, grooming, dressing, etc )  - Assess/evaluate cause of self-care deficits   - Assess range of motion  - Assess patient's mobility; develop plan if impaired  - Assess patient's need for assistive devices and provide as appropriate  - Encourage maximum independence but intervene and supervise when necessary  - Involve family in performance of ADLs  - Assess for home care needs following discharge   - Consider OT consult to assist with ADL evaluation and planning for discharge  - Provide patient education as appropriate  9/10/2020 1411 by Black Mayers RN  Outcome: Adequate for Discharge  9/10/2020 1223 by Black Mayers RN  Outcome: Progressing  Goal: Maintain or return mobility status to optimal level  Description: INTERVENTIONS:  - Assess patient's baseline mobility status (ambulation, transfers, stairs, etc )    - Identify cognitive and physical deficits and behaviors that affect mobility  - Identify mobility aids required to assist with transfers and/or ambulation (gait belt, sit-to-stand, lift, walker, cane, etc )  - [place] fall precautions as indicated by assessment  - Record patient progress and toleration of activity level on Mobility SBAR; progress patient to next Phase/Stage  - Instruct patient to call for assistance with activity based on assessment  - Consider rehabilitation consult to assist with strengthening/weightbearing, etc   9/10/2020 1411 by Black Mayers RN  Outcome: Adequate for Discharge  9/10/2020 1223 by Black Mayers RN  Outcome: Progressing     Problem: DISCHARGE PLANNING  Goal: Discharge to home or other facility with appropriate resources  Description: INTERVENTIONS:  - Identify barriers to discharge w/patient and caregiver  - Arrange for needed discharge resources and transportation as appropriate  - Identify discharge learning needs (meds, wound care, etc )  - Arrange for interpretive services to assist at discharge as needed  - Refer to Case Management Department for coordinating discharge planning if the patient needs post-hospital services based on physician/advanced practitioner order or complex needs related to functional status, cognitive ability, or social support system  9/10/2020 1411 by Black Mayers RN  Outcome: Adequate for Discharge  9/10/2020 1223 by Black Mayers RN  Outcome: Progressing     Problem: Knowledge Deficit  Goal: Patient/family/caregiver demonstrates understanding of disease process, treatment plan, medications, and discharge instructions  Description: Complete learning assessment and assess knowledge base  Interventions:  - Provide teaching at level of understanding  - Provide teaching via preferred learning methods  9/10/2020 1411 by Luba Leyden, RN  Outcome: Adequate for Discharge  9/10/2020 1223 by Luba Leyden, RN  Outcome: Progressing     Problem: Potential for Falls  Goal: Patient will remain free of falls  Description: INTERVENTIONS:  - Assess patient frequently for physical needs  -  Identify cognitive and physical deficits and behaviors that affect risk of falls    -  [place] fall precautions as indicated by assessment   - Educate patient/family on patient safety including physical limitations  - Instruct patient to call for assistance with activity based on assessment  - Modify environment to reduce risk of injury  - Consider OT/PT consult to assist with strengthening/mobility  9/10/2020 1411 by Luba Leyden, RN  Outcome: Adequate for Discharge  9/10/2020 1223 by Luba Leyden, RN  Outcome: Progressing

## 2020-09-10 NOTE — CASE MANAGEMENT
Pt  To discharge home today with wife transporting  On room air  Will have outpt respiratory studies  Is on his own insulin pump  JOEL informed of discharge, nursing only  Pt  With good understanding

## 2020-09-10 NOTE — PROGRESS NOTES
Progress Note - Pulmonary   Harle Crystal 48 y o  male MRN: [id number]  Unit/Bed#: [facility] 410-01 Encounter: [id number]    Impressions & Recommendations:   1  Acute hypoxic respiratory insufficiency  · IS, OOB-Chair, Ambulation  · Titrate O2 to keep SpO2 >88%  · Plan formal home O2 evaluation in AM, clinically improving     2  Abnormal CT Chest - diffuse central GGOs with interstitial thickening, left effusion, small left apical PTX  · Very broad differential which includes pulmonary edema, alveolar hemorrhage, EVALI, TRALI, pulmonary alveolar proteinosis, autoimmune lung disease/vaculitis, drug induced lung disease (single dose amiodarone given so unlikely) and infectious pathogens  Overall given timing and current symptom status I do not suspect autoimmune/vasculitis, infectious or PAP as etiologies  · I remain most concerned for possible pulmonary edema and EVALI  · Improved with diuresis   · Would not pursue PFT testing at this time given recent CT surgery and small left apical PTX  · Check RA ambulatory SpO2 at rest and ambulation priori to discharge - ordered with RT  · Will plan for 2 week follow up appointment with Dr Esha Hutn in our office and CXR prior  · Further testing, imaging, and evaluation based upon clinical course and results     3  Active VAPE Use prior to admission  · Stressed need for complete cessation of any vaping products     4  CAD and severe AS post 3V CABG and Bioprosthetic AoVR - per cardiology and CT surgery  · Defer diuresis regimen to primary service    I discussed with MARILYN Phillips at time of exam this am   Will sign off, please call with any further questions or concerns  Follow up per d/c instructions       Subjective:   Feeling well and very eager to go home, denied sputum production, no chest pain, no pleurisy, no fevers or chills    Objective:   1700-1900cc on SVC with IS      Vitals: Blood pressure 103/62, pulse 73, temperature 98 7 °F (37 1 °C), temperature source Oral, resp  rate 18, height 5' 6" (1 676 m), weight 69 9 kg (154 lb 1 6 oz), SpO2 94 % , RA, Body mass index is 24 87 kg/m²  Intake/Output Summary (Last 24 hours) at 9/10/2020 1158  Last data filed at 9/10/2020 1101  Gross per 24 hour   Intake 698 ml   Output 2350 ml   Net -1652 ml         Physical Exam  Gen: WDWN middle aged man, awake, alert, oriented x 3, no acute distress  HEENT: Mucous membranes moist, no oral lesions, no thrush  NECK: No accessory muscle use, JVP not elevated  Cardiac: Regular, single S1, single S2, no murmurs, no rubs, no gallops, sternal incision w/o purulence  Lungs: clear, no wheeze, no rales, no central rhonchi  Abdomen: normoactive bowel sounds, soft nontender, nondistended, no rebound or rigidity, no guarding  Extremities: no cyanosis, no clubbing, no edema    Labs: I have personally reviewed pertinent lab results    Laboratory and Diagnostics  Results from last 7 days   Lab Units 09/09/20  0456 09/08/20  0520 09/07/20  0507 09/06/20  0551 09/05/20  0535 09/04/20  1618 09/04/20  0501   WBC Thousand/uL 16 63* 16 21* 13 81* 16 44* 23 66*  --  21 18*   HEMOGLOBIN g/dL 8 9* 8 3* 8 4* 8 4* 8 8* 9 5* 6 0*   HEMATOCRIT % 26 4* 25 3* 25 0* 25 3* 25 7* 28 6* 18 2*   PLATELETS Thousands/uL 404* 284 223 190 152  --  137*     Results from last 7 days   Lab Units 09/10/20  0449 09/09/20  0456 09/08/20  0520 09/07/20  0507 09/05/20  0535 09/04/20  0501   SODIUM mmol/L 141 139 139 142 139 143   POTASSIUM mmol/L 4 3 3 4* 4 2 3 2* 3 5 3 7   CHLORIDE mmol/L 107 105 109* 108 105 112*   CO2 mmol/L 29 28 26 28 30 27   ANION GAP mmol/L 5 6 4 6 4 4   BUN mg/dL 11 12 8 8 15 17   CREATININE mg/dL 0 66 0 58* 0 52* 0 61 0 52* 0 60   CALCIUM mg/dL 8 5 8 0* 7 9* 7 8* 7 8* 7 6*   GLUCOSE RANDOM mg/dL 80 77 166* 69 177* 136          Results from last 7 days   Lab Units 09/04/20  0829   INR  1 27*   PTT seconds 32      MICRO  COVID-19 neg 8/27     Imaging and other studies: no new images  CT Chest 9/9 - anterior mediastinal fluid collection noted with some air - likely post surgical, noted diffuse bilateral central GGOs sparing the periphery with some interlobular septal thickening, small left apical PTX, small left effusion, noted for CT chest 20 - with mild asymmetric RUL/DILEEP GGOs and new compared to CT 2017 with basilar atelectasis and small b/l effusions     Pulmonary function testin20 - Ratio 78%, FVC 3 90 (92%), FEV1 3 06L (91%), TLC 99%, %, DLCO 72% - normal spirometry, normal lung volumes, normal diffusion     EKG, Pathology, and Other Studies: I have personally reviewed pertinent reports  TTE  2020 - EF 60%, grade II diastolic dysfunction, severe AS, normal RV         Jose A Yee DO, [facility]

## 2020-09-10 NOTE — RESPIRATORY THERAPY NOTE
Home Oxygen Qualifying Test       Patient name: hTee Walls        : 1965   Date of Test:  September 10, 2020  Diagnosis:      Home Oxygen Test:    **Medicare Guidelines require item(s) 1-5 on all ambulatory patients or 1 and 2 on non-ambulatory patients  1   Baseline SPO2 on Room Air at rest 97%  2   SPO2 during exercise on Room Air 94 %  During exercise monitor SpO2  If SPO2 increases >=89% with ambulation do not add supplemental             oxygen  If <= 88% on room air add O2 via NC and titrate patient  Patient must be ambulated with O2 and titrated to > 88% with exertion  3   SPO2 on Oxygen at rest 0 % 0 lpm     4   SPO2 during exercise on Oxygen 0% a liter flow of0 lpm     5   Exercise performed:          walking, duration 5 (min), distance 900 (feet)          []  Supplemental Home Oxygen is indicated  [x]  Client does not qualify for home oxygen        Respiratory Additional Notes- pt walked 900 feet  Pt SpO2 never dropped below 94% on roomair pt had a quick brisk pace and at  No time became short of breath or was unable talk in full sentences while walking  Pt does not qualify for Home O2     Alex Hamm, RT

## 2020-09-10 NOTE — PROGRESS NOTES
Progress Note - Cardiothoracic Surgery   Emmanuel Steen 48 y o  male MRN: [id number]  Unit/Bed#: [facility] 410-01 Encounter: [id number]    Aortic stenosis, Non-Rheumatic, Coronary artery disease  S/P aortic valve replacement and coronary artery bypass grafting; POD # 8      24 Hour Events: Oxygenation improved with increased diuresis  Now on room air       Medications:   Scheduled Meds:  Current Facility-Administered Medications   Medication Dose Route Frequency Provider Last Rate    acetaminophen  975 mg Oral Q8H Greg Barakat, PA-MYKE      aluminum-magnesium hydroxide-simethicone  30 mL Oral Q4H PRN Sabino Alcantar MD      ascorbic acid  500 mg Oral Daily Adjolly Vidal, PA-C      aspirin  325 mg Oral Daily Gregomid Barakat, PA-C      atorvastatin  80 mg Oral Daily With [employer], PA-C      bisacodyl  10 mg Rectal Daily PRN Christina Filter, PA-C      busPIRone  15 mg Oral BID Christina Filter, PA-C      calcium carbonate  500 mg Oral Daily PRN Christina Filter, PA-C      cholecalciferol  1,000 Units Oral Daily Christina Filter, PA-C      docusate sodium  100 mg Oral BID Christina Filter, PA-C      ferrous sulfate  325 mg Oral Daily With Breakfast Maria Elena Vidal, PA-C      fondaparinux  2 5 mg Subcutaneous Daily Greg Barakat, PA-C      furosemide  40 mg Intravenous Daily Marylou Phillips PA-C      insulin lispro  300 Units Subcutaneous Insulin Pump Daily PRN Drew Burciaga MD      lidocaine  3 patch Topical Daily Christina Filter, PA-C      metoclopramide  10 mg Intravenous Q6H PRN Sabino Alcantar MD      metoprolol tartrate  50 mg Oral Q12H [address] Irene Bernstein MD      oxyCODONE  2 5 mg Oral Q4H PRN Christina Filter, PA-C      oxyCODONE  5 mg Oral Q4H PRN Christina Filter, PA-C      pantoprazole  40 mg Oral Q12H [address] Adam Romero MD      patient maintained insulin pump  1 each Subcutaneous Q8H Drew Burciaga MD      polyethylene glycol  17 g Oral Daily Christina Filter, PA-C      potassium chloride  20 mEq Oral BID Marylou FLOR Phillips PA-C      QUEtiapine  25 mg Oral HS Christina Filter, MARILYN      scopolamine  1 patch Transdermal Q72H Christina Filter, MARILYN      temazepam  15 mg Oral HS PRN Christina Filter, MARILYN       Continuous Infusions:   PRN Meds: aluminum-magnesium hydroxide-simethicone    bisacodyl    calcium carbonate    insulin lispro    metoclopramide    oxyCODONE    oxyCODONE    temazepam    Vitals:   Vitals:    09/09/20 2245 09/10/20 0600 09/10/20 0619 09/10/20 0629   BP: 117/59   122/57   BP Location: Left arm   Left arm   Pulse: 90   95   Resp: 18   18   Temp: 99 1 °F (37 3 °C)   98 5 °F (36 9 °C)   TempSrc: Oral   Oral   SpO2: 95%  94% 95%   Weight:  69 9 kg (154 lb 1 6 oz)     Height:           Telemetry: NSR; Heart Rate: 94 bpm    Respiratory:   SpO2: SpO2: 95 %, SpO2 Activity: SpO2 Activity: At Rest; Room air    Intake/Output:     Intake/Output Summary (Last 24 hours) at 9/10/2020 1047  Last data filed at 9/10/2020 0755  Gross per 24 hour   Intake 698 ml   Output 1350 ml   Net -652 ml   Net -1 4L/24hrs    Weights:   Weight (last 2 days)     Date/Time   Weight    09/10/20 0600   69 9 (154 1)    09/09/20 0600   70 9 (156 31)    09/08/20 0600   73 7 (162 48)            Results:   Results from last 7 days   Lab Units 09/09/20  0456 09/08/20  0520 09/07/20  0507   WBC Thousand/uL 16 63* 16 21* 13 81*   HEMOGLOBIN g/dL 8 9* 8 3* 8 4*   HEMATOCRIT % 26 4* 25 3* 25 0*   PLATELETS Thousands/uL 404* 284 223     Results from last 7 days   Lab Units 09/10/20  0449 09/09/20  0456 09/08/20  0520   SODIUM mmol/L 141 139 139   POTASSIUM mmol/L 4 3 3 4* 4 2   CHLORIDE mmol/L 107 105 109*   CO2 mmol/L 29 28 26   BUN mg/dL 11 12 8   CREATININE mg/dL 0 66 0 58* 0 52*   CALCIUM mg/dL 8 5 8 0* 7 9*     Results from last 7 days   Lab Units 09/04/20  0829   INR  1 27*   PTT seconds 32         Invasive Lines/Tubes:  Invasive Devices     Peripheral Intravenous Line            Peripheral IV 09/08/20 Left Antecubital 1 day              Physical Exam:    HEENT/NECK:  PERRLA  No jugular venous distention  Cardiac: Regular rate and rhythm and No murmurs/rubs/gallops  Pulmonary:  Breath sounds diminished in the bases bilaterally  and No rales/rhonchi/wheezes  Abdomen:  Non-tender, Non-distended and Normal bowel sounds  Incisions: Sternum is stable  Incision is clean, dry, and intact  Extremities: Extremities warm/dry and No edema B/L  Neuro: Alert and oriented X 3, Sensation is grossly intact and No focal deficits  Skin: Warm/Dry, without rashes or lesions  Studies:   CXR: 9/7  IMPRESSION:  There is a new small left apical pneumothorax      Diffuse bilateral groundglass opacities which could be due to pneumonia or pulmonary edema  CXR: 9/8  IMPRESSION:  No change in small left apical pneumothorax      Persistent bilateral ground glass opacity  Assessment:  Principal Problem:    Nonrheumatic aortic valve stenosis  Active Problems:    Type 1 diabetes mellitus with retinopathy ([facility] [address] )    Essential hypertension    Coronary artery disease involving native coronary artery of native heart without angina pectoris    Dyslipidemia    Aortic stenosis due to bicuspid aortic valve    History of ITP    History of coronary angioplasty with insertion of stent    Acute blood loss as cause of postoperative anemia    Post-operative nausea and vomiting    Acute postoperative pulmonary insufficiency (HCC)       Aortic stenosis, Non-Rheumatic, Coronary artery disease  S/P aortic valve replacement and coronary artery bypass grafting; POD # 8    Plan:    1  Cardiac:   Sinus Rhythm; HR/BP well controlled   Lopressor, 50 mg PO BID  Continue ASA and Statin therapy  Central IV access discontinued  Continue DVT prophylaxis    2  Pulmonary:   No longer requiring supplemental oxygen  Encourage IS use, deep breathing, coughing, ambulation    3  Renal:   Intake/Output net: -1 4 L/24hrs   Lasix 40 mg IV BID with potassium supplementation  Post op Creatinine stable;  Follow up labs prn    4  Neuro:    Neurologically intact; No active issues  Incisional pain well-controlled  Continue Tylenol, 975 mg PO q 8, standing dose  Continue Oxycodone, 2 5 to 5 mg PO q 4 hours prn pain    5  GI:  Nausea resolved, no further hematemesis  GI recommending outpatient follow up with EGD  Tolerating TLC 2 3 gm sodium diet  Maintain 1800 mL daily fluid restriction   Continue stool softeners and prn suppository  Continue GI prophylaxis    6  Endo:   No history of diabetes; Glucose well-controlled with sliding scale coverage  Resume insulin pump, per endocrine    7    Hematology:    APOBLA, Leukocytosis    Hgb stable        8     Disposition:      Anticipate discharge home today    VTE Pharmacologic Prophylaxis: Fondaparinux (Arixtra)  VTE Mechanical Prophylaxis: sequential compression device    Collaborative rounds completed with Dr Rafy Angulo of care discussed with bedside nurse    SIGNATURE: Leann Davidson PA-C  DATE: September 10, 2020  TIME: 10:47 AM

## 2020-09-10 NOTE — PLAN OF CARE
Problem: Prexisting or High Potential for Compromised Skin Integrity  Goal: Skin integrity is maintained or improved  Description: INTERVENTIONS:  - Identify patients at risk for skin breakdown  - Assess and monitor skin integrity  - Assess and monitor nutrition and hydration status  - Monitor labs   - Assess for incontinence   - Turn and reposition patient  - Assist with mobility/ambulation  - Relieve pressure over bony prominences  - Avoid friction and shearing  - Provide appropriate hygiene as needed including keeping skin clean and dry  - Evaluate need for skin moisturizer/barrier cream  - Collaborate with interdisciplinary team   - Patient/family teaching  - Consider wound care consult   9/10/2020 1223 by Jerson Jones RN  Outcome: Progressing  9/10/2020 1223 by Jerson Jones RN  Outcome: Progressing     Problem: PAIN - ADULT  Goal: Verbalizes/displays adequate comfort level or baseline comfort level  Description: Interventions:  - Encourage patient to monitor pain and request assistance  - Assess pain using appropriate pain scale  - Administer analgesics based on type and severity of pain and evaluate response  - Implement non-pharmacological measures as appropriate and evaluate response  - Consider cultural and social influences on pain and pain management  - Notify physician/advanced practitioner if interventions unsuccessful or patient reports new pain  9/10/2020 1223 by Jerson Jones RN  Outcome: Progressing  9/10/2020 1223 by Jerson Jones RN  Outcome: Progressing     Problem: INFECTION - ADULT  Goal: Absence or prevention of progression during hospitalization  Description: INTERVENTIONS:  - Assess and monitor for signs and symptoms of infection  - Monitor lab/diagnostic results  - Monitor all insertion sites, i e  indwelling lines, tubes, and drains  - Monitor endotracheal if appropriate and nasal secretions for changes in amount and color  - [place] appropriate cooling/warming therapies per order  - Administer medications as ordered  - Instruct and encourage patient and family to use good hand hygiene technique  - Identify and instruct in appropriate isolation precautions for identified infection/condition  Outcome: Progressing  Goal: Absence of fever/infection during neutropenic period  Description: INTERVENTIONS:  - Monitor WBC    Outcome: Progressing     Problem: SAFETY ADULT  Goal: Patient will remain free of falls  Description: INTERVENTIONS:  - Assess patient frequently for physical needs  -  Identify cognitive and physical deficits and behaviors that affect risk of falls    -  [place] fall precautions as indicated by assessment   - Educate patient/family on patient safety including physical limitations  - Instruct patient to call for assistance with activity based on assessment  - Modify environment to reduce risk of injury  - Consider OT/PT consult to assist with strengthening/mobility  Outcome: Progressing  Goal: Maintain or return to baseline ADL function  Description: INTERVENTIONS:  -  Assess patient's ability to carry out ADLs; assess patient's baseline for ADL function and identify physical deficits which impact ability to perform ADLs (bathing, care of mouth/teeth, toileting, grooming, dressing, etc )  - Assess/evaluate cause of self-care deficits   - Assess range of motion  - Assess patient's mobility; develop plan if impaired  - Assess patient's need for assistive devices and provide as appropriate  - Encourage maximum independence but intervene and supervise when necessary  - Involve family in performance of ADLs  - Assess for home care needs following discharge   - Consider OT consult to assist with ADL evaluation and planning for discharge  - Provide patient education as appropriate  Outcome: Progressing  Goal: Maintain or return mobility status to optimal level  Description: INTERVENTIONS:  - Assess patient's baseline mobility status (ambulation, transfers, stairs, etc )    - Identify cognitive and physical deficits and behaviors that affect mobility  - Identify mobility aids required to assist with transfers and/or ambulation (gait belt, sit-to-stand, lift, walker, cane, etc )  - [place] fall precautions as indicated by assessment  - Record patient progress and toleration of activity level on Mobility SBAR; progress patient to next Phase/Stage  - Instruct patient to call for assistance with activity based on assessment  - Consider rehabilitation consult to assist with strengthening/weightbearing, etc   Outcome: Progressing     Problem: DISCHARGE PLANNING  Goal: Discharge to home or other facility with appropriate resources  Description: INTERVENTIONS:  - Identify barriers to discharge w/patient and caregiver  - Arrange for needed discharge resources and transportation as appropriate  - Identify discharge learning needs (meds, wound care, etc )  - Arrange for interpretive services to assist at discharge as needed  - Refer to Case Management Department for coordinating discharge planning if the patient needs post-hospital services based on physician/advanced practitioner order or complex needs related to functional status, cognitive ability, or social support system  Outcome: Progressing     Problem: Knowledge Deficit  Goal: Patient/family/caregiver demonstrates understanding of disease process, treatment plan, medications, and discharge instructions  Description: Complete learning assessment and assess knowledge base  Interventions:  - Provide teaching at level of understanding  - Provide teaching via preferred learning methods  Outcome: Progressing     Problem: Potential for Falls  Goal: Patient will remain free of falls  Description: INTERVENTIONS:  - Assess patient frequently for physical needs  -  Identify cognitive and physical deficits and behaviors that affect risk of falls    -  [place] fall precautions as indicated by assessment   - Educate patient/family on patient safety including physical limitations  - Instruct patient to call for assistance with activity based on assessment  - Modify environment to reduce risk of injury  - Consider OT/PT consult to assist with strengthening/mobility  Outcome: Progressing

## 2020-09-10 NOTE — PROGRESS NOTES
General Cardiology   Progress Note -  Team One   Petar Morin 48 y o  male MRN: [id number]    Unit/Bed#: [facility] 410-01 Encounter: [id number]    Assessment:    CAD: s/p CABG x3 with LIMA-LAD, SVG-OM1, SVG-right PLB  POD #8  · Intraoperative NORMAN:  EF 60%, RWMA  · Rhythm management:  Lopressor 50 mg BID  · Volume management:   Lasix 40 mg IV daily  Net output + 126 mL which is likely inaccurate as patient reports significant diuresis and weight is down 8 lb in 2 days  · Aspirin, beta-blocker, statin     Severe AS: s/p bioprosthetic AVR with 25 mm [place] Inspiris pericardial tissue valve   POD #8  · Intraoperative NORMAN with normal device function and no PVL      Respiratory insufficiency:  Has not had any significant hypoxia since last evening  Currently on RA  · CT chest 9/9- small left apical pneumothorax small effusion  Retrosternal, anterior mediastinal containing fluid and air measuring 7 4 x 1 9 x 0 4 bilateral diffuse ground-glass changes suggestive congestion versus atypical pneumonia  · Pulmonary consulted     Preserved biventricular systolic function:  Intraoperative NORMAN EF 60%    Volume management as above      Essential hypertension:  Average /60 on Lopressor 50 mg BID and IV Lasix     Hyperlipidemia:  Lipid 07/2018 TC 200, , HDL 52,  on atorvastatin 80 mg     Type 1 diabetes:  Hemoglobin A1c 9 1  Endocrinology managing  VAPE use: Active use prior to admission     Plan/Recommendations:  · Currently on IV Lasix for negative fluid balance  · Monitor I&Os, renal function, electrolytes and standing weight  · Maintain potassium >4 and magnesium >2  · Continue current medication regimen  · Follow-up with DEVEN Hinojosa 09/29/2020  · Will subsequently follow-up with Dr Corbett    ____________________________________________________________________________________    Subjective    Patient seen and examined  No acute events overnight    Denies any chest pain, shortness breath, palpitations    Review of Systems   Constitution: Negative  Negative for chills  Cardiovascular: Negative for chest pain, dyspnea on exertion, leg swelling, near-syncope, orthopnea, palpitations, paroxysmal nocturnal dyspnea and syncope  Respiratory: Negative  Negative for shortness of breath  Gastrointestinal: Negative for diarrhea, nausea and vomiting  Neurological: Negative for dizziness, light-headedness and weakness  Psychiatric/Behavioral: Negative for altered mental status  All other systems reviewed and are negative  Objective:   Vitals: Blood pressure 122/57, pulse 95, temperature 98 5 °F (36 9 °C), temperature source Oral, resp  rate 18, height 5' 6" (1 676 m), weight 69 9 kg (154 lb 1 6 oz), SpO2 95 %  ,     Wt Readings from Last 3 Encounters:   09/10/20 69 9 kg (154 lb 1 6 oz)   08/31/20 73 8 kg (162 lb 11 2 oz)   08/21/20 73 8 kg (162 lb 12 8 oz)        Lab Results   Component Value Date    CREATININE 0 66 09/10/2020    CREATININE 0 58 (L) 09/09/2020    CREATININE 0 52 (L) 09/08/2020         Body mass index is 24 87 kg/m²  ,     Systolic (49SMJ), YMO:577 , Min:104 , KMW:280     Diastolic (23NXA), WSJ:93, Min:56, Max:67          Intake/Output Summary (Last 24 hours) at 9/10/2020 1017  Last data filed at 9/10/2020 0755  Gross per 24 hour   Intake 698 ml   Output 1350 ml   Net -652 ml     Weight (last 2 days)     Date/Time   Weight    09/10/20 0600   69 9 (154 1)    09/09/20 0600   70 9 (156 31)    09/08/20 0600   73 7 (162 48)            Telemetry Review: No significant arrhythmias seen on telemetry review  Physical Exam  Vitals signs and nursing note reviewed  Constitutional:       Appearance: He is well-developed  Comments: On RA in NAD   HENT:      Head: Normocephalic and atraumatic  Neck:      Musculoskeletal: Normal range of motion and neck supple  Cardiovascular:      Rate and Rhythm: Normal rate and regular rhythm  Heart sounds: Normal heart sounds  Pulmonary:      Effort: Pulmonary effort is normal  No respiratory distress  Breath sounds: Normal breath sounds  No wheezing or rales  Abdominal:      General: Bowel sounds are normal       Palpations: Abdomen is soft  Musculoskeletal: Normal range of motion  Right lower leg: No edema  Left lower leg: No edema  Skin:     General: Skin is warm and dry  Comments: Sternal incision clean and dry   Neurological:      Mental Status: He is alert and oriented to person, place, and time  Psychiatric:         Behavior: Behavior normal          Thought Content:  Thought content normal          Judgment: Judgment normal          LABORATORY RESULTS      CBC with diff:   Results from last 7 days   Lab Units 09/09/20  0456 09/08/20  0520 09/07/20  0507 09/06/20  0551 09/05/20  0535 09/04/20  1618 09/04/20  0501   WBC Thousand/uL 16 63* 16 21* 13 81* 16 44* 23 66*  --  21 18*   HEMOGLOBIN g/dL 8 9* 8 3* 8 4* 8 4* 8 8* 9 5* 6 0*   HEMATOCRIT % 26 4* 25 3* 25 0* 25 3* 25 7* 28 6* 18 2*   MCV fL 87 89 88 88 87  --  92   PLATELETS Thousands/uL 404* 284 223 190 152  --  137*   MCH pg 29 3 29 2 29 5 29 2 29 6  --  30 5   MCHC g/dL 33 7 32 8 33 6 33 2 34 2  --  33 0   RDW % 16 3* 16 5* 16 2* 16 5* 16 8*  --  15 1   MPV fL 10 8 10 8 10 8 11 4 11 9  --  11 7       CMP:  Results from last 7 days   Lab Units 09/10/20  0449 09/09/20  0456 09/08/20  0520 09/07/20  0507 09/05/20  0535 09/04/20  0501   POTASSIUM mmol/L 4 3 3 4* 4 2 3 2* 3 5 3 7   CHLORIDE mmol/L 107 105 109* 108 105 112*   CO2 mmol/L 29 28 26 28 30 27   BUN mg/dL 11 12 8 8 15 17   CREATININE mg/dL 0 66 0 58* 0 52* 0 61 0 52* 0 60   CALCIUM mg/dL 8 5 8 0* 7 9* 7 8* 7 8* 7 6*   EGFR ml/min/1 73sq m 110 116 121 113 121 114       BMP:  Results from last 7 days   Lab Units 09/10/20  0449 09/09/20  0456 09/08/20  0520 09/07/20  0507 09/05/20  0535 09/04/20  0501   POTASSIUM mmol/L 4 3 3 4* 4 2 3 2* 3 5 3 7   CHLORIDE mmol/L 107 105 109* 108 105 112*   CO2 mmol/L 29 28 26 28 30 27   BUN mg/dL 11 12 8 8 15 17   CREATININE mg/dL 0 66 0 58* 0 52* 0 61 0 52* 0 60   CALCIUM mg/dL 8 5 8 0* 7 9* 7 8* 7 8* 7 6*       Lab Results   Component Value Date    NTBNP 1,460 (H) 2017                               Results from last 7 days   Lab Units 20  0829   INR  1 27*       Lipid Profile:   Lab Results   Component Value Date    CHOL 259 (H) 10/15/2013     Lab Results   Component Value Date    HDL 53 2018     (H) 2017     (H) 2017     Lab Results   Component Value Date    LDLCALC 102 (H) 2018    LDLCALC 51 2017    LDLCALC 58 2017     Lab Results   Component Value Date    TRIG 225 (H) 2018    TRIG 98 2017    TRIG 62 2017       Cardiac testing:   Results for orders placed in visit on 17   Echo complete with contrast if indicated    Narrative [address] [place] [address], [address] [place] [address]   [phone]     Transthoracic Echocardiogram   Limited 2D, Doppler, and Color Doppler     Study date:  21-Dec-2017     Patient: Citlalli Nino   MR number: [id number]   Account number: [de-identified]   : 1965   Age: 47 years   Gender: Male   Status: Outpatient   Location: [address] [place] [address]   Height: 65 in   Weight: 174 7 lb   BP: 120/ 62 mmHg     Indications: Limited study; evaluate ventricular function     Diagnoses: I42 9 - Cardiomyopathy, unspecified     Sonographer:  GIOVANNY Snyder   Primary Physician:  Cinthia Cyr   Referring Physician:  Kaykay Walker MD   Group:  [facility]   Interpreting Physician:  Bethany Stroud MD     SUMMARY     LEFT VENTRICLE:   Size was normal    Systolic function was normal  Ejection fraction was estimated to be 60 %  There was mild hypokinesis of the mid anteroseptal and apical septal wall(s)  Wall thickness was increased  There was mild concentric hypertrophy       LEFT ATRIUM:   The atrium was mildly dilated  MITRAL VALVE:   There was mild regurgitation  AORTIC VALVE:   The valve was trileaflet  Leaflets exhibited moderately increased thickness, moderate calcification, and moderately reduced cuspal separation  There was mild regurgitation  TRICUSPID VALVE:   There was mild regurgitation  HISTORY: PRIOR HISTORY: Cardiomyopathy, CAD s/p PCI, hypertension, aortic stenosis, type I diabetes     PROCEDURE: The study was performed in the [facility]  This was a routine study  The transthoracic approach was used  The study included limited 2D imaging, limited spectral Doppler, and color Doppler  The heart rate   was 66 bpm, at the start of the study  Images were obtained from the parasternal, apical, subcostal, and suprasternal notch acoustic windows  Image quality was adequate  LEFT VENTRICLE: Size was normal  Systolic function was normal  Ejection fraction was estimated to be 60 %  There was mild hypokinesis of the mid anteroseptal and apical septal wall(s)  Wall thickness was increased  There was mild   concentric hypertrophy  RIGHT VENTRICLE: The size was normal  Systolic function was normal  Wall thickness was normal      LEFT ATRIUM: The atrium was mildly dilated  RIGHT ATRIUM: Size was normal      MITRAL VALVE: Valve structure was normal  There was normal leaflet separation  DOPPLER: The transmitral velocity was within the normal range  There was no evidence for stenosis  There was mild regurgitation  AORTIC VALVE: The valve was trileaflet  Leaflets exhibited moderately increased thickness, moderate calcification, and moderately reduced cuspal separation  DOPPLER: There was mild regurgitation  TRICUSPID VALVE: DOPPLER: There was mild regurgitation  PULMONIC VALVE: Leaflets exhibited normal thickness, no calcification, and normal cuspal separation  DOPPLER: The transpulmonic velocity was within the normal range  There was no regurgitation  PERICARDIUM: There was no pericardial effusion  The pericardium was normal in appearance  AORTA: The root exhibited normal size  SYSTEM MEASUREMENT TABLES     2D   %FS: 40 72 %   EDV(Teich): 112 95 ml   EF Biplane: 62 17 %   EF(Cube): 79 17 %   EF(Teich): 71 33 %   ESV(Cube): 24 55 ml   ESV(Teich): 32 38 ml   IVSd: 1 19 cm   LVEDV MOD A2C: 92 76 ml   LVEDV MOD A4C: 98 47 ml   LVEDV MOD BP: 101 53 ml   LVEF MOD A2C: 60 95 %   LVEF MOD A4C: 62 61 %   LVESV MOD A2C: 36 22 ml   LVESV MOD A4C: 36 81 ml   LVESV MOD BP: 38 41 ml   LVIDd: 4 9 cm   LVIDs: 2 91 cm   LVLd A2C: 9 35 cm   LVLd A4C: 8 21 cm   LVLs A2C: 7 37 cm   LVLs A4C: 6 61 cm   LVPWd: 1 16 cm   SI(Cube): 49 89 ml/m2   SI(Teich): 43 09 ml/m2   SV MOD A2C: 56 54 ml   SV MOD A4C: 61 65 ml   SV(Cube): 93 29 ml   SV(Teich): 80 57 ml     [facility]     Prepared and electronically signed by     Nakita Berman MD   Signed 21-Dec-2017 15:51:41     No results found for this or any previous visit  No results found for this or any previous visit  No procedure found  No results found for this or any previous visit        Meds/Allergies   all current active meds have been reviewed, current meds:   Current Facility-Administered Medications   Medication Dose Route Frequency    acetaminophen (TYLENOL) tablet 975 mg  975 mg Oral Q8H    aluminum-magnesium hydroxide-simethicone (MYLANTA) 200-200-20 mg/5 mL oral suspension 30 mL  30 mL Oral Q4H PRN    ascorbic acid (VITAMIN C) tablet 500 mg  500 mg Oral Daily    aspirin tablet 325 mg  325 mg Oral Daily    atorvastatin (LIPITOR) tablet 80 mg  80 mg Oral Daily With Dinner    bisacodyl (DULCOLAX) rectal suppository 10 mg  10 mg Rectal Daily PRN    busPIRone (BUSPAR) tablet 15 mg  15 mg Oral BID    calcium carbonate (TUMS) chewable tablet 500 mg  500 mg Oral Daily PRN    cholecalciferol (VITAMIN D3) tablet 1,000 Units  1,000 Units Oral Daily    docusate sodium (COLACE) capsule 100 mg  100 mg Oral BID    ferrous sulfate tablet 325 mg  325 mg Oral Daily With Breakfast    fondaparinux (ARIXTRA) subcutaneous injection 2 5 mg  2 5 mg Subcutaneous Daily    furosemide (LASIX) injection 40 mg  40 mg Intravenous Daily    insulin lispro (HumaLOG) FOR PUMP REFILLS 300 Units  300 Units Subcutaneous Insulin Pump Daily PRN    lidocaine (LIDODERM) 5 % patch 3 patch  3 patch Topical Daily    metoclopramide (REGLAN) injection 10 mg  10 mg Intravenous Q6H PRN    metoprolol tartrate (LOPRESSOR) tablet 50 mg  50 mg Oral Q12H ANA PAULA    oxyCODONE (ROXICODONE) IR tablet 2 5 mg  2 5 mg Oral Q4H PRN    oxyCODONE (ROXICODONE) IR tablet 5 mg  5 mg Oral Q4H PRN    pantoprazole (PROTONIX) EC tablet 40 mg  40 mg Oral Q12H [facility]    PATIENT MAINTAINED INSULIN PUMP 1 each  1 each Subcutaneous Q8H    polyethylene glycol (MIRALAX) packet 17 g  17 g Oral Daily    potassium chloride (K-DUR,KLOR-CON) CR tablet 20 mEq  20 mEq Oral BID    QUEtiapine (SEROquel) tablet 25 mg  25 mg Oral HS    scopolamine (TRANSDERM-SCOP) 1 5 mg/3 days TD 72 hr patch 1 patch  1 patch Transdermal Q72H    temazepam (RESTORIL) capsule 15 mg  15 mg Oral HS PRN    and PTA meds:   Prior to Admission Medications   Prescriptions Last Dose Informant Patient Reported? Taking?    NEL MICROLET LANCETS lancets  Self No No   Sig: by Other route 4 (four) times a day Use as instructed   Cholecalciferol (VITAMIN D) 2000 units CAPS 2020 at Unknown time Self Yes Yes   Sig: TK 1 C PO QD   INSULIN SYRINGE 1CC/29G 29G X 1/2" 1 ML MISC  Self No No   Sig: Inject as directed 3 (three) times a day with meals Use as directed   Insulin Infusion Pump KIT  Self Yes No   Si Units/hr by Subcutaneous Insulin Pump route continuous   Insulin Infusion Pump Supplies (MINIMED INFUSION SET-) MISC  Self Yes No   Sig: by Does not apply route   Insulin Infusion Pump Supplies (MINIMED RESERVOIR 3ML) MIS  Self Yes No   Sig: by Does not apply route   QUEtiapine (SEROquel) 25 mg tablet 2020 at Unknown time Self Yes Yes   Sig: TAKE 1 TABLET BY MOUTH EVERY DAY EVERY NIGHT   amoxicillin (AMOXIL) 500 mg capsule 2020 Self Yes No   Sig: Prior dental   aspirin (ECOTRIN LOW STRENGTH) 81 mg EC tablet 2020 at Unknown time Self No Yes   Sig: Take 1 tablet by mouth daily   atorvastatin (LIPITOR) 80 mg tablet 2020 at Unknown time Self No Yes   Sig: Take 1 tablet by mouth daily with dinner   busPIRone (BUSPAR) 15 mg tablet 2020 at Unknown time  Yes Yes   glucagon (GLUCAGON EMERGENCY) 1 MG injection  Self No No   Sig: Inject 1 mg under the skin once as needed for low blood sugar for up to 1 dose   Patient not taking: Reported on 2/3/2020   glucose blood (NEL CONTOUR TEST) test strip  Self No No   Si each by Other route 4 (four) times a day   hydrOXYzine HCL (ATARAX) 25 mg tablet 2020 Self Yes Yes   Sig: TAKE 1 TO 2 TABLETS BY MOUTH EVERY 8 HOURS AS NEEDED FOR ANXIETY   ibuprofen (MOTRIN) 800 mg tablet 2020 Self Yes Yes   Sig: Prior dental   insulin lispro (HumaLOG) 100 units/mL injection 2020 at Unknown time Self No Yes   Sig: Use up to 100 units per day via insulin pump   lisinopril (ZESTRIL) 20 mg tablet 2020 Self Yes Yes   Sig: Take 2 5 mg by mouth daily    mupirocin (BACTROBAN) 2 % ointment   No Yes   Sig: Apply 1 application topically 2 (two) times a day for 5 days Apply to each nostril twice daily for five days before your operation     omeprazole (PriLOSEC) 20 mg delayed release capsule 2020 at Unknown time Self Yes Yes   Sig: Take 20 mg by mouth daily   sildenafil (VIAGRA) 50 MG tablet 2020 Self Yes Yes   Sig: TAKE 1 TABLET BY MOUTH AS  NEEDED FOR ERECTILE  DYSFUNCTION   zolpidem (AMBIEN) 5 mg tablet 2020 at Unknown time Self No Yes   Sig: Take 1 tablet by mouth daily at bedtime as needed for sleep for up to 2 days      Facility-Administered Medications: None     Medications Prior to Admission Medication    aspirin (ECOTRIN LOW STRENGTH) 81 mg EC tablet    atorvastatin (LIPITOR) 80 mg tablet    busPIRone (BUSPAR) 15 mg tablet    Cholecalciferol (VITAMIN D) 2000 units CAPS    hydrOXYzine HCL (ATARAX) 25 mg tablet    ibuprofen (MOTRIN) 800 mg tablet    insulin lispro (HumaLOG) 100 units/mL injection    lisinopril (ZESTRIL) 20 mg tablet    mupirocin (BACTROBAN) 2 % ointment    omeprazole (PriLOSEC) 20 mg delayed release capsule    QUEtiapine (SEROquel) 25 mg tablet    sildenafil (VIAGRA) 50 MG tablet    zolpidem (AMBIEN) 5 mg tablet    amoxicillin (AMOXIL) 500 mg capsule    NEL MICROLET LANCETS lancets    glucagon (GLUCAGON EMERGENCY) 1 MG injection    glucose blood (NEL CONTOUR TEST) test strip    Insulin Infusion Pump KIT    Insulin Infusion Pump Supplies (MINIMED INFUSION SET-) MISC    Insulin Infusion Pump Supplies (MINIMED RESERVOIR 3ML) MISC    INSULIN SYRINGE 1CC/29G 29G X 1/2" 1 ML MISC            Counseling / Coordination of Care  Total floor / unit time spent today 20 minutes  Greater than 50% of total time was spent with the patient and / or family counseling and / or coordination of care  ** Please Note: Dragon 360 Dictation voice to text software may have been used in the creation of this document   **

## 2020-09-11 ENCOUNTER — TELEPHONE (OUTPATIENT)
Dept: GASTROENTEROLOGY | Facility: CLINIC | Age: 55
End: 2020-09-11

## 2020-09-11 NOTE — TELEPHONE ENCOUNTER
Called patient to set up a clinic visit for his 1-2 month follow up from hospital admission   Left a message to call office to get him on the schedule

## 2020-09-18 ENCOUNTER — TELEPHONE (OUTPATIENT)
Dept: CARDIAC SURGERY | Facility: CLINIC | Age: 55
End: 2020-09-18

## 2020-09-18 NOTE — TELEPHONE ENCOUNTER
POST OP CALL    9/2/20: Elective AVR/CABG x 3  9/10/20: Discharge home  9/18/20: Spoke to patient  He states he is doing well  He is walking several times per day  He denies SOB, JACOBS, angina, lightheadedness, palpitations, fever chills or cough Weight is stable, no edema  Incisions healing well  Appetite improving  No GI issues  Having difficulty sleeping    Medications reviewed  Questions answered  Appts reviewed

## 2020-09-24 ENCOUNTER — HOSPITAL ENCOUNTER (OUTPATIENT)
Dept: RADIOLOGY | Facility: HOSPITAL | Age: 55
Discharge: HOME | End: 2020-09-24
Payer: COMMERCIAL

## 2020-09-24 DIAGNOSIS — R09.02 HYPOXIA: ICD-10-CM

## 2020-09-24 PROCEDURE — 71046 X-RAY EXAM CHEST 2 VIEWS: CPT

## 2020-09-29 ENCOUNTER — OFFICE VISIT (OUTPATIENT)
Dept: CARDIOLOGY CLINIC | Facility: CLINIC | Age: 55
End: 2020-09-29
Payer: COMMERCIAL

## 2020-09-29 VITALS
OXYGEN SATURATION: 99 % | SYSTOLIC BLOOD PRESSURE: 118 MMHG | DIASTOLIC BLOOD PRESSURE: 70 MMHG | TEMPERATURE: 97.2 F | HEART RATE: 58 BPM | WEIGHT: 163.8 LBS | BODY MASS INDEX: 26.33 KG/M2 | HEIGHT: 66 IN

## 2020-09-29 DIAGNOSIS — E78.5 DYSLIPIDEMIA: ICD-10-CM

## 2020-09-29 DIAGNOSIS — Z95.1 S/P CABG X 3: ICD-10-CM

## 2020-09-29 DIAGNOSIS — R21 GROIN RASH: ICD-10-CM

## 2020-09-29 DIAGNOSIS — Z95.2 S/P AVR (AORTIC VALVE REPLACEMENT): Primary | ICD-10-CM

## 2020-09-29 DIAGNOSIS — E10.311 TYPE 1 DIABETES MELLITUS WITH RETINOPATHY OF BOTH EYES AND MACULAR EDEMA, UNSPECIFIED RETINOPATHY SEVERITY (HCC): ICD-10-CM

## 2020-09-29 DIAGNOSIS — I10 HYPERTENSION, UNSPECIFIED TYPE: ICD-10-CM

## 2020-09-29 PROCEDURE — 99214 OFFICE O/P EST MOD 30 MIN: CPT | Performed by: NURSE PRACTITIONER

## 2020-09-29 RX ORDER — CLOTRIMAZOLE 1 %
CREAM (GRAM) TOPICAL 2 TIMES DAILY
Qty: 30 G | Refills: 0 | Status: SHIPPED | OUTPATIENT
Start: 2020-09-29 | End: 2022-01-13 | Stop reason: HOSPADM

## 2020-09-29 RX ORDER — CLOTRIMAZOLE 1 %
CREAM (GRAM) TOPICAL 2 TIMES DAILY
Qty: 30 G | Refills: 0 | Status: SHIPPED | OUTPATIENT
Start: 2020-09-29 | End: 2020-09-29 | Stop reason: SDUPTHER

## 2020-09-29 NOTE — PATIENT INSTRUCTIONS
2gm sodium low fat low cholesterol diet, eating fresh is best  Walk 30 minutes daily      CBC, BMP and mag to be done in the near future

## 2020-09-29 NOTE — PROGRESS NOTES
Cardiology Follow Up    Annie Rodast  1965  [id number]  [facility] [place]  [address] [address] [phone]    CABG x 3     Interval History:   Mr Sofia Angulo was admitted to [facility] on 9/02 - 9/10/20 with non-rheumatic aortic valve stenosis and CAD  On 9/02/20  Formerly [facility] was electively admitted and underwent AVR #25mm Juleen Britain tissue valve and CABG x 3 LIMA to LAD, SVG to RPLB, and OM1   by Dr Terrell Guillen  He was extubated on POD#1  He received PRBC post operatively  He remained on Oxygen 2LNC  Pulmonary consulted  Diuretics were continued   [facility]ly [facility] was discharged on POD# 8  Mr Sofia Angulo presents to our office for a recent hospitalization follow up visit  He does not have a list of his current medications  He denies Lightheadedness or dizziness   [facility] is walking daily and admits to shortness of breath at the end of the walk going up a hill  He admits to muscle sternal discomfort at night causing difficulty with restful sleep  He is eating and drinking well  Emery Seymour admits to constipation which has improved and owen groin rash  He is using OTC lotrimin cream with some improvement  HPI:  Hypertension  Dyslipidemia  Aortic stenosis  CAD hx of PCI to LAD in 2017, 80% small PLB  DM1  7/28/20 LVEF 60%, grade 2 DD, Mild MR, mild to mod Arotic valve regurgitation, MINI 0 82cm, mild TR      Patient Active Problem List   Diagnosis    Nonrheumatic aortic valve stenosis    Type 1 diabetes mellitus with retinopathy ([facility] )    Essential hypertension    Mouth sores    Coronary artery disease involving native coronary artery of native heart without angina pectoris    Dyslipidemia    Aortic stenosis due to bicuspid aortic valve    History of ITP    History of coronary angioplasty with insertion of stent    Acute blood loss as cause of postoperative anemia    Post-operative nausea and vomiting    Acute postoperative pulmonary insufficiency (HCC)     Past Medical History:   Diagnosis Date    Aortic stenosis     Clavicle fracture     LEFT    Diabetes mellitus ([facility]     Hyperlipidemia     Hypertension     Myocardial infarction ([facility]     Thrombocytopenic purpura ([facility] )      Social History     Socioeconomic History    Marital status: /Civil Union     Spouse name: Not on file    Number of children: Not on file    Years of education: GRADUATED HIGHSCHOOL     Highest education level: Not on file   Occupational History    Occupation: MANAGER AT [employer] Financial resource strain: Not on file    Food insecurity     Worry: Not on file     Inability: Not on file   [employer] needs     Medical: Not on file     Non-medical: Not on file   Tobacco Use    Smoking status: Former Smoker     Types: Cigars    Smokeless tobacco: Never Used    Tobacco comment: none   Substance and Sexual Activity    Alcohol use: Not Currently     Alcohol/week: 0 0 standard drinks     Frequency: Never     Drinks per session: Patient refused     Binge frequency: Never     Comment: none    Drug use: Not Currently     Frequency: 3 0 times per week     Types: Marijuana     Comment: none    Sexual activity: Not Currently     Partners: Female   Lifestyle    Physical activity     Days per week: Not on file     Minutes per session: Not on file    Stress: Not on file   Relationships    Social connections     Talks on phone: Not on file     Gets together: Not on file     Attends Episcopal service: Not on file     Active member of club or organization: Not on file     Attends meetings of clubs or organizations: Not on file     Relationship status: Not on file    Intimate partner violence     Fear of current or ex partner: Not on file     Emotionally abused: Not on file     Physically abused: Not on file     Forced sexual activity: Not on file   Other Topics Concern    Not on file   Social History Narrative    Not on file      Family History   Problem Relation Age of Onset    Aneurysm Mother         CAREBRAL ARTERY     Coronary artery disease Mother     Diabetes Mother      Past Surgical History:   Procedure Laterality Date    ABDOMINAL SURGERY      HARVEST VEIN Left 9/2/2020    Procedure: HARVEST VEIN ENDOSCOPIC (EVH) LEFT LEG;  Surgeon: Francia Cohen MD;  Location: [facility];  Service: Cardiac Surgery    CO CABG, ARTERY-VEIN, THREE N/A 9/2/2020    Procedure: CORONARY ARTERY BYPASS GRAFT X 3 WITH SVG TO Right Posterior Lateral Branch, OM1 AND LIMA TO LAD ;  Surgeon: Francia Cohen MD;  [facility];  Service: Cardiac Surgery    CO RPLCMT AORTIC VALVE OPN W/STENTLESS TISSUE VALVE N/A 9/2/2020    Procedure: AORTIC VALVE REPLACEMENT WITH A 25MM SANTOYO INSPIRIS RESILIA  TISSUE AORTIC VALVE;  Surgeon: Francia Cohen MD;  [facility];  Service: Cardiac Surgery    ROTATOR CUFF REPAIR      SPLENECTOMY      TONSILLECTOMY      VITRECTOMY Bilateral     BY ANTERIOR APPROACH        Current Outpatient Medications:     acetaminophen (TYLENOL) 325 mg tablet, Take 2 tablets (650 mg total) by mouth every 6 (six) hours as needed for mild pain or moderate pain, Disp: 30 tablet, Rfl: 0    amoxicillin (AMOXIL) 500 mg capsule, Prior dental, Disp: , Rfl:     ascorbic acid (VITAMIN C) 500 MG tablet, Take 1 tablet (500 mg total) by mouth daily, Disp: 30 tablet, Rfl: 2    aspirin 325 mg tablet, Take 1 tablet (325 mg total) by mouth daily, Disp: 30 tablet, Rfl: 2    atorvastatin (LIPITOR) 80 mg tablet, Take 1 tablet by mouth daily with dinner, Disp: 30 tablet, Rfl: 0    NEL MICROLET LANCETS lancets, by Other route 4 (four) times a day Use as instructed, Disp: 400 each, Rfl: 3    busPIRone (BUSPAR) 15 mg tablet, , Disp: , Rfl:     Cholecalciferol (VITAMIN D) 2000 units CAPS, TK 1 C PO QD, Disp: , Rfl: 3    ferrous sulfate 325 (65 Fe) mg tablet, Take 1 tablet (325 mg total) by mouth daily with breakfast, Disp: 30 tablet, Rfl: 2    glucagon (GLUCAGON EMERGENCY) 1 MG injection, Inject 1 mg under the skin once as needed for low blood sugar for up to 1 dose (Patient not taking: Reported on 2/3/2020), Disp: 1 kit, Rfl: 1    glucose blood (NEL CONTOUR TEST) test strip, 1 each by Other route 4 (four) times a day, Disp: 400 each, Rfl: 3    hydrOXYzine HCL (ATARAX) 25 mg tablet, TAKE 1 TO 2 TABLETS BY MOUTH EVERY 8 HOURS AS NEEDED FOR ANXIETY, Disp: , Rfl:     Insulin Infusion Pump KIT, 1 Units/hr by Subcutaneous Insulin Pump route continuous, Disp: , Rfl:     Insulin Infusion Pump Supplies (MINIMED INFUSION SET-) MISC, by Does not apply route, Disp: , Rfl:     Insulin Infusion Pump Supplies (MINIMED RESERVOIR 3ML) MISC, by Does not apply route, Disp: , Rfl:     insulin lispro (HumaLOG) 100 units/mL injection, Use up to 100 units per day via insulin pump, Disp: 90 mL, Rfl: 0    INSULIN SYRINGE 1CC/29G 29G X 1/2" 1 ML MISC, Inject as directed 3 (three) times a day with meals Use as directed, Disp: 100 each, Rfl: 0    metoprolol tartrate (LOPRESSOR) 50 mg tablet, Take 1 tablet (50 mg total) by mouth every 12 (twelve) hours, Disp: 60 tablet, Rfl: 2    omeprazole (PriLOSEC) 20 mg delayed release capsule, Take 20 mg by mouth daily, Disp: , Rfl:     potassium chloride (K-DUR,KLOR-CON) 20 mEq tablet, Take 1 tablet (20 mEq total) by mouth 2 (two) times a day for 7 days, THEN 1 tablet (20 mEq total) daily for 7 days  , Disp: 21 tablet, Rfl: 1    QUEtiapine (SEROquel) 25 mg tablet, TAKE 1 TABLET BY MOUTH EVERY DAY EVERY NIGHT, Disp: , Rfl:     sildenafil (VIAGRA) 50 MG tablet, TAKE 1 TABLET BY MOUTH AS  NEEDED FOR ERECTILE  DYSFUNCTION, Disp: , Rfl:     torsemide (DEMADEX) 20 mg tablet, Take 1 tablet (20 mg total) by mouth 2 (two) times a day for 7 days, THEN 1 tablet (20 mg total) daily for 7 days  , Disp: 21 tablet, Rfl: 1    zolpidem (AMBIEN) 5 mg tablet, Take 1 tablet by mouth daily at bedtime as needed for sleep for up to 2 days, Disp: 2 tablet, Rfl: 0  No Known Allergies    Labs:  No results displayed because visit has over 200 results  Admission on 08/31/2020, Discharged on 08/31/2020   Component Date Value    POC Glucose 08/31/2020 91     Ventricular Rate 08/31/2020 76     Atrial Rate 08/31/2020 76     OH Interval 08/31/2020 184     QRSD Interval 08/31/2020 106     QT Interval 08/31/2020 412     QTC Interval 08/31/2020 463     P Axis 08/31/2020 39     QRS Axis 08/31/2020 0     T Wave Axis 08/31/2020 -46    Lab Requisition on 08/26/2020   Component Date Value    ABO Grouping 08/26/2020 A     Rh Factor 08/26/2020 Positive     Antibody Screen 08/26/2020 Negative     Specimen Expiration Date 08/26/2020 [id number]    Orders Only on 08/26/2020   Component Date Value    SARS-CoV-2  08/26/2020 Not Detected    Appointment on 08/26/2020   Component Date Value    WBC 08/26/2020 7 58     RBC 08/26/2020 4 34     Hemoglobin 08/26/2020 12 9     Hematocrit 08/26/2020 37 9     MCV 08/26/2020 87     MCH 08/26/2020 29 7     MCHC 08/26/2020 34 0     RDW 08/26/2020 14 2     Platelets 79/90/5039 420*    MPV 08/26/2020 10 2     Sodium 08/26/2020 138     Potassium 08/26/2020 4 4     Chloride 08/26/2020 104     CO2 08/26/2020 28     ANION GAP 08/26/2020 6     BUN 08/26/2020 11     Creatinine 08/26/2020 0 72     Glucose, Fasting 08/26/2020 255*    Calcium 08/26/2020 8 6     eGFR 08/26/2020 106     Protime 08/26/2020 10 7     INR 08/26/2020 1 01      Imaging: Xr Chest Portable    Result Date: 9/4/2020  Narrative: CHEST INDICATION:   Left lung opacities and basilar effusion on previous imaging  COMPARISON:  9/3/2020, CT chest 8/18/2020 EXAM PERFORMED/VIEWS:  XR CHEST PORTABLE FINDINGS:  The patient is rotated to the right  Right IJ central line tip terminates in the region of the SVC  No pneumothorax   Cardiomediastinal silhouette appears stable  Status post median sternotomy and CABG with aortic valve replacement  Mediastinal and left pleural drains  There is new right perihilar airspace opacity with persistent left perihilar and basilar opacities  Hazy opacity left costophrenic angle suggesting pleural effusion  Osseous structures appear within normal limits for patient age  Impression: New right perihilar opacity favored to represent pneumonia  Persistent left lung consolidation and probable small basilar pleural effusion  Workstation performed: [id number]     Xr Chest Portable    Result Date: 9/3/2020  Narrative: CHEST INDICATION:   Post Open Heart Surgey  COMPARISON:  9/2/2020 EXAM PERFORMED/VIEWS:  XR CHEST PORTABLE FINDINGS:  Sternotomy wires are intact  Prosthetic heart valve unchanged  [place]-Lynnette catheter, ET tube and NG tube have been removed  There is a right-sided central line to the cavoatrial junction which is unchanged  There is a mediastinal drain and a  left basilar chest tube which remain  Stable mild cardiac enlargement  New opacity within the left upper and lower lobe with small left basilar effusion  The right lung field is clear  No pneumothorax  Osseous structures appear within normal limits for patient age  Impression: New left upper and lower lobe opacities with superimposed small basilar effusion  Differential considerations would include atelectasis versus pneumonia  The right lung field is clear  The study was marked in [facility] for immediate notification  Workstation performed: [id number]     Xr Chest Pa & Lateral    Result Date: 9/29/2020  Narrative: CHEST INDICATION:   R09 02: Hypoxemia  COMPARISON:  Chest radiograph from 9/8/2020 and 9/7/2020  Chest CT from 9/9/2020  EXAM PERFORMED/VIEWS:  XR CHEST PA & LATERAL FINDINGS: Normal heart size  AVR  CABG  Resolution of left ground glass opacity  Improving right groundglass opacity, but persistent in the right midlung    Resolution of left pneumothorax  Slight increase in small left pleural effusion  Multiple clips in the left upper quadrant related to a splenectomy  Multiple old left rib fractures  Impression: Resolution of left groundglass opacity with improving but persistent right groundglass opacity  Slight increase in small left effusion  Workstation performed: [id number]     Xr Chest Pa & Lateral    Result Date: 9/8/2020  Narrative: CHEST INDICATION:   left apical pneumothorax  COMPARISON:  Chest radiograph from 9/7/2020 and chest CT from 8/18/2020  EXAM PERFORMED/VIEWS:  XR CHEST PA & LATERAL  DUAL ENERGY SUBTRACTION TECHNIQUE FINDINGS:  Right jugular catheter in upper SVC  Normal heart size  CABG   AVR  Persistent moderate bilateral ground glass opacity  No change in small left apical pneumothorax  No pleural effusion  Clips in the left upper quadrant  Healed left rib fractures  Impression: No change in small left apical pneumothorax  Persistent bilateral ground glass opacity  Workstation performed: [id number]     Xr Chest Pa & Lateral    Result Date: 9/7/2020  Narrative: CHEST INDICATION:   rule out effusion  COMPARISON:  9/4/2020 EXAM PERFORMED/VIEWS:  XR CHEST PA & LATERAL  The frontal view was performed utilizing dual energy radiographic technique  Images: 4 FINDINGS:  There are median sternotomy wires indicating prior cardiac surgery  CABG procedure  Prosthetic aortic valve  Surgical clips in the left upper quadrant  Stable right IJ line  Chest tubes have been pulled  Cardiomediastinal silhouette appears unremarkable  There is diffuse bilateral groundglass opacity which could be due to infection or pulmonary edema  More focal patchy opacities noted in each lung previously are not apparent  There is a small left apical pneumothorax which was not apparent previously  Probable small left effusion  Osseous structures appear within normal limits for patient age  Impression: There is a new small left apical pneumothorax  Diffuse bilateral groundglass opacities which could be due to pneumonia or pulmonary edema  The study was marked in [facility] for immediate notification  Workstation performed: [id number]     Ct Head Wo Contrast    Result Date: 9/3/2020  Narrative: CT BRAIN - WITHOUT CONTRAST INDICATION:   Neuro deficit, acute, stroke suspected Dizziness, persistent/recurrent, cardiac or vascular cause suspected persistent N/V, disconjugate gaze, r/o CVA  COMPARISON:  7/29/2018 TECHNIQUE:  CT examination of the brain was performed  In addition to axial images, sagittal and coronal 2D reformatted images were created and submitted for interpretation  Radiation dose length product (DLP) for this visit:  874 1 mGy-cm   This examination, like all CT scans performed in the [facility], was performed utilizing techniques to minimize radiation dose exposure, including the use of iterative reconstruction and automated exposure control  IMAGE QUALITY:  Diagnostic  FINDINGS: PARENCHYMA:  Subtle hypodensities within the left frontal vertex suggestive of mild chronic microangiopathic change  There is a focal hypodensity identified within the posterior medial aspect of the left thalamus on series 2 image 21 which was present on prior study consistent with an old lacunar infarct  No acute territorial infarct  No mass or hemorrhage  VENTRICLES AND EXTRA-AXIAL SPACES:  Normal for the patient's age  VISUALIZED ORBITS AND PARANASAL SINUSES:  Unremarkable  CALVARIUM AND EXTRACRANIAL SOFT TISSUES:  Normal      Impression: Stable examination with no acute intracranial abnormality  Mild microangiopathic change within the left frontal vertex and a small old lacunar infarct within the left posterior thalamus  Workstation performed: [id number]     Ct Chest Wo Contrast    Result Date: 9/9/2020  Narrative: CT CHEST WITHOUT IV CONTRAST INDICATION:   persistent hypoxia  Post CABG and aortic valve replacement    Post chest tube removal  COMPARISON:  Compared with 8/18/2020 TECHNIQUE: CT examination of the chest was performed without intravenous contrast   Axial, sagittal, and coronal 2D reformatted images were created from the source data and submitted for interpretation  Radiation dose length product (DLP) for this visit:  261 84 mGy-cm   This examination, like all CT scans performed in the [facility], was performed utilizing techniques to minimize radiation dose exposure, including the use of iterative  reconstruction and automated exposure control  FINDINGS: Sternotomy wires seen  LUNGS:  Diffuse bilateral confluent groundglass changes with prominent interstitial thickening  There is no tracheal or endobronchial lesion  PLEURA:  Small left pleural effusion  Small left apical pneumothorax seen  HEART/GREAT VESSELS:  Dense coronary vascular calcifications seen  MEDIASTINUM AND DA:  In the anterior mediastinum is a fluid and air collection measuring 7 4 x 1 9 x 11 4 cm  CHEST WALL AND LOWER NECK:   Unremarkable  VISUALIZED STRUCTURES IN THE UPPER ABDOMEN:  Unremarkable  OSSEOUS STRUCTURES:  No acute fracture or destructive osseous lesion  Impression: 1  Small left apical pneumothorax and small left pleural effusion  Post chest tube removal   This is similar to chest x-ray of 9/7/2020 2  Retrosternal /anterior mediastinal collection containing fluid and air measuring 7 4 x 1 9 x 11 4 cm  3   Bilateral diffuse confluent groundglass changes suggestive for congestive changes with possible atypical pneumonia  I personally discussed this study with Marylen Barth on 9/9/2020 at 11:36 AM  Workstation performed: [id number]     Xr Chest Portable Icu    Addendum Date: 9/2/2020 Addendum:   ADDENDUM: Probable tiny left apical pneumothorax  Result Date: 9/2/2020  Narrative: CHEST INDICATION:   S/P open heart   COMPARISON:  8/30/2017 EXAM PERFORMED/VIEWS:  XR CHEST PORTABLE ICU FINDINGS:  Endotracheal tube is present with its tip in satisfactory position above the level of the jonathan  An enteric tube is present with its tip in satisfactory position below the left hemidiaphragm  Right internal jugular central venous catheter is noted  Also noted is a [place]-Lynnette catheter from a right internal jugular approach with its tip overlying the right main pulmonary artery  Mediastinal and pleural drains are present  Cardiomediastinal silhouette appears unremarkable  Aortic valve prosthesis  The lungs are clear  No pneumothorax or pleural effusion  Osseous structures appear within normal limits for patient age  Impression: Unremarkable postop chest  Workstation performed: [id number]       Review of Systems:  Review of Systems   HENT: Positive for hearing loss  Gastrointestinal: Positive for constipation  Skin: Positive for rash  Bilateral groin rash    All other systems reviewed and are negative  Physical Exam:  Physical Exam  Constitutional:       Appearance: Normal appearance  HENT:      Head: Normocephalic  Eyes:      Pupils: Pupils are equal, round, and reactive to light  Neck:      Musculoskeletal: Normal range of motion  Cardiovascular:      Rate and Rhythm: Normal rate and regular rhythm  Pulses: Normal pulses  Pulmonary:      Effort: Pulmonary effort is normal       Breath sounds: Normal breath sounds  Comments: Slightly diminished breath sound left base  Abdominal:      General: Bowel sounds are normal       Palpations: Abdomen is soft  Musculoskeletal: Normal range of motion  Left lower leg: Edema present  Comments: Trace to 1+ LLE edema    Skin:     General: Skin is warm and dry  Capillary Refill: Capillary refill takes less than 2 seconds        Comments: Sternal incision line appears clean dry intact approximated erythema or ecchymosis    Left upper calf inner aspect  Incision line appears clean dry intact approximated without erythema or ecchymosis    owen groin rash with slight erythema    Neurological:      General: No focal deficit present  Mental Status: He is alert and oriented to person, place, and time  Psychiatric:         Mood and Affect: Mood normal          Behavior: Behavior normal          Discussion/Summary:  1  9/02/20 sp AVR #25mm [place] INSPIRIS Resilia tissue valve and CABG x 3 LIMA to LAD, SVG to RPLB, and OM1,  by Dr Rhett Moyer  9/24/20 FU CXR showed small left pleural effusion  Mr [place] denies dyspnea with exertion except with walking up a hill  BP and HR controlled, continue on  Aspirin 325 mg daily, ferrous sulfate 325 mg daily, omeprazole 20 mg daily, metoprolol tartrate 50 mg q 12 hours, Lipitor 80 mg daily  Mr [place] is aware to take Amoxicillin one hour prior to any dental procedure     2gm sodium  Low-fat low-cholesterol diet  CBC, BMP, mag to be done in near future  2  Hypertension controlled on  Metoprolol tartrate 50 mg q 12 hours  3  Dyslipidemia 7/21/20 , HDL 89, TG 49, LDL 54 -  Continue Lipitor 80 mg daily  4  Telly groin rash improving with OTC Lotrimin cream, I have ordered Lotrimin 1% BID to telly groin until rash disappears  Follow up with PCP   5   DM1 on insulin pump according to Mr [place] BS running 100- 160 at home, follow up with Endocrinology

## 2020-10-08 ENCOUNTER — OFFICE VISIT (OUTPATIENT)
Dept: CARDIAC SURGERY | Facility: CLINIC | Age: 55
End: 2020-10-08

## 2020-10-08 VITALS
WEIGHT: 160 LBS | HEIGHT: 66 IN | HEART RATE: 61 BPM | BODY MASS INDEX: 25.71 KG/M2 | DIASTOLIC BLOOD PRESSURE: 80 MMHG | SYSTOLIC BLOOD PRESSURE: 130 MMHG | TEMPERATURE: 97.2 F | RESPIRATION RATE: 18 BRPM

## 2020-10-08 DIAGNOSIS — Z12.11 COLON CANCER SCREENING: Primary | ICD-10-CM

## 2020-10-08 PROCEDURE — 99024 POSTOP FOLLOW-UP VISIT: CPT | Performed by: THORACIC SURGERY (CARDIOTHORACIC VASCULAR SURGERY)

## 2020-10-12 ENCOUNTER — OFFICE VISIT (OUTPATIENT)
Dept: DIABETES SERVICES | Facility: CLINIC | Age: 55
End: 2020-10-12

## 2020-10-12 ENCOUNTER — CLINICAL SUPPORT (OUTPATIENT)
Dept: CARDIAC REHAB | Facility: CLINIC | Age: 55
End: 2020-10-12
Payer: COMMERCIAL

## 2020-10-12 DIAGNOSIS — E10.65 UNCONTROLLED TYPE 1 DIABETES MELLITUS WITH HYPERGLYCEMIA (HCC): Primary | ICD-10-CM

## 2020-10-12 DIAGNOSIS — Z95.1 S/P CABG X 3: ICD-10-CM

## 2020-10-12 DIAGNOSIS — Z95.2 S/P AVR (AORTIC VALVE REPLACEMENT): ICD-10-CM

## 2020-10-12 PROCEDURE — 93797 PHYS/QHP OP CAR RHAB WO ECG: CPT

## 2020-10-12 NOTE — UTILIZATION REVIEW
Notification of Discharge  This is a Notification of Discharge from our facility [facility]  Please be advised that this patient has been discharge from our facility  Below you will find the admission and discharge date and time including the patients disposition  PRESENTATION DATE: 9/2/2020  5:51 AM  OBS ADMISSION DATE:   IP ADMISSION DATE: 9/2/20 0718   DISCHARGE DATE: 9/10/2020  2:42 PM  [facility]   Admission Orders listed below:  Admission Orders (From admission, onward)     Ordered        09/02/20 0718  Inpatient Admission  Once                   Please contact the UR Department if additional information is required to close this patient's authorization/case  [id number] [facility] Critical Utilization Review Department  Main: [phone] x carefully listen to the prompts  All voicemails are confidential   [email or web address]  Send all requests for admission clinical reviews, approved or denied determinations and any other requests to dedicated fax number below belonging to the campus where the patient is receiving treatment   List of dedicated fax numbers:  [address] DENIALS (Administrative/Medical Necessity) [phone]   [address] (Maternity/NICU/Pediatrics) [phone]   [facility] [phone]   [facility] [phone]   Ciro Hodge [phone]   [place] [address] [phone]   [facility]  [phone]   [address] [facility]  [address] [address] [address] [facility] [phone]

## 2020-10-15 ENCOUNTER — CLINICAL SUPPORT (OUTPATIENT)
Dept: CARDIAC REHAB | Facility: CLINIC | Age: 55
End: 2020-10-15
Payer: COMMERCIAL

## 2020-10-15 DIAGNOSIS — Z95.1 S/P CABG X 3: ICD-10-CM

## 2020-10-15 DIAGNOSIS — Z95.2 S/P AVR: ICD-10-CM

## 2020-10-15 PROCEDURE — 93798 PHYS/QHP OP CAR RHAB W/ECG: CPT

## 2020-10-16 ENCOUNTER — CLINICAL SUPPORT (OUTPATIENT)
Dept: CARDIAC REHAB | Facility: CLINIC | Age: 55
End: 2020-10-16
Payer: COMMERCIAL

## 2020-10-16 DIAGNOSIS — Z95.2 S/P AVR (AORTIC VALVE REPLACEMENT): ICD-10-CM

## 2020-10-16 PROCEDURE — 93798 PHYS/QHP OP CAR RHAB W/ECG: CPT

## 2020-10-17 ENCOUNTER — TELEPHONE (OUTPATIENT)
Dept: GASTROENTEROLOGY | Facility: CLINIC | Age: 55
End: 2020-10-17

## 2020-10-23 ENCOUNTER — TELEPHONE (OUTPATIENT)
Dept: CARDIAC REHAB | Facility: CLINIC | Age: 55
End: 2020-10-23

## 2020-10-29 DIAGNOSIS — E10.9 TYPE 1 DIABETES MELLITUS WITHOUT COMPLICATION (HCC): ICD-10-CM

## 2020-11-06 ENCOUNTER — TELEPHONE (OUTPATIENT)
Dept: CARDIAC REHAB | Facility: CLINIC | Age: 55
End: 2020-11-06

## 2020-12-31 ENCOUNTER — OFFICE VISIT (OUTPATIENT)
Dept: CARDIOLOGY CLINIC | Facility: CLINIC | Age: 55
End: 2020-12-31
Payer: COMMERCIAL

## 2020-12-31 VITALS
DIASTOLIC BLOOD PRESSURE: 90 MMHG | HEART RATE: 76 BPM | WEIGHT: 170.1 LBS | OXYGEN SATURATION: 98 % | BODY MASS INDEX: 27.34 KG/M2 | HEIGHT: 66 IN | SYSTOLIC BLOOD PRESSURE: 140 MMHG

## 2020-12-31 DIAGNOSIS — Z95.2 S/P AVR (AORTIC VALVE REPLACEMENT): ICD-10-CM

## 2020-12-31 DIAGNOSIS — Z95.1 S/P CABG X 3: ICD-10-CM

## 2020-12-31 DIAGNOSIS — I10 ESSENTIAL HYPERTENSION: ICD-10-CM

## 2020-12-31 DIAGNOSIS — I35.0 NONRHEUMATIC AORTIC VALVE STENOSIS: ICD-10-CM

## 2020-12-31 DIAGNOSIS — I25.10 CORONARY ARTERY DISEASE INVOLVING NATIVE CORONARY ARTERY OF NATIVE HEART WITHOUT ANGINA PECTORIS: ICD-10-CM

## 2020-12-31 DIAGNOSIS — Q23.0 AORTIC STENOSIS DUE TO BICUSPID AORTIC VALVE: Primary | Chronic | ICD-10-CM

## 2020-12-31 DIAGNOSIS — Q23.1 AORTIC STENOSIS DUE TO BICUSPID AORTIC VALVE: Primary | Chronic | ICD-10-CM

## 2020-12-31 PROCEDURE — 99214 OFFICE O/P EST MOD 30 MIN: CPT | Performed by: INTERNAL MEDICINE

## 2020-12-31 RX ORDER — LISINOPRIL 5 MG/1
5 TABLET ORAL DAILY
Qty: 90 TABLET | Refills: 3 | Status: SHIPPED | OUTPATIENT
Start: 2020-12-31 | End: 2021-12-29 | Stop reason: SDUPTHER

## 2020-12-31 RX ORDER — METOPROLOL TARTRATE 50 MG/1
50 TABLET, FILM COATED ORAL EVERY 12 HOURS SCHEDULED
Qty: 180 TABLET | Refills: 3 | Status: SHIPPED | OUTPATIENT
Start: 2020-12-31 | End: 2021-12-29 | Stop reason: SDUPTHER

## 2021-01-26 DIAGNOSIS — E10.8 TYPE 1 DIABETES MELLITUS WITH COMPLICATION (HCC): ICD-10-CM

## 2021-03-03 NOTE — OCCUPATIONAL THERAPY NOTE
Occupational Therapy Evaluation     Patient Name: Aimee Headley  Today's Date: 9/5/2020  Problem List  Principal Problem:    Nonrheumatic aortic valve stenosis  Active Problems:    Type 1 diabetes mellitus with retinopathy ([facility] [address] )    Essential hypertension    Coronary artery disease involving native coronary artery of native heart without angina pectoris    Dyslipidemia    Aortic stenosis due to bicuspid aortic valve    History of ITP    History of coronary angioplasty with insertion of stent    Acute blood loss as cause of postoperative anemia    Post-operative nausea and vomiting    Past Medical History  Past Medical History:   Diagnosis Date    Aortic stenosis     Clavicle fracture     LEFT    Diabetes mellitus ([facility] [address] )     Hyperlipidemia     Hypertension     Myocardial infarction ([facility] [address] )     [facility]ocytopen[facility] purpura [facility]      Past Surgical History  Past Surgical History:   Procedure Laterality Date    ABDOMINAL SURGERY      HARVEST VEIN Left 9/2/2020    Procedure: HARVEST VEIN ENDOSCOPIC (EV[facility]) LEFT LEG;  Surgeon: Bia Vogel MD;  [facility];  Service: Cardiac Surgery    UT CABG, ARTERY-VEIN, THREE N/A 9/2/2020    Procedure: CORONARY ARTERY BYPASS GRAFT X 3 WITH SVG TO Right Posterior Lateral Branch, OM1 AND LIMA TO LAD ;  Surgeon: Bia Vogel MD;  [facility];  Service: Cardiac Surgery    UT RPLCMT AORTIC VALVE OPN W/STENTLESS TISSUE VALVE N/A 9/2/2020    Procedure: AORTIC VALVE REPLACEMENT WITH A 25MM SANTOYO INSPIRIS RESILIA  TISSUE AORTIC VALVE;  Surgeon: Bia Vogel MD;  [facility];  Service: Cardiac Surgery    ROTATOR CUFF REPAIR      SPLENECTOMY      TONSILLECTOMY      VITRECTOMY Bilateral     BY ANTERIOR APPROACH            09/05/20 1352   Note Type   Note type Eval only   Restrictions/Precautions   Weight Bearing Precautions Per Order No   Other Precautions Cardiac/sternal;Multiple lines;O2;Fall Risk;Pain  (6 L via NC, CT)   Pain Assessment Spoke to patient, does not need refill at this time. He will call back in a week and if the increase dose is working we can refill for 3 months. He is agreeable. No further questions at this time. Pain Assessment Tool FLACC   Pain Rating: FLACC (Rest) - Face 0   Pain Rating: FLACC (Rest) - Legs 0   Pain Rating: FLACC (Rest) - Activity 0   Pain Rating: FLACC (Rest) - Cry 0   Pain Rating: FLACC (Rest) - Consolability 0   Score: FLACC (Rest) 0   Pain Rating: FLACC (Activity) - Face 1   Pain Rating: FLACC (Activity) - Legs 0   Pain Rating: FLACC (Activity) - Activity 0   Pain Rating: FLACC (Activity) - Cry 1   Pain Rating: FLACC (Activity) - Consolability 0   Score: FLACC (Activity) 2   Home Living   Type of Home House   Home Layout Two level;Stairs to enter with rails   Bathroom Shower/Tub Tub/shower unit   Bathroom Toilet Standard   Bathroom Equipment   (denies)   [address]   (denies)   Additional Comments Pt lives in a 2 story home with 4 HENRY   Prior Function   Level of [place] Independent with ADLs and functional mobility   Lives With [place] Help From Family   ADL Assistance Independent   IADLs Independent   Falls in the last 6 months 0   Vocational Full time employment   Lifestyle   Autonomy Pt reports being I with ADLS, IADLS and mobility without device PTA  (+)    Reciprocal Relationships Pt lives with his wife who he reports is able to assist as needed upon d/c     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[employer] Enjoys reading and being with family   Subjective   Subjective Pt reports that he has no concerns about returning home  ADL   Where Assessed Chair   Eating Assistance 7  Independent   Grooming Assistance 5  Supervision/Setup   UB Bathing Assistance 5  Supervision/Setup   LB Bathing Assistance 4  Minimal Assistance   UB Dressing Assistance 5  Supervision/Setup   LB [place]  4  Minimal Assistance   Bed Mobility   Additional Comments Unable to assess, pt seated OOB in chair upon OT arrival  After OT session pt in chair with all needs within reach      Transfers Sit to Stand 5  Supervision   Additional items Increased time required   Stand to Sit 5  Supervision   Additional items Increased time required   Functional Mobility   Functional Mobility 4  Minimal assistance   Additional Comments Pt demonstrated household mobility with 1 seated rest break  Additional items Rolling walker   Balance   Static Sitting Good   Dynamic Sitting Fair +   Static Standing Fair +   Dynamic Standing Fair   Ambulatory Fair -   Activity Tolerance   Activity Tolerance Patient limited by fatigue;Patient limited by pain   Nurse Made Aware RN confirmed okay to see pt and updated after   RUE Assessment   RUE Assessment WFL   LUE Assessment   LUE Assessment WFL   Cognition   Overall Cognitive Status WFL   Arousal/Participation Alert; Cooperative   Attention Within functional limits   Orientation Level Oriented X4   Memory Within functional limits   Following Commands Follows all commands and directions without difficulty   Comments Pt is pleasant and cooperative  Pt able to recall sternal precautions independently  Pt provided with Recovering After Cardiac Surgery packet  Assessment   Limitation Decreased ADL status; Decreased endurance;Decreased high-level ADLs   Prognosis Good   Assessment Pt is a 48 y o  male [facility] w/ nonrheumatic aortic valve stenosis s/p AVR and CABG x3 on 9/2/2020  Comorbidities include a h/o Aortic stenosis, Clavicle fracture, Diabetes mellitus ([facility] [address] ), Hyperlipidemia, Hypertension, Myocardial infarction ([facility] [address] ), and Thrombocytopenic purpura ([facility] [address] )  Pt with active OT orders and ambulate  orders  Pt resides in a 2 story home with 3 HENRY  Pt lives with supportive family able to assist as needed upon d/c  Pt was I w/  ADLS and IADLS, (+) drove, & required no use of DME PTA  Currently pt is supervision for functional transfers, min A for functional mobility, supervision for UB ADLS and min A for LB ADLS   Based on the aforementioned OT evaluation, functional performance deficits, and assessments, pt has been identified as a moderate complexity evaluation  From OT standpoint, anticipate d/c home with family support  Recommend continued participation in [facility] and functional mobility with staff  No further acute OT needs, d/c OT      Goals   Patient Goals To feel better and return home    Recommendation   OT Discharge Recommendation Return to previous environment with social support   Equipment Recommended   (denies need for commode)   OT - OK to Discharge Yes  (When medically appropriate)   Modified Jim Hogg Scale   Modified Jim Hogg Scale 4     Lizeth Lopez, GHADA, OTR/L

## 2021-04-16 ENCOUNTER — HOSPITAL ENCOUNTER (OUTPATIENT)
Dept: NON INVASIVE DIAGNOSTICS | Facility: CLINIC | Age: 56
Discharge: HOME | End: 2021-04-16
Payer: COMMERCIAL

## 2021-04-16 DIAGNOSIS — I25.10 CORONARY ARTERY DISEASE INVOLVING NATIVE CORONARY ARTERY OF NATIVE HEART WITHOUT ANGINA PECTORIS: ICD-10-CM

## 2021-04-16 DIAGNOSIS — Z95.2 S/P AVR (AORTIC VALVE REPLACEMENT): ICD-10-CM

## 2021-04-16 DIAGNOSIS — Q23.1 AORTIC STENOSIS DUE TO BICUSPID AORTIC VALVE: Chronic | ICD-10-CM

## 2021-04-16 DIAGNOSIS — Q23.0 AORTIC STENOSIS DUE TO BICUSPID AORTIC VALVE: Chronic | ICD-10-CM

## 2021-04-16 PROCEDURE — 93306 TTE W/DOPPLER COMPLETE: CPT | Performed by: INTERNAL MEDICINE

## 2021-04-16 PROCEDURE — 93306 TTE W/DOPPLER COMPLETE: CPT

## 2021-05-11 ENCOUNTER — OFFICE VISIT (OUTPATIENT)
Dept: DIABETES SERVICES | Facility: CLINIC | Age: 56
End: 2021-05-11

## 2021-05-11 DIAGNOSIS — E10.311 TYPE 1 DIABETES MELLITUS WITH RETINOPATHY OF BOTH EYES AND MACULAR EDEMA, UNSPECIFIED RETINOPATHY SEVERITY (HCC): ICD-10-CM

## 2021-05-11 PROCEDURE — MTHERA

## 2021-05-11 NOTE — PROGRESS NOTES
Constance Youssef is using the Medtronic 670 pump   He is here today for reeducation and start on the guardian sensor  He was previously started on the sensor but the transmitter was lost      Medtronic Guardian CGM Education    Kevin Paz completed CGM training on the Medtronic Guardian Sensor  Completed all aspects of training per Medtronic Training Bee Butch left the office wearing the sensor, in warm up mode  Pt verbalizes/demonstrated understanding of calibrating, CGM trends, Sensor Glucose vs Blood glucose, troubleshooting site/ taping issues, site rotation, alarms, proper sensor insertion  Constance Youssef demonstrates correct insertion and use of sensor  Entered sensor settings per patient preference and clinical judgment, Constance Youssef knows how to change settings and can do that at home if alarms are too frequent or not frequent enough  Discussed the importance of using the linking meter for most accurate results  Constance Youssef understands that insulin dosing needs to based off of finger stick readings, not sensor readings at this time  Understands Suspend Delivery feature, is choosing on to use it at this time  Sensor and transmitter inserted by patient, prepped with alcohol pad into abdomen with correct technique  Small amount of bleeding noted at the site  Device taped properly and secure  Elieser tolerated procedure well, no complaint pain at site  Sensor connected, 2 hour calibration countdown working properly  Observations; Alarms were set as follows  Glucose Alerts:   On     High Alarm: 300      High Snooze:     Low Alarm: 70        Low Snooze:  15    Suspend on low - on  12 - 6   Alert before low - on 6 am - 12      Meter linked: yes  Pump Linked - yes

## 2021-05-11 NOTE — PATIENT INSTRUCTIONS
Download carelink to your home computer  Download and send in a report to the office in 1 week  Auto mode scheduled in 2 weeks

## 2021-05-25 ENCOUNTER — TELEPHONE (OUTPATIENT)
Dept: DIABETES SERVICES | Facility: CLINIC | Age: 56
End: 2021-05-25

## 2021-05-25 ENCOUNTER — TELEPHONE (OUTPATIENT)
Dept: ENDOCRINOLOGY | Facility: CLINIC | Age: 56
End: 2021-05-25

## 2021-05-25 ENCOUNTER — OFFICE VISIT (OUTPATIENT)
Dept: DIABETES SERVICES | Facility: CLINIC | Age: 56
End: 2021-05-25
Payer: COMMERCIAL

## 2021-05-25 DIAGNOSIS — E10.311 TYPE 1 DIABETES MELLITUS WITH RETINOPATHY OF BOTH EYES AND MACULAR EDEMA, UNSPECIFIED RETINOPATHY SEVERITY (HCC): ICD-10-CM

## 2021-05-25 PROCEDURE — G0108 DIAB MANAGE TRN  PER INDIV: HCPCS

## 2021-05-25 NOTE — PROGRESS NOTES
Fredy Mims was scheduled for auto mode today  Fredy Mims has only started using the sensor 12 weeks ago He would like to delay starting on auto mode for 1 week  Fredy Mims has been receiving numerous sensor alarms  He is still becoming accustomed  to taking his insulin before his meal, he does remember to do this most of the time  He has been receiving low alerts and when he confirms with his meter it is always higher  I discussed with him the difference between the sensor reading and the meter glucose reading  When he is low at work he has been treating with a PB and jelly sandwich  His glucose goes up and he gives a bolus dose  We discussed treating with fast acting carbs, he has noted that the juice or glucose tabs will bring his glucose up quicker  He agrees to use the fast acting carbs to treat the low  He has been receiving frequent calibration/BG alerts  Sounds like he is entering  in his calibration blood glucose more that 15 minutes form the time he checked it  I discussed him entering in the calibration within 5 minutes of testing  If there is more of a time lag this might be the reason for the alerts he is receiving  I downloaded his pump and will have his download reviewed by the provider  Fredy Mims is scheduled for auto mode in 1 week

## 2021-05-25 NOTE — PATIENT INSTRUCTIONS
Treat lows with fast acting carb, Juice, glucose tabs or liquid  Enter calibration glucose in within 5 minutes of taking glucose with meter  Continue to bolus before meals

## 2021-05-25 NOTE — TELEPHONE ENCOUNTER
Left message for him to change his correction factor from 30 to 40  He is to call if he has questions

## 2021-05-25 NOTE — TELEPHONE ENCOUNTER
Randa Soto,    Can you call this patient and ask him to change his correction factor from 30 to 40? Looks like he is dropping pretty fast after correction of highs  Also agree with everything in your note bolus before meals, not over treat lows, start automode next week,  etc     Buck Downs     ----- Message from [address] [place] sent at 5/25/2021 12:06 PM EDT -----  Patient is following with you  Sees you in [facility] [employer] you have a good week! MYKE Hamm  ----- Message -----  From: Gabriel Lund  Sent: 5/25/2021  10:01 AM EDT  To: DEVEN Bowser    [address] report for this patient has been scanned into his chart for your review  Thank you!

## 2021-06-04 ENCOUNTER — TELEPHONE (OUTPATIENT)
Dept: ENDOCRINOLOGY | Facility: CLINIC | Age: 56
End: 2021-06-04

## 2021-06-08 ENCOUNTER — TELEPHONE (OUTPATIENT)
Dept: DIABETES SERVICES | Facility: CLINIC | Age: 56
End: 2021-06-08

## 2021-06-08 NOTE — TELEPHONE ENCOUNTER
Will cancel appt for auto mode tomorrow    He will call and reschedule when he receives his 770 Medtronic pump

## 2021-06-21 ENCOUNTER — TELEPHONE (OUTPATIENT)
Dept: DIABETES SERVICES | Facility: CLINIC | Age: 56
End: 2021-06-21

## 2021-06-24 ENCOUNTER — APPOINTMENT (OUTPATIENT)
Dept: RADIOLOGY | Facility: AMBULARY SURGERY CENTER | Age: 56
End: 2021-06-24
Attending: ORTHOPAEDIC SURGERY
Payer: COMMERCIAL

## 2021-06-24 ENCOUNTER — OFFICE VISIT (OUTPATIENT)
Dept: OBGYN CLINIC | Facility: CLINIC | Age: 56
End: 2021-06-24
Payer: COMMERCIAL

## 2021-06-24 VITALS
WEIGHT: 170 LBS | BODY MASS INDEX: 27.32 KG/M2 | SYSTOLIC BLOOD PRESSURE: 143 MMHG | HEIGHT: 66 IN | HEART RATE: 60 BPM | DIASTOLIC BLOOD PRESSURE: 82 MMHG

## 2021-06-24 DIAGNOSIS — M25.511 RIGHT SHOULDER PAIN, UNSPECIFIED CHRONICITY: ICD-10-CM

## 2021-06-24 DIAGNOSIS — M75.01 ADHESIVE CAPSULITIS OF RIGHT SHOULDER: ICD-10-CM

## 2021-06-24 DIAGNOSIS — M25.511 RIGHT SHOULDER PAIN, UNSPECIFIED CHRONICITY: Primary | ICD-10-CM

## 2021-06-24 PROCEDURE — 99204 OFFICE O/P NEW MOD 45 MIN: CPT | Performed by: ORTHOPAEDIC SURGERY

## 2021-06-24 PROCEDURE — 73030 X-RAY EXAM OF SHOULDER: CPT

## 2021-06-24 PROCEDURE — 20610 DRAIN/INJ JOINT/BURSA W/O US: CPT | Performed by: ORTHOPAEDIC SURGERY

## 2021-06-24 RX ORDER — BUPIVACAINE HYDROCHLORIDE 2.5 MG/ML
2 INJECTION, SOLUTION INFILTRATION; PERINEURAL
Status: COMPLETED | OUTPATIENT
Start: 2021-06-24 | End: 2021-06-24

## 2021-06-24 RX ORDER — BETAMETHASONE SODIUM PHOSPHATE AND BETAMETHASONE ACETATE 3; 3 MG/ML; MG/ML
6 INJECTION, SUSPENSION INTRA-ARTICULAR; INTRALESIONAL; INTRAMUSCULAR; SOFT TISSUE
Status: COMPLETED | OUTPATIENT
Start: 2021-06-24 | End: 2021-06-24

## 2021-06-24 RX ORDER — LIDOCAINE HYDROCHLORIDE 10 MG/ML
1 INJECTION, SOLUTION INFILTRATION; PERINEURAL
Status: COMPLETED | OUTPATIENT
Start: 2021-06-24 | End: 2021-06-24

## 2021-06-24 RX ADMIN — BETAMETHASONE SODIUM PHOSPHATE AND BETAMETHASONE ACETATE 6 MG: 3; 3 INJECTION, SUSPENSION INTRA-ARTICULAR; INTRALESIONAL; INTRAMUSCULAR; SOFT TISSUE at 09:22

## 2021-06-24 RX ADMIN — BUPIVACAINE HYDROCHLORIDE 2 ML: 2.5 INJECTION, SOLUTION INFILTRATION; PERINEURAL at 09:22

## 2021-06-24 RX ADMIN — LIDOCAINE HYDROCHLORIDE 1 ML: 10 INJECTION, SOLUTION INFILTRATION; PERINEURAL at 09:22

## 2021-06-24 NOTE — PROGRESS NOTES
Assessment:  1  Right shoulder pain, unspecified chronicity  XR shoulder 2+ vw right     Patient Active Problem List   Diagnosis    Nonrheumatic aortic valve stenosis    Type 1 diabetes mellitus with retinopathy ([facility] [address] )    Essential hypertension    Mouth sores    Coronary artery disease involving native coronary artery of native heart without angina pectoris    Dyslipidemia    Aortic stenosis due to bicuspid aortic valve    History of ITP    History of coronary angioplasty with insertion of stent    Acute blood loss as cause of postoperative anemia    Post-operative nausea and vomiting    Acute postoperative pulmonary insufficiency ([facility] [address] )           Plan       referral to physical therapy patient is uncertain if he wants to go to therapy I will let him make that decision put the referral in  At this point it seems like he has rotator cuff irritation but not for a oskar tear given his findings on objective examination  We gave him a cortisone injection to calm down his pain I do recommend therapy to work on his range of motion it is poor at best on the right and is fair to poor on the left  Subjective:     Patient ID:    Chief Complaint:[facility] 55 y o  male      HPI    Patient comes in today with regards to Shoulder  Patient had open heart surgery back in September of 2020  Ever since he had that surgery he has been having shoulder pain  His left shoulder initially bothered him as well but that improved but the right 1 continues to bother him in fact it is got worse  Pain wakes him up at night  The patient reports that the pain is in the   Posterior aspect and has been going on for  1 year  The pain is rated at3 at its best and7 at its worst   The pain is described as  Low-grade tooth ache  It is worsened with  Slapping 5, and is made better with  uncertain  The patient has taken  Aspercreme with lidocaine, Tylenol for treatment  Advil did help somewhat   No previous injury or surgeries to the shoulder  The following portions of the patient's history were reviewed and updated as appropriate: allergies, current medications, past family history, past social history, past surgical history and problem list         Social History     Socioeconomic History    Marital status: /Civil Union     Spouse name: Not on file    Number of children: Not on file    Years of education: GRADUATED HIGHSCHOOL     Highest education level: Not on file   Occupational History    Occupation: MANAGER AT [employer]    Tobacco Use    Smoking status: Former Smoker     Years: 10 00     Types: Cigars     Quit date: 2020     Years since quittin 8    Smokeless tobacco: Never Used   Vaping Use    Vaping Use: Never used   Substance and Sexual Activity    Alcohol use: Not Currently     Alcohol/week: 0 0 standard drinks     Comment: none    Drug use: Yes     Frequency: 3 0 times per week     Types: Marijuana     Comment: none    Sexual activity: Not Currently     Partners: Female   Other Topics Concern    Not on file   Social History Narrative    Not on file     Social Determinants of Health     Financial Resource Strain:     Difficulty of Paying Living Expenses:    Food Insecurity:     Worried About Running Out of Food in the Last Year:     [address] in the Last Year:    Transportation Needs:     Lack of Transportation (Medical):      Lack of Transportation (Non-Medical):    Physical Activity:     Days of Exercise per Week:     Minutes of Exercise per Session:    Stress:     Feeling of Stress :    Social Connections:     Frequency of Communication with Friends and Family:     Frequency of Social Gatherings with Friends and Family:     Attends Restorationist Services:     Active Member of Clubs or Organizations:     Attends Club or Organization Meetings:     Marital Status:    Intimate Partner Violence:     Fear of Current or Ex-Partner:     Emotionally Abused:     Physically Abused:     Sexually Abused:      Past Medical History:   Diagnosis Date    Aortic stenosis     Clavicle fracture     LEFT    [facility] mellitus [facility] [address] [facility]     Hyperlipidemia     Hypertension     Myocardial infarction ([facility] [address] [facility]     Thrombocytopenic purpura ([facility] [address] )      Past Surgical History:   Procedure Laterality Date    ABDOMINAL SURGERY      HARVEST VEIN Left 9/2/2020    Procedure: HARVEST VEIN ENDOSCOPIC ([facility]) LEFT LEG;  Surgeon: Jesse Gastelum MD;  [facility];  Service: Cardiac Surgery    UT CABG, ARTERY-VEIN, THREE N/A 9/2/2020    Procedure: CORONARY ARTERY BYPASS GRAFT X 3 WITH SVG TO Right Posterior Lateral Branch, OM1 AND LIMA TO LAD ;  Surgeon: Jesse Gastelum MD;  [facility];  Service: Cardiac Surgery    UT RPLCMT AORTIC VALVE OPN W/STENTLESS TISSUE VALVE N/A 9/2/2020    Procedure: AORTIC VALVE REPLACEMENT WITH A 25MM SANTOYO INSPIRIS RESILIA  TISSUE AORTIC VALVE;  Surgeon: Jesse Gastelum MD;  [facility];  Service: Cardiac Surgery    ROTATOR CUFF REPAIR      SPLENECTOMY      TONSILLECTOMY      VITRECTOMY Bilateral     BY ANTERIOR APPROACH      No Known Allergies  Current Outpatient Medications on File Prior to Visit   Medication Sig Dispense Refill    acetaminophen (TYLENOL) 325 mg tablet Take 2 tablets (650 mg total) by mouth every 6 (six) hours as needed for mild pain or moderate pain 30 tablet 0    amoxicillin (AMOXIL) 500 mg capsule Prior dental      ascorbic acid (VITAMIN C) 500 MG tablet Take 1 tablet (500 mg total) by mouth daily 30 tablet 2    aspirin 325 mg tablet Take 1 tablet (325 mg total) by mouth daily 30 tablet 2    atorvastatin (LIPITOR) 80 mg tablet Take 1 tablet by mouth daily with dinner 30 tablet 0    Zapa MICROLET LANCETS lancets by Other route 4 (four) times a day Use as instructed 400 each 3    busPIRone (BUSPAR) 15 mg tablet       Cholecalciferol (VITAMIN D) 2000 units CAPS TK 1 C PO QD  3    clotrimazole (LOTRIMIN) 1 % cream Apply topically 2 (two) times a day 30 g 0    ferrous sulfate 325 (65 Fe) mg tablet Take 1 tablet (325 mg total) by mouth daily with breakfast 30 tablet 2    glucagon 1 MG injection Inject 1 mg under the skin once as needed for low blood sugar for up to 1 dose 1 kit 1    glucose blood (NEL CONTOUR TEST) test strip 1 each by Other route 4 (four) times a day 400 each 3    hydrOXYzine HCL (ATARAX) 25 mg tablet TAKE 1 TO 2 TABLETS BY MOUTH EVERY 8 HOURS AS NEEDED FOR ANXIETY      Insulin Infusion Pump KIT 1 Units/hr by Subcutaneous Insulin Pump route continuous      Insulin Infusion Pump Supplies (MINIMED INFUSION SET-) MISC by Does not apply route      Insulin Infusion Pump Supplies (MINIMED RESERVOIR 3ML) MISC by Does not apply route      insulin lispro (HumaLOG) 100 units/mL injection Use up to 100 units per day via insulin pump 90 mL 3    INSULIN SYRINGE 1CC/29G 29G X 1/2" 1 ML MISC Inject as directed 3 (three) times a day with meals Use as directed 100 each 0    lisinopril (ZESTRIL) 5 mg tablet Take 1 tablet (5 mg total) by mouth daily 90 tablet 3    metoprolol tartrate (LOPRESSOR) 50 mg tablet Take 1 tablet (50 mg total) by mouth every 12 (twelve) hours 180 tablet 3    omeprazole (PriLOSEC) 20 mg delayed release capsule Take 20 mg by mouth daily      potassium chloride (K-DUR,KLOR-CON) 20 mEq tablet Take 1 tablet (20 mEq total) by mouth 2 (two) times a day for 7 days, THEN 1 tablet (20 mEq total) daily for 7 days  21 tablet 1    QUEtiapine (SEROquel) 25 mg tablet TAKE 1 TABLET BY MOUTH EVERY DAY EVERY NIGHT      sildenafil (VIAGRA) 50 MG tablet TAKE 1 TABLET BY MOUTH AS  NEEDED FOR ERECTILE  DYSFUNCTION      torsemide (DEMADEX) 20 mg tablet Take 1 tablet (20 mg total) by mouth 2 (two) times a day for 7 days, THEN 1 tablet (20 mg total) daily for 7 days   21 tablet 1    zolpidem (AMBIEN) 5 mg tablet Take 1 tablet by mouth daily at bedtime as needed for sleep for up to 2 days 2 tablet 0 No current facility-administered medications on file prior to visit  Objective:    Review of Systems   Constitutional: Negative  HENT: Negative  Eyes: Negative  Respiratory: Negative  Cardiovascular: Negative  Gastrointestinal: Negative  Negative for vomiting  Genitourinary: Negative  Musculoskeletal:        Please refer to HPI   Skin: Negative  Neurological: Negative  Hematological: Negative  Psychiatric/Behavioral: Negative  All other systems reviewed and are negative  Right Shoulder Exam     Range of Motion   Active abduction: normal   Passive abduction: normal   Right shoulder external rotation: T1    Forward flexion: normal   Internal rotation 0 degrees: L3     Muscle Strength   The patient has normal right shoulder strength  Tests   N[place]ola test: negative  Impingement: positive    Other   Erythema: absent  Sensation: normal    Comments:    Speed's test is positive as is Hornblower's test     passive range of motion shows external rotation at 90° of abduction at 70° internal rotation of 90° of abduction at approximately 5-10 degrees      Left Shoulder Exam     Range of Motion   Active abduction: normal   Passive abduction: normal   Left shoulder external rotation: T3    Forward flexion: normal   Internal rotation 0 degrees: T12     Muscle Strength   The patient has normal left shoulder strength  Comments:   Passive range of motion of external rotation 90° of abduction shows 90° of external rotation  At 90° of abduction the internal rotation shows approximately 30-40 degrees            Physical Exam  Vitals and nursing note reviewed  Constitutional:       Appearance: He is well-developed  HENT:      Head: Normocephalic  Eyes:      Pupils: Pupils are equal, round, and reactive to light  Pulmonary:      Effort: Pulmonary effort is normal    Abdominal:      General: There is no distension  Musculoskeletal:      Cervical back: Neck supple  Skin:     General: Skin is warm  Neurological:      Mental Status: He is alert and oriented to person, place, and time  Large joint arthrocentesis: R glenohumeral  Universal Protocol:  Consent given by: patient  Time out: Immediately prior to procedure a "time out" was called to verify the correct patient, procedure, equipment, support staff and site/side marked as required  Supporting Documentation  Indications: pain   Procedure Details  Location: shoulder - R glenohumeral  Needle size: 22 G  Approach: posterior  Medications administered: 2 mL bupivacaine 0 25 %; 1 mL lidocaine 1 %; 6 mg betamethasone acetate-betamethasone sodium phosphate 6 (3-3) mg/mL               I have personally reviewed pertinent films in PACS  Portions of the record may have been created with voice recognition software   Occasional wrong word or "sound a like" substitutions may have occurred due to the inherent limitations of voice recognition software   Read the chart carefully and recognize, using context, where substitutions have occurred

## 2021-06-24 NOTE — PATIENT INSTRUCTIONS
Adhesive Capsulitis   WHAT YOU NEED TO KNOW:   What is adhesive capsulitis? Adhesive capsulitis happens when tissues in your shoulder tighten and swell  The condition is often called frozen shoulder because the swollen tissues cause pain and decrease your shoulder movement  What are the signs and symptoms of adhesive capsulitis? · Shoulder pain and stiffness that gets worse over time    · Pain at night that wakes you    · An ache in your shoulder even at rest but that gets worse with movement    · Muscle spasms in your neck, shoulder joint, or near your collarbone    · Trouble reaching over your head or behind you    · Muscle weakness    What increases my risk for adhesive capsulitis? · Age 36 or older    · Being a woman    · Not being able to do physical activity    · A past shoulder injury or surgery    · A medical condition, such as diabetes, thyroid disease, or heart or lung disease    What are the signs and symptoms of adhesive capsulitis? Adhesive capsulitis may last from several months to years before it gets better on its own  You can have adhesive capsulitis in one or both shoulders  The condition has 3 stages:  · Stage 1  is called the freezing or painful stage and may last 2 to 9 months  · Stage 2  is called the adhesive stage and may last 4 to 12 months  You may have less pain  You may still have pain when you move your arm to reach  Your shoulder may still be stiff, and you may not be able to move your shoulder much  · Stage 3  is the recovery stage and may last from 5 months to more than 2 years  Your shoulder movement may slowly start to get better  You may also begin to have less pain  How is adhesive capsulitis diagnosed? Your healthcare provider will do an exam  He or she will check your neck and shoulder  He or she will check how your shoulder moves and how strong it is  Your provider may move your arm in different positions while you stand or lie down   You may also need the following:  · X-ray or MRI  pictures may be taken of the bones and tissues inside your shoulder  An x-ray may show if your shoulder pain and stiffness is from another medical problem  An MRI may show if your shoulder joint has narrowed  Never enter the MRI room with anything metal  Metal can cause serious injury  Tell the healthcare provider if you have any metal in or on your body  · A joint x-ray  is a picture of the bones and tissues in your joints  You may be given contrast liquid to help the pictures show up better  Tell a healthcare provider if you have ever had an allergic reaction to contrast liquid  How is adhesive capsulitis treated? The goal of treatment is to help you regain as much shoulder movement as possible  Treatment will depend on what stage you are in  Ask your healthcare provider about these and other treatments for adhesive capsulitis:  · Prescription pain medicine  may be given  Ask your healthcare provider how to take this medicine safely  Some prescription pain medicines contain acetaminophen  Do not take other medicines that contain acetaminophen without talking to your healthcare provider  Too much acetaminophen may cause liver damage  Prescription pain medicine may cause constipation  Ask your healthcare provider how to prevent or treat constipation  · NSAIDs  help decrease swelling and pain or fever  This medicine is available with or without a doctor's order  NSAIDs can cause stomach bleeding or kidney problems in certain people  If you take blood thinner medicine, always ask your healthcare provider if NSAIDs are safe for you  Always read the medicine label and follow directions  · Steroid medicine  helps decrease pain and swelling  Healthcare providers may give this medicine as a shot into your shoulder  · Physical therapy:  A physical therapist teaches you exercises to help improve movement and strength, and to decrease pain       · Manipulation  is a procedure used to move your shoulder  You will be given anesthesia to make you sleep through this procedure  Manipulation releases tightness in your shoulder and improves movement  · Surgery  may be used to cut the tissues in your shoulder to release the tightness  During surgery, swollen or damaged tissue may also be removed  Surgery may be needed if other treatments do not help  How can I help manage adhesive capsulitis? · Stretches:      ? Doorway stretch:   a doorway with your painful arm bent at the elbow  Place your hand on the door frame and turn your body away from the door frame  Hold this position for 30 seconds  Relax and repeat  ? Forward stretch:  Lie on your back with your legs straight out  Use your healthy arm to push your painful arm up over your head until you feel a gentle stretch  Hold this position for 15 seconds  Slowly lower your arm to the starting position  Relax and repeat  ? Crossover stretch:  Use your healthy arm to gently pull your painful arm across your chest just below your chin  Pull until you feel a gentle stretch  Hold this position for 30 seconds  Relax and repeat  · Apply ice as directed  Ice helps decrease pain and swelling  Apply ice to help ease pain after stretching  Use an ice pack, or put crushed ice in a plastic bag  Cover it with a towel before you apply it to your shoulder  Apply ice for 15 to 20 minutes every hour, or as directed  · Apply heat as directed  Heat helps relax muscles and may help improve shoulder movement  Use a heat pack, or soak a small towel in warm water  Wring out the extra water before you apply the towel to your shoulder  Apply heat for 20 to 30 minutes every hour, or as directed  When should I seek immediate care? · You have new or increased trouble moving your arm  When should I contact my healthcare provider? · You have worse pain and stiffness in your shoulder      · You have questions or concerns about your condition or care  CARE AGREEMENT:   You have the right to help plan your care  Learn about your health condition and how it may be treated  Discuss treatment options with your healthcare providers to decide what care you want to receive  You always have the right to refuse treatment  The above information is an  only  It is not intended as medical advice for individual conditions or treatments  Talk to your doctor, nurse or pharmacist before following any medical regimen to see if it is safe and effective for you  © Copyright [address] Information is for End User's use only and may not be sold, redistributed or otherwise used for commercial purposes   All illustrations and images included in CareNotes® are the copyrighted property of [employer]  or [address] [place] [address]

## 2021-07-25 NOTE — PROGRESS NOTES
[facility] DAILY PROGRESS NOTE      Patient: Edwige Rubio Attending: Silvina De La Fuente MD   : 1930 Date: 2021 10:15 AM   AGE: 89 year old Current Hospital Day: Hospital Day: 4   SEX:  male      Chief Complaint:  Chief Complaint   Patient presents with   • Altered Mental Status        Subjective and Overnight Events:  Pt states he is aching all over and feels very weak    Inpatient Medications:  Current Facility-Administered Medications   Medication Dose Route Frequency Provider Last Rate Last Admin   • perflutren lipid microsphere (DEFINITY) injection 2 mL  2 mL Intravenous Once Silvina De La Fuente MD       • aspirin (ECOTRIN) enteric coated tablet 81 mg  81 mg Oral Nightly Silvina De La Fuente MD   81 mg at 21   • donepezil (ARICEPT) tablet 5 mg  5 mg Oral Nightly Silvina De La Fuente MD   5 mg at 21   • famotidine (PEPCID) tablet 20 mg  20 mg Oral QHS Silvina De La Fuente MD   20 mg at 21   • ferrous sulfate (65 mg Fe per 325 mg) tablet 325 mg  325 mg Oral BID WC Silvina De La Fuente MD   325 mg at 21   • isradipine (DYNACIRC) capsule 2.5 mg  2.5 mg Oral BID Silvina De La Fuente MD   2.5 mg at 21   • metoPROLOL succinate (TOPROL-XL) ER tablet 12.5 mg  12.5 mg Oral BID Silvina De La Fuente MD   12.5 mg at 2139   • cephalexin (KEFLEX) capsule 500 mg  500 mg Oral 2 times per day Silvina De La Fuente MD   500 mg at 2139   • traMADol (ULTRAM) tablet 50 mg  50 mg Oral Q6H PRN Silvina De La Fuente MD   50 mg at 21 0030   • sodium chloride 0.9 % flush bag 25 mL  25 mL Intravenous PRN Mitch Villafana MD       • sodium chloride (PF) 0.9 % injection 2 mL  2 mL Intracatheter 2 times per day Mitch Villafana MD   2 mL at 21 0941   • ondansetron (ZOFRAN) injection 4 mg  4 mg Intravenous BID PRN Mitch Villafana MD   4 mg at 21 0103   • prochlorperazine (COMPAZINE) injection 5 mg  5 mg Intravenous Q4H PRN Mitch Villafana MD       • acetaminophen (TYLENOL) tablet 650 mg  650 mg Oral Q4H PRN Mitch  Progress Note - Cardiology   Leonard Rojas 48 y o  male MRN: [id number]  Unit/Bed#: [facility] 410-01 Encounter: [id number]        Principal Problem:    Nonrheumatic aortic valve stenosis  Active Problems:    Type 1 diabetes mellitus with retinopathy ([address] )    Essential hypertension    Coronary artery disease involving native coronary artery of native heart without angina pectoris    Dyslipidemia    Aortic stenosis due to bicuspid aortic valve    History of ITP    History of coronary angioplasty with insertion of stent    Acute blood loss as cause of postoperative anemia    Post-operative nausea and vomiting      Assessment/Plan     1  Severe AS/multivessel CAD  Status post bioprosthetic AVR/CABG x3  POD #2  Transferred out of ICU yesterday  Asa 325mg/ lipitor 80mg/ BB- lopressor 25mg BID   Telemetry - sinus tachycardia to 100-120's , likely d/c volume depletion/anemia/ decreased PO intake  EKG- NSR inferior /lateral T wave inversions  Intractable nausea- management per primary team, improved today  GI consulted  Feeling dizzy , worse with movement  ABLA post op - for transfusion 2UPRBC's this am   RN reports increased CT drainage, CT surgery aware       2  HTN- nomotensive  Ace inhibitor on hold     3  Type 1 diabetes-per medicine/endo     4  HLD- good control on high intensity statin    Subjective/Objective   Chief Complaint/Subjective  Feels weak, tired, dizzy at times  RN reports increased CT drainage  Receiving 1st of 2 UPRBC's  Feels less nauseated but still present  No CP other than incisional  No sob           Vitals: /63 Comment: map-89  Pulse 105   Temp 98 7 °F (37 1 °C)   Resp 18   Ht 5' 6" (1 676 m)   Wt 73 2 kg (161 lb 6 oz)   SpO2 94%   BMI 26 05 kg/m²     Vitals:    09/03/20 0559 09/04/20 0600   Weight: 72 9 kg (160 lb 11 5 oz) 73 2 kg (161 lb 6 oz)     Orthostatic Blood Pressures      Most Recent Value   Blood Pressure  127/63 [map-89] filed at 09/04/2020 5281   Patient Position - MD Broderick   650 mg at 07/25/21 0939   • bisacodyl (DULCOLAX) suppository 10 mg  10 mg Rectal Daily PRN Mitch Villafana MD       • sodium chloride 0.9 % flush bag 25 mL  25 mL Intravenous PRN Mitch Villafana MD       • sodium chloride (NORMAL SALINE) 0.9 % bolus 500 mL  500 mL Intravenous PRN Mitch Villafana MD       • heparin (porcine) injection 5,000 Units  5,000 Units Subcutaneous 3 times per day Mitch Villafana MD   5,000 Units at 07/25/21 0538         Objective:  Visit Vitals  BP (!) 161/69   Pulse 79   Temp 97.9 °F (36.6 °C) (Oral)   Resp 16   Ht 5' 6.5\" (1.689 m)   Wt 74.6 kg (164 lb 7.4 oz)   SpO2 96%   BMI 26.15 kg/m²       Physical Exam  Constitutional:       General: He is not in acute distress.  HENT:      Head: Normocephalic and atraumatic.      Mouth/Throat:      Mouth: Mucous membranes are moist.      Pharynx: Oropharynx is clear.   Eyes:      Extraocular Movements: Extraocular movements intact.      Pupils: Pupils are equal, round, and reactive to light.   Cardiovascular:      Rate and Rhythm: Normal rate and regular rhythm.      Heart sounds: Normal heart sounds.   Pulmonary:      Breath sounds: Normal breath sounds.   Abdominal:      General: Bowel sounds are normal. There is no distension.      Palpations: Abdomen is soft.      Tenderness: There is no abdominal tenderness.   Musculoskeletal:      Cervical back: Normal range of motion.      Right lower leg: No edema.      Left lower leg: No edema.   Skin:     Comments: R ankle erythema/warmth nearly resolved. Multiple venous stasis ulcers of bilateral LE   Neurological:      General: No focal deficit present.      Mental Status: He is alert.      Comments: Oriented to person/place/month (not year)   Psychiatric:      Comments:            Intake and Output:    Intake/Output Summary (Last 24 hours) at 7/25/2021 1015  Last data filed at 7/24/2021 2300  Gross per 24 hour   Intake 576.66 ml   Output 900 ml   Net -323.34 ml       Labs:  Recent Results (from the past 24 hour(s))  Orthostatic VS  Sitting filed at 09/04/2020 0732            Intake/Output Summary (Last 24 hours) at 9/4/2020 0903  Last data filed at 9/4/2020 9707  Gross per 24 hour   Intake 1909 95 ml   Output 1405 ml   Net 504 95 ml       Invasive Devices     Central Venous Catheter Line            CVC Central Lines 09/02/20 Triple 2 days          Peripheral Intravenous Line            Peripheral IV 09/02/20 Left Antecubital 2 days    Peripheral IV 09/02/20 Right Antecubital 2 days          Line            Pacer Wires 1 day    Pacer Wires 1 day          Drain            Chest Tube 1 Anterior Mediastinal 32 Fr  1 day    Chest Tube 2 Left Pleural 32 Fr  1 day    Chest Tube 3 Posterior Mediastinal 24 Fr  1 day                Current Facility-Administered Medications   Medication Dose Route Frequency    acetaminophen (TYLENOL) tablet 975 mg  975 mg Oral Q8H    aluminum-magnesium hydroxide-simethicone (MYLANTA) 200-200-20 mg/5 mL oral suspension 30 mL  30 mL Oral Q4H PRN    ascorbic acid (VITAMIN C) tablet 500 mg  500 mg Oral Daily    aspirin tablet 325 mg  325 mg Oral Daily    atorvastatin (LIPITOR) tablet 80 mg  80 mg Oral Daily With Dinner    bisacodyl (DULCOLAX) rectal suppository 10 mg  10 mg Rectal Daily PRN    busPIRone (BUSPAR) tablet 15 mg  15 mg Oral BID    calcium carbonate (TUMS) chewable tablet 500 mg  500 mg Oral Daily PRN    calcium chloride 10 % injection 1 g  1 g Intravenous Once    cholecalciferol (VITAMIN D3) tablet 1,000 Units  1,000 Units Oral Daily    docusate sodium (COLACE) capsule 100 mg  100 mg Oral BID    ferrous sulfate tablet 325 mg  325 mg Oral Daily With Breakfast    fondaparinux (ARIXTRA) subcutaneous injection 2 5 mg  2 5 mg Subcutaneous Daily    furosemide (LASIX) injection 40 mg  40 mg Intravenous Once    insulin regular (HumuLIN R,NovoLIN R) 1 Units/mL in sodium chloride 0 9 % 100 mL infusion  0 3-21 Units/hr Intravenous Titrated    lidocaine (LIDODERM) 5 % patch 3 patch  3 patch   Renal Panel    Collection Time: 07/25/21  4:56 AM   Result Value Ref Range    Fasting Status      Sodium 141 135 - 145 mmol/L    Potassium 4.3 3.4 - 5.1 mmol/L    Chloride 101 98 - 107 mmol/L    Carbon Dioxide 32 21 - 32 mmol/L    Anion Gap 12 10 - 20 mmol/L    Calcium 9.8 8.4 - 10.2 mg/dL    Phosphorus 4.5 2.4 - 4.7 mg/dL    Albumin 5.1 3.6 - 5.1 g/dL    Glucose 94 65 - 99 mg/dL    BUN 70 (H) 6 - 20 mg/dL    Creatinine 2.77 (H) 0.67 - 1.17 mg/dL    Glomerular Filtration Rate 19 (L) >90 mL/min/1.73m2    BUN/ Creatinine Ratio 25 7 - 25   CBC with Automated Differential (performable only)    Collection Time: 07/25/21  4:56 AM   Result Value Ref Range    WBC 8.5 4.2 - 11.0 K/mcL    RBC 2.98 (L) 4.50 - 5.90 mil/mcL    HGB 9.1 (L) 13.0 - 17.0 g/dL    HCT 27.8 (L) 39.0 - 51.0 %    MCV 93.3 78.0 - 100.0 fl    MCH 30.5 26.0 - 34.0 pg    MCHC 32.7 32.0 - 36.5 g/dL    RDW-CV 11.6 11.0 - 15.0 %    RDW-SD 39.1 39.0 - 50.0 fL     140 - 450 K/mcL    NRBC 0 <=0 /100 WBC    Neutrophil, Percent 71 %    Lymphocytes, Percent 19 %    Mono, Percent 6 %    Eosinophils, Percent 3 %    Basophils, Percent 0 %    Immature Granulocytes 1 %    Absolute Neutrophils 6.0 1.8 - 7.7 K/mcL    Absolute Lymphocytes 1.6 1.0 - 4.0 K/mcL    Absolute Monocytes 0.5 0.3 - 0.9 K/mcL    Absolute Eosinophils  0.3 0.0 - 0.5 K/mcL    Absolute Basophils 0.0 0.0 - 0.3 K/mcL    Absolute Immmature Granulocytes 0.0 0.0 - 0.2 K/mcL       Microbiology Results     None           Imaging:  Radiology:   XR CHEST PA OR AP 1 VIEW   Final Result   No focal infiltrate is seen.  There is no significant change.      Electronically Signed by: PACO VÁZQUEZ MD    Signed on: 7/22/2021 9:11 PM                Assessment and Plan:  Edwige is a 89 year old male h/o CKD, hypertensive heart disease, dementia who p/w increased sleepiness and confusion.      # Acute/chronic metabolic encephalopathy: Unclear etiology. SAMMI vs progression of dementia? Wife noted his diuretics  Topical Daily    magnesium sulfate 2 g/50 mL IVPB (premix) 2 g  2 g Intravenous Once    metoclopramide (REGLAN) injection 10 mg  10 mg Intravenous Q6H PRN    metoprolol tartrate (LOPRESSOR) tablet 25 mg  25 mg Oral Q12H [address]    multi-electrolyte (PLASMALYTE-A/ISOLYTE-S PH 7 4) IV solution  125 mL/hr Intravenous Continuous    oxyCODONE (ROXICODONE) IR tablet 2 5 mg  2 5 mg Oral Q4H PRN    oxyCODONE (ROXICODONE) IR tablet 5 mg  5 mg Oral Q4H PRN    pantoprazole (PROTONIX) injection 40 mg  40 mg Intravenous Q12H [address]    polyethylene glycol (MIRALAX) packet 17 g  17 g Oral Daily    QUEtiapine (SEROquel) tablet 25 mg  25 mg Oral HS    scopolamine (TRANSDERM-SCOP) 1 5 mg/3 days TD 72 hr patch 1 patch  1 patch Transdermal Q72H    sodium chloride infusion 0 45 %  20 mL/hr Intravenous Continuous    temazepam (RESTORIL) capsule 15 mg  15 mg Oral HS PRN         Physical Exam: /63 Comment: map-89  Pulse 105   Temp 98 7 °F (37 1 °C)   Resp 18   Ht 5' 6" (1 676 m)   Wt 73 2 kg (161 lb 6 oz)   SpO2 94%   BMI 26 05 kg/m²     General Appearance:    Alert, cooperative, no distress, appears fatigued   Pale   Head:    Normocephalic, no scleral icterus   Eyes:    PERRL   Nose:   Nares normal, septum midline, no drainage    Throat:   Lips, mucosa, and tongue normal   Neck:   Supple, symmetrical, trachea midline,       no  JVD       Lungs:     Clear to auscultation/ decreased effort  bilaterally, respirations unlabored on n/c   Chest Wall:    Covered with dsg  Ct intact to pleuravac    Heart:    Regular rate and rhythm, S1 and S2 normal, no murmur, rub   or gallop       Extremities:   Extremities normal, atraumatic, no cyanosis or edema   Left leg wrapped       Skin:   Skin warm, pale   Neurologic:   Alert and oriented to person place and time, no focal deficits                 Lab Results:   Recent Results (from the past 72 hour(s))   Type and screen    Collection Time: 09/02/20  6:19 AM   Result Value Ref Range ABO Grouping A     Rh Factor Positive     Antibody Screen Negative     Specimen Expiration Date [id number]    Fingerstick Glucose (POCT)    Collection Time: 09/02/20  6:43 AM   Result Value Ref Range    POC Glucose 93 65 - 140 mg/dl   POCT Blood Gas (CG8+)    Collection Time: 09/02/20  8:42 AM   Result Value Ref Range    pH, Art i-STAT 7 479 (H) 7 350 - 7 450    pCO2, Art i-STAT 37 7 36 0 - 44 0 mm HG    pO2, ART i-STAT >400 0 (H) 75 0 - 129 0 mm HG    BE, i-STAT 4 (H) -2 - 3 mmol/L    HCO3, Art i-STAT 28 0 22 0 - 28 0 mmol/L    CO2, i-STAT 29 21 - 32 mmol/L    O2 Sat, i-STAT      SODIUM, I-STAT 140 136 - 145 mmol/l    Potassium, i-STAT 3 7 3 5 - 5 3 mmol/L    Calcium, Ionized i-STAT 1 18 1 12 - 1 32 mmol/L    Hct, i-STAT 33 (L) 36 5 - 49 3 %    Hgb, i-STAT 11 2 (L) 12 0 - 17 0 g/dl    Glucose, i-STAT 107 65 - 140 mg/dl    Specimen Type ARTERIAL    POCT activated clotting time    Collection Time: 09/02/20  8:42 AM   Result Value Ref Range    Activated Clotting Time, i-STAT 138 (H) 89 - 137 sec    Specimen Type VENOUS    POCT activated clotting time    Collection Time: 09/02/20  9:41 AM   Result Value Ref Range    Activated Clotting Time, i-STAT 484 (H) 89 - 137 sec    Specimen Type ARTERIAL    POCT Blood Gas (CG8+)    Collection Time: 09/02/20  9:42 AM   Result Value Ref Range    pH, Art i-STAT 7 349 (L) 7 350 - 7 450    pCO2, Art i-STAT 48 3 (H) 36 0 - 44 0 mm HG    pO2, ART i-STAT >400 0 (H) 75 0 - 129 0 mm HG    BE, i-STAT 1 -2 - 3 mmol/L    HCO3, Art i-STAT 26 6 22 0 - 28 0 mmol/L    CO2, i-STAT 28 21 - 32 mmol/L    O2 Sat, i-STAT      SODIUM, I-STAT 140 136 - 145 mmol/l    Potassium, i-STAT 3 3 (L) 3 5 - 5 3 mmol/L    Calcium, Ionized i-STAT 1 10 (L) 1 12 - 1 32 mmol/L    Hct, i-STAT 32 (L) 36 5 - 49 3 %    Hgb, i-STAT 10 9 (L) 12 0 - 17 0 g/dl    Glucose, i-STAT 166 (H) 65 - 140 mg/dl    Specimen Type ARTERIAL    POCT activated clotting time    Collection Time: 09/02/20  9:45 AM   Result Value Ref Range have recently been increased. Daughter at bedside. States pt is at his baseline now  - Trop neg  - No s/s infxn - UA neg, CXR neg  - TSH wnL, B12 wnL, pt refused ammonia  - Frequent reorientation     # SAMMI/CKD stage 4 2/2 hypertensive nephrosclerosis: per nephrologist, most recent Cr was 2.5. SAMMI likely from intravascular depletion although total body volume is up. Cr improving  - Pt had renal US elsewhere per Dr. Tinoco that didn't show obstruction  - Pt's nephrologist is Dr. Tinoco - ordered albumin and IV bumex x 48 hrs and LE edema much improved. Holding off on further diuresis today. Will see when ok w/ resuming home torsemide     # Volume o/d: per discussion w/ Dr. Tinoco, overload 2/2 CKD stage 4  - No protein in urine  - Albumin only mildly low at 3.1  - NTproBNP 5221  - Echo ordered but pt refusing  - Nephrology managing as above  - Discussed w/ wife about using compression stockings which she states is difficult to place on him and pt does not like compression stockings     # RLE cellulitis: 2/2 venous stasis ulcerations  - Continue PO keflex x 5d     # Bilateral LE wounds stage 2 present on admission:  - Wound care RN consult     # Hypertensive heart disease:  - Continue home BP meds     # Advanced care planning:  - Spent 25 minutes with patient discussing his code status w/ wife HCPOA. Went over in the event of an emergency whether he would want chest compressions, intubation, cardioversion, antiarrhythmics, pressors. Wife states pt is a DNR - no chest compressions, intubation, cardioversion, antiarrhythmics, pressors         F: none  E: monitor  N:   Dietary Orders (From admission, onward)     Start     Ordered    07/23/21 4368  2 Times/Day w Breakfast & Dinner; [place] Instant Breakfast Shake/Breakfast Drink Liquid Oral Nutrition Supplement  As Directed     Question Answer Comment   Frequency 2 Times/Day w Breakfast & Dinner    Oral Supplement [place] Instant Breakfast Shake/Breakfast Drink Liquid         07/23/21 1518    07/22/21 2342  Cardiac, Renal (2400mg Na+, 60mEq K+, 1000mg P) Diet  DIET EFFECTIVE NOW     Question Answer Comment   Diet Modifiers Cardiac    Diet Modifiers Renal (2400mg Na+, 60mEq K+, 1000mg P)        07/22/21 2342    07/22/21 2237  One Time Diet Renal (2400mg Na+, 60mEq K+, 1000mg P)  (Order Panel)  DIET EFFECTIVE NOW ONE TIME     Question:  Diet Modifiers  Answer:  Renal (2400mg Na+, 60mEq K+, 1000mg P)    07/22/21 2236              DVT Ppx:   Current Active Medications for DVT Prophylaxis (From admission, onward)         Stop     heparin (porcine) injection 5,000 Units  5,000 Units,   Subcutaneous,   3 times per day      --                 Dispo: pending SNF placement - CM aware          Silvina De La Fuente MD  [facility] Hospitalist   7/25/2021             Activated Clotting Time, i-STAT 543 (H) 89 - 137 sec    Specimen Type ARTERIAL    POCT activated clotting time    Collection Time: 09/02/20  9:57 AM   Result Value Ref Range    Activated Clotting Time, i-STAT 478 (H) 89 - 137 sec    Specimen Type ARTERIAL    POCT Blood Gas (CG8+)    Collection Time: 09/02/20  9:58 AM   Result Value Ref Range    pH, Art i-STAT 7 377 7 350 - 7 450    pCO2, Art i-STAT 42 6 36 0 - 44 0 mm HG    pO2, ART i-STAT 297 0 (H) 75 0 - 129 0 mm HG    BE, i-STAT 0 -2 - 3 mmol/L    HCO3, Art i-STAT 25 0 22 0 - 28 0 mmol/L    CO2, i-STAT 26 21 - 32 mmol/L    O2 Sat, i-STAT 100 (H) 60 - 85 %    SODIUM, I-STAT 138 136 - 145 mmol/l    Potassium, i-STAT 3 6 3 5 - 5 3 mmol/L    Calcium, Ionized i-STAT 1 05 (L) 1 12 - 1 32 mmol/L    Hct, i-STAT 26 (L) 36 5 - 49 3 %    Hgb, i-STAT 8 8 (L) 12 0 - 17 0 g/dl    Glucose, i-STAT 148 (H) 65 - 140 mg/dl    Specimen Type ARTERIAL    POCT Blood Gas (CG8+)    Collection Time: 09/02/20 10:05 AM   Result Value Ref Range    ph, Mychal ISTAT      pCO2, Mychal i-STAT      pO2, Mychal i-STAT 38 0 35 0 - 45 0 mm HG    BE, i-STAT      HCO3, Mychal i-STAT      CO2, i-STAT      O2 Sat, i-STAT      SODIUM, I-STAT 139 136 - 145 mmol/l    Potassium, i-STAT 4 4 3 5 - 5 3 mmol/L    Calcium, Ionized i-STAT 1 03 (L) 1 12 - 1 32 mmol/L    Hct, i-STAT 25 (L) 36 5 - 49 3 %    Hgb, i-STAT 8 5 (L) 12 0 - 17 0 g/dl    Glucose, i-STAT 148 (H) 65 - 140 mg/dl    Specimen Type VENOUS    POCT Blood Gas (CG8+)    Collection Time: 09/02/20 10:11 AM   Result Value Ref Range    ph, Mychal ISTAT 7 364 7 300 - 7 400    pCO2, Mychal i-STAT 42 5 42 0 - 50 0 mm HG    pO2, Mychal i-STAT 38 0 35 0 - 45 0 mm HG    BE, i-STAT -1 -2 - 3 mmol/L    HCO3, Mychal i-STAT 24 2 24 0 - 30 0 mmol/L    CO2, i-STAT 26 21 - 32 mmol/L    O2 Sat, i-STAT 69 60 - 85 %    SODIUM, I-STAT 138 136 - 145 mmol/l    Potassium, i-STAT 4 4 3 5 - 5 3 mmol/L    Calcium, Ionized i-STAT 1 03 (L) 1 12 - 1 32 mmol/L    Hct, i-STAT 25 (L) 36 5 - 49 3 %    Hgb, i-STAT 8 5 (L) 12 0 - 17 0 g/dl    Glucose, i-STAT 149 (H) 65 - 140 mg/dl    Specimen Type VENOUS    POCT activated clotting time    Collection Time: 09/02/20 10:13 AM   Result Value Ref Range    Activated Clotting Time, i-STAT 460 (H) 89 - 137 sec    Specimen Type ARTERIAL    POCT Blood Gas (CG8+)    Collection Time: 09/02/20 10:27 AM   Result Value Ref Range    pH, Art i-STAT 7 369 7 350 - 7 450    pCO2, Art i-STAT 45 7 (H) 36 0 - 44 0 mm HG    pO2, ART i-STAT 346 0 (H) 75 0 - 129 0 mm HG    BE, i-STAT 1 -2 - 3 mmol/L    HCO3, Art i-STAT 26 3 22 0 - 28 0 mmol/L    CO2, i-STAT 28 21 - 32 mmol/L    O2 Sat, i-STAT 100 (H) 60 - 85 %    SODIUM, I-STAT 140 136 - 145 mmol/l    Potassium, i-STAT 3 5 3 5 - 5 3 mmol/L    Calcium, Ionized i-STAT 1 05 (L) 1 12 - 1 32 mmol/L    Hct, i-STAT 26 (L) 36 5 - 49 3 %    Hgb, i-STAT 8 8 (L) 12 0 - 17 0 g/dl    Glucose, i-STAT 159 (H) 65 - 140 mg/dl    Specimen Type ARTERIAL    POCT activated clotting time    Collection Time: 09/02/20 10:27 AM   Result Value Ref Range    Activated Clotting Time, i-STAT 543 (H) 89 - 137 sec    Specimen Type ARTERIAL    POCT Blood Gas (CG8+)    Collection Time: 09/02/20 10:53 AM   Result Value Ref Range    pH, Art i-STAT 7 416 7 350 - 7 450    pCO2, Art i-STAT 40 4 36 0 - 44 0 mm HG    pO2, ART i-STAT 371 0 (H) 75 0 - 129 0 mm HG    BE, i-STAT 1 -2 - 3 mmol/L    HCO3, Art i-STAT 26 0 22 0 - 28 0 mmol/L    CO2, i-STAT 27 21 - 32 mmol/L    O2 Sat, i-STAT 100 (H) 60 - 85 %    SODIUM, I-STAT 141 136 - 145 mmol/l    Potassium, i-STAT 3 8 3 5 - 5 3 mmol/L    Calcium, Ionized i-STAT 1 05 (L) 1 12 - 1 32 mmol/L    Hct, i-STAT 26 (L) 36 5 - 49 3 %    Hgb, i-STAT 8 8 (L) 12 0 - 17 0 g/dl    Glucose, i-STAT 151 (H) 65 - 140 mg/dl    Specimen Type ARTERIAL    POCT activated clotting time    Collection Time: 09/02/20 10:53 AM   Result Value Ref Range    Activated Clotting Time, i-STAT 472 (H) 89 - 137 sec    Specimen Type ARTERIAL    Platelet count    Collection Time: 09/02/20 10:57 AM   Result Value Ref Range    Platelets 306 936 - 823 Thousands/uL    MPV 10 3 8 9 - 12 7 fL   POCT activated clotting time    Collection Time: 09/02/20 11:12 AM   Result Value Ref Range    Activated Clotting Time, i-STAT 611 (H) 89 - 137 sec    Specimen Type ARTERIAL    POCT activated clotting time    Collection Time: 09/02/20 11:28 AM   Result Value Ref Range    Activated Clotting Time, i-STAT 478 (H) 89 - 137 sec    Specimen Type ARTERIAL    POCT Blood Gas (CG8+)    Collection Time: 09/02/20 11:29 AM   Result Value Ref Range    pH, Art i-STAT 7 384 7 350 - 7 450    pCO2, Art i-STAT 42 5 36 0 - 44 0 mm HG    pO2, ART i-STAT 383 0 (H) 75 0 - 129 0 mm HG    BE, i-STAT 0 -2 - 3 mmol/L    HCO3, Art i-STAT 25 4 22 0 - 28 0 mmol/L    CO2, i-STAT 27 21 - 32 mmol/L    O2 Sat, i-STAT 100 (H) 60 - 85 %    SODIUM, I-STAT 140 136 - 145 mmol/l    Potassium, i-STAT 4 6 3 5 - 5 3 mmol/L    Calcium, Ionized i-STAT 1 06 (L) 1 12 - 1 32 mmol/L    Hct, i-STAT 27 (L) 36 5 - 49 3 %    Hgb, i-STAT 9 2 (L) 12 0 - 17 0 g/dl    Glucose, i-STAT 162 (H) 65 - 140 mg/dl    Specimen Type ARTERIAL    POCT Blood Gas (CG8+)    Collection Time: 09/02/20 12:15 PM   Result Value Ref Range    pH, Art i-STAT 7 319 (L) 7 350 - 7 450    pCO2, Art i-STAT 47 4 (H) 36 0 - 44 0 mm HG    pO2, ART i-STAT 242 0 (H) 75 0 - 129 0 mm HG    BE, i-STAT -2 -2 - 3 mmol/L    HCO3, Art i-STAT 24 4 22 0 - 28 0 mmol/L    CO2, i-STAT 26 21 - 32 mmol/L    O2 Sat, i-STAT 100 (H) 60 - 85 %    SODIUM, I-STAT 143 136 - 145 mmol/l    Potassium, i-STAT 3 5 3 5 - 5 3 mmol/L    Calcium, Ionized i-STAT 1 19 1 12 - 1 32 mmol/L    Hct, i-STAT 28 (L) 36 5 - 49 3 %    Hgb, i-STAT 9 5 (L) 12 0 - 17 0 g/dl    Glucose, i-STAT 128 65 - 140 mg/dl    Specimen Type ARTERIAL    POCT activated clotting time    Collection Time: 09/02/20 12:15 PM   Result Value Ref Range    Activated Clotting Time, i-STAT 127 89 - 137 sec    Specimen Type ARTERIAL    Fingerstick Glucose (POCT)    Collection Time: 09/02/20 12:53 PM   Result Value Ref Range    POC Glucose 84 65 - 140 mg/dl   Platelets Count - If Chest Tube Losses > 200 mL/hr in First Hour Postop    Collection Time: 09/02/20 12:55 PM   Result Value Ref Range    Platelets 640 454 - 729 Thousands/uL    MPV 10 2 8 9 - 12 7 fL   Basic metabolic panel    Collection Time: 09/02/20 12:55 PM   Result Value Ref Range    Sodium 143 136 - 145 mmol/L    Potassium 4 6 3 5 - 5 3 mmol/L    Chloride 116 (H) 100 - 108 mmol/L    CO2 24 21 - 32 mmol/L    ANION GAP 3 (L) 4 - 13 mmol/L    BUN 7 5 - 25 mg/dL    Creatinine 0 49 (L) 0 60 - 1 30 mg/dL    Glucose 105 65 - 140 mg/dL    Calcium 7 6 (L) 8 3 - 10 1 mg/dL    eGFR 124 ml/min/1 73sq m   Hemoglobin and hematocrit, blood    Collection Time: 09/02/20 12:55 PM   Result Value Ref Range    Hemoglobin 11 1 (L) 12 0 - 17 0 g/dL    Hematocrit 33 2 (L) 36 5 - 49 3 %   POCT Blood Gas (CG8+)    Collection Time: 09/02/20 12:57 PM   Result Value Ref Range    pH, Art i-STAT 7 331 (L) 7 350 - 7 450    pCO2, Art i-STAT 45 7 (H) 36 0 - 44 0 mm HG    pO2, ART i-STAT 147 0 (H) 75 0 - 129 0 mm HG    BE, i-STAT -2 -2 - 3 mmol/L    HCO3, Art i-STAT 24 1 22 0 - 28 0 mmol/L    CO2, i-STAT 26 21 - 32 mmol/L    O2 Sat, i-STAT 99 (H) 60 - 85 %    SODIUM, I-STAT 143 136 - 145 mmol/l    Potassium, i-STAT 4 5 3 5 - 5 3 mmol/L    Calcium, Ionized i-STAT 1 20 1  12 - 1 32 mmol/L    Hct, i-STAT 32 (L) 36 5 - 49 3 %    Hgb, i-STAT 10 9 (L) 12 0 - 17 0 g/dl    Glucose, i-STAT 103 65 - 140 mg/dl    Specimen Type ARTERIAL    ECG 12 lead    Collection Time: 09/02/20  1:00 PM   Result Value Ref Range    Ventricular Rate 76 BPM    Atrial Rate 76 BPM    SD Interval 175 ms    QRSD Interval 121 ms    QT Interval 438 ms    QTC Interval 493 ms    P [place] 68 degrees    QRS Axis 79 degrees    T Wave Axis -79 degrees   Fingerstick Glucose (POCT)    Collection Time: 09/02/20  2:10 PM   Result Value Ref Range    POC Glucose 169 (H) 65 - 140 mg/dl Fibrinogen  - If Chest Tube Losses > 200 mL/hr in First Hour Postop    Collection Time: 09/02/20  3:14 PM   Result Value Ref Range    Fibrinogen 291 227 - 495 mg/dL   aPTT - If Chest Tube Losses > 200 mL/hr in First Hour    Collection Time: 09/02/20  3:14 PM   Result Value Ref Range    PTT 28 23 - 37 seconds   Protime - INR - If Chest Tube Losses > 200 mL/hr in First Hour    Collection Time: 09/02/20  3:14 PM   Result Value Ref Range    Protime 15 7 (H) 11 6 - 14 5 seconds    INR 1 25 (H) 0 84 - 1 19   Potassium    Collection Time: 09/02/20  3:14 PM   Result Value Ref Range    Potassium 4 4 3 5 - 5 3 mmol/L   Platelet count    Collection Time: 09/02/20  3:14 PM   Result Value Ref Range    Platelets 117 286 - 226 Thousands/uL    MPV 10 7 8 9 - 12 7 fL   Hemoglobin and hematocrit, blood    Collection Time: 09/02/20  3:14 PM   Result Value Ref Range    Hemoglobin 11 5 (L) 12 0 - 17 0 g/dL    Hematocrit 34 7 (L) 36 5 - 49 3 %   Fingerstick Glucose (POCT)    Collection Time: 09/02/20  3:18 PM   Result Value Ref Range    POC Glucose 176 (H) 65 - 140 mg/dl   ECG 12 lead    Collection Time: 09/02/20  3:26 PM   Result Value Ref Range    Ventricular Rate 76 BPM    Atrial Rate 76 BPM    NE Interval 196 ms    QRSD Interval 117 ms    QT Interval 454 ms    QTC Interval 511 ms    P Axis 73 degrees    QRS Axis 69 degrees    T Wave Axis -35 degrees   Fingerstick Glucose (POCT)    Collection Time: 09/02/20  4:13 PM   Result Value Ref Range    POC Glucose 161 (H) 65 - 140 mg/dl   Fingerstick Glucose (POCT)    Collection Time: 09/02/20  6:13 PM   Result Value Ref Range    POC Glucose 113 65 - 140 mg/dl   Fingerstick Glucose (POCT)    Collection Time: 09/02/20  7:51 PM   Result Value Ref Range    POC Glucose 140 65 - 140 mg/dl   Hemoglobin and hematocrit, blood    Collection Time: 09/02/20  8:39 PM   Result Value Ref Range    Hemoglobin 8 4 (L) 12 0 - 17 0 g/dL    Hematocrit 26 0 (L) 36 5 - 49 3 %   Potassium    Collection Time: 09/02/20  8:39 PM   Result Value Ref Range    Potassium 4 4 3 5 - 5 3 mmol/L   Fingerstick Glucose (POCT)    Collection Time: 09/02/20 10:05 PM   Result Value Ref Range    POC Glucose 127 65 - 140 mg/dl   Fingerstick Glucose (POCT)    Collection Time: 09/02/20 11:52 PM   Result Value Ref Range    POC Glucose 138 65 - 140 mg/dl   Hemoglobin and hematocrit, blood    Collection Time: 09/02/20 11:54 PM   Result Value Ref Range    Hemoglobin 8 0 (L) 12 0 - 17 0 g/dL    Hematocrit 24 1 (L) 36 5 - 49 3 %   Fingerstick Glucose (POCT)    Collection Time: 09/03/20  1:55 AM   Result Value Ref Range    POC Glucose 151 (H) 65 - 140 mg/dl   Fingerstick Glucose (POCT)    Collection Time: 09/03/20  4:21 AM   Result Value Ref Range    POC Glucose 124 65 - 140 mg/dl   Basic metabolic panel    Collection Time: 09/03/20  4:22 AM   Result Value Ref Range    Sodium 147 (H) 136 - 145 mmol/L    Potassium 4 2 3 5 - 5 3 mmol/L    Chloride 116 (H) 100 - 108 mmol/L    CO2 26 21 - 32 mmol/L    ANION GAP 5 4 - 13 mmol/L    BUN 11 5 - 25 mg/dL    Creatinine 0 62 0 60 - 1 30 mg/dL    Glucose 132 65 - 140 mg/dL    Calcium 8 0 (L) 8 3 - 10 1 mg/dL    eGFR 112 ml/min/1 73sq m   Hemoglobin and hematocrit, blood    Collection Time: 09/03/20  4:22 AM   Result Value Ref Range    Hemoglobin 7 8 (L) 12 0 - 17 0 g/dL    Hematocrit 23 6 (L) 36 5 - 49 3 %   ECG 12 lead    Collection Time: 09/03/20  4:49 AM   Result Value Ref Range    Ventricular Rate 75 BPM    Atrial Rate 75 BPM    LA Interval 111 ms    QRSD Interval 104 ms    QT Interval 404 ms    QTC Interval 452 ms    P Axis  degrees    QRS Axis 59 degrees    T Wave Axis -20 degrees   Magnesium    Collection Time: 09/03/20  5:07 AM   Result Value Ref Range    Magnesium 2 5 1 6 - 2 6 mg/dL   CBC-AM POD #1    Collection Time: 09/03/20  5:07 AM   Result Value Ref Range    WBC 15 22 (H) 4 31 - 10 16 Thousand/uL    RBC 2 58 (L) 3 88 - 5 62 Million/uL    Hemoglobin 7 8 (L) 12 0 - 17 0 g/dL    Hematocrit 23 4 (L) 36 5 - 49 3 %    MCV 91 82 - 98 fL    MCH 30 2 26 8 - 34 3 pg    MCHC 33 3 31 4 - 37 4 g/dL    RDW 15 0 11 6 - 15 1 %    Platelets 820 (L) 842 - 390 Thousands/uL    MPV 10 6 8 9 - 12 7 fL   Fingerstick Glucose (POCT)    Collection Time: 09/03/20  6:04 AM   Result Value Ref Range    POC Glucose 170 (H) 65 - 140 mg/dl   Prepare Leukoreduced RBC: 4 Units    Collection Time: 09/03/20  7:24 AM   Result Value Ref Range    Unit Product Code R4766U28     Unit Number [id number]     Unit ABO A     Unit RH POS     Crossmatch Compatible     Unit Dispense Status Return to [facility]     Unit Product Code Z7616G69     Unit Number [id number]     Unit ABO A     Unit RH POS     Crossmatch Compatible     Unit Dispense Status Return to [facility]     Unit Product Code U2347S13     Unit Number [id number]     Unit ABO A     Unit RH POS     Crossmatch Compatible     Unit Dispense Status Return to [facility]     Unit Product Code N1001U98     Unit Number [id number]     Unit ABO A     Unit RH POS     Crossmatch Compatible     Unit Dispense Status Return to Inv    Fingerstick Glucose (POCT)    Collection Time: 09/03/20  8:08 AM   Result Value Ref Range    POC Glucose 139 65 - 140 mg/dl   Fingerstick Glucose (POCT)    Collection Time: 09/03/20 10:02 AM   Result Value Ref Range    POC Glucose 141 (H) 65 - 140 mg/dl   Fingerstick Glucose (POCT)    Collection Time: 09/03/20 12:10 PM   Result Value Ref Range    POC Glucose 168 (H) 65 - 140 mg/dl   Fingerstick Glucose (POCT)    Collection Time: 09/03/20  2:10 PM   Result Value Ref Range    POC Glucose 124 65 - 140 mg/dl   Fingerstick Glucose (POCT)    Collection Time: 09/03/20  5:19 PM   Result Value Ref Range    POC Glucose 190 (H) 65 - 140 mg/dl   Fingerstick Glucose (POCT)    Collection Time: 09/03/20  6:13 PM   Result Value Ref Range    POC Glucose 219 (H) 65 - 140 mg/dl   Beta Hydroxybutyrate    Collection Time: 09/03/20  6:56 PM   Result Value Ref Range    BETA-HYDROXYBUTYRATE 0 2 <0 6 mmol/L   Fingerstick Glucose (POCT)    Collection Time: 09/03/20  8:15 PM   Result Value Ref Range    POC Glucose 130 65 - 140 mg/dl   Fingerstick Glucose (POCT)    Collection Time: 09/03/20  8:22 PM   Result Value Ref Range    POC Glucose 128 65 - 140 mg/dl   Fingerstick Glucose (POCT)    Collection Time: 09/03/20 10:13 PM   Result Value Ref Range    POC Glucose 162 (H) 65 - 140 mg/dl   Fingerstick Glucose (POCT)    Collection Time: 09/03/20 11:58 PM   Result Value Ref Range    POC Glucose 116 65 - 140 mg/dl   Fingerstick Glucose (POCT)    Collection Time: 09/04/20  1:58 AM   Result Value Ref Range    POC Glucose 133 65 - 140 mg/dl   Fingerstick Glucose (POCT)    Collection Time: 09/04/20  4:09 AM   Result Value Ref Range    POC Glucose 184 (H) 65 - 140 mg/dl   Basic metabolic panel    Collection Time: 09/04/20  5:01 AM   Result Value Ref Range    Sodium 143 136 - 145 mmol/L    Potassium 3 7 3 5 - 5 3 mmol/L    Chloride 112 (H) 100 - 108 mmol/L    CO2 27 21 - 32 mmol/L    ANION GAP 4 4 - 13 mmol/L    BUN 17 5 - 25 mg/dL    Creatinine 0 60 0 60 - 1 30 mg/dL    Glucose 136 65 - 140 mg/dL    Calcium 7 6 (L) 8 3 - 10 1 mg/dL    eGFR 114 ml/min/1 73sq m   CBC (With Platelets)    Collection Time: 09/04/20  5:01 AM   Result Value Ref Range    WBC 21 18 (H) 4 31 - 10 16 Thousand/uL    RBC 1 97 (L) 3 88 - 5 62 Million/uL    Hemoglobin 6 0 (LL) 12 0 - 17 0 g/dL    Hematocrit 18 2 (L) 36 5 - 49 3 %    MCV 92 82 - 98 fL    MCH 30 5 26 8 - 34 3 pg    MCHC 33 0 31 4 - 37 4 g/dL    RDW 15 1 11 6 - 15 1 %    Platelets 538 (L) 342 - 390 Thousands/uL    MPV 11 7 8 9 - 12 7 fL   Urinalysis with microscopic    Collection Time: 09/04/20  5:51 AM   Result Value Ref Range    Clarity, UA Cloudy     Color, UA Yellow     Specific [place], UA 1 019 1 003 - 1 030    pH, UA 5 5 4 5, 5 0, 5 5, 6 0, 6 5, 7 0, 7 5, 8 0    Glucose,  (1/10%) (A) Negative mg/dl    Ketones, UA Negative Negative mg/dl    Blood, UA Large (A) Negative    Protein, UA Negative Negative mg/dl    Nitrite, UA Negative Negative    Bilirubin, UA Negative Negative    Urobilinogen, UA 0 2 0 2, 1 0 E U /dl E U /dl    Leukocytes, UA Negative Negative    WBC, UA 4-10 (A) None Seen, 0-5, 5-55, 5-65 /hpf    RBC, UA 30-50 (A) None Seen, 0-5 /hpf    Hyaline Casts, UA 10-25 (A) None Seen /lpf    Bacteria, UA None Seen None Seen, Occasional /hpf    Epithelial Cells None Seen None Seen, Occasional /hpf   Fingerstick Glucose (POCT)    Collection Time: 09/04/20  6:03 AM   Result Value Ref Range    POC Glucose 93 65 - 140 mg/dl   Prepare Leukoreduced RBC: 2 Units    Collection Time: 09/04/20  8:30 AM   Result Value Ref Range    Unit Product Code N4084M70     Unit Number [id number]     Unit ABO A     Unit [facility] POS     Crossmatch Compatible     Unit Dispense Status Crossmatched     Unit Product Code X5008Q29     Unit Number [id number]     Unit ABO A     Unit RH POS     Crossmatch Compatible     Unit Dispense Status Issued    Fingerstick Glucose (POCT)    Collection Time: 09/04/20  9:01 AM   Result Value Ref Range    POC Glucose 234 (H) 65 - 140 mg/dl     Imaging: I have personally reviewed pertinent reports  Tele sinus tachcyardia  VTE Pharmacologic Prophylaxis: Fondaparinux (Arixtra) and Heparin  VTE Mechanical Prophylaxis: sequential compression device    Counseling / Coordination of Care  Total time spent today 25 minutes  Greater than 50% of total time was spent with the patient and / or family counseling and / or coordination of care

## 2021-08-05 ENCOUNTER — TELEPHONE (OUTPATIENT)
Dept: OBGYN CLINIC | Facility: HOSPITAL | Age: 56
End: 2021-08-05

## 2021-08-05 NOTE — TELEPHONE ENCOUNTER
Dr Marietta Bianchi    Patient was seen by Dr Marietta Bianchi, he wants an immediate appt for frozen shoulder,  I offered him 2 spots, one with Marietta Bianchi, one with Lacy Denton  Patient was not satisfied that he can't be seen sooner  [facility] or the ED would be the alternative when he asked, he still was unhappy with the answer and said he will be calling 'someone else"

## 2021-08-06 ENCOUNTER — APPOINTMENT (EMERGENCY)
Dept: RADIOLOGY | Facility: HOSPITAL | Age: 56
End: 2021-08-06
Payer: COMMERCIAL

## 2021-08-06 ENCOUNTER — HOSPITAL ENCOUNTER (EMERGENCY)
Facility: HOSPITAL | Age: 56
Discharge: HOME | End: 2021-08-06
Attending: INTERNAL MEDICINE
Payer: COMMERCIAL

## 2021-08-06 VITALS
DIASTOLIC BLOOD PRESSURE: 63 MMHG | TEMPERATURE: 98.1 F | WEIGHT: 169.75 LBS | HEIGHT: 65 IN | OXYGEN SATURATION: 99 % | HEART RATE: 77 BPM | SYSTOLIC BLOOD PRESSURE: 159 MMHG | RESPIRATION RATE: 16 BRPM | BODY MASS INDEX: 28.28 KG/M2

## 2021-08-06 DIAGNOSIS — M25.511 ACUTE PAIN OF RIGHT SHOULDER: Primary | ICD-10-CM

## 2021-08-06 DIAGNOSIS — M62.838 MUSCLE SPASM OF RIGHT SHOULDER: ICD-10-CM

## 2021-08-06 PROCEDURE — 73030 X-RAY EXAM OF SHOULDER: CPT

## 2021-08-06 PROCEDURE — 96374 THER/PROPH/DIAG INJ IV PUSH: CPT

## 2021-08-06 PROCEDURE — 96372 THER/PROPH/DIAG INJ SC/IM: CPT

## 2021-08-06 PROCEDURE — 99285 EMERGENCY DEPT VISIT HI MDM: CPT | Performed by: INTERNAL MEDICINE

## 2021-08-06 PROCEDURE — 99283 EMERGENCY DEPT VISIT LOW MDM: CPT

## 2021-08-06 RX ORDER — IBUPROFEN 600 MG/1
600 TABLET ORAL EVERY 8 HOURS PRN
Qty: 30 TABLET | Refills: 0 | Status: SHIPPED | OUTPATIENT
Start: 2021-08-06 | End: 2022-01-13 | Stop reason: HOSPADM

## 2021-08-06 RX ORDER — KETOROLAC TROMETHAMINE 30 MG/ML
30 INJECTION, SOLUTION INTRAMUSCULAR; INTRAVENOUS ONCE
Status: COMPLETED | OUTPATIENT
Start: 2021-08-06 | End: 2021-08-06

## 2021-08-06 RX ORDER — DIAZEPAM 5 MG/1
5 TABLET ORAL EVERY 8 HOURS PRN
Qty: 20 TABLET | Refills: 0 | Status: SHIPPED | OUTPATIENT
Start: 2021-08-06 | End: 2022-01-13 | Stop reason: HOSPADM

## 2021-08-06 RX ORDER — DIAZEPAM 5 MG/ML
2.5 INJECTION, SOLUTION INTRAMUSCULAR; INTRAVENOUS ONCE
Status: COMPLETED | OUTPATIENT
Start: 2021-08-06 | End: 2021-08-06

## 2021-08-06 RX ADMIN — DIAZEPAM 2.5 MG: 5 INJECTION, SOLUTION INTRAMUSCULAR; INTRAVENOUS at 10:34

## 2021-08-06 RX ADMIN — KETOROLAC TROMETHAMINE 30 MG: 30 INJECTION, SOLUTION INTRAMUSCULAR; INTRAVENOUS at 10:35

## 2021-08-06 NOTE — ED PROVIDER NOTES
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 Taking?    NEL MICROLET LANCETS lancets  Self No No   Sig: by Other route 4 (four) times a day Use as instructed   Cholecalciferol (VITAMIN D) 2000 units CAPS  Self Yes No   Sig: TK 1 C PO QD   INSULIN SYRINGE 1CC/29G 29G X 1/2" 1 ML MISC  Self No No   Sig: Inject as directed 3 (three) times a day with meals Use as directed   Insulin Infusion Pump KIT  Self Yes No   Si Units/hr by Subcutaneous Insulin Pump route continuous   Insulin Infusion Pump Supplies (MINIMED INFUSION SET-) MISC  Self Yes No   Sig: by Does not apply route   Insulin Infusion Pump Supplies (MINIMED RESERVOIR 3ML) MISC  Self Yes No   Sig: by Does not apply route   QUEtiapine (SEROquel) 25 mg tablet  Self Yes No   Sig: TAKE 1 TABLET BY MOUTH EVERY DAY EVERY NIGHT   acetaminophen (TYLENOL) 325 mg tablet   No No   Sig: Take 2 tablets (650 mg total) by mouth every 6 (six) hours as needed for mild pain or moderate pain   amoxicillin (AMOXIL) 500 mg capsule  Self Yes No   Sig: Prior dental   ascorbic acid (VITAMIN C) 500 MG tablet   No No   Sig: Take 1 tablet (500 mg total) by mouth daily   aspirin 325 mg tablet   No No   Sig: Take 1 tablet (325 mg total) by mouth daily   atorvastatin (LIPITOR) 80 mg tablet  Self No No   Sig: Take 1 tablet by mouth daily with dinner   busPIRone (BUSPAR) 15 mg tablet   Yes No   clotrimazole (LOTRIMIN) 1 % cream   No No   Sig: Apply topically 2 (two) times a day   ferrous sulfate 325 (65 Fe) mg tablet   No No   Sig: Take 1 tablet (325 mg total) by mouth daily with breakfast   glucagon 1 MG injection   No No   Sig: Inject 1 mg under the skin once as needed for low blood sugar for up to 1 dose   glucose blood (NEL CONTOUR TEST) test strip  Self No No   Si each by Other route 4 (four) times a day   hydrOXYzine HCL (ATARAX) 25 mg tablet  Self Yes No   Sig: TAKE 1 TO 2 TABLETS BY MOUTH EVERY 8 HOURS AS NEEDED FOR ANXIETY   insulin lispro (HumaLOG) 100 units/mL injection   No No   Sig: Use up to 100 units per day via insulin pump   lisinopril (ZESTRIL) 5 mg tablet   No No   Sig: Take 1 tablet (5 mg total) by mouth daily   metoprolol tartrate (LOPRESSOR) 50 mg tablet   No No   Sig: Take 1 tablet (50 mg total) by mouth every 12 (twelve) hours   omeprazole (PriLOSEC) 20 mg delayed release capsule  Self Yes No   Sig: Take 20 mg by mouth daily   potassium chloride (K-DUR,KLOR-CON) 20 mEq tablet   No No   Sig: Take 1 tablet (20 mEq total) by mouth 2 (two) times a day for 7 days, THEN 1 tablet (20 mEq total) daily for 7 days  sildenafil (VIAGRA) 50 MG tablet  Self Yes No   Sig: TAKE 1 TABLET BY MOUTH AS  NEEDED FOR ERECTILE  DYSFUNCTION   torsemide (DEMADEX) 20 mg tablet   No No   Sig: Take 1 tablet (20 mg total) by mouth 2 (two) times a day for 7 days, THEN 1 tablet (20 mg total) daily for 7 days     zolpidem (AMBIEN) 5 mg tablet  Self No No   Sig: Take 1 tablet by mouth daily at bedtime as needed for sleep for up to 2 days      Facility-Administered Medications: None       Past Medical History:   Diagnosis Date    Aortic stenosis     Clavicle fracture     LEFT    [facility] mellitus [facility] [address] [facility]     Hyperlipidemia     Hypertension     Myocardial infarction ([facility] [address] [facility]     Thrombocytopenic purpura ([facility] [address] )        Past Surgical History:   Procedure Laterality Date    ABDOMINAL SURGERY      HARVEST VEIN Left 2020    Procedure: HARVEST VEIN ENDOSCOPIC ([facility]) LEFT LEG;  Surgeon: Arnold Mohan MD;  [facility];  Service: Cardiac Surgery    LA CABG, ARTERY-VEIN, THREE N/A 2020    Procedure: CORONARY ARTERY BYPASS GRAFT X 3 WITH SVG TO Right Posterior Lateral Branch, OM1 AND LIMA TO LAD ;  Surgeon: Arnold Mohan MD;  [facility];  Service: Cardiac Surgery    LA RPLCMT AORTIC VALVE OPN W/STENTLESS TISSUE VALVE N/A 2020    Procedure: AORTIC VALVE REPLACEMENT WITH A 25MM SANTOYO INSPIRIS RESILIA  TISSUE AORTIC VALVE;  Surgeon: Arnold Mohan MD;  [facility];  Service: Cardiac Surgery    ROTATOR CUFF REPAIR      SPLENECTOMY      TONSILLECTOMY      VITRECTOMY Bilateral     BY ANTERIOR APPROACH        Family History   Problem Relation Age of Onset    Aneurysm Mother         CAREBRAL ARTERY     Coronary artery disease Mother     Diabetes Mother     Stroke Father      I have reviewed and agree with the history as documented      E-Cigarette/Vaping    E-Cigarette Use Never User     Cartridges/Day none      E-Cigarette/Vaping Substances    Nicotine No     THC No     CBD No     Flavoring No     Other No     Unknown No      Social History     Tobacco Use    Smoking status: Former Smoker     Years: 10 00     Types: Cigars     Quit date: 2020     Years since quittin 0    Smokeless tobacco: Never Used   Vaping Use    Vaping Use: Never used   Substance Use Topics    Alcohol use: Not Currently     Alcohol/week: 0 0 standard drinks     Comment: none    Drug use: Yes     Frequency: 3 0 times per week     Types: Marijuana     Comment: none       Review of Systems   Constitutional: Negative for diaphoresis, fatigue and fever  HENT: Negative for hearing loss  Respiratory: Negative for cough, chest tightness and shortness of breath  Cardiovascular: Negative for chest pain, palpitations and leg swelling  Gastrointestinal: Negative for abdominal pain, diarrhea, nausea and vomiting  Genitourinary: Negative for difficulty urinating, dysuria, flank pain and hematuria  Musculoskeletal: Positive for arthralgias  Negative for back pain, gait problem, neck pain and neck stiffness  Skin: Negative for color change, pallor and rash  Neurological: Negative for dizziness, light-headedness and headaches  Hematological: Negative for adenopathy  Does not bruise/bleed easily  Psychiatric/Behavioral: Negative for agitation and behavioral problems  Physical Exam  Physical Exam  Vitals and nursing note reviewed  Constitutional:       General: He is not in acute distress  Appearance: He is well-developed  He is not ill-appearing, toxic-appearing or diaphoretic  HENT:      Head: Normocephalic and atraumatic  Left Ear: Ear canal normal       Nose: Nose normal       Mouth/Throat:      Mouth: Mucous membranes are moist       Pharynx: No oropharyngeal exudate or posterior oropharyngeal erythema  Cardiovascular:      Rate and Rhythm: Normal rate and regular rhythm  Pulses: Normal pulses  Heart sounds: Normal heart sounds  No murmur heard  No friction rub  Pulmonary:      Effort: Pulmonary effort is normal  No respiratory distress  Breath sounds: Normal breath sounds  No stridor  No wheezing, rhonchi or rales  Chest:      Chest wall: No tenderness  Abdominal:      General: Bowel sounds are normal  There is no distension  Palpations: Abdomen is soft  There is no mass  Tenderness: There is no abdominal tenderness  There is no guarding  Musculoskeletal:         General: Tenderness present   No swelling, deformity or signs of injury  Normal range of motion  Cervical back: Normal range of motion and neck supple  Right lower leg: No edema  Left lower leg: No edema  Comments: Examination of the right shoulder area shows pain at the site of the supraspinatus muscle  Patient has full range of motion of the right shoulder  There is mild spasm of the supraspinatus muscle  Sensation of the right upper extremities normal reflexes are normal    Skin:     General: Skin is warm and dry  Capillary Refill: Capillary refill takes less than 2 seconds  Neurological:      Mental Status: He is alert and oriented to person, place, and time  Psychiatric:         Mood and Affect: Mood normal          Behavior: Behavior normal          Vital Signs  ED Triage Vitals   Temperature Pulse Respirations Blood Pressure SpO2   08/06/21 1021 08/06/21 1019 08/06/21 1019 08/06/21 1019 08/06/21 1019   98 1 °F (36 7 °C) 77 16 159/63 99 %      Temp Source Heart Rate Source Patient Position - Orthostatic VS BP Location FiO2 (%)   08/06/21 1021 -- -- -- --   Oral          Pain Score       08/06/21 1019       Worst Possible Pain           Vitals:    08/06/21 1019   BP: 159/63   Pulse: 77         Visual Acuity      ED Medications  Medications   ketorolac (TORADOL) injection 30 mg (30 mg Intramuscular Given 8/6/21 1035)   diazepam (VALIUM) injection 2 5 mg (2 5 mg Intravenous Given 8/6/21 1034)       Diagnostic Studies  Results Reviewed     None                 XR shoulder 2+ views RIGHT   Final Result by Meme Ventura DO (08/06 1143)      No acute osseous abnormality  Degenerative changes as described              Workstation performed: [id number]                    Procedures  Procedures         ED Course  ED Course as of Aug 07 2051   Fri Aug 06, 2021   1119 X ray R shoulder ; no fracture , dislocation, no acute findings                                SBIRT 22yo+      Most Recent Value   SBIRT (25 yo +)   In order to provide better care to our patients, we are screening all of our patients for alcohol and drug use  Would it be okay to ask you these screening questions? No Filed at: 08/06/2021 1025                    [facility]  Number of Diagnoses or Management Options  Diagnosis management comments: This is a 55years old came for having pain at the right shoulder  On the exam he has spasm and very mild tenderness on the right supraspinatus muscle  He has full range of motion of the shoulder pain again discharge patient on muscle relaxant Valium as patient receive Robaxin and it did not work with him  Also give him NSAID  And to follow-up with his primary doctor  Amount and/or Complexity of Data Reviewed  Tests in the radiology section of CPT®: ordered and reviewed    Risk of Complications, Morbidity, and/or Mortality  Presenting problems: low  Diagnostic procedures: low  Management options: low        Disposition  Final diagnoses:   Acute pain of right shoulder   Muscle spasm of right shoulder     Time reflects when diagnosis was documented in both MDM as applicable and the Disposition within this note     Time User Action Codes Description Comment    8/6/2021 11:29 AM Hayden Combs Add [M25 511] Acute pain of right shoulder     8/6/2021 11:31 AM Kendrick Spear Add [P29 944] Muscle spasm of right shoulder       ED Disposition     ED Disposition Condition Date/Time Comment    Discharge Stable Fri Aug 6, 2021 11:29 AM Gabriela Gomez discharge to home/self care              Follow-up Information     Follow up With Specialties Details Why Contact Kathryn Smith DO Family Medicine In 1 week  [address] [facility]  [phone]            Discharge Medication List as of 8/6/2021 11:33 AM      START taking these medications    Details   diazepam (VALIUM) 5 mg tablet Take 1 tablet (5 mg total) by mouth every 8 (eight) hours as needed for anxiety for up to 10 days, Starting Fri 8/6/2021, Until Mon 8/16/2021 at 2359, Normal      ibuprofen (MOTRIN) 600 mg tablet Take 1 tablet (600 mg total) by mouth every 8 (eight) hours as needed for mild pain, Starting Fri 8/6/2021, Until Sun 9/5/2021 at 2359, Normal         CONTINUE these medications which have NOT CHANGED    Details   acetaminophen (TYLENOL) 325 mg tablet Take 2 tablets (650 mg total) by mouth every 6 (six) hours as needed for mild pain or moderate pain, Starting Thu 9/10/2020, Normal      amoxicillin (AMOXIL) 500 mg capsule Prior dental, Starting Tue 8/11/2020, Historical Med      ascorbic acid (VITAMIN C) 500 MG tablet Take 1 tablet (500 mg total) by mouth daily, Starting Fri 9/11/2020, Until Thu 12/31/2020, Normal      aspirin 325 mg tablet Take 1 tablet (325 mg total) by mouth daily, Starting Fri 9/11/2020, Normal      atorvastatin (LIPITOR) 80 mg tablet Take 1 tablet by mouth daily with dinner, Starting Fri 9/1/2017, Print      [employer] MICROLET LANCETS lancets by Other route 4 (four) times a day Use as instructed, Starting Wed 3/28/2018, Normal      busPIRone (BUSPAR) 15 mg tablet Starting Wed 8/19/2020, Historical Med      Cholecalciferol (VITAMIN D) 2000 units CAPS TK 1 C PO QD, Historical Med      clotrimazole (LOTRIMIN) 1 % cream Apply topically 2 (two) times a day, Starting Tue 9/29/2020, Normal      ferrous sulfate 325 (65 Fe) mg tablet Take 1 tablet (325 mg total) by mouth daily with breakfast, Starting Fri 9/11/2020, Until Thu 12/10/2020, Normal      glucagon 1 MG injection Inject 1 mg under the skin once as needed for low blood sugar for up to 1 dose, Starting Tue 1/26/2021, Normal      glucose blood (NEL CONTOUR TEST) test strip 1 each by Other route 4 (four) times a day, Starting Mon 9/10/2018, Normal      hydrOXYzine HCL (ATARAX) 25 mg tablet TAKE 1 TO 2 TABLETS BY MOUTH EVERY 8 HOURS AS NEEDED FOR ANXIETY, Historical Med      Insulin Infusion Pump KIT 1 Units/hr by Subcutaneous Insulin Pump route continuous, Historical Med      Insulin Infusion Pump Supplies (MINIMED INFUSION SET-) MISC by Does not apply route, Starting Tue 12/19/2017, Historical Med      Insulin Infusion Pump Supplies (MINIMED RESERVOIR 3ML) MISC by Does not apply route, Starting Tue 12/19/2017, Historical Med      insulin lispro (HumaLOG) 100 units/mL injection Use up to 100 units per day via insulin pump, Normal      INSULIN SYRINGE 1CC/29G 29G X 1/2" 1 ML MISC Inject as directed 3 (three) times a day with meals Use as directed, Starting Fri 2/23/2018, Normal      lisinopril (ZESTRIL) 5 mg tablet Take 1 tablet (5 mg total) by mouth daily, Starting Thu 12/31/2020, Normal      metoprolol tartrate (LOPRESSOR) 50 mg tablet Take 1 tablet (50 mg total) by mouth every 12 (twelve) hours, Starting Thu 12/31/2020, Normal      omeprazole (PriLOSEC) 20 mg delayed release capsule Take 20 mg by mouth daily, Historical Med      potassium chloride (K-DUR,KLOR-CON) 20 mEq tablet Multiple Dosages:Starting Thu 9/10/2020, Last dose on Wed 9/16/2020, THEN Starting Thu 9/17/2020, Last dose on Wed 9/23/2020Take 1 tablet (20 mEq total) by mouth 2 (two) times a day for 7 days, THEN 1 tablet (20 mEq total) daily for 7 days  , Normal      QUEtiapine (SEROquel) 25 mg tablet TAKE 1 TABLET BY MOUTH EVERY DAY EVERY NIGHT, Historical Med      sildenafil (VIAGRA) 50 MG tablet TAKE 1 TABLET BY MOUTH AS  NEEDED FOR ERECTILE  DYSFUNCTION, Historical Med      torsemide (DEMADEX) 20 mg tablet Multiple Dosages:Starting Thu 9/10/2020, Last dose on Wed 9/16/2020, THEN Starting Thu 9/17/2020, Last dose on Wed 9/23/2020Take 1 tablet (20 mg total) by mouth 2 (two) times a day for 7 days, THEN 1 tablet (20 mg total) daily for 7 days  , Normal      zolpidem (AMBIEN) 5 mg tablet Take 1 tablet by mouth daily at bedtime as needed for sleep for up to 2 days, Starting Fri 9/1/2017, Until Tue 9/1/2020, Print           No discharge procedures on file      PDMP Review       Value Time User    PDMP Reviewed  Yes 9/10/2020 12:13 PM Emmie Lozano PA-C          ED Provider  Electronically Signed by           Tia Salinas MD  08/07/21 2051

## 2021-08-06 NOTE — DISCHARGE INSTRUCTIONS
Take medications as prescribed     Follow up with your PMD   APPLY heating pads to the R shoulder area

## 2021-08-08 ENCOUNTER — APPOINTMENT (EMERGENCY)
Dept: RADIOLOGY | Facility: HOSPITAL | Age: 56
End: 2021-08-08
Payer: COMMERCIAL

## 2021-08-08 ENCOUNTER — HOSPITAL ENCOUNTER (EMERGENCY)
Facility: HOSPITAL | Age: 56
Discharge: HOME | End: 2021-08-08
Payer: COMMERCIAL

## 2021-08-08 VITALS
DIASTOLIC BLOOD PRESSURE: 90 MMHG | RESPIRATION RATE: 18 BRPM | BODY MASS INDEX: 27.82 KG/M2 | HEART RATE: 70 BPM | HEIGHT: 65 IN | TEMPERATURE: 98.4 F | WEIGHT: 167 LBS | SYSTOLIC BLOOD PRESSURE: 150 MMHG | OXYGEN SATURATION: 100 %

## 2021-08-08 DIAGNOSIS — S46.911A STRAIN OF RIGHT SHOULDER, INITIAL ENCOUNTER: Primary | ICD-10-CM

## 2021-08-08 PROCEDURE — 96361 HYDRATE IV INFUSION ADD-ON: CPT

## 2021-08-08 PROCEDURE — 99283 EMERGENCY DEPT VISIT LOW MDM: CPT

## 2021-08-08 PROCEDURE — 71045 X-RAY EXAM CHEST 1 VIEW: CPT

## 2021-08-08 PROCEDURE — 96375 TX/PRO/DX INJ NEW DRUG ADDON: CPT

## 2021-08-08 PROCEDURE — 99285 EMERGENCY DEPT VISIT HI MDM: CPT

## 2021-08-08 PROCEDURE — 96374 THER/PROPH/DIAG INJ IV PUSH: CPT

## 2021-08-08 RX ORDER — LORAZEPAM 2 MG/ML
0.5 INJECTION INTRAMUSCULAR ONCE
Status: COMPLETED | OUTPATIENT
Start: 2021-08-08 | End: 2021-08-08

## 2021-08-08 RX ORDER — HYDROMORPHONE HCL/PF 1 MG/ML
0.5 SYRINGE (ML) INJECTION ONCE
Status: COMPLETED | OUTPATIENT
Start: 2021-08-08 | End: 2021-08-08

## 2021-08-08 RX ORDER — ONDANSETRON 2 MG/ML
4 INJECTION INTRAMUSCULAR; INTRAVENOUS ONCE
Status: COMPLETED | OUTPATIENT
Start: 2021-08-08 | End: 2021-08-08

## 2021-08-08 RX ORDER — OXYCODONE HYDROCHLORIDE AND ACETAMINOPHEN 5; 325 MG/1; MG/1
1 TABLET ORAL EVERY 4 HOURS PRN
Qty: 12 TABLET | Refills: 0 | Status: SHIPPED | OUTPATIENT
Start: 2021-08-08 | End: 2021-08-18

## 2021-08-08 RX ORDER — SODIUM CHLORIDE 9 MG/ML
500 INJECTION, SOLUTION INTRAVENOUS ONCE
Status: COMPLETED | OUTPATIENT
Start: 2021-08-08 | End: 2021-08-08

## 2021-08-08 RX ORDER — KETOROLAC TROMETHAMINE 30 MG/ML
15 INJECTION, SOLUTION INTRAMUSCULAR; INTRAVENOUS ONCE
Status: COMPLETED | OUTPATIENT
Start: 2021-08-08 | End: 2021-08-08

## 2021-08-08 RX ADMIN — HYDROMORPHONE HYDROCHLORIDE 0.5 MG: 1 INJECTION, SOLUTION INTRAMUSCULAR; INTRAVENOUS; SUBCUTANEOUS at 16:38

## 2021-08-08 RX ADMIN — LORAZEPAM 0.5 MG: 2 INJECTION, SOLUTION INTRAMUSCULAR; INTRAVENOUS at 15:10

## 2021-08-08 RX ADMIN — ONDANSETRON 4 MG: 2 INJECTION INTRAMUSCULAR; INTRAVENOUS at 16:37

## 2021-08-08 RX ADMIN — SODIUM CHLORIDE 500 ML/HR: 0.9 INJECTION, SOLUTION INTRAVENOUS at 15:10

## 2021-08-08 RX ADMIN — KETOROLAC TROMETHAMINE 15 MG: 30 INJECTION, SOLUTION INTRAMUSCULAR; INTRAVENOUS at 15:10

## 2021-08-08 NOTE — Clinical Note
Pablo Salinas was seen and treated in our emergency department on 8/8/2021  Diagnosis:     Maria Teresa Washburn  may return to work on return date  He may return on this date: 08/16/2021    Due to shoulder injury     If you have any questions or concerns, please don't hesitate to call        Tra Woods MD    ______________________________           _______________          _______________  Hospital Representative                              Date                                Time

## 2021-08-08 NOTE — ED TRIAGE NOTES
Pt presents to the ED from home with complaint of upper back pain; states was at this facility yesterday with same complaint; states prescribed Ibuprofen and muscle relaxer; states was at his PCP Tuesday for same complaint and has been taking muscle relaxer from that appointment; "you need to do something for me because I'm still in pain and nothing is working"; arrives ambulatory, in no acute distress, able to answer questions  appropriately

## 2021-08-08 NOTE — ED PROVIDER NOTES
History  Chief Complaint   Patient presents with    Back Pain     Pt presents to the ED from home with complaint of upper back pain; states was at this facility yesterday with same complaint; states prescribed Ibuprofen and muscle relaxer; states was at his PCP Tuesday for same complaint and has been taking muscle relaxer from that appointment; "you need to do something for me because I'm still in pain and nothing is working"; arrives ambulatory, in no acute distress, able to answer questions appropriately     52-year-old male history of insulin-dependent diabetes status post aortic valve replacement here for follow-up and ongoing pain in his right shoulder trapezius region  Patient stated this all started after he was doing a 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  No direct trauma to the area  Patient was seen here few days ago had x-rays shoulder demonstrate no acute findings patient placed on anti-inflammatory and muscle relaxer but states that pain has not responded to this at all he is unable sleep in the pain becoming more severe  Patient cannot find a comfortable position at this point in time  Patient denies any weakness or numbness in his arm  Has no rashes or lesions  Patient states he has pain in his right shoulder area when he does take a deep breath  Patient denies any cough congestion no nausea no vomiting patient denies any headache he is afebrile he is nontoxic in appearance but appears very uncomfortable  Pain is very positional           Prior to Admission Medications   Prescriptions Last Dose Informant Patient Reported? Taking?    NEL MICROLET LANCETS lancets  Self No No   Sig: by Other route 4 (four) times a day Use as instructed   Cholecalciferol (VITAMIN D) 2000 units CAPS  Self Yes No   Sig: TK 1 C PO QD   INSULIN SYRINGE 1CC/29G 29G X 1/2" 1 ML MISC  Self No No   Sig: Inject as directed 3 (three) times a day with meals Use as directed Insulin Infusion Pump KIT  Self Yes No   Si Units/hr by Subcutaneous Insulin Pump route continuous   Insulin Infusion Pump Supplies (MINIMED INFUSION SET-) MISC  Self Yes No   Sig: by Does not apply route   Insulin Infusion Pump Supplies (MINIMED RESERVOIR 3ML) [facility]  Self Yes No   Sig: by Does not apply route   QUEtiapine (SEROquel) 25 mg tablet  Self Yes No   Sig: TAKE 1 TABLET BY MOUTH EVERY DAY EVERY NIGHT   acetaminophen (TYLENOL) 325 mg tablet   No No   Sig: Take 2 tablets (650 mg total) by mouth every 6 (six) hours as needed for mild pain or moderate pain   amoxicillin (AMOXIL) 500 mg capsule  Self Yes No   Sig: Prior dental   ascorbic acid (VITAMIN C) 500 MG tablet   No No   Sig: Take 1 tablet (500 mg total) by mouth daily   aspirin 325 mg tablet   No No   Sig: Take 1 tablet (325 mg total) by mouth daily   atorvastatin (LIPITOR) 80 mg tablet  Self No No   Sig: Take 1 tablet by mouth daily with dinner   busPIRone (BUSPAR) 15 mg tablet   Yes No   clotrimazole (LOTRIMIN) 1 % cream   No No   Sig: Apply topically 2 (two) times a day   diazepam (VALIUM) 5 mg tablet   No No   Sig: Take 1 tablet (5 mg total) by mouth every 8 (eight) hours as needed for anxiety for up to 10 days   ferrous sulfate 325 (65 Fe) mg tablet   No No   Sig: Take 1 tablet (325 mg total) by mouth daily with breakfast   glucagon 1 MG injection   No No   Sig: Inject 1 mg under the skin once as needed for low blood sugar for up to 1 dose   glucose blood (NEL CONTOUR TEST) test strip  Self No No   Si each by Other route 4 (four) times a day   hydrOXYzine HCL (ATARAX) 25 mg tablet  Self Yes No   Sig: TAKE 1 TO 2 TABLETS BY MOUTH EVERY 8 HOURS AS NEEDED FOR ANXIETY   ibuprofen (MOTRIN) 600 mg tablet   No No   Sig: Take 1 tablet (600 mg total) by mouth every 8 (eight) hours as needed for mild pain   insulin lispro (HumaLOG) 100 units/mL injection   No No   Sig: Use up to 100 units per day via insulin pump   lisinopril (ZESTRIL) 5 mg tablet   No No   Sig: Take 1 tablet (5 mg total) by mouth daily   metoprolol tartrate (LOPRESSOR) 50 mg tablet   No No   Sig: Take 1 tablet (50 mg total) by mouth every 12 (twelve) hours   omeprazole (PriLOSEC) 20 mg delayed release capsule  Self Yes No   Sig: Take 20 mg by mouth daily   potassium chloride (K-DUR,KLOR-CON) 20 mEq tablet   No No   Sig: Take 1 tablet (20 mEq total) by mouth 2 (two) times a day for 7 days, THEN 1 tablet (20 mEq total) daily for 7 days  sildenafil (VIAGRA) 50 MG tablet  Self Yes No   Sig: TAKE 1 TABLET BY MOUTH AS  NEEDED FOR ERECTILE  DYSFUNCTION   torsemide (DEMADEX) 20 mg tablet   No No   Sig: Take 1 tablet (20 mg total) by mouth 2 (two) times a day for 7 days, THEN 1 tablet (20 mg total) daily for 7 days     zolpidem (AMBIEN) 5 mg tablet  Self No No   Sig: Take 1 tablet by mouth daily at bedtime as needed for sleep for up to 2 days      Facility-Administered Medications: None       Past Medical History:   Diagnosis Date    Aortic stenosis     Clavicle fracture     LEFT    [facility]     Hyperlipidemia     Hypertension     [facility]ocardial infarction [facility]     Thrombocytopenic purpura ([facility]        Past Surgical History:   Procedure Laterality Date    ABDOMINAL SURGERY      HARVEST VEIN Left 9/2/2020    Procedure: HARVEST VEIN ENDOSCOPIC ([facility]) LEFT LEG;  Surgeon: Fabi Velasco MD;  [facility];  Service: Cardiac Surgery    MA CABG, ARTERY-VEIN, THREE N/A 9/2/2020    Procedure: CORONARY ARTERY BYPASS GRAFT X 3 WITH SVG TO Right Posterior Lateral Branch, OM1 AND LIMA TO LAD ;  Surgeon: Fabi Velasco MD;  [facility];  Service: Cardiac Surgery    MA RPLCMT AORTIC VALVE OPN W/STENTLESS TISSUE VALVE N/A 9/2/2020    Procedure: AORTIC VALVE REPLACEMENT WITH A 25MM SANTOYO INSPIRIS RESILIA  TISSUE AORTIC VALVE;  Surgeon: Fabi Velasco MD;  [facility];  Service: Cardiac Surgery    ROTATOR CUFF REPAIR      SPLENECTOMY      TONSILLECTOMY  VITRECTOMY Bilateral     BY ANTERIOR APPROACH        Family History   Problem Relation Age of Onset    Aneurysm Mother         CAREBRAL ARTERY     Coronary artery disease Mother     Diabetes Mother     Stroke Father      I have reviewed and agree with the history as documented  E-Cigarette/Vaping    E-Cigarette Use Never User     Cartridges/Day none      E-Cigarette/Vaping Substances    Nicotine No     THC No     CBD No     Flavoring No     Other No     Unknown No      Social History     Tobacco Use    Smoking status: Former Smoker     Years: 10 00     Types: Cigars     Quit date: 2020     Years since quittin 0    Smokeless tobacco: Never Used   Vaping Use    Vaping Use: Never used   Substance Use Topics    Alcohol use: Not Currently     Alcohol/week: 0 0 standard drinks     Comment: none    Drug use: Yes     Frequency: 3 0 times per week     Types: Marijuana     Comment: none       Review of Systems   Constitutional: Negative for chills and fever  HENT: Negative for congestion  Eyes: Negative for visual disturbance  Respiratory: Negative for shortness of breath  Cardiovascular: Negative for chest pain  Gastrointestinal: Negative for abdominal pain  Endocrine: Negative for cold intolerance  Genitourinary: Negative for frequency  Musculoskeletal: Positive for arthralgias and back pain  Negative for gait problem  Skin: Negative for rash  Neurological: Negative for dizziness  Psychiatric/Behavioral: Negative for behavioral problems and confusion  Physical Exam  Physical Exam  Vitals and nursing note reviewed  Constitutional:       Appearance: He is well-developed  HENT:      Head: Normocephalic and atraumatic  Nose: Nose normal       Mouth/Throat:      Mouth: Mucous membranes are dry  Eyes:      Conjunctiva/sclera: Conjunctivae normal       Pupils: Pupils are equal, round, and reactive to light     Cardiovascular:      Rate and Rhythm: Normal rate and regular rhythm  Heart sounds: Normal heart sounds  Pulmonary:      Effort: Pulmonary effort is normal       Breath sounds: Normal breath sounds  Abdominal:      General: Bowel sounds are normal       Palpations: Abdomen is soft  Musculoskeletal:         General: Normal range of motion  Cervical back: Normal range of motion and neck supple  Comments: Examination right shoulder demonstrates significant tenderness to palpation in the right trapezius region posterior shoulder area pain with flexion extension rotation of neck  Skin:     General: Skin is warm and dry  Capillary Refill: Capillary refill takes less than 2 seconds  Neurological:      Mental Status: He is alert and oriented to person, place, and time  Psychiatric:         Behavior: Behavior normal          Vital Signs  ED Triage Vitals [08/08/21 1428]   Temperature Pulse Respirations Blood Pressure SpO2   98 4 °F (36 9 °C) 70 18 150/90 100 %      Temp Source Heart Rate Source Patient Position - Orthostatic VS BP Location FiO2 (%)   Oral Monitor -- Right arm --      Pain Score       Worst Possible Pain           Vitals:    08/08/21 1428   BP: 150/90   Pulse: 70         Visual Acuity  Visual Acuity      Most Recent Value   L Pupil Size (mm)  4   R Pupil Size (mm)  4          ED Medications  Medications   ketorolac (TORADOL) injection 15 mg (has no administration in time range)   LORazepam (ATIVAN) injection 0 5 mg (has no administration in time range)   sodium chloride 0 9 % infusion (has no administration in time range)       Diagnostic Studies  Results Reviewed     None                 XR chest 1 view portable    (Results Pending)              Procedures  Procedures         ED Course                             SBIRT 22yo+      Most Recent Value   SBIRT (22 yo +)   In order to provide better care to our patients, we are screening all of our patients for alcohol and drug use   Would it be okay to ask you these screening questions? Yes Filed at: 08/08/2021 1434   Initial Alcohol Screen: US AUDIT-C    1  How often do you have a drink containing alcohol?  0 Filed at: 08/08/2021 1434   2  How many drinks containing alcohol do you have on a typical day you are drinking? 0 Filed at: 08/08/2021 1434   3a  Male UNDER 65: How often do you have five or more drinks on one occasion? 0 Filed at: 08/08/2021 1434   Audit-C Score  0 Filed at: 08/08/2021 1434   MARCELA: How many times in the past year have you    Used an illegal drug or used a prescription medication for non-medical reasons? Never Filed at: 08/08/2021 1434                    MDM  Number of Diagnoses or Management Options  Diagnosis management comments: Patient was monitored emergency department chest x-ray demonstrated no acute findings  Patient initially received Toradol 15 mg IV Ativan 0 5 mg IV with minimal improvement of his symptoms  Patient then received Dilaudid 0 5 mg IV and Zofran 4 mg IV with significant improvement  Plan is to place patient in arm sling patient will be referred to orthopedic on-call recommend cold packs sling no work for 1 week impression is shoulder thoracic strain      Disposition  Final diagnoses:   None     ED Disposition     None      Follow-up Information    None         Patient's Medications   Discharge Prescriptions    No medications on file     No discharge procedures on file      PDMP Review       Value Time User    PDMP Reviewed  Yes 9/10/2020 12:13 PM Emerson Noel PA-C          ED Provider  Electronically Signed by           Real Burr MD  08/08/21 8206

## 2021-08-08 NOTE — Clinical Note
Shaheen Fuller was seen and treated in our emergency department on 8/8/2021  Diagnosis:     Derrell Gustafson  may return to work on return date  He may return on this date: 08/16/2021    Due to shoulder injury     If you have any questions or concerns, please don't hesitate to call        Estiven King MD    ______________________________           _______________          _______________  Hospital Representative                              Date                                Time

## 2021-08-09 ENCOUNTER — TELEPHONE (OUTPATIENT)
Dept: OBGYN CLINIC | Facility: HOSPITAL | Age: 56
End: 2021-08-09

## 2021-08-09 DIAGNOSIS — M75.01 ADHESIVE CAPSULITIS OF RIGHT SHOULDER: Primary | ICD-10-CM

## 2021-08-09 RX ORDER — PREDNISONE 10 MG/1
TABLET ORAL
Qty: 30 TABLET | Refills: 0 | Status: SHIPPED | OUTPATIENT
Start: 2021-08-09 | End: 2021-08-17

## 2021-08-09 RX ORDER — METAXALONE 800 MG/1
800 TABLET ORAL 3 TIMES DAILY
Qty: 30 TABLET | Refills: 0 | Status: SHIPPED | OUTPATIENT
Start: 2021-08-09

## 2021-08-09 NOTE — TELEPHONE ENCOUNTER
Spoke to [employer]  Patient is scheduled with Louise Law for Thursday  She asked what she is supposed to do in the mean time  Denies chest pain, SOB redness, heat to touch, or swelling  Stated that he is basically taking percocet and falling asleep for 4 hrs to wake up and take percocet again  She stated he only has a day or so left of that medication then he will be out  She reports that he is also taking advil and it isn't helping  Asked if a muscle relaxer might help or if someone could review ER records and see if there is anything else that can be recommended before he is seen on Thursday       Advised ice/heat 20 mins on 20 mins off and rest when needed  [facility] and just about every other doctor's schedule is full this week before scheduled appointment  Please advise

## 2021-08-09 NOTE — TELEPHONE ENCOUNTER
Spoke to [employer]  Did advise above  She stated he is willing to try any help we can give as far as a muscle relaxer or steroids  He was recently order valium q8h PRN and percocet  Advised that muscles relaxer should not be taken at the same time as either of those medications, must be  by a few hours between administration  Advised stopping ibuprofen while taking steroid as well  Understanding verbalized  Preferred pharmacy is [facility] in [place] on chart   Thank you

## 2021-08-09 NOTE — TELEPHONE ENCOUNTER
Patient's wife Shana Liriano, called and spoke to nurse NICA GARCIA earlier  There was supposed to be a an rx for prednisone and a muscle relaxer in to the pharmacy earlier but the pharmacy doesn't have it yet  Preferred is [facility] in [place] on Chart  Callback #[phone]    Sent the above TT message to Dr Vincenzo Terry on call

## 2021-08-09 NOTE — TELEPHONE ENCOUNTER
Patient sees Dr Rashid Conteh       Patients wife is calling stating her  is in extreme pain in his shoulder  He has been to the ER and his PCP and nothing is helping  Patient was scheduled with Dr Jerri Esparza this Friday but she is stating he cannot wait  She would like advice on what he can do        CB: [phone]

## 2021-08-09 NOTE — TELEPHONE ENCOUNTER
I can see the patient on Thursday as a follow-up since he has seen Dr Ewelina Grossman in June  If this is not soon for him we can offer him visit with Brynn Hoover if she has any availability before then or perhaps another surgeon if he feels he cannot wait until Thursday    He is having any constitutional symptoms and worsening shoulder pain along with those symptoms he should go back to the ER

## 2021-08-10 NOTE — TELEPHONE ENCOUNTER
Dr Miguel A Abreu prescribed the medications last night  I will see the patient on Thursday   Thanks for your help

## 2021-08-10 NOTE — PROGRESS NOTES
Wife called back because Paula Patel has severe right shoulder pain  He is taking oxycodone-acetaminophen, which puts him to sleep for 4 hours  He then wakes up in severe pain again, and she does not feel that the opiate medication is really addressing the problem  I prescribed an oral prednisone taper and metaxalone for 10 days

## 2021-08-12 ENCOUNTER — OFFICE VISIT (OUTPATIENT)
Dept: OBGYN CLINIC | Facility: CLINIC | Age: 56
End: 2021-08-12
Payer: COMMERCIAL

## 2021-08-12 ENCOUNTER — TELEPHONE (OUTPATIENT)
Dept: OBGYN CLINIC | Facility: CLINIC | Age: 56
End: 2021-08-12

## 2021-08-12 VITALS
HEART RATE: 60 BPM | HEIGHT: 65 IN | SYSTOLIC BLOOD PRESSURE: 145 MMHG | BODY MASS INDEX: 27.82 KG/M2 | DIASTOLIC BLOOD PRESSURE: 78 MMHG | WEIGHT: 167 LBS

## 2021-08-12 DIAGNOSIS — S46.911A MUSCLE STRAIN OF RIGHT SHOULDER, INITIAL ENCOUNTER: ICD-10-CM

## 2021-08-12 DIAGNOSIS — M62.838 MUSCLE SPASM: Primary | ICD-10-CM

## 2021-08-12 DIAGNOSIS — M54.2 NECK PAIN: ICD-10-CM

## 2021-08-12 PROCEDURE — 99214 OFFICE O/P EST MOD 30 MIN: CPT | Performed by: PHYSICIAN ASSISTANT

## 2021-08-12 RX ORDER — TRAMADOL HYDROCHLORIDE 50 MG/1
50 TABLET ORAL EVERY 6 HOURS PRN
COMMUNITY
Start: 2021-08-04 | End: 2022-01-13 | Stop reason: HOSPADM

## 2021-08-12 RX ORDER — TADALAFIL 20 MG/1
20 TABLET ORAL
COMMUNITY
Start: 2021-06-29 | End: 2022-01-13 | Stop reason: HOSPADM

## 2021-08-12 RX ORDER — METHOCARBAMOL 750 MG/1
750 TABLET, FILM COATED ORAL 4 TIMES DAILY PRN
COMMUNITY
Start: 2021-08-03 | End: 2021-09-07

## 2021-08-12 NOTE — PROGRESS NOTES
Assessment/Plan:  1  Neck pain  Ambulatory referral to Physical Therapy    Ambulatory referral to Pain Management   2  Muscle strain of right shoulder, initial encounter  Ambulatory referral to Physical Therapy    Ambulatory referral to Pain Management   3  Muscle spasm  Ambulatory referral to Physical Therapy    Ambulatory referral to Pain Management     Patient Active Problem List   Diagnosis    Nonrheumatic aortic valve stenosis    Type 1 diabetes mellitus with retinopathy ([facility] [address] )    Essential hypertension    Mouth sores    Coronary artery disease involving native coronary artery of native heart without angina pectoris    Dyslipidemia    Aortic stenosis due to bicuspid aortic valve    History of ITP    History of coronary angioplasty with insertion of stent    Acute blood loss as cause of postoperative anemia    Post-operative nausea and vomiting    Acute postoperative pulmonary insufficiency (HCC)    Adhesive capsulitis of right shoulder       Discussion/Summary:    55 y o  male  with  Posterior right shoulder pain secondary to muscle strain and spasm of the trapezius primarily      had a long discussion with the patient and his wife regarding management of pain and symptoms; Explained that the recommended course of treatment for this is muscle relaxant, oral steroid, NSAIDs, physical therapy    the patient did not feel that all the treatments he has received thus far have been entirely adequate although the muscle relaxant and steroids prescribed by Dr Chad Jacobo  Have been helping a little bit       explained to the patient that opioids are not indicated for this pain  And I will not  Prescribe any opioids for his muscle strain and spasm    referral was placed to physical therapy and pain management     I did offer him 3 days of nightly diazepam 5 mg  For little stronger muscle relaxation over the weekend;  Patient was prescribed diazepam less than a week ago by his family doctor so he will take the medication that was provided to him by her    patient may have early adhesive capsulitis today on exam  His range of motion is near normal in internal rotation and normal in all other planes of motion,  His pain does not seem to be originating from the glenohumeral joint but he would still benefit from stretching with internal rotation by therapy in addition to exercises for the back and neck      Subjective:   Patient ID: Ritika Zhang is a 55 y o  male   HPI    Patient presents to the office for follow up of  Right shoulder pain  Since the last visit, the patient reports  At this past Monday, he was doing a lot of activity moving boxes and a lot of lifting with the shoulder  That night the next morning began having a lot of neck and posterior shoulder pain  He notes a balled up area in the muscle the posterior shoulder  He describes the pain as sharp constant severe  Radiates up to the neck  Pain does not radiate down the arm  He denies any numbness or tingling going into the hand      He has gotten no relief from Percocet, Tylenol, ibuprofen  He has had some relief from Medrol Dosepak and steroids  Patient  denies any new injuries to the  Left shoulder since the last visit       The following portions of the patient's history were reviewed and updated as appropriate: allergies, current medications, past family history, past social history, past surgical history and problem list     Social History     Socioeconomic History    Marital status: /Civil Union     Spouse name: Not on file    Number of children: Not on file    Years of education: GRADUATED HIGHSCHOOL     Highest education level: Not on file   Occupational History    Occupation: MANAGER AT [employer]    Tobacco Use    Smoking status: Former Smoker     Years: 10 00     Types: Cigars     Quit date: 2020     Years since quittin 0    Smokeless tobacco: Never Used   Vaping Use    Vaping Use: Never used   Substance and Sexual Activity    Alcohol use: Not Currently     Alcohol/week: 0 0 standard drinks     Comment: none    Drug use: Yes     Frequency: 3 0 times per week     Types: Marijuana     Comment: none    Sexual activity: Not Currently     Partners: Female   Other Topics Concern    Not on file   Social History Narrative    Not on file     Social Determinants of Health     Financial Resource Strain:     Difficulty of Paying Living Expenses:    Food Insecurity:     Worried About Running Out of Food in the Last Year:     Ran Out of Food in the Last Year:    Transportation Needs:     Lack of Transportation (Medical):      Lack of Transportation (Non-Medical):    Physical Activity:     Days of Exercise per Week:     Minutes of Exercise per Session:    Stress:     Feeling of Stress :    Social Connections:     Frequency of Communication with Friends and Family:     Frequency of Social Gatherings with Friends and Family:     Attends Nondenominational Services:     Active Member of Clubs or Organizations:     Attends Club or Organization Meetings:     Marital Status:    Intimate Partner Violence:     Fear of Current or Ex-Partner:     Emotionally Abused:     Physically Abused:     Sexually Abused:      Past Medical History:   Diagnosis Date    Aortic stenosis     Clavicle fracture     LEFT    Diabetes mellitus ([facility] [address] [facility]     Hyperlipidemia     Hypertension     Myocardial infarction ([facility] [address] [facility]     Thrombocytopenic purpura ([facility] [address] )      Past Surgical History:   Procedure Laterality Date    ABDOMINAL SURGERY      HARVEST VEIN Left 9/2/2020    Procedure: HARVEST VEIN ENDOSCOPIC ([address]) LEFT LEG;  Surgeon: Fabi Velasco MD;  [facility]: [facility];  Service: Cardiac Surgery    MD CABG, ARTERY-VEIN, THREE N/A 9/2/2020    Procedure: CORONARY ARTERY BYPASS GRAFT X 3 WITH SVG TO Right Posterior Lateral Branch, OM1 AND LIMA TO LAD ;  Surgeon: Fabi Velasco MD;  [facility];  Service: Cardiac Surgery    MD RPLCMT AORTIC VALVE OPN W/STENTLESS TISSUE VALVE N/A 9/2/2020    Procedure: AORTIC VALVE REPLACEMENT WITH A 25MM SANTOYO INSPIRIS RESILIA  TISSUE AORTIC VALVE;  Surgeon: Angela Resendiz MD;  [facility];  Service: Cardiac Surgery    ROTATOR CUFF REPAIR      SPLENECTOMY      TONSILLECTOMY      VITRECTOMY Bilateral     BY ANTERIOR APPROACH      No Known Allergies  Current Outpatient Medications on File Prior to Visit   Medication Sig Dispense Refill    methocarbamol (ROBAXIN) 750 mg tablet Take 750 mg by mouth 4 (four) times a day as needed      tadalafil (CIALIS) 20 MG tablet Take 20 mg by mouth      acetaminophen (TYLENOL) 325 mg tablet Take 2 tablets (650 mg total) by mouth every 6 (six) hours as needed for mild pain or moderate pain 30 tablet 0    amoxicillin (AMOXIL) 500 mg capsule Prior dental      ascorbic acid (VITAMIN C) 500 MG tablet Take 1 tablet (500 mg total) by mouth daily 30 tablet 2    aspirin 325 mg tablet Take 1 tablet (325 mg total) by mouth daily 30 tablet 2    atorvastatin (LIPITOR) 80 mg tablet Take 1 tablet by mouth daily with dinner 30 tablet 0    Movirtu MICROLET LANCETS lancets by Other route 4 (four) times a day Use as instructed 400 each 3    busPIRone (BUSPAR) 15 mg tablet       Cholecalciferol (VITAMIN D) 2000 units CAPS TK 1 C PO QD  3    clotrimazole (LOTRIMIN) 1 % cream Apply topically 2 (two) times a day 30 g 0    diazepam (VALIUM) 5 mg tablet Take 1 tablet (5 mg total) by mouth every 8 (eight) hours as needed for anxiety for up to 10 days 20 tablet 0    ferrous sulfate 325 (65 Fe) mg tablet Take 1 tablet (325 mg total) by mouth daily with breakfast 30 tablet 2    glucagon 1 MG injection Inject 1 mg under the skin once as needed for low blood sugar for up to 1 dose 1 kit 1    glucose blood (NEL CONTOUR TEST) test strip 1 each by Other route 4 (four) times a day 400 each 3    hydrOXYzine HCL (ATARAX) 25 mg tablet TAKE 1 TO 2 TABLETS BY MOUTH EVERY 8 HOURS AS NEEDED FOR ANXIETY      ibuprofen (MOTRIN) 600 mg tablet Take 1 tablet (600 mg total) by mouth every 8 (eight) hours as needed for mild pain 30 tablet 0    Insulin Infusion Pump KIT 1 Units/hr by Subcutaneous Insulin Pump route continuous      Insulin Infusion Pump Supplies (MINIMED INFUSION SET-) MISC by Does not apply route      Insulin Infusion Pump Supplies (MINIMED RESERVOIR 3ML) MISC by Does not apply route      insulin lispro (HumaLOG) 100 units/mL injection Use up to 100 units per day via insulin pump 90 mL 3    INSULIN SYRINGE 1CC/29G 29G X 1/2" 1 ML MISC Inject as directed 3 (three) times a day with meals Use as directed 100 each 0    lisinopril (ZESTRIL) 5 mg tablet Take 1 tablet (5 mg total) by mouth daily 90 tablet 3    metaxalone (SKELAXIN) 800 mg tablet Take 1 tablet (800 mg total) by mouth 3 (three) times a day 30 tablet 0    metoprolol tartrate (LOPRESSOR) 50 mg tablet Take 1 tablet (50 mg total) by mouth every 12 (twelve) hours 180 tablet 3    omeprazole (PriLOSEC) 20 mg delayed release capsule Take 20 mg by mouth daily      oxyCODONE-acetaminophen (PERCOCET) 5-325 mg per tablet Take 1 tablet by mouth every 4 (four) hours as needed for moderate pain (shoulder pain) for up to 10 daysMax Daily Amount: 6 tablets 12 tablet 0    potassium chloride (K-DUR,KLOR-CON) 20 mEq tablet Take 1 tablet (20 mEq total) by mouth 2 (two) times a day for 7 days, THEN 1 tablet (20 mEq total) daily for 7 days  21 tablet 1    predniSONE 10 mg tablet Take 5 tablets (50 mg total) by mouth daily for 2 days, THEN 4 tablets (40 mg total) daily for 2 days, THEN 3 tablets (30 mg total) daily for 2 days, THEN 2 tablets (20 mg total) daily for 2 days, THEN 1 tablet (10 mg total) daily for 2 days   30 tablet 0    QUEtiapine (SEROquel) 25 mg tablet TAKE 1 TABLET BY MOUTH EVERY DAY EVERY NIGHT      sildenafil (VIAGRA) 50 MG tablet TAKE 1 TABLET BY MOUTH AS  NEEDED FOR ERECTILE  DYSFUNCTION      torsemide (DEMADEX) 20 mg tablet Take 1 tablet (20 mg total) by mouth 2 (two) times a day for 7 days, THEN 1 tablet (20 mg total) daily for 7 days  21 tablet 1    traMADol (ULTRAM) 50 mg tablet Take 50 mg by mouth every 6 (six) hours as needed      zolpidem (AMBIEN) 5 mg tablet Take 1 tablet by mouth daily at bedtime as needed for sleep for up to 2 days 2 tablet 0     No current facility-administered medications on file prior to visit  Review of Systems  See hpi    Objective:    Vitals:    08/12/21 0915   BP: 145/78   Pulse: 60       Physical Exam  Constitutional:       General: He is not in acute distress  Appearance: He is well-developed  HENT:      Head: Normocephalic and atraumatic  Eyes:      General: No scleral icterus  Conjunctiva/sclera: Conjunctivae normal    Neck:      Trachea: No tracheal deviation  Pulmonary:      Effort: Pulmonary effort is normal  No respiratory distress  Musculoskeletal:      Cervical back: Neck supple  Skin:     General: Skin is warm and dry  Neurological:      Mental Status: He is alert  Psychiatric:         Behavior: Behavior normal          Right Shoulder Exam     Range of Motion   Active abduction: normal   Passive abduction: normal   Extension: normal   External rotation: normal   Forward flexion: normal   Internal rotation 90 degrees:  80 abnormal     Tests   Cross arm: negative  Drop arm: negative    Other   Erythema: absent  Scars: absent  Sensation: normal  Pulse: present    Comments: There is  tenderness of the right paraspinal musculature and trapezius, no palpable lump today on exam of the trapezius or  supraspinatus            I have personally reviewed pertinent films in PACS and my interpretation is X-ray right shoulder from 08/06/2021 demonstrates mild degenerative changes no acute fracture  Procedures  No Procedures performed today    Portions of the record may have been created with voice recognition software    Occasional wrong word or "sound a like" substitutions may have occurred due to the inherent limitations of voice recognition software  Read the chart carefully and recognize, using context, where substitutions have occurred

## 2021-08-12 NOTE — TELEPHONE ENCOUNTER
Jnana Lund,       Patient is calling in requesting a call  He states his medication has not been sent to the pharmacy yet         Please advise,     Cb#: [phone]

## 2021-08-13 ENCOUNTER — TELEPHONE (OUTPATIENT)
Dept: OBGYN CLINIC | Facility: CLINIC | Age: 56
End: 2021-08-13

## 2021-08-13 NOTE — TELEPHONE ENCOUNTER
Patient stated he does not have any more diazepam from his PCP  Stated he has 23 robaxin left  Stated he is out of prednisone as well, took last pill this morning  Stated he doesn't have the money for SPA and PT as he had several appointments that have not helped him so far and he has high bills from then  Advised PT and SPA are the course of treatment for his condition  PT can treat the cause of the condition with exercises so he should make those appointments to treat the cause of his concerned  Understanding verbalized  Please advise

## 2021-08-13 NOTE — TELEPHONE ENCOUNTER
Patient was prescribed 20 tablets of diazepam on 08/06/2020  He told me yesterday that he got rid of them and was not taking him  If this was not true and he was taking 1 every 8 hours around the clock then he should have just finished that prescription today  We are prescribing him Skelaxin, not Robaxin  We do see that someone else prescribed him Robaxin but we wanted him to start the Skelaxin  Also he should not be out of the prednisone at this point, it was prescribed 4 days ago for a 10 day course with the instructions from Dr Bill Zapata on how to taper it on the bottle  At this point in time, given the inconsistencies with what he is reporting and him apparently not taking the medications as prescribed, I will not provide him any more prescription medications for these issues  Recommend that he contact his family doctor for medical management his pain and muscular issues in the back and neck

## 2021-08-13 NOTE — TELEPHONE ENCOUNTER
Looks like you saw him and discussed a benzo   But in your note looks like he already has it from another provider

## 2021-08-13 NOTE — TELEPHONE ENCOUNTER
Spoke to patient  He stated that his wife misinterpreted what as stated on the bottle for doing and gave him 5 tablets of prednisone 2 times for one day, 2 tablets twice the next day and so on  This is how that occurred  Advised that he reach out to PCP today  Understanding verbalized

## 2021-08-13 NOTE — TELEPHONE ENCOUNTER
Thank you for talking to the patient  Not only is it inappropriate to not take medication as prescribed in the case of something like prednisone it is also potentially dangerous to take that much in a short time without appropriately tapering it off    For that reason as well would strongly recommend he contact his PCP for further guidance today, as well as for management of his pain moving forward

## 2021-08-13 NOTE — TELEPHONE ENCOUNTER
Pt stopped in office for his work note and then indicated he called about refilling pain medication    He states advil does not work and is looking for something stronger     # [phone]

## 2021-08-13 NOTE — TELEPHONE ENCOUNTER
Please contact the patient and let him know that as per our discussion in the office, he was to call us after he checked at home if he had the diazepam 5mg tablets his PCP prescribed last week  He told me he threw them out but his wife did not think they did throw them out  If he did throw them out I will prescribe enough to take nightly through the weekend but if I he does have them I will not prescribe any and he can take those 5 mg tabs at night time as that was the same dosage I would've prescribed   I also discussed possible repeating the steroid taper after he finishes this one he can let us know if he wants to do that otherwise recommend he make the appts with spine and pain and PT if he has not already

## 2021-08-13 NOTE — TELEPHONE ENCOUNTER
Patient has been advised of above  Advised that with the above information he would need to speak to his PCP regarding his medications  Patient verbalized understanding  He stated he was in so much pain he didn't know what else to do  This is why he took too many of them  He stated he wont do it again and needs something  Advised again of above information regarding inappropriate dosing of medication and dangers of taking medications outside recommended dose and patient stated he will be speaking to someone else about this and this is unacceptable  He then ended the call  Just GURDEEP

## 2021-08-17 ENCOUNTER — CONSULT (OUTPATIENT)
Dept: PAIN MEDICINE | Facility: CLINIC | Age: 56
End: 2021-08-17
Payer: COMMERCIAL

## 2021-08-17 VITALS
HEART RATE: 57 BPM | WEIGHT: 161 LBS | BODY MASS INDEX: 26.79 KG/M2 | SYSTOLIC BLOOD PRESSURE: 127 MMHG | DIASTOLIC BLOOD PRESSURE: 72 MMHG

## 2021-08-17 DIAGNOSIS — M62.838 MUSCLE SPASM: ICD-10-CM

## 2021-08-17 DIAGNOSIS — M54.2 NECK PAIN: Primary | ICD-10-CM

## 2021-08-17 DIAGNOSIS — S46.911A MUSCLE STRAIN OF RIGHT SHOULDER, INITIAL ENCOUNTER: ICD-10-CM

## 2021-08-17 DIAGNOSIS — M79.18 MYOFASCIAL PAIN SYNDROME: ICD-10-CM

## 2021-08-17 PROCEDURE — 99204 OFFICE O/P NEW MOD 45 MIN: CPT | Performed by: PHYSICAL MEDICINE & REHABILITATION

## 2021-08-17 PROCEDURE — 20552 NJX 1/MLT TRIGGER POINT 1/2: CPT | Performed by: PHYSICAL MEDICINE & REHABILITATION

## 2021-08-17 RX ORDER — METHYLPREDNISOLONE ACETATE 40 MG/ML
40 INJECTION, SUSPENSION INTRA-ARTICULAR; INTRALESIONAL; INTRAMUSCULAR; SOFT TISSUE ONCE
Status: COMPLETED | OUTPATIENT
Start: 2021-08-17 | End: 2021-08-17

## 2021-08-17 RX ORDER — DICLOFENAC SODIUM 75 MG/1
75 TABLET, DELAYED RELEASE ORAL 2 TIMES DAILY
Qty: 20 TABLET | Refills: 0 | Status: SHIPPED | OUTPATIENT
Start: 2021-08-17 | End: 2022-01-13 | Stop reason: HOSPADM

## 2021-08-17 RX ORDER — LIDOCAINE HYDROCHLORIDE 10 MG/ML
1 INJECTION, SOLUTION INFILTRATION; PERINEURAL ONCE
Status: COMPLETED | OUTPATIENT
Start: 2021-08-17 | End: 2021-08-17

## 2021-08-17 RX ORDER — BUPIVACAINE HYDROCHLORIDE 2.5 MG/ML
10 INJECTION, SOLUTION EPIDURAL; INFILTRATION; INTRACAUDAL ONCE
Status: CANCELLED | OUTPATIENT
Start: 2021-08-17 | End: 2021-08-17

## 2021-08-17 RX ADMIN — LIDOCAINE HYDROCHLORIDE 1 ML: 10 INJECTION, SOLUTION INFILTRATION; PERINEURAL at 09:19

## 2021-08-17 RX ADMIN — METHYLPREDNISOLONE ACETATE 40 MG: 40 INJECTION, SUSPENSION INTRA-ARTICULAR; INTRALESIONAL; INTRAMUSCULAR; SOFT TISSUE at 09:20

## 2021-08-17 NOTE — PATIENT INSTRUCTIONS
Neck Exercises   WHAT YOU NEED TO KNOW:   Why is it important to do neck exercises? Neck exercises help reduce neck pain, and improve neck movement and strength  Neck exercises also help prevent long-term neck problems  What do I need to know about neck exercises? · Do the exercises every day,  or as often as directed by your healthcare provider  · Move slowly, gently, and smoothly  Avoid fast or jerky motions  · Stand and sit the way your healthcare provider shows you  Good posture may reduce your neck pain  Check your posture often, even when you are not doing your neck exercises  How do I perform neck exercises safely? · Exercise position:  You may sit or stand while you do neck exercises  Face forward  Your shoulders should be straight and relaxed, with a good posture  · Head tilts, forward and back:  Gently bow your head and try to touch your chin to your chest  Your healthcare provider may tell you to push on the back of your neck to help bow your head  Raise your chin back to the starting position  Tilt your head back as far as possible so you are looking up at the ceiling  Your healthcare provider may tell you to lift your chin to help tilt your head back  Return your head to the starting position  · Head tilts, side to side:  Tilt your head, bringing your ear toward your shoulder  Then tilt your head toward the other shoulder  · Head turns:  Turn your head to look over your shoulder  Tilt your chin down and try to touch it to your shoulder  Do not raise your shoulder to your chin  Face forward again  Do the same on the other side  · Head rolls:  Slowly bring your chin toward your chest  Next, roll your head to the right  Your ear should be positioned over your shoulder  Hold this position for 5 seconds  Roll your head back toward your chest and to the left into the same position  Hold for 5 seconds   Gently roll your head back and around in a clockwise [place] 3 times  Next, move your head in the reverse direction (counterclockwise) in a [place] 3 times  Do not shrug your shoulders upwards while you do this exercise  When should I contact my healthcare provider? · Your pain does not get better, or gets worse  · You have questions or concerns about your condition, care, or exercise program     CARE AGREEMENT:   You have the right to help plan your care  Learn about your health condition and how it may be treated  Discuss treatment options with your healthcare providers to decide what care you want to receive  You always have the right to refuse treatment  The above information is an  only  It is not intended as medical advice for individual conditions or treatments  Talk to your doctor, nurse or pharmacist before following any medical regimen to see if it is safe and effective for you  © Copyright [employer] 2021 Information is for End User's use only and may not be sold, redistributed or otherwise used for commercial purposes   All illustrations and images included in CareNotes® are the copyrighted property of [employer]  or [address] [place] [address]

## 2021-08-17 NOTE — PROGRESS NOTES
Assessment  1  Neck pain    2  Muscle strain of right shoulder, initial encounter    3  Muscle spasm    4  Myofascial pain syndrome        Plan  Mr Dayan Uriarte is a pleasant 64-year-old male who presents for initial evaluation regarding several weeks duration of worsening right-sided neck pain with radiating symptoms into the right shoulder after lifting and moving several bags of concrete  During today's evaluation he is demonstrating clinical evidence of myofascial pain syndrome primarily of the right trapezius muscle unrelieved with medications and relative rest   At this time we will plan for an in office trigger point injection  1  Indication:  Muscular pain  Preprocedure diagnosis:  1  Myofascial pain syndrome  Postprocedure diagnosis:  1  Myofascial pain syndrome    Procedure:  Right-sided trapezius muscle trigger point injection(s)  After discussing the risks, benefits, and alternatives to the procedure, the patient expressed understanding and wished to proceed  The patient was brought to the procedure suite and placed in the seated position  A procedural pause was conducted to verify:  correct patient identity, procedure to be performed and as applicable, correct side and site, correct patient position, and availability of implants, special equipment or special requirements  A simple surgical tray was used  Prior to the procedure, the right trapezius muscle was examined and marked  Following this, the area was prepared with alcohol pads then re-examined  Thereafter, a 1 5 inch 25 gauge needle was inserted, negative aspiration for blood, and then a mixture of 1% lidocaine with 40 milligrams/milliliter Depo-Medrol was injected into the targeted trigger points  Following the injection, the needle was withdrawn  The patient tolerated the procedure well and there were no apparent complications  No significant bleeding post-injection, bandaids applied   After an appropriate amount of observation, the patient was dismissed from the clinic in good condition under their own power  2  Provided the patient with home neck exercises to be done 3 times per week for 4 weeks or longer if needed  I did offer physical therapy but the patient reports due to his job he is unable to take time to go to therapy  Will follow up in 4 weeks or sooner if needed  My impressions and treatment recommendations were discussed in detail with the patient who verbalized understanding and had no further questions  Discharge instructions were provided  I personally saw and examined the patient and I agree with the above discussed plan of care  No orders of the defined types were placed in this encounter  New Medications Ordered This Visit   Medications    diclofenac (VOLTAREN) 75 mg EC tablet     Sig: Take 1 tablet (75 mg total) by mouth 2 (two) times a day     Dispense:  20 tablet     Refill:  0    lidocaine (XYLOCAINE) 1 % injection 1 mL    methylPREDNISolone acetate (DEPO-MEDROL) injection 40 mg       History of Present Illness    Yuki Donaldson is a 55 y o  male presents to [place] [address] [facility] for initial evaluation regarding neck pain with radiating symptoms into the right shoulder 2 5 weeks duration  Patient reports a non work related injury on August 3, 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  Today patient reports moderate pain rated 7/10 and interfering with daily activities  Pain is constant 100% of the time that is present throughout the day and night  Describes symptoms of burning, sharp, throbbing pain  Also reports upper extremity weakness but denies falls  Does not use any durable medical equipment for ambulation  Symptoms are unchanged with position  He has had no significant relief with relative rest or medication management with over-the-counter NSAIDs including Tylenol, Motrin muscle relaxers methocarbamol or oral steroid Dosepaks    Presents today for initial evaluation  I have personally reviewed and/or updated the patient's past medical history, past surgical history, family history, social history, current medications, allergies, and vital signs today  Review of Systems   Musculoskeletal: Positive for joint swelling and neck pain  Psychiatric/Behavioral: Positive for decreased concentration  The patient is nervous/anxious          Patient Active Problem List   Diagnosis    Nonrheumatic aortic valve stenosis    Type 1 diabetes mellitus with retinopathy ([facility]    Essential hypertension    Mouth sores    Coronary artery disease involving native coronary artery of native heart without angina pectoris    Dyslipidemia    Aortic stenosis due to bicuspid aortic valve    History of ITP    History of coronary angioplasty with insertion of stent    Acute blood loss as cause of postoperative anemia    Post-operative nausea and vomiting    Acute postoperative pulmonary insufficiency (HCC)    Adhesive capsulitis of right shoulder    Muscle spasm    Muscle strain of right shoulder       Past Medical History:   Diagnosis Date    Aortic stenosis     Clavicle fracture     LEFT    Diabetes mellitus ([facility] [address] [facility]     Hyperlipidemia     Hypertension     Myocardial infarction ([facility] [address] [facility]     [facility]        Past Surgical History:   Procedure Laterality Date    ABDOMINAL SURGERY      HARVEST VEIN Left 9/2/2020    Procedure: HARVEST VEIN ENDOSCOPIC ([facility]) LEFT LEG;  Surgeon: Shadia Fan MD;  [facility];  Service: Cardiac Surgery    OK CABG, ARTERY-VEIN, THREE N/A 9/2/2020    Procedure: CORONARY ARTERY BYPASS GRAFT X 3 WITH SVG TO Right Posterior Lateral Branch, OM1 AND LIMA TO LAD ;  Surgeon: Shadia Fan MD;  [facility];  Service: Cardiac Surgery    OK RPLCMT AORTIC VALVE OPN W/STENTLESS TISSUE VALVE N/A 9/2/2020    Procedure: AORTIC VALVE REPLACEMENT WITH A 25MM SANTOYO INSPIRIS RESILIA  TISSUE AORTIC VALVE;  Surgeon: Edgar Chahal MD [place];  [facility];  Service: Cardiac Surgery    ROTATOR CUFF REPAIR      SPLENECTOMY      TONSILLECTOMY      VITRECTOMY Bilateral     BY ANTERIOR APPROACH        Family History   Problem Relation Age of Onset    Aneurysm Mother         CAREBRAL ARTERY     Coronary artery disease Mother     Diabetes Mother     Stroke Father        Social History     Occupational History    Occupation: MANAGER AT [employer]    Tobacco Use    Smoking status: Former Smoker     Years: 10 00     Types: Cigars     Quit date: 2020     Years since quittin 0    Smokeless tobacco: Never Used   Vaping Use    Vaping Use: Never used   Substance and Sexual Activity    Alcohol use: Not Currently     Alcohol/week: 0 0 standard drinks     Comment: none    Drug use: Yes     Frequency: 3 0 times per week     Types: Marijuana     Comment: none    Sexual activity: Not Currently     Partners: Female       Current Outpatient Medications on File Prior to Visit   Medication Sig    amoxicillin (AMOXIL) 500 mg capsule Prior dental    aspirin 325 mg tablet Take 1 tablet (325 mg total) by mouth daily    atorvastatin (LIPITOR) 80 mg tablet Take 1 tablet by mouth daily with dinner    busPIRone (BUSPAR) 15 mg tablet     Cholecalciferol (VITAMIN D) 2000 units CAPS TK 1 C PO QD    clotrimazole (LOTRIMIN) 1 % cream Apply topically 2 (two) times a day    hydrOXYzine HCL (ATARAX) 25 mg tablet TAKE 1 TO 2 TABLETS BY MOUTH EVERY 8 HOURS AS NEEDED FOR ANXIETY    ibuprofen (MOTRIN) 600 mg tablet Take 1 tablet (600 mg total) by mouth every 8 (eight) hours as needed for mild pain    Insulin Infusion Pump KIT 1 Units/hr by Subcutaneous Insulin Pump route continuous    Insulin Infusion Pump Supplies (MINIMED INFUSION SET-) MISC by Does not apply route    Insulin Infusion Pump Supplies (MINIMED RESERVOIR 3ML) MISC by Does not apply route    insulin lispro (HumaLOG) 100 units/mL injection Use up to 100 units per day via insulin pump    lisinopril (ZESTRIL) 5 mg tablet Take 1 tablet (5 mg total) by mouth daily    metoprolol tartrate (LOPRESSOR) 50 mg tablet Take 1 tablet (50 mg total) by mouth every 12 (twelve) hours    omeprazole (PriLOSEC) 20 mg delayed release capsule Take 20 mg by mouth daily    QUEtiapine (SEROquel) 25 mg tablet TAKE 1 TABLET BY MOUTH EVERY DAY EVERY NIGHT    acetaminophen (TYLENOL) 325 mg tablet Take 2 tablets (650 mg total) by mouth every 6 (six) hours as needed for mild pain or moderate pain    ascorbic acid (VITAMIN C) 500 MG tablet Take 1 tablet (500 mg total) by mouth daily    BugSense MICROLET LANCETS lancets by Other route 4 (four) times a day Use as instructed    diazepam (VALIUM) 5 mg tablet Take 1 tablet (5 mg total) by mouth every 8 (eight) hours as needed for anxiety for up to 10 days    ferrous sulfate 325 (65 Fe) mg tablet Take 1 tablet (325 mg total) by mouth daily with breakfast    glucagon 1 MG injection Inject 1 mg under the skin once as needed for low blood sugar for up to 1 dose    glucose blood (BugSense CONTOUR TEST) test strip 1 each by Other route 4 (four) times a day    INSULIN SYRINGE 1CC/29G 29G X 1/2" 1 ML MISC Inject as directed 3 (three) times a day with meals Use as directed    metaxalone (SKELAXIN) 800 mg tablet Take 1 tablet (800 mg total) by mouth 3 (three) times a day    methocarbamol (ROBAXIN) 750 mg tablet Take 750 mg by mouth 4 (four) times a day as needed    oxyCODONE-acetaminophen (PERCOCET) 5-325 mg per tablet Take 1 tablet by mouth every 4 (four) hours as needed for moderate pain (shoulder pain) for up to 10 daysMax Daily Amount: 6 tablets    potassium chloride (K-DUR,KLOR-CON) 20 mEq tablet Take 1 tablet (20 mEq total) by mouth 2 (two) times a day for 7 days, THEN 1 tablet (20 mEq total) daily for 7 days      sildenafil (VIAGRA) 50 MG tablet TAKE 1 TABLET BY MOUTH AS  NEEDED FOR ERECTILE  DYSFUNCTION    tadalafil (CIALIS) 20 MG tablet Take 20 mg by mouth    torsemide (DEMADEX) 20 mg tablet Take 1 tablet (20 mg total) by mouth 2 (two) times a day for 7 days, THEN 1 tablet (20 mg total) daily for 7 days   traMADol (ULTRAM) 50 mg tablet Take 50 mg by mouth every 6 (six) hours as needed    zolpidem (AMBIEN) 5 mg tablet Take 1 tablet by mouth daily at bedtime as needed for sleep for up to 2 days    [DISCONTINUED] predniSONE 10 mg tablet Take 5 tablets (50 mg total) by mouth daily for 2 days, THEN 4 tablets (40 mg total) daily for 2 days, THEN 3 tablets (30 mg total) daily for 2 days, THEN 2 tablets (20 mg total) daily for 2 days, THEN 1 tablet (10 mg total) daily for 2 days  No current facility-administered medications on file prior to visit  No Known Allergies    Physical Exam    /72   Pulse 57   Wt 73 kg (161 lb)   BMI 26 79 kg/m²     Constitutional: normal, well developed, well nourished, alert, in no distress and non-toxic and no overt pain behavior    Eyes: anicteric  HEENT: grossly intact  Neck: supple, symmetric, trachea midline and no masses   Pulmonary:even and unlabored  Cardiovascular:No edema or pitting edema present  Skin:Normal without rashes or lesions and well hydrated  Psychiatric:Mood and affect appropriate  Neurologic:Cranial Nerves II-XII grossly intact  Musculoskeletal:normal gait, tenderness to palpation right trapezius muscle, decreased active and passive range of motion with cervical flexion, extension, right side bending limited by pain, sensation grossly intact to light touch, MMT 5/5 bilateral upper extremities, DTRs within normal limits, Spurling test positive for radicular pain into the right shoulder    Imaging    RIGHT SHOULDER     INDICATION: Right shoulder pain      COMPARISON:  6/24/2021     VIEWS:  XR SHOULDER 2+ VW RIGHT  Images: 3     FINDINGS:     There is no acute fracture or dislocation      Mild osteoarthritis acromioclavicular joint      No lytic or blastic osseous lesion      Soft tissues are unremarkable      IMPRESSION:     No acute osseous abnormality      Degenerative changes as described

## 2021-08-26 ENCOUNTER — TELEPHONE (OUTPATIENT)
Dept: PAIN MEDICINE | Facility: CLINIC | Age: 56
End: 2021-08-26

## 2021-08-26 NOTE — TELEPHONE ENCOUNTER
First available is 10/5/21  Would you prefer we double book somewhere? Or is first available ok?   Please advise

## 2021-08-26 NOTE — TELEPHONE ENCOUNTER
pt called in stating that diclofenac (VOLTAREN) 75 mg EC tablet isnt working for pain- pain level is a 8/10 and hes only sleeping about 3 hrs a night   Thank you      cb [phone]

## 2021-08-26 NOTE — TELEPHONE ENCOUNTER
FYI:  S/W pt and advised of below  Advised to take 300mg nightly x 3 days then 2 times per day if tolerating  Advised can cause drowsiness and dizziness  Call if any issues with medication  Pt states TPI did not help much at all  States pain is in right trapezius and in right shoulder joint  States did see Ortho and was dx with frozen shoulder as well and muscle strain  Advised pt that Ortho did recommend PT and referral is in system  Advised it may be beneficial for pt to at least try PT  Pt states his work schedule makes it hard for him to get to PT, but he will call tomorrow and get set up for it  Pt asking when he should f/u with [facility]?

## 2021-08-27 NOTE — PROGRESS NOTES
PT Evaluation     Today's date: 2021  Patient name: Sara Miller  : 1965  MRN: [id number]  Referring provider: Spencer Miguel DO  Dx:   Encounter Diagnosis     ICD-10-CM    1  Right shoulder pain, unspecified chronicity  M25 511 Ambulatory referral to Physical Therapy   2  Adhesive capsulitis of right shoulder  M75 01 Ambulatory referral to Physical Therapy                  Assessment  Assessment details: Sara Miller is a 55 y o  male who presents with signs and symptoms consistent of right sided neck, scapula, and right posterior lateral shoulder pain  Pt has clinical signs and symptoms of right cervical radiculopathy C5/6  Positive c/s radiculopathy cluster includes decreased c/s rotation, +ULTT, + traction, +Spurlings  Pt also has absent biceps reflex and weakness in C5 myotome  Due to these impairments, Patient has difficulty performing desk working, using mouse, reaching shoulder over head, looking up with head, or rotating neck  Pt's pain severity and irritability is high limiting physical examination  Patient would benefit from skilled physical therapy to address the impairments, improve their level of function, and to improve their overall quality of life  Impairments: abnormal muscle firing, abnormal muscle tone, abnormal or restricted ROM, activity intolerance, impaired physical strength, lacks appropriate home exercise program, pain with function, scapular dyskinesis and poor body mechanics  Understanding of Dx/Px/POC: fair   Prognosis: fair    Goals  Short Term Goals: to be achieved by 4 weeks  1) Patient to be independent with basic HEP  2) Decrease pain to 4/10 at its worst   3) Increase cervical spine ROM by 5-10 degrees in all deficient planes  4) Increase UE strength by 1/2 MMT grade in all deficient planes  Long Term Goals: to be achieved by discharge  1) FOTO equal to or greater than expected  2) Patient to be independent with comprehensive HEP    3) Cervical spine ROM WNL all planes to improve a/iadls  4) Increase UE strength to 1 MMT grade in all planes to improve a/iadls  5) Lifting is improved to maximal level of function   6) Improve sleeping > 5 hours per night  Plan  Patient would benefit from: skilled physical therapy and PT eval  Planned therapy interventions: joint mobilization, manual therapy, neuromuscular re-education, patient education, stretching, therapeutic activities, therapeutic exercise, home exercise program, graded exercise and graded activity  Frequency: 2x per week for 4-6 weeks  Treatment plan discussed with: patient        Subjective Evaluation    History of Present Illness  Mechanism of injury: History of Current Injury: Pt attends PT with chief complaints of acute right shoulder pain that has been present over the past month  Pt was fixing a side walk with concrete and hung drywall with spackle and tape which caused his right shoulder pain  Pt went to ED 2x, PCP, Ortho, and pain management with multiple medications and minimal relief  Pt was recently prescribed gabapentin which provided some relief  Pt still only averaging a few hours of sleep  Pt notes an increase of anxiety and depression due to this injury  Pt denies any numbness/tingling but does report burning over the shoulder  Location: R UE pain into R scapula region and R posterior shoulder pain  Aggravating factors: moving mouse, brushing teeth, computer work  Easing factors: looking at feet and slouch  24 HR pattern: Movement dependent  Imaging: XR of shoulder: No osseous abnormality  Special Questions: Denies N&T, No chronic neck or back pain, Denies 5 Ds, N, Ataxia  Pt does feel weak with R UE vs L UE and he is R hand dominant  Patient goals:  Manage this problem     Hobbies/Interest: Coaching baseball, football, read, shooting (pistols), 4 grandchildren   Occupation: Works for [employer] ()   PMH: Aortic valve replacement in Sept of 2020 which resulted in Adhesive capsulitis  Pain  Current pain ratin  At worst pain rating: 10      Diagnostic Tests  X-ray: normal  Treatments  Previous treatment: medication (Ice/heat)        Objective     Neurological Testing     Sensation   Cervical/Thoracic   Left   Intact: light touch    Right   Diminished: light touch    Comments   Right light touch: C4 dermatome  Reflexes   Left   Biceps (C5/C6): normal (2+)  Brachioradialis (C6): normal (2+)  Triceps (C7): normal (2+)  Ruelas's reflex: negative    Right   Biceps (C5/C6): absent (0)  Brachioradialis (C6): normal (2+)  Triceps (C7): normal (2+)  Ruelas's reflex: negative    Active Range of Motion   Cervical/Thoracic Spine       Cervical    Flexion: 55 degrees   Extension: 22 degrees     with pain  Left lateral flexion: 40 degrees      Right lateral flexion: 35 degrees     with pain  Left rotation: 84 degrees  Right rotation: 64 degrees         Left Shoulder   Flexion: 170 degrees   Extension: 60 degrees   Abduction: 180 degrees   Internal rotation BTB: L1     Right Shoulder   Flexion: 150 degrees with pain  Extension: 40 degrees   Abduction: 155 degrees with pain  Internal rotation BTB: sacrum     Strength/Myotome Testing   Cervical Spine     Left   Interossei strength (t1): 5    Right   Interossei strength (t1): 5    Left Shoulder     Planes of Motion   Abduction: 5   External rotation at 0°: 5     Right Shoulder     Planes of Motion   Abduction: 4-   External rotation at 0°: 4-     Left Elbow   Flexion: 5  Extension: 5    Right Elbow   Flexion: 4-  Extension: 5    Left Wrist/Hand   Wrist extension: 5  Wrist flexion: 5  Thumb extension: 5    Right Wrist/Hand   Wrist extension: 5  Wrist flexion: 5  Thumb extension: 5    Additional Strength Details   Strength: 45# R/55# L    Tests   Cervical   Negative alar ligament test and Sharp-Janie test      Right   Positive Spurling's Test A and Spurling's Test B  Right Shoulder   Positive ULTT3     Negative ULTT1 and ULTT4  Additional Tests Details  + C/S Radic cluster:  *Decreased C/S rotation  *+Spurlings  *Relief with cervical traction  *Positive ULTT-R    +Shoulder hyper abduction sign (pain relief)         Flowsheet Rows      Most Recent Value   PT/OT G-Codes   Current Score  37   Projected Score  58             Precautions: Type I DM, CAD, Aortic valve replacement 9/2020, Depression, Anxiety      Manuals             C/S mobilizations                                                    Neuro Re-Ed             C/S retraction             R Radial nerve glide                                                                              Ther Ex             Check OA/AA mobility              UT Stretch             LS Stretch             Suboccip  stretch                                                                 Ther Activity                                       Gait Training                                       Modalities             Cervical traction Intermittent 10' 25#

## 2021-08-30 ENCOUNTER — EVALUATION (OUTPATIENT)
Dept: PHYSICAL THERAPY | Facility: CLINIC | Age: 56
End: 2021-08-30
Payer: COMMERCIAL

## 2021-08-30 DIAGNOSIS — M75.01 ADHESIVE CAPSULITIS OF RIGHT SHOULDER: ICD-10-CM

## 2021-08-30 DIAGNOSIS — M25.511 RIGHT SHOULDER PAIN, UNSPECIFIED CHRONICITY: Primary | ICD-10-CM

## 2021-08-30 PROCEDURE — 97163 PT EVAL HIGH COMPLEX 45 MIN: CPT | Performed by: PHYSICAL THERAPIST

## 2021-08-31 ENCOUNTER — TELEPHONE (OUTPATIENT)
Dept: PAIN MEDICINE | Facility: CLINIC | Age: 56
End: 2021-08-31

## 2021-08-31 NOTE — TELEPHONE ENCOUNTER
Patient   [phone]  Dr Zelda Garcia    Patient is calling in asking if he can an extra gabapentin per day? It is wearing off to fast for him   He thinks 3 a day will help better

## 2021-08-31 NOTE — TELEPHONE ENCOUNTER
S/W pt  Pt wants to take Gabapentin 300 mg 3x/day  He took it that way for the last 1-2 days and "been perfect "  Advised pt be can not increase his medications on his own  Pt stated he is up 18 hrs a day and the medication wears off after 6-7 hrs  Please advise

## 2021-09-02 ENCOUNTER — OFFICE VISIT (OUTPATIENT)
Dept: PHYSICAL THERAPY | Facility: CLINIC | Age: 56
End: 2021-09-02
Payer: COMMERCIAL

## 2021-09-02 DIAGNOSIS — M25.511 RIGHT SHOULDER PAIN, UNSPECIFIED CHRONICITY: Primary | ICD-10-CM

## 2021-09-02 DIAGNOSIS — M75.01 ADHESIVE CAPSULITIS OF RIGHT SHOULDER: ICD-10-CM

## 2021-09-02 PROCEDURE — 97140 MANUAL THERAPY 1/> REGIONS: CPT | Performed by: PHYSICAL THERAPIST

## 2021-09-02 PROCEDURE — 97110 THERAPEUTIC EXERCISES: CPT | Performed by: PHYSICAL THERAPIST

## 2021-09-02 PROCEDURE — 97012 MECHANICAL TRACTION THERAPY: CPT | Performed by: PHYSICAL THERAPIST

## 2021-09-02 NOTE — PROGRESS NOTES
Daily Note     Today's date: 2021  Patient name: Lou Henriquez  : 1965  MRN: [id number]  Referring provider: Beatrice Mcintosh DO  Dx:   Encounter Diagnosis     ICD-10-CM    1  Right shoulder pain, unspecified chronicity  M25 511    2  Adhesive capsulitis of right shoulder  M75 01        Start Time:   Stop Time: 1800  Total time in clinic (min): 45 minutes    Subjective: Pt reports feeling more shoulder pain than neck pain today  Most symptoms located at posterolateral shoulder  He notes less pain after last session  Objective: See treatment diary below  Shoulder ER at 90 and shoulder abduction: 4-/5 R vs L     Assessment: Address both cervical spine and right shoulder today  Weakness still evident at C5 NR and rotator cuff  Performed light cervical mobility exercises along with nerve glides  Tolerated treatment well  Finished treatment in cervical intermittent traction as this was fairly effective after last session  Patient would benefit from continued PT  Plan: Continue per plan of care        Precautions: Type I DM, CAD, Aortic valve replacement 2020, Depression, Anxiety      Manuals             C/S mobilizations  upslips/downsliples Gr II+III           Radial Nerve glides             R shoulder PROM  5' with [facility] joint moibs           R scap mobs  3'           Neuro Re-Ed             C/S retraction  10x5"           R Radial nerve glide  15x           TB No money             TB W's                                                    Ther Ex             Check OA/AA mobility              UT Stretch             LS Stretch             Suboccip  stretch             Pulleys             UBE  2'/2'           C/S rot OP  C/ pillowcase 10x5" R           C/S extension OP  C/ pillowcase 10x5"            Ther Activity                                       Gait Training                                       Modalities             Cervical traction Intermittent 10' 25# Intermittent 15' 20-25# 1:1 with PT from 510-6PM

## 2021-09-07 ENCOUNTER — TELEPHONE (OUTPATIENT)
Dept: PAIN MEDICINE | Facility: CLINIC | Age: 56
End: 2021-09-07

## 2021-09-07 ENCOUNTER — OFFICE VISIT (OUTPATIENT)
Dept: PHYSICAL THERAPY | Facility: CLINIC | Age: 56
End: 2021-09-07
Payer: COMMERCIAL

## 2021-09-07 ENCOUNTER — OFFICE VISIT (OUTPATIENT)
Dept: PAIN MEDICINE | Facility: CLINIC | Age: 56
End: 2021-09-07
Payer: COMMERCIAL

## 2021-09-07 VITALS
BODY MASS INDEX: 26.79 KG/M2 | WEIGHT: 161 LBS | SYSTOLIC BLOOD PRESSURE: 124 MMHG | DIASTOLIC BLOOD PRESSURE: 78 MMHG | HEART RATE: 62 BPM

## 2021-09-07 DIAGNOSIS — G89.29 CHRONIC RIGHT SHOULDER PAIN: ICD-10-CM

## 2021-09-07 DIAGNOSIS — M25.511 RIGHT SHOULDER PAIN, UNSPECIFIED CHRONICITY: Primary | ICD-10-CM

## 2021-09-07 DIAGNOSIS — M25.511 CHRONIC RIGHT SHOULDER PAIN: ICD-10-CM

## 2021-09-07 DIAGNOSIS — M62.838 MUSCLE SPASM: ICD-10-CM

## 2021-09-07 DIAGNOSIS — M75.01 ADHESIVE CAPSULITIS OF RIGHT SHOULDER: ICD-10-CM

## 2021-09-07 DIAGNOSIS — M50.120 CERVICAL DISC DISORDER WITH RADICULOPATHY OF MID-CERVICAL REGION: Primary | ICD-10-CM

## 2021-09-07 PROCEDURE — 97110 THERAPEUTIC EXERCISES: CPT

## 2021-09-07 PROCEDURE — 97012 MECHANICAL TRACTION THERAPY: CPT

## 2021-09-07 PROCEDURE — 99214 OFFICE O/P EST MOD 30 MIN: CPT | Performed by: PHYSICAL MEDICINE & REHABILITATION

## 2021-09-07 PROCEDURE — 97140 MANUAL THERAPY 1/> REGIONS: CPT

## 2021-09-07 RX ORDER — TIZANIDINE 4 MG/1
4 TABLET ORAL EVERY 8 HOURS PRN
Qty: 90 TABLET | Refills: 1 | Status: SHIPPED | OUTPATIENT
Start: 2021-09-07 | End: 2021-09-10 | Stop reason: SDUPTHER

## 2021-09-07 NOTE — PATIENT INSTRUCTIONS
Cervical Radiculopathy   WHAT YOU NEED TO KNOW:   What is cervical radiculopathy? Cervical radiculopathy is a painful condition that happens when a spinal nerve in your neck is pinched or irritated  What causes cervical radiculopathy? Changes in the vertebrae (bones) and discs in your neck can put pressure on a spinal nerve  Discs are natural, spongy cushions between your vertebrae that allow your spine to move  The following can cause a pinched nerve:  · Disc damage  may occur if a disc flattens, bulges, or moves over time  An injury can also cause disc damage  · Cervical spondylosis  is when the vertebrae in your neck break down  This normally occurs as you age  · Growths , such as tumors or cysts (fluid-filled lumps), may grow and press on the nerve  What are the signs and symptoms of cervical radiculopathy? The most common symptom is sharp pain that travels from your neck all the way down your arm  You may have pain in your shoulder, chest, and hand  The pain may get worse with movement or when you cough or sneeze  You may also have any of the following:  · Burning or tingling sensations in your neck or arm     · Numbness or weakness in your arm or hand that makes it hard for you to  objects    · Headaches    How is cervical radiculopathy diagnosed? Your healthcare provider will ask when and how your symptoms began  He will gently press on your neck to check for tenderness and areas that are not shaped correctly  He will also check your arms and hands for numbness or weakness  You may have any of the following:  · Provocative tests  are done to check your response to certain movements  He will ask you to move your neck, shoulder, and arm in different ways  Some movements will increase your symptoms, while others will make you feel better  · An x-ray  is a picture of the bones and tissues in your neck  · An MRI or a CT scan  may be used to take pictures of your neck   The pictures can show problems and changes in your nerves, discs, and vertebrae  You may be given dye to help the pictures show up better  Tell the healthcare provider if you have ever had an allergic reaction to contrast dye  Do not enter the MRI room with anything metal  Metal can cause serious injury  Tell the healthcare provider if you have any metal in or on your body  · An electromyography  is also called an EMG  An EMG is done to test the function of your muscles and the nerves that control them  Electrodes (wires) are placed on the muscle being tested  Needles may be attached to the electrodes and placed in your skin  The electrical activity of your muscles and nerves is measured by a machine attached to the electrodes  Your muscles are tested at rest and during movement  How is cervical radiculopathy treated? · NSAIDs  decrease swelling and pain  This medicine can be bought without a doctor's order  This medicine can cause stomach bleeding or kidney problems in certain people  If you take blood thinner medicine, always ask your healthcare provider if NSAIDs are safe for you  Always read the medicine label and follow the directions on it before using this medicine  · Prescription pain medicine  may be given to decrease pain  Do not wait until the pain is severe before you take this medicine  · Steroids  help decrease pain and swelling  These may be given as a pill or as an injection in your neck  You may need more than 1 injection if your symptoms do not improve after the first treatment  · Physical therapy  helps stretch and strengthen your muscles  Your physical therapist can teach you how to improve your posture and the way you hold your neck  He may also teach you how to be safely active and avoid further injury  He can also help you develop an exercise program that is safe for your back and neck       · Surgery  may be used to treat a pinched nerve if other treatments have not helped after 6 to 12 weeks  How can I manage my symptoms? · Ice  helps decrease swelling and pain  Ice may also help prevent tissue damage  Use an ice pack, or put crushed ice in a plastic bag  Cover it with a towel and place it on your neck for 15 to 20 minutes every hour or as directed  · Rest  when you feel it is needed  Slowly start to do more each day  Return to your daily activities as directed  · Wear a soft collar  You may be given a soft collar to support your neck while you sleep  Wear the soft collar only as directed  · Do light stretches and regular exercise  Your healthcare provider may suggest light stretches to help decrease stiffness in your neck and arm as you recover  After your pain is controlled, you may benefit from regular exercise  Ask what type of exercise is safe for your back and neck  · Review your work area  A comfortable work area can help prevent neck strain  Ask your employer for an ergonomic review to check the position of your desk, chair, phone, and computer  Make any necessary adjustments for your comfort  When should I contact my healthcare provider? · You are losing weight without trying  · Your pain is worse, even with medicine  · One or both hands feel more numb than before, or you cannot move your fingers well  · You have questions or concerns about your condition or care  CARE AGREEMENT:   You have the right to help plan your care  Learn about your health condition and how it may be treated  Discuss treatment options with your healthcare providers to decide what care you want to receive  You always have the right to refuse treatment  The above information is an  only  It is not intended as medical advice for individual conditions or treatments  Talk to your doctor, nurse or pharmacist before following any medical regimen to see if it is safe and effective for you    © Copyright [employer] 2021 Information is for End User's use only and may not be sold, redistributed or otherwise used for commercial purposes   All illustrations and images included in CareNotes® are the copyrighted property of A D A M , Inc  or [facility] Ana Neville

## 2021-09-07 NOTE — TELEPHONE ENCOUNTER
GURDEEP pt seen today  S/W pt who states the soonest MRI appt he could get was 9/17 but in [address] [facility] which pt states is pretty far for him living in [place], and given his neck pain it is hard to drive for long periods  Advised pt to call CS later today and daily to see if there are cancellations at [facility] as MRI's are cancelled due to insurance reasons, etc   Pt has not yet tried Tizanidine, advised pt to do so and CB if needed

## 2021-09-07 NOTE — TELEPHONE ENCOUNTER
Patient states he's not able to get his MRI study done until a month & adds he's in a lot of pain  He would like to know what he can do for his pain until he's able to get his study   Please advise, carline     Call back# [phone]

## 2021-09-07 NOTE — PROGRESS NOTES
Daily Note     Today's date: 2021  Patient name: Bo Mccall  : 1965  MRN: [id number]  Referring provider: Baldo Hoffman DO  Dx:   Encounter Diagnosis     ICD-10-CM    1  Right shoulder pain, unspecified chronicity  M25 511    2  Adhesive capsulitis of right shoulder  M75 01                   Subjective: Patient reports R shoulder has been pretty sore since last session- rates R shoulder pain as 3/10 upon entering clinic  Patient states R shoulder pain is much worse at night and jumps to a 8/10  Patient states the R shoulder pain wakes him up most nights and it is very difficult to find a comfortable position to sleep  Patient reports neck has been feeling a bit better and denies significant neck pain at beginning of session  Objective: See treatment diary below      Assessment: Patient tolerated manual PROM/stretching to C/S and R shoulder well  Patient deferred cervical extension stretch with pillowcase due to increased R shoulder pain when raising it overhead  Patient noted R shoulder muscle fatigue following cervical retractions  Patient would benefit from continued PT to increase cervical and R shoulder strength and ROM for improved function in ADLs  Plan: Continue per plan of care        Precautions: Type I DM, CAD, Aortic valve replacement 2020, Depression, Anxiety      Manuals            C/S mobilizations  upslips/downsliples Gr II+III C PROM 10'          Radial Nerve glides             R shoulder PROM  5' with [address] joint moibs 5'          R scap mobs  3'           Neuro Re-Ed             C/S retraction  10x5" 10x5"          R Radial nerve glide  15x 15x          TB No money             TB W's                                                    Ther Ex             Check OA/AA mobility              UT Stretch             LS Stretch             Suboccip  stretch             Pulleys             UBE  2'/2' 2'/2'          C/S rot OP  C/ pillowcase 10x5" R C/ pillowcase 10x5" R C/S extension OP  C/ pillowcase 10x5"  Def d/t pain          Ther Activity                                       Gait Training                                       Modalities             Cervical traction Intermittent 10' 25# Intermittent 15' 20-25# Intermittent 15' 20-25#

## 2021-09-07 NOTE — PROGRESS NOTES
Assessment:  1  Cervical disc disorder with radiculopathy of mid-cervical region    2  Muscle spasm    3  Chronic right shoulder pain        Plan:  Mr Jennifer Starkey is a pleasant 60-year-old male who presents for follow-up and re-evaluation regarding neck pain with radiating symptoms into the right shoulder with which we performed in office trigger point injections into the right trap as well as started him on gabapentin 300 mg 3 times a day which is provided approximately 33% relief in his pain  However, he continues to report isolated posterior right shoulder pain as well as radicular symptoms into the right arm  He has already completed greater than 1 month of physical therapy with minimal improvements  At this time we will   1  Order cervical MRI without contrast to better identify disc and spine pathology contributing to his ongoing radicular pain  2  Will plan for a ultrasound-guided right glenohumeral joint injection  3  Will start on Zanaflex 4 mg every 8 hours as needed for muscle spasms and stop methocarbamol  4  Complete risks and benefits including bleeding, infection, tissue reaction, nerve injury and allergic reaction were discussed  The approach was demonstrated using models and literature was provided  Verbal and written consent was obtained  My impressions and treatment recommendations were discussed in detail with the patient who verbalized understanding and had no further questions  Discharge instructions were provided  I personally saw and examined the patient and I agree with the above discussed plan of care  Orders Placed This Encounter   Procedures    MRI cervical spine without contrast     Standing Status:   Future     Standing Expiration Date:   9/7/2025     Scheduling Instructions: There is no preparation for this test  Please leave your jewelry and valuables at home, wedding rings are the exception   Magnetic nail polish must be removed prior to arrival for your test  Please bring your insurance cards, a form of photo ID and a list of your medications with you  Arrive 15 minutes prior to your appointment time in order to register  Please bring any prior CT or MRI studies of this area that were not performed at a [facility]  To schedule this appointment, please contact Central Scheduling at [phone]  Prior to your appointment, please make sure you complete the MRI Screening Form when you e-Check in for your appointment  This will be available starting 7 days before your appointment in [address]  You may receive an e-mail with an activation code if you do not have a [employer] account  If you do not have access to a device, we will complete your screening at your appointment  Order Specific Question:   What is the patient's sedation requirement? Answer:   No Sedation     Order Specific Question:   Release to patient through [employer]     Answer:   Immediate     Order Specific Question:   Is order priority selected as STAT? Answer:   No     Order Specific Question:   Reason for Exam (FREE TEXT)     Answer:   cervical radiculopathy    US guidance     Right glenohumeral joint injection USGI     Standing Status:   Future     Standing Expiration Date:   9/7/2025     Scheduling Instructions:      No prep required  Please bring your insurance cards, a form of photo ID and a list of your medications with you  Arrive 15 minutes prior to your appointment time in order to register  To schedule this appointment, please contact Central Scheduling at [phone]  Order Specific Question:   What is the patient's sedation requirement? Answer:   No Sedation     New Medications Ordered This Visit   Medications    tiZANidine (Zanaflex) 4 mg tablet     Sig: Take 1 tablet (4 mg total) by mouth every 8 (eight) hours as needed for muscle spasms     Dispense:  90 tablet     Refill:  1       History of Present Illness:  Courtney Joshi is a 55 y  o  male who presents for a follow up office visit in regards to Shoulder Pain (right side) and Neck Pain  The patients current symptoms include neck pain with radiating symptoms into the right upper extremity currently rated 6/10 and interfering with daily activities  Pain is described as a constant burning, sharp, pins and needle sensation that is worse in the morning  Presents today for follow-up and re-evaluation  I have personally reviewed and/or updated the patient's past medical history, past surgical history, family history, social history, current medications, allergies, and vital signs today  Review of Systems   Respiratory: Negative for shortness of breath  Cardiovascular: Negative for chest pain  Gastrointestinal: Negative for constipation, diarrhea, nausea and vomiting  Musculoskeletal: Negative for arthralgias, gait problem, joint swelling and myalgias  Skin: Negative for rash  Neurological: Negative for dizziness, seizures and weakness  All other systems reviewed and are negative        Patient Active Problem List   Diagnosis    Nonrheumatic aortic valve stenosis    Type 1 diabetes mellitus with retinopathy ([facility] [address] [facility]    Essential hypertension    Mouth sores    Coronary artery disease involving native coronary artery of native heart without angina pectoris    Dyslipidemia    Aortic stenosis due to bicuspid aortic valve    History of ITP    History of coronary angioplasty with insertion of stent    Acute blood loss as cause of postoperative anemia    Post-operative nausea and vomiting    Acute postoperative pulmonary insufficiency (HCC)    Adhesive capsulitis of right shoulder    Muscle spasm    Muscle strain of right shoulder       Past Medical History:   Diagnosis Date    Aortic stenosis     Clavicle fracture     LEFT    Diabetes mellitus ([address] )     Hyperlipidemia     Hypertension     Myocardial infarction ([address] )     Thrombocytopenic purpura ([facility] [address] )        Past Surgical History:   Procedure Laterality Date    ABDOMINAL SURGERY      HARVEST VEIN Left 2020    Procedure: HARVEST VEIN ENDOSCOPIC ([facility]) LEFT LEG;  Surgeon: Jason Alvarenga MD;  Location: [facility];  Service: Cardiac Surgery    LA CABG, ARTERY-VEIN, THREE N/A 2020    Procedure: CORONARY ARTERY BYPASS GRAFT X 3 WITH SVG TO Right Posterior Lateral Branch, OM1 AND LIMA TO LAD ;  Surgeon: Jason Alvarenga MD;  [facility];  Service: Cardiac Surgery    LA RPLCMT AORTIC VALVE OPN W/STENTLESS TISSUE VALVE N/A 2020    Procedure: AORTIC VALVE REPLACEMENT WITH A 25MM SANTOYO INSPIRIS RESILIA  TISSUE AORTIC VALVE;  Surgeon: Jason Alvarenga MD;  [facility];  Service: Cardiac Surgery    ROTATOR CUFF REPAIR      SPLENECTOMY      TONSILLECTOMY      VITRECTOMY Bilateral     BY ANTERIOR APPROACH        Family History   Problem Relation Age of Onset    Aneurysm Mother         CAREBRAL ARTERY     Coronary artery disease Mother     Diabetes Mother     Stroke Father        Social History     Occupational History    Occupation: MANAGER AT [employer]    Tobacco Use    Smoking status: Former Smoker     Years: 10 00     Types: Cigars     Quit date: 2020     Years since quittin 1    Smokeless tobacco: Never Used   Vaping Use    Vaping Use: Never used   Substance and Sexual Activity    Alcohol use: Not Currently     Alcohol/week: 0 0 standard drinks     Comment: none    Drug use: Yes     Frequency: 3 0 times per week     Types: Marijuana     Comment: none    Sexual activity: Not Currently     Partners: Female       Current Outpatient Medications on File Prior to Visit   Medication Sig    acetaminophen (TYLENOL) 325 mg tablet Take 2 tablets (650 mg total) by mouth every 6 (six) hours as needed for mild pain or moderate pain    amoxicillin (AMOXIL) 500 mg capsule Prior dental    aspirin 325 mg tablet Take 1 tablet (325 mg total) by mouth daily    atorvastatin (LIPITOR) 80 mg tablet Take 1 tablet by mouth daily with dinner    NEL MICROLET LANCETS lancets by Other route 4 (four) times a day Use as instructed    busPIRone (BUSPAR) 15 mg tablet     Cholecalciferol (VITAMIN D) 2000 units CAPS TK 1 C PO QD    clotrimazole (LOTRIMIN) 1 % cream Apply topically 2 (two) times a day    diclofenac (VOLTAREN) 75 mg EC tablet Take 1 tablet (75 mg total) by mouth 2 (two) times a day    glucose blood (NEL CONTOUR TEST) test strip 1 each by Other route 4 (four) times a day    hydrOXYzine HCL (ATARAX) 25 mg tablet TAKE 1 TO 2 TABLETS BY MOUTH EVERY 8 HOURS AS NEEDED FOR ANXIETY    Insulin Infusion Pump KIT 1 Units/hr by Subcutaneous Insulin Pump route continuous    Insulin Infusion Pump Supplies (MINIMED INFUSION SET-) MISC by Does not apply route    Insulin Infusion Pump Supplies (MINIMED RESERVOIR 3ML) MISC by Does not apply route    insulin lispro (HumaLOG) 100 units/mL injection Use up to 100 units per day via insulin pump    INSULIN SYRINGE 1CC/29G 29G X 1/2" 1 ML MISC Inject as directed 3 (three) times a day with meals Use as directed    lisinopril (ZESTRIL) 5 mg tablet Take 1 tablet (5 mg total) by mouth daily    metaxalone (SKELAXIN) 800 mg tablet Take 1 tablet (800 mg total) by mouth 3 (three) times a day    metoprolol tartrate (LOPRESSOR) 50 mg tablet Take 1 tablet (50 mg total) by mouth every 12 (twelve) hours    omeprazole (PriLOSEC) 20 mg delayed release capsule Take 20 mg by mouth daily    QUEtiapine (SEROquel) 25 mg tablet TAKE 1 TABLET BY MOUTH EVERY DAY EVERY NIGHT    sildenafil (VIAGRA) 50 MG tablet TAKE 1 TABLET BY MOUTH AS  NEEDED FOR ERECTILE  DYSFUNCTION    tadalafil (CIALIS) 20 MG tablet Take 20 mg by mouth    traMADol (ULTRAM) 50 mg tablet Take 50 mg by mouth every 6 (six) hours as needed    ascorbic acid (VITAMIN C) 500 MG tablet Take 1 tablet (500 mg total) by mouth daily    diazepam (VALIUM) 5 mg tablet Take 1 tablet (5 mg total) by mouth every 8 (eight) hours as needed for anxiety for up to 10 days    ferrous sulfate 325 (65 Fe) mg tablet Take 1 tablet (325 mg total) by mouth daily with breakfast    gabapentin (NEURONTIN) 300 mg capsule Take 1 capsule (300 mg total) by mouth 3 (three) times a day    glucagon 1 MG injection Inject 1 mg under the skin once as needed for low blood sugar for up to 1 dose    ibuprofen (MOTRIN) 600 mg tablet Take 1 tablet (600 mg total) by mouth every 8 (eight) hours as needed for mild pain    potassium chloride (K-DUR,KLOR-CON) 20 mEq tablet Take 1 tablet (20 mEq total) by mouth 2 (two) times a day for 7 days, THEN 1 tablet (20 mEq total) daily for 7 days   torsemide (DEMADEX) 20 mg tablet Take 1 tablet (20 mg total) by mouth 2 (two) times a day for 7 days, THEN 1 tablet (20 mg total) daily for 7 days   zolpidem (AMBIEN) 5 mg tablet Take 1 tablet by mouth daily at bedtime as needed for sleep for up to 2 days    [DISCONTINUED] methocarbamol (ROBAXIN) 750 mg tablet Take 750 mg by mouth 4 (four) times a day as needed     No current facility-administered medications on file prior to visit  No Known Allergies    Physical Exam:    /78   Pulse 62   Wt 73 kg (161 lb)   BMI 26 79 kg/m²     Constitutional:normal, well developed, well nourished, alert, in no distress and non-toxic and no overt pain behavior    Eyes:anicteric  HEENT:grossly intact  Neck:supple, symmetric, trachea midline and no masses   Pulmonary:even and unlabored  Cardiovascular:No edema or pitting edema present  Skin:Normal without rashes or lesions and well hydrated  Psychiatric:Mood and affect appropriate  Neurologic:Cranial Nerves II-XII grossly intact  Musculoskeletal:normal    Imaging

## 2021-09-09 ENCOUNTER — OFFICE VISIT (OUTPATIENT)
Dept: PHYSICAL THERAPY | Facility: CLINIC | Age: 56
End: 2021-09-09
Payer: COMMERCIAL

## 2021-09-09 DIAGNOSIS — M25.511 RIGHT SHOULDER PAIN, UNSPECIFIED CHRONICITY: Primary | ICD-10-CM

## 2021-09-09 DIAGNOSIS — M75.01 ADHESIVE CAPSULITIS OF RIGHT SHOULDER: ICD-10-CM

## 2021-09-09 PROCEDURE — 97140 MANUAL THERAPY 1/> REGIONS: CPT | Performed by: PHYSICAL THERAPIST

## 2021-09-09 PROCEDURE — 97110 THERAPEUTIC EXERCISES: CPT | Performed by: PHYSICAL THERAPIST

## 2021-09-09 PROCEDURE — 97012 MECHANICAL TRACTION THERAPY: CPT | Performed by: PHYSICAL THERAPIST

## 2021-09-09 NOTE — PROGRESS NOTES
Daily Note     Today's date: 2021  Patient name: Bo Mccall  : 1965  MRN: [id number]  Referring provider: Baldo Hoffman DO  Dx:   Encounter Diagnosis     ICD-10-CM    1  Right shoulder pain, unspecified chronicity  M25 511    2  Adhesive capsulitis of right shoulder  M75 01                   Subjective: Pt c/o burning in right shoulder  Neck is gradually improving  Pt will be getting an MRI on the cervical spine  Objective: See treatment diary below  Cervical    Flexion: 65 degrees   Extension: 40 degrees     with pain  Left lateral flexion: 40 degrees      Right lateral flexion: 35 degrees     with pain  Left rotation: 80 degrees  Right rotation: 60 degrees       Positive CFRT to the R compared to L (limited)     Assessment: Cervical spine motion is still restricted with rotation to the R  Positive CFRT to the right with hypomobile AA joint  Right shoulder PROM nearly full in all planes  No radicular symptoms present with cervical MT  Symptom irritability and severity is improving with less intense radicular symptoms  Pt still experiences intermittent paresthesia's in RUQ  Tolerated treatment well  Patient would benefit from continued PT  Plan: Continue per plan of care        Precautions: Type I DM, CAD, Aortic valve replacement 2020, Depression, Anxiety      Manuals           C/S mobilizations  upslips/downsliples Gr II+III C PROM 10' Rotational glides Gr III         Radial Nerve glides             R shoulder PROM  5' with [address] joint mobs 5' 5'         R scap mobs  3'  3'         Neuro Re-Ed             C/S retraction  10x5" 10x5" 20x5"         R Radial nerve glide  15x 15x          TB No money             TB W's                                                    Ther Ex             Check OA/AA mobility     hypomobile AA to the R         UT Stretch             LS Stretch             Suboccip  stretch             Pulleys             UBE  2'/2' 2'/2' 3'/3'         C/S rot OP C/ pillowcase 10x5" R C/ pillowcase 10x5" R C/ pillowcase 10x5" R         C/S extension OP  C/ pillowcase 10x5"  Def d/t pain C/ pillowcase 10x5"          Ther Activity                                       Gait Training                                       Modalities             Cervical traction Intermittent 10' 25# Intermittent 15' 20-25# Intermittent 15' 20-25# Intermittent 15' 25-30#                      1:1 with PT from [phone]

## 2021-09-10 ENCOUNTER — TELEPHONE (OUTPATIENT)
Dept: PAIN MEDICINE | Facility: CLINIC | Age: 56
End: 2021-09-10

## 2021-09-10 NOTE — TELEPHONE ENCOUNTER
Pt called in asking that [address] resent the script for  tiZANidine (Zanaflex) 4 mg tablet to [facility]- [facility]  is [facility] at this time      [phone]
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
## 2021-09-14 ENCOUNTER — OFFICE VISIT (OUTPATIENT)
Dept: PHYSICAL THERAPY | Facility: CLINIC | Age: 56
End: 2021-09-14
Payer: COMMERCIAL

## 2021-09-14 DIAGNOSIS — M75.01 ADHESIVE CAPSULITIS OF RIGHT SHOULDER: ICD-10-CM

## 2021-09-14 DIAGNOSIS — M25.511 RIGHT SHOULDER PAIN, UNSPECIFIED CHRONICITY: Primary | ICD-10-CM

## 2021-09-14 PROCEDURE — 97140 MANUAL THERAPY 1/> REGIONS: CPT | Performed by: PHYSICAL THERAPIST

## 2021-09-14 PROCEDURE — 97110 THERAPEUTIC EXERCISES: CPT | Performed by: PHYSICAL THERAPIST

## 2021-09-14 PROCEDURE — 97112 NEUROMUSCULAR REEDUCATION: CPT | Performed by: PHYSICAL THERAPIST

## 2021-09-14 NOTE — PROGRESS NOTES
Daily Note     Today's date: 2021  Patient name: Qing Patterson  : 1965  MRN: [id number]  Referring provider: Nghia Shaw DO  Dx:   Encounter Diagnosis     ICD-10-CM    1  Right shoulder pain, unspecified chronicity  M25 511    2  Adhesive capsulitis of right shoulder  M75 01                   Subjective: Pt reports continued burning in R shoulder  He reports improved motion and reduction in pain intensity with PT intervention  He will be getting an MRI on Friday  Objective: See treatment diary below      Assessment: Joint mobility and ROM at neck improving  Pt was fairly tender to palpation to R anterolateral shoulder today  Addressed AA mobility deficit to the right today  Initiated scapular and shoulder strengthening with good tolerance  Patient demonstrated fatigue post treatment and would benefit from continued PT  Updated HEP and emailed to patient  Plan: Continue per plan of care        Precautions: Type I DM, CAD, Aortic valve replacement 2020, Depression, Anxiety      Manuals          C/S mobilizations  upslips/downsliples Gr II+III C PROM 10' Rotational glides Gr III Rotational glides Gr III        Radial Nerve glides             R shoulder PROM  5' with [facility] joint mobs 5' 5' 3'        R scap mobs  3'  3' 3'        AA mobilizations to the R     3'        Neuro Re-Ed             C/S retraction  10x5" 10x5" 20x5" 20x5        R Radial nerve glide  15x 15x          TB No money     20x ytb        TB W's             Supine shoulder horizontal abduction     20x        Shoulder ER     ytb 2x10         TB rows     30x        Ther Ex             Check OA/AA mobility     hypomobile AA to the R         UT Stretch             LS Stretch             Suboccip  stretch             Pulleys     4'        UBE  2'/2' 2'/2' 3'/3' 2'/2'        C/S rot OP  C/ pillowcase 10x5" R C/ pillowcase 10x5" R C/ pillowcase 10x5" R         C/S extension OP  C/ pillowcase 10x5"  Def d/t pain C/ pillowcase 10x5"          Ther Activity                                       Gait Training                                       Modalities             Cervical traction Intermittent 10' 25# Intermittent 15' 20-25# Intermittent 15' 20-25# Intermittent 15' 25-30#                      1:1 with PT from [phone]  Visit    Wevod    Access Code: [email or web address]

## 2021-09-16 ENCOUNTER — APPOINTMENT (OUTPATIENT)
Dept: PHYSICAL THERAPY | Facility: CLINIC | Age: 56
End: 2021-09-16
Payer: COMMERCIAL

## 2021-09-17 ENCOUNTER — HOSPITAL ENCOUNTER (OUTPATIENT)
Dept: MRI IMAGING | Facility: HOSPITAL | Age: 56
Discharge: HOME | End: 2021-09-17
Attending: PHYSICAL MEDICINE & REHABILITATION
Payer: COMMERCIAL

## 2021-09-17 DIAGNOSIS — G89.29 CHRONIC RIGHT SHOULDER PAIN: ICD-10-CM

## 2021-09-17 DIAGNOSIS — M25.511 CHRONIC RIGHT SHOULDER PAIN: ICD-10-CM

## 2021-09-17 DIAGNOSIS — M62.838 MUSCLE SPASM: ICD-10-CM

## 2021-09-17 DIAGNOSIS — M50.120 CERVICAL DISC DISORDER WITH RADICULOPATHY OF MID-CERVICAL REGION: ICD-10-CM

## 2021-09-17 PROCEDURE — G1004 CDSM NDSC: HCPCS

## 2021-09-17 PROCEDURE — 72141 MRI NECK SPINE W/O DYE: CPT

## 2021-09-21 ENCOUNTER — TELEPHONE (OUTPATIENT)
Dept: RADIOLOGY | Facility: CLINIC | Age: 56
End: 2021-09-21

## 2021-09-21 ENCOUNTER — APPOINTMENT (OUTPATIENT)
Dept: PHYSICAL THERAPY | Facility: CLINIC | Age: 56
End: 2021-09-21
Payer: COMMERCIAL

## 2021-09-21 NOTE — TELEPHONE ENCOUNTER
S/W pt and advised of below results and plan  Pt states is scheduled for right Glenohumeral injection with Dr Arnaud Oseguera on 10/5 but is interested in moving forward with MARLINE  Pt is taking Diclofenac and Aspirin 325mg  There is also an order for Ibuprofen    Please schedule

## 2021-09-21 NOTE — TELEPHONE ENCOUNTER
----- Message from Jacqueline Olivera DO sent at 9/21/2021 10:22 AM EDT -----  Clinical: Please notify patient of MRI cervical spine results demonstrating multilevel central stenosis most notable at C4-C5 and C6-C7 with foraminal narrowing at C6-C7 all of which may be contributing to his radicular symptoms in the neck into the arm    He would likely benefit from a cervical epidural steroid injection under fluoro guidance Yolette: if patient is interested and amenable please schedule Thank you  ----- Message -----  From: Ana, Radiology Results In  Sent: 9/21/2021  10:03 AM EDT  To: Jaqcueline Olivera DO

## 2021-09-22 ENCOUNTER — TELEPHONE (OUTPATIENT)
Dept: PAIN MEDICINE | Facility: CLINIC | Age: 56
End: 2021-09-22

## 2021-09-22 NOTE — TELEPHONE ENCOUNTER
Pt is being scheduled for a Cervical Epidural with Dr Jim Gregorio that requires a 6 day hold of 325mg aspirin  Please advise if you approve this hold   Thanks

## 2021-09-23 ENCOUNTER — APPOINTMENT (OUTPATIENT)
Dept: PHYSICAL THERAPY | Facility: CLINIC | Age: 56
End: 2021-09-23
Payer: COMMERCIAL

## 2021-09-28 ENCOUNTER — APPOINTMENT (OUTPATIENT)
Dept: PHYSICAL THERAPY | Facility: CLINIC | Age: 56
End: 2021-09-28
Payer: COMMERCIAL

## 2021-09-30 ENCOUNTER — APPOINTMENT (OUTPATIENT)
Dept: PHYSICAL THERAPY | Facility: CLINIC | Age: 56
End: 2021-09-30
Payer: COMMERCIAL

## 2021-10-05 ENCOUNTER — PROCEDURE VISIT (OUTPATIENT)
Dept: PAIN MEDICINE | Facility: CLINIC | Age: 56
End: 2021-10-05
Payer: COMMERCIAL

## 2021-10-05 VITALS
WEIGHT: 161 LBS | SYSTOLIC BLOOD PRESSURE: 127 MMHG | BODY MASS INDEX: 26.79 KG/M2 | DIASTOLIC BLOOD PRESSURE: 72 MMHG | HEART RATE: 68 BPM

## 2021-10-05 DIAGNOSIS — M25.511 CHRONIC RIGHT SHOULDER PAIN: ICD-10-CM

## 2021-10-05 DIAGNOSIS — M47.812 CERVICAL SPONDYLOSIS: ICD-10-CM

## 2021-10-05 DIAGNOSIS — M54.2 NECK PAIN: Primary | ICD-10-CM

## 2021-10-05 DIAGNOSIS — M48.02 FORAMINAL STENOSIS OF CERVICAL REGION: ICD-10-CM

## 2021-10-05 DIAGNOSIS — G89.29 CHRONIC RIGHT SHOULDER PAIN: ICD-10-CM

## 2021-10-05 PROCEDURE — 20611 DRAIN/INJ JOINT/BURSA W/US: CPT | Performed by: PHYSICAL MEDICINE & REHABILITATION

## 2021-10-05 RX ORDER — METHYLPREDNISOLONE ACETATE 40 MG/ML
40 INJECTION, SUSPENSION INTRA-ARTICULAR; INTRALESIONAL; INTRAMUSCULAR; SOFT TISSUE ONCE
Status: COMPLETED | OUTPATIENT
Start: 2021-10-05 | End: 2021-10-05

## 2021-10-05 RX ORDER — BUPIVACAINE HYDROCHLORIDE 2.5 MG/ML
10 INJECTION, SOLUTION EPIDURAL; INFILTRATION; INTRACAUDAL ONCE
Status: COMPLETED | OUTPATIENT
Start: 2021-10-05 | End: 2021-10-05

## 2021-10-05 RX ADMIN — METHYLPREDNISOLONE ACETATE 40 MG: 40 INJECTION, SUSPENSION INTRA-ARTICULAR; INTRALESIONAL; INTRAMUSCULAR; SOFT TISSUE at 08:42

## 2021-10-05 RX ADMIN — BUPIVACAINE HYDROCHLORIDE 10 ML: 2.5 INJECTION, SOLUTION EPIDURAL; INFILTRATION; INTRACAUDAL at 08:42

## 2021-10-06 ENCOUNTER — TELEPHONE (OUTPATIENT)
Dept: PAIN MEDICINE | Facility: CLINIC | Age: 56
End: 2021-10-06

## 2021-10-07 NOTE — TELEPHONE ENCOUNTER
Scheduled pt for MARLINE for 10/25/21  Pt denies rx blood thinners  Pt is taking 325 aspirin and a 6 day hold was approved by Dr Nani Ho instructed to hold for 6 days with last dose on 10/18/21   Pt also takes Ibuprofen and was instructed to hold for 1 day with last dose on 10/23/21  Pt states he is no longer taking meloxicam  Went over pre-procedure instructions below:  Nothing to eat or drink 1 hr prior to procedure  Need to arrange transportation  Proper clothing for procedure  If ill or placed on antibiotics please call to reschedule  Covid/travel/ and vaccine instructions

## 2021-10-25 ENCOUNTER — HOSPITAL ENCOUNTER (OUTPATIENT)
Dept: RADIOLOGY | Facility: CLINIC | Age: 56
Discharge: HOME | End: 2021-10-25
Attending: PHYSICAL MEDICINE & REHABILITATION | Admitting: PHYSICAL MEDICINE & REHABILITATION
Payer: COMMERCIAL

## 2021-10-25 VITALS
SYSTOLIC BLOOD PRESSURE: 152 MMHG | OXYGEN SATURATION: 96 % | RESPIRATION RATE: 18 BRPM | TEMPERATURE: 97.9 F | DIASTOLIC BLOOD PRESSURE: 88 MMHG | HEART RATE: 64 BPM

## 2021-10-25 DIAGNOSIS — M50.120 CERVICAL DISC DISORDER WITH RADICULOPATHY OF MID-CERVICAL REGION: ICD-10-CM

## 2021-10-25 PROCEDURE — 62321 NJX INTERLAMINAR CRV/THRC: CPT | Performed by: PHYSICAL MEDICINE & REHABILITATION

## 2021-10-25 RX ORDER — METHYLPREDNISOLONE ACETATE 80 MG/ML
80 INJECTION, SUSPENSION INTRA-ARTICULAR; INTRALESIONAL; INTRAMUSCULAR; PARENTERAL; SOFT TISSUE ONCE
Status: COMPLETED | OUTPATIENT
Start: 2021-10-25 | End: 2021-10-25

## 2021-10-25 RX ADMIN — IOHEXOL 1 ML: 300 INJECTION, SOLUTION INTRAVENOUS at 08:56

## 2021-10-25 RX ADMIN — METHYLPREDNISOLONE ACETATE 80 MG: 80 INJECTION, SUSPENSION INTRA-ARTICULAR; INTRALESIONAL; INTRAMUSCULAR; PARENTERAL; SOFT TISSUE at 08:57

## 2021-11-01 ENCOUNTER — TELEPHONE (OUTPATIENT)
Dept: PAIN MEDICINE | Facility: CLINIC | Age: 56
End: 2021-11-01

## 2021-11-09 NOTE — PROGRESS NOTES
PT-Discharge:   Pt has not returned to PT since August  Pt welcome to return if symptoms have not subsided  D/C from PT at this time

## 2021-12-29 DIAGNOSIS — Z95.2 S/P AVR (AORTIC VALVE REPLACEMENT): ICD-10-CM

## 2021-12-29 DIAGNOSIS — Z95.1 S/P CABG X 3: ICD-10-CM

## 2021-12-29 DIAGNOSIS — Q23.1 AORTIC STENOSIS DUE TO BICUSPID AORTIC VALVE: Chronic | ICD-10-CM

## 2021-12-29 DIAGNOSIS — I25.10 CORONARY ARTERY DISEASE INVOLVING NATIVE CORONARY ARTERY OF NATIVE HEART WITHOUT ANGINA PECTORIS: ICD-10-CM

## 2021-12-29 DIAGNOSIS — Q23.0 AORTIC STENOSIS DUE TO BICUSPID AORTIC VALVE: Chronic | ICD-10-CM

## 2021-12-29 RX ORDER — METOPROLOL TARTRATE 50 MG/1
TABLET, FILM COATED ORAL
Qty: 180 TABLET | Refills: 3 | Status: SHIPPED | OUTPATIENT
Start: 2021-12-29

## 2021-12-29 RX ORDER — LISINOPRIL 5 MG/1
TABLET ORAL
Qty: 90 TABLET | Refills: 3 | Status: SHIPPED | OUTPATIENT
Start: 2021-12-29

## 2022-01-10 ENCOUNTER — APPOINTMENT (INPATIENT)
Dept: RADIOLOGY | Facility: HOSPITAL | Age: 57
DRG: 177 | End: 2022-01-10
Payer: COMMERCIAL

## 2022-01-10 ENCOUNTER — HOSPITAL ENCOUNTER (INPATIENT)
Facility: HOSPITAL | Age: 57
LOS: 3 days | Discharge: HOME | DRG: 177 | End: 2022-01-13
Attending: EMERGENCY MEDICINE | Admitting: INTERNAL MEDICINE
Payer: COMMERCIAL

## 2022-01-10 DIAGNOSIS — R11.2 NAUSEA AND VOMITING: ICD-10-CM

## 2022-01-10 DIAGNOSIS — Z95.2 S/P AVR (AORTIC VALVE REPLACEMENT): ICD-10-CM

## 2022-01-10 DIAGNOSIS — U07.1 COVID-19: ICD-10-CM

## 2022-01-10 DIAGNOSIS — E10.10 DKA, TYPE 1 (HCC): Primary | ICD-10-CM

## 2022-01-10 DIAGNOSIS — Z95.1 S/P CABG X 3: ICD-10-CM

## 2022-01-10 PROBLEM — E11.10 DKA (DIABETIC KETOACIDOSIS) (HCC): Status: ACTIVE | Noted: 2022-01-10

## 2022-01-10 PROBLEM — N17.9 AKI (ACUTE KIDNEY INJURY) (HCC): Status: ACTIVE | Noted: 2022-01-10

## 2022-01-10 PROBLEM — F41.9 ANXIETY: Status: ACTIVE | Noted: 2022-01-10

## 2022-01-10 LAB
2HR DELTA HS TROPONIN: 11 NG/L
4HR DELTA HS TROPONIN: 18 NG/L
ALBUMIN SERPL BCP-MCNC: 4.2 G/DL (ref 3.5–5)
ALP SERPL-CCNC: 229 U/L (ref 46–116)
ALT SERPL W P-5'-P-CCNC: 61 U/L (ref 12–78)
ANION GAP SERPL CALCULATED.3IONS-SCNC: 10 MMOL/L (ref 4–13)
ANION GAP SERPL CALCULATED.3IONS-SCNC: 13 MMOL/L (ref 4–13)
ANION GAP SERPL CALCULATED.3IONS-SCNC: 16 MMOL/L (ref 4–13)
APTT PPP: 28 SECONDS (ref 23–37)
AST SERPL W P-5'-P-CCNC: 20 U/L (ref 5–45)
ATRIAL RATE: 110 BPM
ATRIAL RATE: 133 BPM
BACTERIA UR QL AUTO: NORMAL /HPF
BASE EX.OXY STD BLDV CALC-SCNC: 65.4 % (ref 60–80)
BASE EXCESS BLDV CALC-SCNC: 1.4 MMOL/L
BASOPHILS # BLD AUTO: 0.04 THOUSANDS/ΜL (ref 0–0.1)
BASOPHILS NFR BLD AUTO: 0 % (ref 0–1)
BETA-HYDROXYBUTYRATE: 3.4 MMOL/L
BILIRUB SERPL-MCNC: 0.38 MG/DL (ref 0.2–1)
BILIRUB UR QL STRIP: NEGATIVE
BUN SERPL-MCNC: 22 MG/DL (ref 5–25)
BUN SERPL-MCNC: 25 MG/DL (ref 5–25)
BUN SERPL-MCNC: 27 MG/DL (ref 5–25)
CALCIUM SERPL-MCNC: 7.9 MG/DL (ref 8.3–10.1)
CALCIUM SERPL-MCNC: 9 MG/DL (ref 8.3–10.1)
CALCIUM SERPL-MCNC: 9.8 MG/DL (ref 8.3–10.1)
CARDIAC TROPONIN I PNL SERPL HS: 66 NG/L
CARDIAC TROPONIN I PNL SERPL HS: 77 NG/L
CARDIAC TROPONIN I PNL SERPL HS: 84 NG/L
CHLORIDE SERPL-SCNC: 100 MMOL/L (ref 100–108)
CHLORIDE SERPL-SCNC: 108 MMOL/L (ref 100–108)
CHLORIDE SERPL-SCNC: 109 MMOL/L (ref 100–108)
CLARITY UR: CLEAR
CO2 SERPL-SCNC: 23 MMOL/L (ref 21–32)
CO2 SERPL-SCNC: 24 MMOL/L (ref 21–32)
CO2 SERPL-SCNC: 28 MMOL/L (ref 21–32)
COLOR UR: YELLOW
CREAT SERPL-MCNC: 0.95 MG/DL (ref 0.6–1.3)
CREAT SERPL-MCNC: 1.03 MG/DL (ref 0.6–1.3)
CREAT SERPL-MCNC: 1.29 MG/DL (ref 0.6–1.3)
EOSINOPHIL # BLD AUTO: 0 THOUSAND/ΜL (ref 0–0.61)
EOSINOPHIL NFR BLD AUTO: 0 % (ref 0–6)
ERYTHROCYTE [DISTWIDTH] IN BLOOD BY AUTOMATED COUNT: 14.6 % (ref 11.6–15.1)
EST. AVERAGE GLUCOSE BLD GHB EST-MCNC: 183 MG/DL
GFR SERPL CREATININE-BSD FRML MDRD: 62 ML/MIN/1.73SQ M
GFR SERPL CREATININE-BSD FRML MDRD: 81 ML/MIN/1.73SQ M
GFR SERPL CREATININE-BSD FRML MDRD: 89 ML/MIN/1.73SQ M
GLUCOSE SERPL-MCNC: 106 MG/DL (ref 65–140)
GLUCOSE SERPL-MCNC: 113 MG/DL (ref 65–140)
GLUCOSE SERPL-MCNC: 150 MG/DL (ref 65–140)
GLUCOSE SERPL-MCNC: 166 MG/DL (ref 65–140)
GLUCOSE SERPL-MCNC: 175 MG/DL (ref 65–140)
GLUCOSE SERPL-MCNC: 195 MG/DL (ref 65–140)
GLUCOSE SERPL-MCNC: 195 MG/DL (ref 65–140)
GLUCOSE SERPL-MCNC: 198 MG/DL (ref 65–140)
GLUCOSE SERPL-MCNC: 200 MG/DL (ref 65–140)
GLUCOSE SERPL-MCNC: 218 MG/DL (ref 65–140)
GLUCOSE SERPL-MCNC: 271 MG/DL (ref 65–140)
GLUCOSE SERPL-MCNC: 303 MG/DL (ref 65–140)
GLUCOSE SERPL-MCNC: 340 MG/DL (ref 65–140)
GLUCOSE SERPL-MCNC: 405 MG/DL (ref 65–140)
GLUCOSE SERPL-MCNC: 420 MG/DL (ref 65–140)
GLUCOSE UR STRIP-MCNC: ABNORMAL MG/DL
HBA1C MFR BLD: 8 %
HCO3 BLDV-SCNC: 24.9 MMOL/L (ref 24–30)
HCT VFR BLD AUTO: 43.5 % (ref 36.5–49.3)
HGB BLD-MCNC: 14.7 G/DL (ref 12–17)
HGB UR QL STRIP.AUTO: ABNORMAL
IMM GRANULOCYTES # BLD AUTO: 0.11 THOUSAND/UL (ref 0–0.2)
IMM GRANULOCYTES NFR BLD AUTO: 1 % (ref 0–2)
INR PPP: 1.06 (ref 0.84–1.19)
KETONES UR STRIP-MCNC: ABNORMAL MG/DL
LACTATE SERPL-SCNC: 1.2 MMOL/L (ref 0.5–2)
LACTATE SERPL-SCNC: 2.3 MMOL/L (ref 0.5–2)
LEUKOCYTE ESTERASE UR QL STRIP: ABNORMAL
LYMPHOCYTES # BLD AUTO: 1.16 THOUSANDS/ΜL (ref 0.6–4.47)
LYMPHOCYTES NFR BLD AUTO: 6 % (ref 14–44)
MAGNESIUM SERPL-MCNC: 2 MG/DL (ref 1.6–2.6)
MAGNESIUM SERPL-MCNC: 2.2 MG/DL (ref 1.6–2.6)
MCH RBC QN AUTO: 29.1 PG (ref 26.8–34.3)
MCHC RBC AUTO-ENTMCNC: 33.8 G/DL (ref 31.4–37.4)
MCV RBC AUTO: 86 FL (ref 82–98)
MONOCYTES # BLD AUTO: 1.63 THOUSAND/ΜL (ref 0.17–1.22)
MONOCYTES NFR BLD AUTO: 9 % (ref 4–12)
NEUTROPHILS # BLD AUTO: 15.51 THOUSANDS/ΜL (ref 1.85–7.62)
NEUTS SEG NFR BLD AUTO: 84 % (ref 43–75)
NITRITE UR QL STRIP: NEGATIVE
NON-SQ EPI CELLS URNS QL MICRO: NORMAL /HPF
NRBC BLD AUTO-RTO: 0 /100 WBCS
O2 CT BLDV-SCNC: 14 ML/DL
P AXIS: 45 DEGREES
P AXIS: 61 DEGREES
PCO2 BLDV: 36.2 MM HG (ref 42–50)
PH BLDV: 7.46 [PH] (ref 7.3–7.4)
PH UR STRIP.AUTO: 5.5 [PH]
PHOSPHATE SERPL-MCNC: 2.6 MG/DL (ref 2.7–4.5)
PHOSPHATE SERPL-MCNC: 3.2 MG/DL (ref 2.7–4.5)
PLATELET # BLD AUTO: 332 THOUSANDS/UL (ref 149–390)
PLATELET # BLD AUTO: 367 THOUSANDS/UL (ref 149–390)
PMV BLD AUTO: 10.7 FL (ref 8.9–12.7)
PMV BLD AUTO: 11.7 FL (ref 8.9–12.7)
PO2 BLDV: 32.9 MM HG (ref 35–45)
POTASSIUM SERPL-SCNC: 3.8 MMOL/L (ref 3.5–5.3)
POTASSIUM SERPL-SCNC: 4 MMOL/L (ref 3.5–5.3)
POTASSIUM SERPL-SCNC: 4 MMOL/L (ref 3.5–5.3)
PR INTERVAL: 104 MS
PR INTERVAL: 158 MS
PROCALCITONIN SERPL-MCNC: 0.32 NG/ML
PROT SERPL-MCNC: 9 G/DL (ref 6.4–8.2)
PROT UR STRIP-MCNC: ABNORMAL MG/DL
PROTHROMBIN TIME: 13.8 SECONDS (ref 11.6–14.5)
QRS AXIS: 54 DEGREES
QRS AXIS: 57 DEGREES
QRSD INTERVAL: 96 MS
QRSD INTERVAL: 98 MS
QT INTERVAL: 288 MS
QT INTERVAL: 304 MS
QTC INTERVAL: 402 MS
QTC INTERVAL: 421 MS
RBC # BLD AUTO: 5.06 MILLION/UL (ref 3.88–5.62)
RBC #/AREA URNS AUTO: NORMAL /HPF
SODIUM SERPL-SCNC: 140 MMOL/L (ref 136–145)
SODIUM SERPL-SCNC: 144 MMOL/L (ref 136–145)
SODIUM SERPL-SCNC: 147 MMOL/L (ref 136–145)
SP GR UR STRIP.AUTO: 1.01 (ref 1–1.03)
T WAVE AXIS: -22 DEGREES
T WAVE AXIS: -79 DEGREES
UROBILINOGEN UR QL STRIP.AUTO: 0.2 E.U./DL
VENTRICULAR RATE: 105 BPM
VENTRICULAR RATE: 128 BPM
WBC # BLD AUTO: 18.45 THOUSAND/UL (ref 4.31–10.16)
WBC #/AREA URNS AUTO: NORMAL /HPF

## 2022-01-10 PROCEDURE — 93010 ELECTROCARDIOGRAM REPORT: CPT | Performed by: INTERNAL MEDICINE

## 2022-01-10 PROCEDURE — 80048 BASIC METABOLIC PNL TOTAL CA: CPT | Performed by: PHYSICIAN ASSISTANT

## 2022-01-10 PROCEDURE — 85610 PROTHROMBIN TIME: CPT | Performed by: EMERGENCY MEDICINE

## 2022-01-10 PROCEDURE — 85730 THROMBOPLASTIN TIME PARTIAL: CPT | Performed by: EMERGENCY MEDICINE

## 2022-01-10 PROCEDURE — 80048 BASIC METABOLIC PNL TOTAL CA: CPT

## 2022-01-10 PROCEDURE — 84100 ASSAY OF PHOSPHORUS: CPT

## 2022-01-10 PROCEDURE — 85049 AUTOMATED PLATELET COUNT: CPT | Performed by: PHYSICIAN ASSISTANT

## 2022-01-10 PROCEDURE — 81001 URINALYSIS AUTO W/SCOPE: CPT | Performed by: EMERGENCY MEDICINE

## 2022-01-10 PROCEDURE — 36415 COLL VENOUS BLD VENIPUNCTURE: CPT | Performed by: EMERGENCY MEDICINE

## 2022-01-10 PROCEDURE — 84100 ASSAY OF PHOSPHORUS: CPT | Performed by: PHYSICIAN ASSISTANT

## 2022-01-10 PROCEDURE — 82948 REAGENT STRIP/BLOOD GLUCOSE: CPT

## 2022-01-10 PROCEDURE — 83735 ASSAY OF MAGNESIUM: CPT | Performed by: PHYSICIAN ASSISTANT

## 2022-01-10 PROCEDURE — 99254 IP/OBS CNSLTJ NEW/EST MOD 60: CPT | Performed by: INTERNAL MEDICINE

## 2022-01-10 PROCEDURE — 99222 1ST HOSP IP/OBS MODERATE 55: CPT | Performed by: INTERNAL MEDICINE

## 2022-01-10 PROCEDURE — 84145 PROCALCITONIN (PCT): CPT | Performed by: EMERGENCY MEDICINE

## 2022-01-10 PROCEDURE — 71045 X-RAY EXAM CHEST 1 VIEW: CPT

## 2022-01-10 PROCEDURE — 93005 ELECTROCARDIOGRAM TRACING: CPT

## 2022-01-10 PROCEDURE — 83036 HEMOGLOBIN GLYCOSYLATED A1C: CPT | Performed by: PHYSICIAN ASSISTANT

## 2022-01-10 PROCEDURE — 85025 COMPLETE CBC W/AUTO DIFF WBC: CPT | Performed by: EMERGENCY MEDICINE

## 2022-01-10 PROCEDURE — 99285 EMERGENCY DEPT VISIT HI MDM: CPT

## 2022-01-10 PROCEDURE — 83605 ASSAY OF LACTIC ACID: CPT | Performed by: EMERGENCY MEDICINE

## 2022-01-10 PROCEDURE — 84484 ASSAY OF TROPONIN QUANT: CPT

## 2022-01-10 PROCEDURE — 80053 COMPREHEN METABOLIC PANEL: CPT | Performed by: EMERGENCY MEDICINE

## 2022-01-10 PROCEDURE — 82805 BLOOD GASES W/O2 SATURATION: CPT

## 2022-01-10 PROCEDURE — 99285 EMERGENCY DEPT VISIT HI MDM: CPT | Performed by: EMERGENCY MEDICINE

## 2022-01-10 PROCEDURE — 83735 ASSAY OF MAGNESIUM: CPT

## 2022-01-10 PROCEDURE — 82010 KETONE BODYS QUAN: CPT

## 2022-01-10 PROCEDURE — 87040 BLOOD CULTURE FOR BACTERIA: CPT | Performed by: EMERGENCY MEDICINE

## 2022-01-10 RX ORDER — LISINOPRIL 5 MG/1
5 TABLET ORAL DAILY
Status: DISCONTINUED | OUTPATIENT
Start: 2022-01-10 | End: 2022-01-11

## 2022-01-10 RX ORDER — ONDANSETRON 2 MG/ML
1 INJECTION INTRAMUSCULAR; INTRAVENOUS ONCE
Status: COMPLETED | OUTPATIENT
Start: 2022-01-10 | End: 2022-01-10

## 2022-01-10 RX ORDER — CALCIUM CARBONATE 200(500)MG
500 TABLET,CHEWABLE ORAL DAILY PRN
Status: DISCONTINUED | OUTPATIENT
Start: 2022-01-10 | End: 2022-01-13 | Stop reason: HOSPADM

## 2022-01-10 RX ORDER — ASPIRIN 325 MG
325 TABLET ORAL DAILY
Status: DISCONTINUED | OUTPATIENT
Start: 2022-01-10 | End: 2022-01-13 | Stop reason: HOSPADM

## 2022-01-10 RX ORDER — PANTOPRAZOLE SODIUM 40 MG/1
40 TABLET, DELAYED RELEASE ORAL
Status: DISCONTINUED | OUTPATIENT
Start: 2022-01-10 | End: 2022-01-13 | Stop reason: HOSPADM

## 2022-01-10 RX ORDER — ATORVASTATIN CALCIUM 40 MG/1
80 TABLET, FILM COATED ORAL
Status: DISCONTINUED | OUTPATIENT
Start: 2022-01-10 | End: 2022-01-13 | Stop reason: HOSPADM

## 2022-01-10 RX ORDER — METAXALONE 800 MG/1
800 TABLET ORAL 3 TIMES DAILY
Status: DISCONTINUED | OUTPATIENT
Start: 2022-01-10 | End: 2022-01-13 | Stop reason: HOSPADM

## 2022-01-10 RX ORDER — SODIUM CHLORIDE, SODIUM GLUCONATE, SODIUM ACETATE, POTASSIUM CHLORIDE, MAGNESIUM CHLORIDE, SODIUM PHOSPHATE, DIBASIC, AND POTASSIUM PHOSPHATE .53; .5; .37; .037; .03; .012; .00082 G/100ML; G/100ML; G/100ML; G/100ML; G/100ML; G/100ML; G/100ML
500 INJECTION, SOLUTION INTRAVENOUS CONTINUOUS
Status: DISCONTINUED | OUTPATIENT
Start: 2022-01-10 | End: 2022-01-10

## 2022-01-10 RX ORDER — GABAPENTIN 300 MG/1
300 CAPSULE ORAL 3 TIMES DAILY
Status: DISCONTINUED | OUTPATIENT
Start: 2022-01-10 | End: 2022-01-13 | Stop reason: HOSPADM

## 2022-01-10 RX ORDER — SODIUM CHLORIDE, SODIUM GLUCONATE, SODIUM ACETATE, POTASSIUM CHLORIDE, MAGNESIUM CHLORIDE, SODIUM PHOSPHATE, DIBASIC, AND POTASSIUM PHOSPHATE .53; .5; .37; .037; .03; .012; .00082 G/100ML; G/100ML; G/100ML; G/100ML; G/100ML; G/100ML; G/100ML
75 INJECTION, SOLUTION INTRAVENOUS CONTINUOUS
Status: DISCONTINUED | OUTPATIENT
Start: 2022-01-10 | End: 2022-01-12

## 2022-01-10 RX ORDER — HEPARIN SODIUM 5000 [USP'U]/ML
5000 INJECTION, SOLUTION INTRAVENOUS; SUBCUTANEOUS EVERY 8 HOURS SCHEDULED
Status: DISCONTINUED | OUTPATIENT
Start: 2022-01-10 | End: 2022-01-13 | Stop reason: HOSPADM

## 2022-01-10 RX ORDER — ONDANSETRON 2 MG/ML
4 INJECTION INTRAMUSCULAR; INTRAVENOUS EVERY 6 HOURS PRN
Status: DISCONTINUED | OUTPATIENT
Start: 2022-01-10 | End: 2022-01-13 | Stop reason: HOSPADM

## 2022-01-10 RX ORDER — METOPROLOL TARTRATE 50 MG/1
50 TABLET, FILM COATED ORAL EVERY 12 HOURS SCHEDULED
Status: DISCONTINUED | OUTPATIENT
Start: 2022-01-10 | End: 2022-01-13 | Stop reason: HOSPADM

## 2022-01-10 RX ORDER — SODIUM CHLORIDE, SODIUM GLUCONATE, SODIUM ACETATE, POTASSIUM CHLORIDE, MAGNESIUM CHLORIDE, SODIUM PHOSPHATE, DIBASIC, AND POTASSIUM PHOSPHATE .53; .5; .37; .037; .03; .012; .00082 G/100ML; G/100ML; G/100ML; G/100ML; G/100ML; G/100ML; G/100ML
1000 INJECTION, SOLUTION INTRAVENOUS ONCE
Status: COMPLETED | OUTPATIENT
Start: 2022-01-10 | End: 2022-01-10

## 2022-01-10 RX ORDER — QUETIAPINE FUMARATE 25 MG/1
25 TABLET, FILM COATED ORAL
Status: DISCONTINUED | OUTPATIENT
Start: 2022-01-10 | End: 2022-01-13 | Stop reason: HOSPADM

## 2022-01-10 RX ORDER — SODIUM CHLORIDE, SODIUM GLUCONATE, SODIUM ACETATE, POTASSIUM CHLORIDE, MAGNESIUM CHLORIDE, SODIUM PHOSPHATE, DIBASIC, AND POTASSIUM PHOSPHATE .53; .5; .37; .037; .03; .012; .00082 G/100ML; G/100ML; G/100ML; G/100ML; G/100ML; G/100ML; G/100ML
250 INJECTION, SOLUTION INTRAVENOUS CONTINUOUS
Status: DISCONTINUED | OUTPATIENT
Start: 2022-01-10 | End: 2022-01-10

## 2022-01-10 RX ORDER — DEXTROSE AND SODIUM CHLORIDE 5; .9 G/100ML; G/100ML
250 INJECTION, SOLUTION INTRAVENOUS CONTINUOUS
Status: DISCONTINUED | OUTPATIENT
Start: 2022-01-10 | End: 2022-01-10

## 2022-01-10 RX ORDER — HYDROXYZINE HYDROCHLORIDE 10 MG/1
10 TABLET, FILM COATED ORAL EVERY 6 HOURS PRN
Status: DISCONTINUED | OUTPATIENT
Start: 2022-01-10 | End: 2022-01-13 | Stop reason: HOSPADM

## 2022-01-10 RX ORDER — ACETAMINOPHEN 325 MG/1
650 TABLET ORAL EVERY 6 HOURS PRN
Status: DISCONTINUED | OUTPATIENT
Start: 2022-01-10 | End: 2022-01-13 | Stop reason: HOSPADM

## 2022-01-10 RX ADMIN — SODIUM CHLORIDE 7.5 UNITS/HR: 9 INJECTION, SOLUTION INTRAVENOUS at 10:14

## 2022-01-10 RX ADMIN — QUETIAPINE FUMARATE 25 MG: 25 TABLET ORAL at 21:30

## 2022-01-10 RX ADMIN — SODIUM CHLORIDE 1 UNITS/HR: 9 INJECTION, SOLUTION INTRAVENOUS at 13:40

## 2022-01-10 RX ADMIN — GABAPENTIN 300 MG: 300 CAPSULE ORAL at 21:30

## 2022-01-10 RX ADMIN — HEPARIN SODIUM 5000 UNITS: 5000 INJECTION INTRAVENOUS; SUBCUTANEOUS at 13:50

## 2022-01-10 RX ADMIN — LISINOPRIL 5 MG: 5 TABLET ORAL at 10:24

## 2022-01-10 RX ADMIN — SODIUM CHLORIDE, SODIUM GLUCONATE, SODIUM ACETATE, POTASSIUM CHLORIDE, MAGNESIUM CHLORIDE, SODIUM PHOSPHATE, DIBASIC, AND POTASSIUM PHOSPHATE 500 ML/HR: .53; .5; .37; .037; .03; .012; .00082 INJECTION, SOLUTION INTRAVENOUS at 11:41

## 2022-01-10 RX ADMIN — GABAPENTIN 300 MG: 300 CAPSULE ORAL at 15:32

## 2022-01-10 RX ADMIN — ASPIRIN 325 MG ORAL TABLET 325 MG: 325 PILL ORAL at 10:24

## 2022-01-10 RX ADMIN — HEPARIN SODIUM 5000 UNITS: 5000 INJECTION INTRAVENOUS; SUBCUTANEOUS at 21:30

## 2022-01-10 RX ADMIN — METAXALONE 800 MG: 800 TABLET ORAL at 10:52

## 2022-01-10 RX ADMIN — SODIUM CHLORIDE, SODIUM GLUCONATE, SODIUM ACETATE, POTASSIUM CHLORIDE, MAGNESIUM CHLORIDE, SODIUM PHOSPHATE, DIBASIC, AND POTASSIUM PHOSPHATE 1000 ML: .53; .5; .37; .037; .03; .012; .00082 INJECTION, SOLUTION INTRAVENOUS at 10:08

## 2022-01-10 RX ADMIN — METAXALONE 800 MG: 800 TABLET ORAL at 15:32

## 2022-01-10 RX ADMIN — METOPROLOL TARTRATE 50 MG: 50 TABLET, FILM COATED ORAL at 21:30

## 2022-01-10 RX ADMIN — METOPROLOL TARTRATE 50 MG: 50 TABLET, FILM COATED ORAL at 10:24

## 2022-01-10 RX ADMIN — ONDANSETRON 4 MG: 2 INJECTION INTRAMUSCULAR; INTRAVENOUS at 15:32

## 2022-01-10 RX ADMIN — SODIUM CHLORIDE, SODIUM GLUCONATE, SODIUM ACETATE, POTASSIUM CHLORIDE, MAGNESIUM CHLORIDE, SODIUM PHOSPHATE, DIBASIC, AND POTASSIUM PHOSPHATE 125 ML/HR: .53; .5; .37; .037; .03; .012; .00082 INJECTION, SOLUTION INTRAVENOUS at 13:39

## 2022-01-10 RX ADMIN — ATORVASTATIN CALCIUM 80 MG: 40 TABLET, FILM COATED ORAL at 16:31

## 2022-01-10 RX ADMIN — SODIUM CHLORIDE, SODIUM GLUCONATE, SODIUM ACETATE, POTASSIUM CHLORIDE, MAGNESIUM CHLORIDE, SODIUM PHOSPHATE, DIBASIC, AND POTASSIUM PHOSPHATE 125 ML/HR: .53; .5; .37; .037; .03; .012; .00082 INJECTION, SOLUTION INTRAVENOUS at 22:52

## 2022-01-10 RX ADMIN — GABAPENTIN 300 MG: 300 CAPSULE ORAL at 10:24

## 2022-01-10 RX ADMIN — ONDANSETRON 4 MG: 2 INJECTION INTRAMUSCULAR; INTRAVENOUS at 09:46

## 2022-01-10 RX ADMIN — PANTOPRAZOLE SODIUM 40 MG: 40 TABLET, DELAYED RELEASE ORAL at 10:24

## 2022-01-10 RX ADMIN — METAXALONE 800 MG: 800 TABLET ORAL at 21:30

## 2022-01-10 RX ADMIN — CALCIUM CARBONATE (ANTACID) CHEW TAB 500 MG 500 MG: 500 CHEW TAB at 09:47

## 2022-01-10 RX ADMIN — DEXTROSE AND SODIUM CHLORIDE 250 ML/HR: 5; .9 INJECTION, SOLUTION INTRAVENOUS at 10:17

## 2022-01-10 RX ADMIN — SODIUM CHLORIDE 7.3 UNITS/HR: 9 INJECTION, SOLUTION INTRAVENOUS at 08:08

## 2022-01-10 NOTE — ED NOTES
Insulin drip continued @ 7 5 units/ hr for FSBS of [facility], [address] [place] [address]  01/10/22 5402

## 2022-01-10 NOTE — LETTER
[facility] MED SURG UNIT  [address] [facility] [place] [address]  Dept: [phone]    January 12, 2022     Patient: Petar Morin   YOB: 1965   Date of Visit: 1/10/2022       To Whom it May Concern:    Ming Ramos is under my professional care  He was seen in the hospital from 1/10/2022   to 01/12/22  He may return to work on 1/17/22 without limitations  If you have any questions or concerns, please don't hesitate to call           Sincerely,              Jalen Wilcox PA-C

## 2022-01-10 NOTE — ASSESSMENT & PLAN NOTE
Lab Results   Component Value Date    HGBA1C 9 1 (H) 07/21/2020       Recent Labs     01/10/22  0533 01/10/22  0807 01/10/22  0914   POCGLU 405* 303* 271*       Blood Sugar Average: Last 72 hrs:  (P) 461 [id number]     · Patient with 72hr history of nausea/vomiting   · AGMA with positive BHB  · Patient uses insulin pump at home, last A1c from July 2021 9 1  · DKA protocol, aggressive fluid and electrolyte replacement  · Did not receive IVF in the ED or bolus insulin  · Will bolus 1L IVF and place on replacement fluids  · D5 NS to start when blood glucose <250  · AG closed, will repeat labs in 4hrs if anion gap persistently closed will transition to regular insulin infusion   · Consult endo  · Will send A1c

## 2022-01-10 NOTE — ASSESSMENT & PLAN NOTE
Lab Results   Component Value Date    HGBA1C 9 1 (H) 07/21/2020       Recent Labs     01/10/22  0533 01/10/22  0807 01/10/22  0914   POCGLU 405* 303* 271*       Blood Sugar Average: Last 72 hrs:  (P) 551 [id number]     · Endo consult   · Hold home pump  · Treatment as above

## 2022-01-10 NOTE — H&P
[facility]  H&P- Petar Morin 1965, 55 y o  male MRN: [id number]  Unit/Bed#: FT 03 Encounter: [id number]  Primary Care Provider: Remington Stover DO   Date and time admitted to hospital: 1/10/2022  5:22 AM    * DKA (diabetic ketoacidosis) [facility])  Assessment & Plan  Lab Results   Component Value Date    HGBA1C 9 1 (H) 07/21/2020       Recent Labs     01/10/22  0533 01/10/22  0807 01/10/22  0914   POCGLU 405* 303* 271*       Blood Sugar Average: Last 72 hrs:  (P) 322 [id number]     · Patient with 72hr history of nausea/vomiting   · AGMA with positive BHB  · Patient uses insulin pump at home, last A1c from July 2021 9 1  · DKA protocol, aggressive fluid and electrolyte replacement  · Did not receive IVF in the ED or bolus insulin  · Will bolus 1L IVF and place on replacement fluids  · D5 NS to start when blood glucose <250  · AG closed, will repeat labs in 4hrs if anion gap persistently closed will transition to regular insulin infusion   · Consult endo  · Will send A1c    Type 1 diabetes mellitus with retinopathy [facility])  Assessment & Plan  Lab Results   Component Value Date    HGBA1C 9 1 (H) 07/21/2020       Recent Labs     01/10/22  0533 01/10/22  0807 01/10/22  0914   POCGLU 405* 303* 271*       Blood Sugar Average: Last 72 hrs:  (P) 215 [id number]     · Endo consult   · Hold home pump  · Treatment as above    MILTON (acute kidney injury) ([facility] [address] )  Assessment & Plan  · Likely 2/2 DKA  · Fluid resuscitation as able    Anxiety  Assessment & Plan  · Resume PTA meds    COVID-19  Assessment & Plan  · Patient on room air  · Not in high risk category for monoclonal antibodies  · Will place on mild pathway, if patient requires oxygen can start remdesivir/steroids    Dyslipidemia  Assessment & Plan  · statin    Coronary artery disease involving native coronary artery of native heart without angina pectoris  Assessment & Plan  · Continue ASA, statin   · Metoprolol  · skelaxin    Essential hypertension  Assessment & Plan  · Resume home lopressor, lisinopril    -------------------------------------------------------------------------------------------------------------  Chief Complaint: Nausea/vomiting    History of Present Illness   HX and PE limited by: nothing  Vielka Oropeza is a 55 y o  male w/ PMHx CAD, HLD, DM 1, and anxiety who now presents with AGMA 2/2 DKA  Patient has been home with mild COVID symptoms, tested positive on 1/4  Over the last 72hrs has had nausea and vomiting prompting ED eval this AM  Labwork consistent with DKA, started on DKA protocol  Critical care has been consulted for admission  History obtained from chart review and the patient   -------------------------------------------------------------------------------------------------------------  Dispo: Admit to Stepdown Level 1    Code Status: Level 1 - Full Code  --------------------------------------------------------------------------------------------------------------  Review of Systems   Constitutional: Positive for fatigue  Gastrointestinal: Positive for abdominal distention and nausea  A 12-point, complete review of systems was reviewed and negative except as stated above     Physical Exam  Constitutional:       Appearance: He is obese  HENT:      Head: Normocephalic  Mouth/Throat:      Mouth: Mucous membranes are dry  Eyes:      Pupils: Pupils are equal, round, and reactive to light  Cardiovascular:      Rate and Rhythm: Normal rate and regular rhythm  Pulmonary:      Effort: Pulmonary effort is normal       Breath sounds: Normal breath sounds  Abdominal:      General: Abdomen is flat  There is distension  Palpations: Abdomen is soft  Musculoskeletal:      Cervical back: Normal range of motion and neck supple  Skin:     General: Skin is warm  Capillary Refill: Capillary refill takes less than 2 seconds  Neurological:      General: No focal deficit present        Mental Status: He is alert        --------------------------------------------------------------------------------------------------------------  Vitals:   Vitals:    01/10/22 0600 01/10/22 0700 01/10/22 0800 01/10/22 0933   BP: (!) 183/74 (!) 180/79 (!) 175/75 (!) 177/81   BP Location: Right arm Right arm  Right arm   Pulse: (!) 114 (!) 114 (!) 120 (!) 121   Resp: 22 22 22 18   Temp:    98 4 °F (36 9 °C)   TempSrc:    Oral   SpO2: 99% 95% 98% 98%   Weight:    74 8 kg (165 lb)   Height:    5' 6" (1 676 m)     Temp  Min: 98 4 °F (36 9 °C)  Max: 99 3 °F (37 4 °C)  IBW (Ideal Body Weight): 63 8 kg  Height: 5' 6" (167 6 cm)  Body mass index is 26 63 kg/m²      Laboratory and Diagnostics:  Results from last 7 days   Lab Units 01/10/22  0540   WBC Thousand/uL 18 45*   HEMOGLOBIN g/dL 14 7   HEMATOCRIT % 43 5   PLATELETS Thousands/uL 367   NEUTROS PCT % 84*   MONOS PCT % 9     Results from last 7 days   Lab Units 01/10/22  0814 01/10/22  0540   SODIUM mmol/L 144 140   POTASSIUM mmol/L 3 8 4 0   CHLORIDE mmol/L 108 100   CO2 mmol/L 23 24   ANION GAP mmol/L 13 16*   BUN mg/dL 25 27*   CREATININE mg/dL 1 03 1 29   CALCIUM mg/dL 9 0 9 8   GLUCOSE RANDOM mg/dL 340* 420*   ALT U/L  --  61   AST U/L  --  20   ALK PHOS U/L  --  229*   ALBUMIN g/dL  --  4 2   TOTAL BILIRUBIN mg/dL  --  0 38     Results from last 7 days   Lab Units 01/10/22  0814   MAGNESIUM mg/dL 2 0   PHOSPHORUS mg/dL 2 6*      Results from last 7 days   Lab Units 01/10/22  0540   INR  1 06   PTT seconds 28          Results from last 7 days   Lab Units 01/10/22  0814 01/10/22  0540   LACTIC ACID mmol/L 1 2 2 3*     ABG:    VBG:  Results from last 7 days   Lab Units 01/10/22  0613   PH CURT  7 456*   PCO2 CURT mm Hg 36 2*   PO2 CURT mm Hg 32 9*   HCO3 CURT mmol/L 24 9   BASE EXC CURT mmol/L 1 4           Micro:              Historical Information   Past Medical History:   Diagnosis Date    Aortic stenosis     Clavicle fracture     LEFT    Diabetes mellitus ([address] )     Hyperlipidemia     Hypertension     Myocardial infarction ([facility] [address] )     Thrombocytopenic purpura (HCC)      Past Surgical History:   Procedure Laterality Date    ABDOMINAL SURGERY      HARVEST VEIN Left 2020    Procedure: HARVEST VEIN ENDOSCOPIC ([facility]H) LEFT LEG;  Surgeon: Jamshid Renner MD;  [facility];  Service: Cardiac Surgery    AK CABG, ARTERY-VEIN, THREE N/A 2020    Procedure: CORONARY ARTERY BYPASS GRAFT X 3 WITH SVG TO Right Posterior Lateral Branch, OM1 AND LIMA TO LAD ;  Surgeon: Jamshid Renner MD;  [facility];  Service: Cardiac Surgery    AK RPLCMT AORTIC VALVE OPN W/STENTLESS TISSUE VALVE N/A 2020    Procedure: AORTIC VALVE REPLACEMENT WITH A 25MM SANTOYO INSPIRIS RESILIA  TISSUE AORTIC VALVE;  Surgeon: Jamshid Renner MD;  [facility];  Service: Cardiac Surgery    ROTATOR CUFF REPAIR      SPLENECTOMY      TONSILLECTOMY      VITRECTOMY Bilateral     BY ANTERIOR APPROACH      Social History   Social History     Substance and Sexual Activity   Alcohol Use Not Currently    Alcohol/week: 0 0 standard drinks    Comment: none     Social History     Substance and Sexual Activity   Drug Use Yes    Frequency: 3 0 times per week    Types: Marijuana    Comment: none     Social History     Tobacco Use   Smoking Status Former Smoker    Years: 10 00    Types: Cigars    Quit date: 2020    Years since quittin 4   Smokeless Tobacco Never Used       Family History:   Family History   Problem Relation Age of Onset    Aneurysm Mother         CAREBRAL ARTERY     Coronary artery disease Mother     Diabetes Mother     Stroke Father      I have reviewed this patient's family history and commented on sigificant items within the HPI      Medications:  Current Facility-Administered Medications   Medication Dose Route Frequency    aspirin tablet 325 mg  325 mg Oral Daily    atorvastatin (LIPITOR) tablet 80 mg  80 mg Oral Daily With Dinner    calcium carbonate (TUMS) chewable tablet 500 mg  500 mg Oral Daily PRN    dextrose 5 % and sodium chloride 0 9 % infusion  250 mL/hr Intravenous Continuous    gabapentin (NEURONTIN) capsule 300 mg  300 mg Oral TID    heparin (porcine) subcutaneous injection 5,000 Units  5,000 Units Subcutaneous Q8H [address]    hydrOXYzine HCL (ATARAX) tablet 10 mg  10 mg Oral Q6H PRN    insulin regular (HumuLIN R,NovoLIN R) 1 Units/mL in sodium chloride 0 9 % 100 mL infusion  0 1-30 Units/hr Intravenous Continuous    lisinopril (ZESTRIL) tablet 5 mg  5 mg Oral Daily    metaxalone (SKELAXIN) tablet 800 mg  800 mg Oral TID    metoprolol tartrate (LOPRESSOR) tablet 50 mg  50 mg Oral Q12H [address]    multi-electrolyte (ISOLYTE-S PH 7 4 equivalent) IV solution  500 mL/hr Intravenous Continuous    Followed by   Loli Rouleau multi-electrolyte (ISOLYTE-S PH 7 4 equivalent) IV solution  250 mL/hr Intravenous Continuous    multi-electrolyte (ISOLYTE-S PH 7 4) bolus 1,000 mL  1,000 mL Intravenous Once    ondansetron (ZOFRAN) injection 4 mg  4 mg Intravenous Q6H PRN    pantoprazole (PROTONIX) EC tablet 40 mg  40 mg Oral Early Morning    QUEtiapine (SEROquel) tablet 25 mg  25 mg Oral HS     Home medications:  Prior to Admission Medications   Prescriptions Last Dose Informant Patient Reported? Taking?    Cholecalciferol (VITAMIN D) 2000 units CAPS  Self Yes No   Sig: TK 1 C PO QD   HumaLOG 100 UNIT/ML injection   No No   Sig: USE UP  UNITS PER DAY VIA INSULIN PUMP   QUEtiapine (SEROquel) 25 mg tablet  Self Yes No   Sig: TAKE 1 TABLET BY MOUTH EVERY DAY EVERY NIGHT   ascorbic acid (VITAMIN C) 500 MG tablet   No No   Sig: Take 1 tablet (500 mg total) by mouth daily   aspirin 325 mg tablet   No No   Sig: Take 1 tablet (325 mg total) by mouth daily   atorvastatin (LIPITOR) 80 mg tablet  Self No No   Sig: Take 1 tablet by mouth daily with dinner   busPIRone (BUSPAR) 15 mg tablet   Yes No   clotrimazole (LOTRIMIN) 1 % cream   No No   Sig: Apply topically 2 (two) times a day   diazepam (VALIUM) 5 mg tablet   No No   Sig: Take 1 tablet (5 mg total) by mouth every 8 (eight) hours as needed for anxiety for up to 10 days   diclofenac (VOLTAREN) 75 mg EC tablet   No No   Sig: Take 1 tablet (75 mg total) by mouth 2 (two) times a day   ferrous sulfate 325 (65 Fe) mg tablet   No No   Sig: Take 1 tablet (325 mg total) by mouth daily with breakfast   gabapentin (NEURONTIN) 300 mg capsule   No No   Sig: Take 1 capsule (300 mg total) by mouth 3 (three) times a day   glucagon 1 MG injection   No No   Sig: Inject 1 mg under the skin once as needed for low blood sugar for up to 1 dose   hydrOXYzine HCL (ATARAX) 25 mg tablet  Self Yes No   Sig: TAKE 1 TO 2 TABLETS BY MOUTH EVERY 8 HOURS AS NEEDED FOR ANXIETY   ibuprofen (MOTRIN) 600 mg tablet   No No   Sig: Take 1 tablet (600 mg total) by mouth every 8 (eight) hours as needed for mild pain   lisinopril (ZESTRIL) 5 mg tablet   No No   Sig: TAKE 1 TABLET(5 MG) BY MOUTH DAILY   metaxalone (SKELAXIN) 800 mg tablet   No No   Sig: Take 1 tablet (800 mg total) by mouth 3 (three) times a day   metoprolol tartrate (LOPRESSOR) 50 mg tablet   No No   Sig: TAKE 1 TABLET(50 MG) BY MOUTH EVERY 12 HOURS   omeprazole (PriLOSEC) 20 mg delayed release capsule  Self Yes No   Sig: Take 20 mg by mouth daily   potassium chloride (K-DUR,KLOR-CON) 20 mEq tablet   No No   Sig: Take 1 tablet (20 mEq total) by mouth 2 (two) times a day for 7 days, THEN 1 tablet (20 mEq total) daily for 7 days  sildenafil (VIAGRA) 50 MG tablet  Self Yes No   Sig: TAKE 1 TABLET BY MOUTH AS  NEEDED FOR ERECTILE  DYSFUNCTION   tadalafil (CIALIS) 20 MG tablet   Yes No   Sig: Take 20 mg by mouth   tiZANidine (Zanaflex) 4 mg tablet   No No   Sig: Take 1 tablet (4 mg total) by mouth every 8 (eight) hours as needed for muscle spasms   torsemide (DEMADEX) 20 mg tablet   No No   Sig: Take 1 tablet (20 mg total) by mouth 2 (two) times a day for 7 days, THEN 1 tablet (20 mg total) daily for 7 days  traMADol (ULTRAM) 50 mg tablet   Yes No   Sig: Take 50 mg by mouth every 6 (six) hours as needed   zolpidem (AMBIEN) 5 mg tablet  Self No No   Sig: Take 1 tablet by mouth daily at bedtime as needed for sleep for up to 2 days      Facility-Administered Medications: None     Allergies:  No Known Allergies    ------------------------------------------------------------------------------------------------------------  Advance Directive and Living Will: Yes    Power of : Yes  POLST:    ------------------------------------------------------------------------------------------------------------  Anticipated Length of Stay is > 2 midnights    Care Time Delivered:   No Critical Care time spent       David Wilcox PA-C        Portions of the record may have been created with voice recognition software  Occasional wrong word or "sound a like" substitutions may have occurred due to the inherent limitations of voice recognition software    Read the chart carefully and recognize, using context, where substitutions have occurred

## 2022-01-10 NOTE — ED NOTES
Dr Ulices Cline with Endo doing virtual consult with patient via Teams        Joel Prince, RN  01/10/22 3933

## 2022-01-10 NOTE — QUICK NOTE
Patient seen and evaluated by Critical Care today and deemed to be appropriate for transfer to [facility]   Spoke to Dr Dimas Rangel from [facility]  Major changes to treatment plan from the day of transfer include transition to regular insulin infusion    Critical care can be contacted via [employer] with any questions or concerns

## 2022-01-10 NOTE — CONSULTS
The patient was identified by name and date of birth  Was informed that this is a virtual visit and that the visit is being conducted through [employer] and patient was informed that this may not be a secure, HIPAA-complaint platform  Patient agreed to proceed  Patient privacy was secured with closed door and no other individual was privy to conversation on my end  Patient acknowledged consent and understanding of privacy and security of the video platform  The patient has agreed to participate and understands they can discontinue the visit at any time  Patient is aware this is a billable service  Consultation - [facility] Endocrinology    Patient Information: Petar Morin 55 y o  male MRN: [id number]  Unit/Bed#: FT 03 Encounter: [id number]  Admitting Physician: Monica Sewell MD  PCP: Remington Stover DO  Date of Consultation:  01/10/22    ASSESSMENT:  55 yr male with uncontrolled diabetes for many years, HTN, HLD and CAD is admitted with DKA 2" covid 19 infection  PLAN:    1  DKA  Improving  Still nausea but no vomiting  May considering switching to non DKA iv insulin infusion protocol once gap remains closed on 2 consecutive BMP's     2  Type 1 diabetes  Uncontrolled with A1c 9 1%  Goal is 7%  Uses medtronic 770G pump without sensor at home  Checks capillary glucose occasionally  Suggest continue IV insulin infusion overnight as he still reports weakness and nausea  May consider transition to pump tomorrow once he is hydrated and eating and drinking  He will likely need some pump adjustment as he seems basal heavy (65% basal and 35% bolus on last pump download 6/2021) and is atypical in using bigger dose overnight basal rate to cover for food intake overnight  He does report to using bolus wizard  Advised close outpt follow up with endocrinology in 2-3 weeks after discharge with pump data for review  3  Retinopathy  Has proliferative retinopathy post surgery/laser therapy   Is following with ophthalmology as outpt  4  Covid 19  Advised IV hydration  In case steroids are used, will need to increase basal rate by 20-40%  Total time spent was 50 min of which >50% was in coordination of care  Reason for consultation:   DKA    History of Present Illness:    Tova Heath is a 55 y o  male with Type 1 diabetes for 45yrs, proliferative retinopathy, retinopathy, HTN, HLD and CAD s/p MINNIE to LAD is admitted with DKA 2" acute covid 19 infection  He reports 2-3 days of N/V and abdominal pain  Presenting glucose was 400 with mild anion gap and elevated BHB 3 4mmol/L  He has Type 1 diabetes since age 6 and has been using medtronic insulin pumps for >10 yrs  Last year, he switched to Medtronic 770G system  Current pump settings as reported by patient as follows:    Basal:  11pm - 7am 2u/hr 7am - 11pm 1u/hr  ICR 1u/10gm ISF 1u/40 TBG 120mg/dL AIT - 4hr      Review of Systems:    Review of Systems   Constitutional: Positive for activity change, appetite change and fatigue  HENT: Negative  Eyes: Negative  Respiratory: Negative  Cardiovascular: Negative for chest pain  Gastrointestinal: Positive for abdominal pain and nausea  Endocrine: Negative  Genitourinary: Negative  Musculoskeletal: Negative  Skin: Negative  Allergic/Immunologic: Negative  Neurological: Negative  Hematological: Negative  Psychiatric/Behavioral: Negative  All other systems reviewed and are negative        Past Medical and Surgical History:     Past Medical History:   Diagnosis Date    Aortic stenosis     Clavicle fracture     LEFT    Diabetes mellitus ([address] )     Hyperlipidemia     Hypertension     Myocardial infarction ([address] )     Thrombocytopenic purpura ([address] )        Past Surgical History:   Procedure Laterality Date    ABDOMINAL SURGERY      HARVEST VEIN Left 9/2/2020    Procedure: HARVEST VEIN ENDOSCOPIC ([facility]) LEFT LEG;  Surgeon: Ryan Portillo MD;  Location: [facility] OR;  Service: Cardiac Surgery    [place] CABG, ARTERY-VEIN, THREE N/A 9/2/2020    Procedure: CORONARY ARTERY BYPASS GRAFT X 3 WITH SVG TO Right Posterior Lateral Branch, OM1 AND LIMA TO LAD ;  Surgeon: Carley Nelson MD;  Location: [facility];  Service: Cardiac Surgery    [place] RPLCMT AORTIC VALVE OPN W/STENTLESS TISSUE VALVE N/A 9/2/2020    Procedure: AORTIC VALVE REPLACEMENT WITH A 25MM SANTOYO INSPIRIS RESILIA  TISSUE AORTIC VALVE;  Surgeon: Carley Nelson MD;  Location: [facility];  Service: Cardiac Surgery    ROTATOR CUFF REPAIR      SPLENECTOMY      TONSILLECTOMY      VITRECTOMY Bilateral     BY ANTERIOR APPROACH        Meds/Allergies:    PTA meds:   Prior to Admission Medications   Prescriptions Last Dose Informant Patient Reported? Taking?    Cholecalciferol (VITAMIN D) 2000 units CAPS  Self Yes No   Sig: TK 1 C PO QD   HumaLOG 100 UNIT/ML injection   No No   Sig: USE UP  UNITS PER DAY VIA INSULIN PUMP   QUEtiapine (SEROquel) 25 mg tablet  Self Yes No   Sig: TAKE 1 TABLET BY MOUTH EVERY DAY EVERY NIGHT   ascorbic acid (VITAMIN C) 500 MG tablet   No No   Sig: Take 1 tablet (500 mg total) by mouth daily   aspirin 325 mg tablet   No No   Sig: Take 1 tablet (325 mg total) by mouth daily   atorvastatin (LIPITOR) 80 mg tablet  Self No No   Sig: Take 1 tablet by mouth daily with dinner   busPIRone (BUSPAR) 15 mg tablet   Yes No   clotrimazole (LOTRIMIN) 1 % cream   No No   Sig: Apply topically 2 (two) times a day   diazepam (VALIUM) 5 mg tablet   No No   Sig: Take 1 tablet (5 mg total) by mouth every 8 (eight) hours as needed for anxiety for up to 10 days   diclofenac (VOLTAREN) 75 mg EC tablet   No No   Sig: Take 1 tablet (75 mg total) by mouth 2 (two) times a day   ferrous sulfate 325 (65 Fe) mg tablet   No No   Sig: Take 1 tablet (325 mg total) by mouth daily with breakfast   gabapentin (NEURONTIN) 300 mg capsule   No No   Sig: Take 1 capsule (300 mg total) by mouth 3 (three) times a day   glucagon 1 MG injection   No No   Sig: Inject 1 mg under the skin once as needed for low blood sugar for up to 1 dose   hydrOXYzine HCL (ATARAX) 25 mg tablet  Self Yes No   Sig: TAKE 1 TO 2 TABLETS BY MOUTH EVERY 8 HOURS AS NEEDED FOR ANXIETY   ibuprofen (MOTRIN) 600 mg tablet   No No   Sig: Take 1 tablet (600 mg total) by mouth every 8 (eight) hours as needed for mild pain   lisinopril (ZESTRIL) 5 mg tablet   No No   Sig: TAKE 1 TABLET(5 MG) BY MOUTH DAILY   metaxalone (SKELAXIN) 800 mg tablet   No No   Sig: Take 1 tablet (800 mg total) by mouth 3 (three) times a day   metoprolol tartrate (LOPRESSOR) 50 mg tablet   No No   Sig: TAKE 1 TABLET(50 MG) BY MOUTH EVERY 12 HOURS   omeprazole (PriLOSEC) 20 mg delayed release capsule  Self Yes No   Sig: Take 20 mg by mouth daily   potassium chloride (K-DUR,KLOR-CON) 20 mEq tablet   No No   Sig: Take 1 tablet (20 mEq total) by mouth 2 (two) times a day for 7 days, THEN 1 tablet (20 mEq total) daily for 7 days  sildenafil (VIAGRA) 50 MG tablet  Self Yes No   Sig: TAKE 1 TABLET BY MOUTH AS  NEEDED FOR ERECTILE  DYSFUNCTION   tadalafil (CIALIS) 20 MG tablet   Yes No   Sig: Take 20 mg by mouth   tiZANidine (Zanaflex) 4 mg tablet   No No   Sig: Take 1 tablet (4 mg total) by mouth every 8 (eight) hours as needed for muscle spasms   torsemide (DEMADEX) 20 mg tablet   No No   Sig: Take 1 tablet (20 mg total) by mouth 2 (two) times a day for 7 days, THEN 1 tablet (20 mg total) daily for 7 days     traMADol (ULTRAM) 50 mg tablet   Yes No   Sig: Take 50 mg by mouth every 6 (six) hours as needed   zolpidem (AMBIEN) 5 mg tablet  Self No No   Sig: Take 1 tablet by mouth daily at bedtime as needed for sleep for up to 2 days      Facility-Administered Medications: None       Allergies: No Known Allergies  History:     Marital Status: /Civil Union   Occupation:   Substance Use History:   Social History     Substance and Sexual Activity   Alcohol Use Not Currently    Alcohol/week: 0 0 standard drinks    Comment: none     Social History     Tobacco Use   Smoking Status Former Smoker    Years: 10 00    Types: Cigars    Quit date: 2020    Years since quittin 4   Smokeless Tobacco Never Used     Social History     Substance and Sexual Activity   Drug Use Yes    Frequency: 3 0 times per week    Types: Marijuana    Comment: none       Family History:    Family History   Problem Relation Age of Onset    Aneurysm Mother         CAREBRAL ARTERY     Coronary artery disease Mother     Diabetes Mother     Stroke Father        Physical Exam:     Vitals:   Blood Pressure: 163/72 (01/10/22 1345)  Pulse: 74 (01/10/22 1345)  Temperature: 98 4 °F (36 9 °C) (01/10/22 0933)  Temp Source: Oral (01/10/22 0933)  Respirations: 18 (01/10/22 1130)  Height: 5' 6" (167 6 cm) (01/10/22 0933)  Weight - Scale: 74 8 kg (165 lb) (01/10/22 0933)  SpO2: 99 % (01/10/22 1345)    Physical Exam  Constitutional:       General: He is not in acute distress  Appearance: He is well-developed  HENT:      Head: Normocephalic and atraumatic  Nose: Nose normal    Eyes:      Conjunctiva/sclera: Conjunctivae normal    Pulmonary:      Effort: Pulmonary effort is normal    Abdominal:      General: There is no distension  Musculoskeletal:      Cervical back: Normal range of motion and neck supple  Skin:     Findings: No rash  Comments: No icterus   Neurological:      Mental Status: He is alert and oriented to person, place, and time  Lab and Imaging Results: I have personally reviewed pertinent films in PACS    Results from last 7 days   Lab Units 01/10/22  1011 01/10/22  0540 01/10/22  0540   WBC Thousand/uL  --   --  18 45*   HEMOGLOBIN g/dL  --   --  14 7   HEMATOCRIT %  --   --  43 5   PLATELETS Thousands/uL 332   < > 367   NEUTROS PCT %  --   --  84*   LYMPHS PCT %  --   --  6*   MONOS PCT %  --   --  9   EOS PCT %  --   --  0    < > = values in this interval not displayed       Results from last 7 days Lab Units 01/10/22  1200 01/10/22  0814 01/10/22  0540   POTASSIUM mmol/L 4 0   < > 4 0   CHLORIDE mmol/L 109*   < > 100   CO2 mmol/L 28   < > 24   BUN mg/dL 22   < > 27*   CREATININE mg/dL 0 95   < > 1 29   CALCIUM mg/dL 7 9*   < > 9 8   ALK PHOS U/L  --   --  229*   ALT U/L  --   --  61   AST U/L  --   --  20    < > = values in this interval not displayed  Results from last 7 days   Lab Units 01/10/22  0540   INR  1 06     POC Glucose (mg/dl)   Date Value   01/10/2022 150 (H)   01/10/2022 175 (H)   01/10/2022 166 (H)   01/10/2022 200 (H)   01/10/2022 271 (H)   01/10/2022 303 (H)   01/10/2022 405 (H)   09/10/2020 173 (H)   09/10/2020 76   09/09/2020 170 (H)         ** Please Note: Dragon 360 Dictation voice to text software may have been used in the creation of this document   **

## 2022-01-10 NOTE — ED PROVIDER NOTES
History  Chief Complaint   Patient presents with    Vomiting     Pt +, c/o n/v/d  Pt +Covid  1/4  Denies CP, SOB or any other sx      54 yo M with PMHx DM I, HTN, MI s/p CABG presents for evaluation of nausea and vomiting x2-3 days, found to be COVID + on 1/4/22  Pt also reports cough but denies fever/chills, abdominal pain, CP, SOB or any other Sx  Has not been eating/drinking at baseline  Denies any functional issues of insulin pump  No other concerns  Prior to Admission Medications   Prescriptions Last Dose Informant Patient Reported? Taking?    Cholecalciferol (VITAMIN D) 2000 units CAPS  Self Yes No   Sig: TK 1 C PO QD   HumaLOG 100 UNIT/ML injection   No No   Sig: USE UP  UNITS PER DAY VIA INSULIN PUMP   QUEtiapine (SEROquel) 25 mg tablet  Self Yes No   Sig: TAKE 1 TABLET BY MOUTH EVERY DAY EVERY NIGHT   ascorbic acid (VITAMIN C) 500 MG tablet   No No   Sig: Take 1 tablet (500 mg total) by mouth daily   aspirin 325 mg tablet   No No   Sig: Take 1 tablet (325 mg total) by mouth daily   atorvastatin (LIPITOR) 80 mg tablet  Self No No   Sig: Take 1 tablet by mouth daily with dinner   busPIRone (BUSPAR) 15 mg tablet   Yes No   clotrimazole (LOTRIMIN) 1 % cream   No No   Sig: Apply topically 2 (two) times a day   diazepam (VALIUM) 5 mg tablet   No No   Sig: Take 1 tablet (5 mg total) by mouth every 8 (eight) hours as needed for anxiety for up to 10 days   diclofenac (VOLTAREN) 75 mg EC tablet   No No   Sig: Take 1 tablet (75 mg total) by mouth 2 (two) times a day   ferrous sulfate 325 (65 Fe) mg tablet   No No   Sig: Take 1 tablet (325 mg total) by mouth daily with breakfast   gabapentin (NEURONTIN) 300 mg capsule   No No   Sig: Take 1 capsule (300 mg total) by mouth 3 (three) times a day   glucagon 1 MG injection   No No   Sig: Inject 1 mg under the skin once as needed for low blood sugar for up to 1 dose   hydrOXYzine HCL (ATARAX) 25 mg tablet  Self Yes No   Sig: TAKE 1 TO 2 TABLETS BY MOUTH EVERY 8 HOURS AS NEEDED FOR ANXIETY   ibuprofen (MOTRIN) 600 mg tablet   No No   Sig: Take 1 tablet (600 mg total) by mouth every 8 (eight) hours as needed for mild pain   lisinopril (ZESTRIL) 5 mg tablet   No No   Sig: TAKE 1 TABLET(5 MG) BY MOUTH DAILY   metaxalone (SKELAXIN) 800 mg tablet   No No   Sig: Take 1 tablet (800 mg total) by mouth 3 (three) times a day   metoprolol tartrate (LOPRESSOR) 50 mg tablet   No No   Sig: TAKE 1 TABLET(50 MG) BY MOUTH EVERY 12 HOURS   omeprazole (PriLOSEC) 20 mg delayed release capsule  Self Yes No   Sig: Take 20 mg by mouth daily   potassium chloride (K-DUR,KLOR-CON) 20 mEq tablet   No No   Sig: Take 1 tablet (20 mEq total) by mouth 2 (two) times a day for 7 days, THEN 1 tablet (20 mEq total) daily for 7 days  sildenafil (VIAGRA) 50 MG tablet  Self Yes No   Sig: TAKE 1 TABLET BY MOUTH AS  NEEDED FOR ERECTILE  DYSFUNCTION   tadalafil (CIALIS) 20 MG tablet   Yes No   Sig: Take 20 mg by mouth   tiZANidine (Zanaflex) 4 mg tablet   No No   Sig: Take 1 tablet (4 mg total) by mouth every 8 (eight) hours as needed for muscle spasms   torsemide (DEMADEX) 20 mg tablet   No No   Sig: Take 1 tablet (20 mg total) by mouth 2 (two) times a day for 7 days, THEN 1 tablet (20 mg total) daily for 7 days     traMADol (ULTRAM) 50 mg tablet   Yes No   Sig: Take 50 mg by mouth every 6 (six) hours as needed   zolpidem (AMBIEN) 5 mg tablet  Self No No   Sig: Take 1 tablet by mouth daily at bedtime as needed for sleep for up to 2 days      Facility-Administered Medications: None       Past Medical History:   Diagnosis Date    Aortic stenosis     Clavicle fracture     LEFT    [facility]     Hyperlipidemia     Hypertension     Myocardial infarction ([facility] [facility]     Thrombocytopenic purpura ([facility] [address] )        Past Surgical History:   Procedure Laterality Date    ABDOMINAL SURGERY      HARVEST VEIN Left 9/2/2020    Procedure: HARVEST VEIN ENDOSCOPIC (EVH) LEFT LEG;  Surgeon: Francia Cohen MD; [facility]: [facility];  Service: Cardiac Surgery    LA CABG, ARTERY-VEIN, THREE N/A 2020    Procedure: CORONARY ARTERY BYPASS GRAFT X 3 WITH SVG TO Right Posterior Lateral Branch, OM1 AND LIMA TO LAD ;  Surgeon: Barbara Pisano MD;  [facility];  Service: Cardiac Surgery    LA RPLCMT AORTIC VALVE OPN W/STENTLESS TISSUE VALVE N/A 2020    Procedure: AORTIC VALVE REPLACEMENT WITH A 25MM SANTOYO INSPIRIS RESILIA  TISSUE AORTIC VALVE;  Surgeon: Barbara Pisano MD;  [facility];  Service: Cardiac Surgery    ROTATOR CUFF REPAIR      SPLENECTOMY      TONSILLECTOMY      VITRECTOMY Bilateral     BY ANTERIOR APPROACH        Family History   Problem Relation Age of Onset    Aneurysm Mother         CAREBRAL ARTERY     Coronary artery disease Mother     Diabetes Mother     Stroke Father      I have reviewed and agree with the history as documented  E-Cigarette/Vaping    E-Cigarette Use Never User     Cartridges/Day none      E-Cigarette/Vaping Substances    Nicotine No     THC No     CBD No     Flavoring No     Other No     Unknown No      Social History     Tobacco Use    Smoking status: Former Smoker     Years: 10 00     Types: Cigars     Quit date: 2020     Years since quittin 4    Smokeless tobacco: Never Used   Vaping Use    Vaping Use: Never used   Substance Use Topics    Alcohol use: Not Currently     Alcohol/week: 0 0 standard drinks     Comment: none    Drug use: Yes     Frequency: 3 0 times per week     Types: Marijuana     Comment: none        Review of Systems   Constitutional: Negative  Negative for chills and fever  HENT: Negative  Eyes: Negative  Respiratory: Positive for cough  Negative for shortness of breath  Cardiovascular: Negative  Negative for chest pain  Gastrointestinal: Positive for nausea and vomiting  Negative for abdominal pain  Endocrine: Negative  Genitourinary: Negative  Musculoskeletal: Negative  Skin: Negative  Allergic/Immunologic: Negative  Neurological: Negative  Hematological: Negative  Psychiatric/Behavioral: Negative  All other systems reviewed and are negative  Physical Exam  ED Triage Vitals   Temperature Pulse Respirations Blood Pressure SpO2   01/10/22 0524 01/10/22 0524 01/10/22 0524 01/10/22 0524 01/10/22 0524   99 3 °F (37 4 °C) (!) 126 (!) 24 (!) 186/86 99 %      Temp Source Heart Rate Source Patient Position - Orthostatic VS BP Location FiO2 (%)   01/10/22 0524 01/10/22 0524 01/10/22 0524 01/10/22 0524 --   Oral Monitor Lying Right arm       Pain Score       01/10/22 0933       9             Orthostatic Vital Signs  Vitals:    01/10/22 0700 01/10/22 0800 01/10/22 0933 01/10/22 1024   BP: (!) 180/79 (!) 175/75 (!) 177/81 168/96   Pulse: (!) 114 (!) 120 (!) 121 (!) 114   Patient Position - Orthostatic VS: Lying Lying Sitting        Physical Exam  Vitals reviewed  Constitutional:       Appearance: Normal appearance  HENT:      Head: Normocephalic and atraumatic  Right Ear: External ear normal       Left Ear: External ear normal       Nose: Nose normal       Mouth/Throat:      Mouth: Mucous membranes are dry  Eyes:      Extraocular Movements: Extraocular movements intact  Cardiovascular:      Rate and Rhythm: Regular rhythm  Tachycardia present  Pulses: Normal pulses  Heart sounds: Normal heart sounds  No murmur heard  Pulmonary:      Effort: Pulmonary effort is normal  No respiratory distress  Breath sounds: Normal breath sounds  No wheezing or rales  Abdominal:      General: Abdomen is flat  Palpations: Abdomen is soft  Tenderness: There is no abdominal tenderness  There is no guarding or rebound  Musculoskeletal:         General: Normal range of motion  Cervical back: Normal range of motion  Skin:     General: Skin is warm and dry  Capillary Refill: Capillary refill takes less than 2 seconds     Neurological:      Mental Status: He is alert and oriented to person, place, and time  Mental status is at baseline     Psychiatric:         Mood and Affect: Mood normal          Behavior: Behavior normal          ED Medications  Medications   calcium carbonate (TUMS) chewable tablet 500 mg (500 mg Oral Given 1/10/22 0947)   ondansetron (ZOFRAN) injection 4 mg (4 mg Intravenous Given 1/10/22 0946)   aspirin tablet 325 mg (325 mg Oral Given 1/10/22 1024)   atorvastatin (LIPITOR) tablet 80 mg (has no administration in time range)   gabapentin (NEURONTIN) capsule 300 mg (300 mg Oral Given 1/10/22 1024)   hydrOXYzine HCL (ATARAX) tablet 10 mg (has no administration in time range)   lisinopril (ZESTRIL) tablet 5 mg (5 mg Oral Given 1/10/22 1024)   metaxalone (SKELAXIN) tablet 800 mg (has no administration in time range)   metoprolol tartrate (LOPRESSOR) tablet 50 mg (50 mg Oral Given 1/10/22 1024)   pantoprazole (PROTONIX) EC tablet 40 mg (40 mg Oral Given 1/10/22 1024)   QUEtiapine (SEROquel) tablet 25 mg (has no administration in time range)   heparin (porcine) subcutaneous injection 5,000 Units (has no administration in time range)   multi-electrolyte (ISOLYTE-S PH 7 4) bolus 1,000 mL (1,000 mL Intravenous New Bag 1/10/22 1008)   dextrose 5 % and sodium chloride 0 9 % infusion (250 mL/hr Intravenous New Bag 1/10/22 1017)   insulin regular (HumuLIN R,NovoLIN R) 1 Units/mL in sodium chloride 0 9 % 100 mL infusion (7 5 Units/hr Intravenous New Bag 1/10/22 1014)   multi-electrolyte (ISOLYTE-S PH 7 4 equivalent) IV solution (has no administration in time range)     Followed by   multi-electrolyte (ISOLYTE-S PH 7 4 equivalent) IV solution (has no administration in time range)   ondansetron (FOR EMS ONLY) (ZOFRAN) 4 mg/2 mL injection 4 mg (0 mg Does not apply Given to EMS 1/10/22 0526)       Diagnostic Studies  Results Reviewed     Procedure Component Value Units Date/Time    Platelet count [[id number]]  (Normal) Collected: 01/10/22 1011    Lab Status: Final result Specimen: Blood from Arm, Left Updated: 01/10/22 1027     Platelets 405 Thousands/uL      MPV 10 7 fL     HS Troponin I 4hr [[id number]] Collected: 01/10/22 1011    Lab Status: In process Specimen: Blood from Arm, Left Updated: 01/10/22 1022    Fingerstick Glucose (POCT) [[id number]]  (Abnormal) Collected: 01/10/22 1009    Lab Status: Final result Updated: 01/10/22 1013     POC Glucose 200 mg/dl     Basic metabolic panel [[id number]]     Lab Status: No result Specimen: Blood     Magnesium [[id number]]     Lab Status: No result Specimen: Blood     Phosphorus [[id number]]     Lab Status: No result Specimen: Blood     Hemoglobin A1C [[id number]] Collected: 01/10/22 0540    Lab Status: In process Specimen: Blood from Arm, Right Updated: 01/10/22 1007    Fingerstick Glucose (POCT) [[id number]]  (Abnormal) Collected: 01/10/22 0914    Lab Status: Final result Updated: 01/10/22 0918     POC Glucose 271 mg/dl     HS Troponin I 2hr [[id number]]  (Abnormal) Collected: 01/10/22 0814    Lab Status: Final result Specimen: Blood from Arm, Right Updated: 01/10/22 0910     hs TnI 2hr 77 ng/L      Delta 2hr hsTnI 11 ng/L     Lactic acid 2 Hours [[id number]]  (Normal) Collected: 01/10/22 0814    Lab Status: Final result Specimen: Blood from Arm, Right Updated: 01/10/22 0853     LACTIC ACID 1 2 mmol/L     Narrative:      Result may be elevated if tourniquet was used during collection      Basic metabolic panel [[id number]]  (Abnormal) Collected: 01/10/22 0814    Lab Status: Final result Specimen: Blood from Arm, Right Updated: 01/10/22 0844     Sodium 144 mmol/L      Potassium 3 8 mmol/L      Chloride 108 mmol/L      CO2 23 mmol/L      ANION GAP 13 mmol/L      BUN 25 mg/dL      Creatinine 1 03 mg/dL      Glucose 340 mg/dL      Calcium 9 0 mg/dL      eGFR 81 ml/min/1 73sq m     Narrative:      [place] guidelines for Chronic Kidney Disease (CKD):     Stage 1 with normal or high GFR (GFR > 90 mL/min/1 73 square meters)    Stage 2 Mild CKD (GFR = 60-89 mL/min/1 73 square meters)    Stage 3A Moderate CKD (GFR = 45-59 mL/min/1 73 square meters)    Stage 3B Moderate CKD (GFR = 30-44 mL/min/1 73 square meters)    Stage 4 Severe CKD (GFR = 15-29 mL/min/1 73 square meters)    Stage 5 End Stage CKD (GFR <15 mL/min/1 73 square meters)  Note: GFR calculation is accurate only with a steady state creatinine    Magnesium [[id number]]  (Normal) Collected: 01/10/22 0814    Lab Status: Final result Specimen: Blood from Arm, Right Updated: 01/10/22 0844     Magnesium 2 0 mg/dL     Phosphorus [[id number]]  (Abnormal) Collected: 01/10/22 0814    Lab Status: Final result Specimen: Blood from Arm, Right Updated: 01/10/22 0844     Phosphorus 2 6 mg/dL     Fingerstick Glucose (POCT) [[id number]]  (Abnormal) Collected: 01/10/22 0807    Lab Status: Final result Updated: 01/10/22 0818     POC Glucose 303 mg/dl     Urine Microscopic [[id number]]  (Normal) Collected: 01/10/22 0657    Lab Status: Final result Specimen: Urine, Clean Catch Updated: 01/10/22 0728     RBC, UA 0-1 /hpf      WBC, UA None Seen /hpf      Epithelial Cells None Seen /hpf      Bacteria, UA None Seen /hpf     UA w Reflex to Microscopic w Reflex to Culture [[id number]]  (Abnormal) Collected: 01/10/22 0657    Lab Status: Final result Specimen: Urine, Clean Catch Updated: 01/10/22 0711     Color, UA Yellow     Clarity, UA Clear     Specific Gravity, UA 1 015     pH, UA 5 5     Leukocytes, UA Elevated glucose may cause decreased leukocyte values   See urine microscopic for [facility] result/     Nitrite, UA Negative     Protein, UA Trace mg/dl      Glucose, UA >=1000 (1%) mg/dl      Ketones, UA 40 (2+) mg/dl      Urobilinogen, UA 0 2 E U /dl      Bilirubin, UA Negative     Blood, UA Small    HS Troponin 0hr (reflex protocol) [[id number]]  (Abnormal) Collected: 01/10/22 0613    Lab Status: Final result Specimen: Blood from Arm, Right Updated: 01/10/22 0706     hs TnI 0hr 66 ng/L     Blood culture #1 [[id number]] Collected: 01/10/22 0656    Lab Status: In process Specimen: Blood from Arm, Left Updated: 01/10/22 0702    Lactic acid [[id number]]  (Abnormal) Collected: 01/10/22 0540    Lab Status: Final result Specimen: Blood from Arm, Right Updated: 01/10/22 5320     LACTIC ACID 2 3 mmol/L     Narrative:      Result may be elevated if tourniquet was used during collection      Beta Hydroxybutyrate [[id number]]  (Abnormal) Collected: 01/10/22 0613    Lab Status: Final result Specimen: Blood from Arm, Right Updated: 01/10/22 0627     BETA-HYDROXYBUTYRATE 3 4 mmol/L     Blood gas, venous [[id number]]  (Abnormal) Collected: 01/10/22 0613    Lab Status: Final result Specimen: Blood from Arm, Right Updated: 01/10/22 0621     pH, Mychal 7 456     pCO2, Mychal 36 2 mm Hg      pO2, Mychal 32 9 mm Hg      HCO3, Mychal 24 9 mmol/L      Base Excess, Mychal 1 4 mmol/L      O2 Content, Mychal 14 0 ml/dL      O2 HGB, VENOUS 65 4 %     Comprehensive metabolic panel [[id number]]  (Abnormal) Collected: 01/10/22 0540    Lab Status: Final result Specimen: Blood from Arm, Right Updated: 01/10/22 0618     Sodium 140 mmol/L      Potassium 4 0 mmol/L      Chloride 100 mmol/L      CO2 24 mmol/L      ANION GAP 16 mmol/L      BUN 27 mg/dL      Creatinine 1 29 mg/dL      Glucose 420 mg/dL      Calcium 9 8 mg/dL      AST 20 U/L      ALT 61 U/L      Alkaline Phosphatase 229 U/L      Total Protein 9 0 g/dL      Albumin 4 2 g/dL      Total Bilirubin 0 38 mg/dL      eGFR 62 ml/min/1 73sq m     Narrative:      [place] guidelines for Chronic Kidney Disease (CKD):     Stage 1 with normal or high GFR (GFR > 90 mL/min/1 73 square meters)    Stage 2 Mild CKD (GFR = 60-89 mL/min/1 73 square meters)    Stage 3A Moderate CKD (GFR = 45-59 mL/min/1 73 square meters)    Stage 3B Moderate CKD (GFR = 30-44 mL/min/1 73 square meters)    Stage 4 Severe CKD (GFR = 15-29 mL/min/1 73 square meters)    Stage 5 End Stage CKD (GFR <15 mL/min/1 73 square meters)  Note: GFR calculation is accurate only with a steady state creatinine    CBC and differential [[id number]]  (Abnormal) Collected: 01/10/22 0540    Lab Status: Final result Specimen: Blood from Arm, Right Updated: 01/10/22 0609     WBC 18 45 Thousand/uL      RBC 5 06 Million/uL      Hemoglobin 14 7 g/dL      Hematocrit 43 5 %      MCV 86 fL      MCH 29 1 pg      MCHC 33 8 g/dL      RDW 14 6 %      MPV 11 7 fL      Platelets 401 Thousands/uL      nRBC 0 /100 WBCs      Neutrophils Relative 84 %      Immat GRANS % 1 %      Lymphocytes Relative 6 %      Monocytes Relative 9 %      Eosinophils Relative 0 %      Basophils Relative 0 %      Neutrophils Absolute 15 51 Thousands/µL      Immature Grans Absolute 0 11 Thousand/uL      Lymphocytes Absolute 1 16 Thousands/µL      Monocytes Absolute 1 63 Thousand/µL      Eosinophils Absolute 0 00 Thousand/µL      Basophils Absolute 0 04 Thousands/µL     Protime-INR [[id number]]  (Normal) Collected: 01/10/22 0540    Lab Status: Final result Specimen: Blood from Arm, Right Updated: 01/10/22 0604     Protime 13 8 seconds      INR 1 06    APTT [[id number]]  (Normal) Collected: 01/10/22 0540    Lab Status: Final result Specimen: Blood from Arm, Right Updated: 01/10/22 0604     PTT 28 seconds     Fingerstick Glucose (POCT) [[id number]]  (Abnormal) Collected: 01/10/22 0533    Lab Status: Final result Updated: 01/10/22 0554     POC Glucose 405 mg/dl     Blood culture #2 [[id number]] Collected: 01/10/22 0540    Lab Status: In process Specimen: Blood from Arm, Right Updated: 01/10/22 0545    Procalcitonin with AM Reflex [[id number]] Collected: 01/10/22 0540    Lab Status:  In process Specimen: Blood from Arm, Right Updated: 01/10/22 0544                 XR chest 1 view portable    (Results Pending)         Procedures  Procedures      ED Course                                       MDM  Number of Diagnoses or Management Options  COVID-19  DKA, type 1 (HCC)  Nausea and vomiting  Diagnosis management comments: 56 yo M with PMHx DM I, HTN, MI s/p CABG presents for evaluation of nausea and vomiting x2-3 days, found to be COVID + on 1/4/22  Pt found to be in DKA per elevated beta hydroxybutyrate, glucose with anion gap 16 despite pH >7 4 (due to profuse N/V)  Pt remained stable  Insulin drip started, turned off subq pump  Elevated Lactate  Pt given 1000 cc NS which was started via EMS and another 1000 cc for repletion  Admitted to CC to step down 1 for continued management of DKA  Pt understands and agrees with plan  All questions answered  No other concerns  Amount and/or Complexity of Data Reviewed  Clinical lab tests: ordered and reviewed  Tests in the radiology section of CPT®: ordered and reviewed  Tests in the medicine section of CPT®: reviewed and ordered    Risk of Complications, Morbidity, and/or Mortality  Presenting problems: high  Diagnostic procedures: high  Management options: high    Patient Progress  Patient progress: stable      Disposition  Final diagnoses:   DKA, type 1 ([address] )   Nausea and vomiting   COVID-19     Time reflects when diagnosis was documented in both MDM as applicable and the Disposition within this note     Time User Action Codes Description Comment    1/10/2022  7:12 AM Malorie Cast Add [E10 10] DKA, type 1 ([address] )     1/10/2022  7:12 AM Malorie Cast Add [R11 2] Nausea and vomiting     1/10/2022  7:12 AM Alana Foster Add [U07 1] COVID-19       ED Disposition     ED Disposition Condition Date/Time Comment    Admit Stable Mon Garth 10, 2022  7:12 AM Case was discussed with [place] and the patient's admission status was agreed to be Admission Status: inpatient status to the service of Dr David Agrawal   Follow-up Information    None         Patient's Medications   Discharge Prescriptions    No medications on file     No discharge procedures on file      PDMP Review       Value Time User    PDMP Reviewed  Yes 9/10/2020 12:13 PM Marylou RAY Brian De La Garza           ED Provider  Attending physically available and evaluated Chris Berumenivania RAMÍREZ managed the patient along with the ED Attending      Electronically Signed by         Artis Ramos MD  01/10/22 1866

## 2022-01-10 NOTE — ED NOTES
Fingerstick blood sugar 166, insulin drip kept as 7 5 units/hr      Bahman Salmeron RN  01/10/22 1122

## 2022-01-10 NOTE — ASSESSMENT & PLAN NOTE
· Patient on room air  · Not in high risk category for monoclonal antibodies  · Will place on mild pathway, if patient requires oxygen can start remdesivir/steroids

## 2022-01-10 NOTE — ED NOTES
Received report from Felipa Anguiano RN and assumed care of patient at this time        Mathew Traylor RN  01/10/22 4860

## 2022-01-10 NOTE — ED NOTES
Resting on gurney with intermittent nausea  Breathing easy and unlabored  Sinus tach on cardiac monitor, offers no complains, IVF/ meds infusing without distress  Will continue to monitor patient and anticipate needs        Gustavo Chambers RN  01/10/22 1700

## 2022-01-11 PROBLEM — R79.89 TROPONIN LEVEL ELEVATED: Status: ACTIVE | Noted: 2022-01-11

## 2022-01-11 PROBLEM — D72.829 LEUKOCYTOSIS: Status: ACTIVE | Noted: 2022-01-11

## 2022-01-11 PROBLEM — E87.6 HYPOKALEMIA: Status: ACTIVE | Noted: 2022-01-11

## 2022-01-11 PROBLEM — R77.8 TROPONIN LEVEL ELEVATED: Status: ACTIVE | Noted: 2022-01-11

## 2022-01-11 LAB
ANION GAP SERPL CALCULATED.3IONS-SCNC: 8 MMOL/L (ref 4–13)
BUN SERPL-MCNC: 14 MG/DL (ref 5–25)
CALCIUM SERPL-MCNC: 8 MG/DL (ref 8.3–10.1)
CHLORIDE SERPL-SCNC: 104 MMOL/L (ref 100–108)
CO2 SERPL-SCNC: 26 MMOL/L (ref 21–32)
CREAT SERPL-MCNC: 0.67 MG/DL (ref 0.6–1.3)
ERYTHROCYTE [DISTWIDTH] IN BLOOD BY AUTOMATED COUNT: 14.5 % (ref 11.6–15.1)
GFR SERPL CREATININE-BSD FRML MDRD: 108 ML/MIN/1.73SQ M
GLUCOSE SERPL-MCNC: 112 MG/DL (ref 65–140)
GLUCOSE SERPL-MCNC: 127 MG/DL (ref 65–140)
GLUCOSE SERPL-MCNC: 133 MG/DL (ref 65–140)
GLUCOSE SERPL-MCNC: 161 MG/DL (ref 65–140)
GLUCOSE SERPL-MCNC: 180 MG/DL (ref 65–140)
GLUCOSE SERPL-MCNC: 180 MG/DL (ref 65–140)
GLUCOSE SERPL-MCNC: 196 MG/DL (ref 65–140)
GLUCOSE SERPL-MCNC: 207 MG/DL (ref 65–140)
GLUCOSE SERPL-MCNC: 224 MG/DL (ref 65–140)
HCT VFR BLD AUTO: 33.2 % (ref 36.5–49.3)
HGB BLD-MCNC: 11.3 G/DL (ref 12–17)
MAGNESIUM SERPL-MCNC: 2.1 MG/DL (ref 1.6–2.6)
MCH RBC QN AUTO: 29.7 PG (ref 26.8–34.3)
MCHC RBC AUTO-ENTMCNC: 34 G/DL (ref 31.4–37.4)
MCV RBC AUTO: 87 FL (ref 82–98)
PHOSPHATE SERPL-MCNC: 2.6 MG/DL (ref 2.7–4.5)
PLATELET # BLD AUTO: 273 THOUSANDS/UL (ref 149–390)
PMV BLD AUTO: 10.9 FL (ref 8.9–12.7)
POTASSIUM SERPL-SCNC: 3.4 MMOL/L (ref 3.5–5.3)
PROCALCITONIN SERPL-MCNC: 0.2 NG/ML
RBC # BLD AUTO: 3.81 MILLION/UL (ref 3.88–5.62)
SODIUM SERPL-SCNC: 138 MMOL/L (ref 136–145)
WBC # BLD AUTO: 20.37 THOUSAND/UL (ref 4.31–10.16)

## 2022-01-11 PROCEDURE — 80048 BASIC METABOLIC PNL TOTAL CA: CPT | Performed by: PHYSICIAN ASSISTANT

## 2022-01-11 PROCEDURE — 84145 PROCALCITONIN (PCT): CPT | Performed by: PHYSICIAN ASSISTANT

## 2022-01-11 PROCEDURE — 83735 ASSAY OF MAGNESIUM: CPT | Performed by: PHYSICIAN ASSISTANT

## 2022-01-11 PROCEDURE — 99232 SBSQ HOSP IP/OBS MODERATE 35: CPT | Performed by: INTERNAL MEDICINE

## 2022-01-11 PROCEDURE — 85027 COMPLETE CBC AUTOMATED: CPT | Performed by: PHYSICIAN ASSISTANT

## 2022-01-11 PROCEDURE — 82948 REAGENT STRIP/BLOOD GLUCOSE: CPT

## 2022-01-11 PROCEDURE — 99232 SBSQ HOSP IP/OBS MODERATE 35: CPT | Performed by: PHYSICIAN ASSISTANT

## 2022-01-11 PROCEDURE — 84100 ASSAY OF PHOSPHORUS: CPT | Performed by: PHYSICIAN ASSISTANT

## 2022-01-11 RX ORDER — POTASSIUM CHLORIDE 20 MEQ/1
40 TABLET, EXTENDED RELEASE ORAL ONCE
Status: COMPLETED | OUTPATIENT
Start: 2022-01-11 | End: 2022-01-11

## 2022-01-11 RX ORDER — ECHINACEA PURPUREA EXTRACT 125 MG
2 TABLET ORAL
Status: DISCONTINUED | OUTPATIENT
Start: 2022-01-11 | End: 2022-01-13 | Stop reason: HOSPADM

## 2022-01-11 RX ORDER — FLUTICASONE PROPIONATE 50 MCG
2 SPRAY, SUSPENSION (ML) NASAL DAILY
Status: DISCONTINUED | OUTPATIENT
Start: 2022-01-11 | End: 2022-01-13 | Stop reason: HOSPADM

## 2022-01-11 RX ORDER — LISINOPRIL 5 MG/1
5 TABLET ORAL DAILY
Status: DISCONTINUED | OUTPATIENT
Start: 2022-01-12 | End: 2022-01-13 | Stop reason: HOSPADM

## 2022-01-11 RX ADMIN — LISINOPRIL 5 MG: 5 TABLET ORAL at 08:11

## 2022-01-11 RX ADMIN — HEPARIN SODIUM 5000 UNITS: 5000 INJECTION INTRAVENOUS; SUBCUTANEOUS at 22:18

## 2022-01-11 RX ADMIN — METAXALONE 800 MG: 800 TABLET ORAL at 17:02

## 2022-01-11 RX ADMIN — POTASSIUM CHLORIDE 40 MEQ: 1500 TABLET, EXTENDED RELEASE ORAL at 14:18

## 2022-01-11 RX ADMIN — HEPARIN SODIUM 5000 UNITS: 5000 INJECTION INTRAVENOUS; SUBCUTANEOUS at 06:07

## 2022-01-11 RX ADMIN — HYDROXYZINE HYDROCHLORIDE 10 MG: 10 TABLET ORAL at 08:26

## 2022-01-11 RX ADMIN — HEPARIN SODIUM 5000 UNITS: 5000 INJECTION INTRAVENOUS; SUBCUTANEOUS at 14:18

## 2022-01-11 RX ADMIN — METOPROLOL TARTRATE 50 MG: 50 TABLET, FILM COATED ORAL at 20:04

## 2022-01-11 RX ADMIN — SODIUM CHLORIDE, SODIUM GLUCONATE, SODIUM ACETATE, POTASSIUM CHLORIDE, MAGNESIUM CHLORIDE, SODIUM PHOSPHATE, DIBASIC, AND POTASSIUM PHOSPHATE 75 ML/HR: .53; .5; .37; .037; .03; .012; .00082 INJECTION, SOLUTION INTRAVENOUS at 22:21

## 2022-01-11 RX ADMIN — GABAPENTIN 300 MG: 300 CAPSULE ORAL at 17:02

## 2022-01-11 RX ADMIN — ATORVASTATIN CALCIUM 80 MG: 40 TABLET, FILM COATED ORAL at 17:02

## 2022-01-11 RX ADMIN — METAXALONE 800 MG: 800 TABLET ORAL at 22:19

## 2022-01-11 RX ADMIN — SALINE NASAL SPRAY 2 SPRAY: 1.5 SOLUTION NASAL at 22:22

## 2022-01-11 RX ADMIN — QUETIAPINE FUMARATE 25 MG: 25 TABLET ORAL at 22:18

## 2022-01-11 RX ADMIN — METOPROLOL TARTRATE 50 MG: 50 TABLET, FILM COATED ORAL at 08:11

## 2022-01-11 RX ADMIN — ASPIRIN 325 MG ORAL TABLET 325 MG: 325 PILL ORAL at 08:11

## 2022-01-11 RX ADMIN — CALCIUM CARBONATE (ANTACID) CHEW TAB 500 MG 500 MG: 500 CHEW TAB at 20:05

## 2022-01-11 RX ADMIN — METAXALONE 800 MG: 800 TABLET ORAL at 10:13

## 2022-01-11 RX ADMIN — PANTOPRAZOLE SODIUM 40 MG: 40 TABLET, DELAYED RELEASE ORAL at 06:07

## 2022-01-11 RX ADMIN — GABAPENTIN 300 MG: 300 CAPSULE ORAL at 20:04

## 2022-01-11 RX ADMIN — GABAPENTIN 300 MG: 300 CAPSULE ORAL at 08:11

## 2022-01-11 NOTE — PROGRESS NOTES
[facility]  Progress Note - Jared Glass 1965, 55 y o  male MRN: [id number]  Unit/Bed#: S -Arsenio Encounter: [id number]  Primary Care Provider: Yana Pedraza DO   Date and time admitted to hospital: 1/10/2022  5:22 AM    * DKA (diabetic ketoacidosis) [facility])  Assessment & Plan  Lab Results   Component Value Date    HGBA1C 8 0 (H) 01/10/2022       Recent Labs     01/11/22  0544 01/11/22  0809 01/11/22  1011 01/11/22  1200   POCGLU 180* 127 224* 196*       Blood Sugar Average: Last 72 hrs:  (P) 195 [id number]   patient with underlying history of type 1 diabetes- A1c reflects reasonable control  · Patient presented to the hospital due to blood sugar greater than 500 in the setting of very poor oral intake/vomiting  Beta hydroxybutyrate was 3 4 and lactic acid was 2 3, creat was 1 29  Now improved after receiving IV fluids  · Initially he required an insulin drip but now is stabilizing and will be transitioned back to his insulin pump per endocrinology    Leukocytosis  Assessment & Plan  · Noted that patient has significant leukocytosis of 20,000, this has risen slightly since admission  No fever  · Also with mild procalcitonin elevation which is trending down  · Blood cultures negative at 24 hours  Chest x-ray negative  UA negative  · Patient did not receive any steroids  · Possibly stress response  Will recheck in a m     non MI troponin elevation  Assessment & Plan  · Patient noted to have minimal troponin elevation 66--77--84, with no reports of chest pain; he was quite concerned when he saw these values in his chart as he has underlying history of heart disease  · Suspect this was simply non MI troponin minimal elevation in the setting of DKA    COVID-19  Assessment & Plan  · Vaccinated x 2  Tested positive on 1/4/22     · Chest x-ray unremarkable  · Patient without any respiratory issues  · Suspect patient's nausea vomiting and diarrhea were due to COVID--continue supportive care  · Also with severe nasal congestion--add Flonase and Ocean nasal spray  Counseled patient that he cannot continue to use Afrin due to rhinitis medicamentosa    Essential hypertension  Assessment & Plan  · Continue lisinopril and Lopressor  Noted that blood pressure is mildly elevated    Hypokalemia  Assessment & Plan  · K 3 4, replete and recheck in am    Coronary artery disease involving native coronary artery of native heart without angina pectoris  Assessment & Plan  · With history of CABG    VTE Pharmacologic Prophylaxis:   Pharmacologic: Heparin  Mechanical VTE Prophylaxis in Place: No    Patient Centered Rounds: I have performed bedside rounds with nursing staff today  Discussions with Specialists or Other Care Team Provider:     Education and Discussions with Family / Patient:  Offered, patient declined    Time Spent for Care: 30 minutes  More than 50% of total time spent on counseling and coordination of care as described above  Current Length of Stay: 1 day(s)    Current Patient Status: Inpatient   Certification Statement: The patient will continue to require additional inpatient hospital stay due to Ongoing monitoring overnight of white blood cell count and electrolytes    Discharge Plan:  Likely discharge tomorrow if stable    Code Status: Level 1 - Full Code    Subjective:   Patient reports that he feels so much better at this time and the nausea, vomiting, diarrhea he was initially having has improved  He is tolerating diet now and is drinking plenty of fluids, whereas at home he could not keep anything down  He stated that he read his chart and was very concerned about his elevated heart numbers and what they mean since he does have a history of heart disease  He reiterates that he did not have any chest pain and never had any shortness of breath and nursing reassures me has not needed any oxygen  He is not having any cough    He tells me he has not had any traditional COVID symptoms since testing positive  He does state that he has significant nasal congestion which is worse at night and has been using Afrin for multiple days now    Objective:     Vitals:   Temp (24hrs), Av 9 °F (37 2 °C), Min:98 7 °F (37 1 °C), Max:99 °F (37 2 °C)    Temp:  [98 7 °F (37 1 °C)-99 °F (37 2 °C)] 98 8 °F (37 1 °C)  HR:  [67-85] 67  Resp:  [18] 18  BP: (118-186)/(58-86) 162/72  SpO2:  [92 %-98 %] 92 %  Body mass index is 26 63 kg/m²  Input and Output Summary (last 24 hours): Intake/Output Summary (Last 24 hours) at 2022 1451  Last data filed at 2022 1419  Gross per 24 hour   Intake --   Output 1725 ml   Net -1725 ml       Physical Exam:     Physical Exam  Vitals reviewed  Constitutional:       General: He is not in acute distress  Appearance: Normal appearance  He is not ill-appearing, toxic-appearing or diaphoretic  HENT:      Nose: Congestion present  Comments: Sounds very congested  Eyes:      General: No scleral icterus  Right eye: No discharge  Left eye: No discharge  Conjunctiva/sclera: Conjunctivae normal    Cardiovascular:      Rate and Rhythm: Normal rate and regular rhythm  Heart sounds: No murmur heard  Pulmonary:      Effort: No respiratory distress  Breath sounds: No stridor  No wheezing, rhonchi or rales  Abdominal:      General: Bowel sounds are normal  There is no distension  Palpations: Abdomen is soft  Tenderness: There is no abdominal tenderness  There is no guarding  Musculoskeletal:         General: No swelling, tenderness, deformity or signs of injury  Right lower leg: No edema  Left lower leg: No edema  Skin:     General: Skin is warm and dry  Coloration: Skin is not jaundiced or pale  Findings: No bruising, erythema, lesion or rash  Neurological:      General: No focal deficit present  Mental Status: He is alert  Mental status is at baseline     Psychiatric: Mood and Affect: Mood normal          Thought Content: Thought content normal        Additional Data:     Labs:    Results from last 7 days   Lab Units 01/11/22  0605 01/10/22  1011 01/10/22  0540   WBC Thousand/uL 20 37*   < > 18 45*   HEMOGLOBIN g/dL 11 3*   < > 14 7   HEMATOCRIT % 33 2*   < > 43 5   PLATELETS Thousands/uL 273   < > 367   NEUTROS PCT %  --   --  84*   LYMPHS PCT %  --   --  6*   MONOS PCT %  --   --  9   EOS PCT %  --   --  0    < > = values in this interval not displayed  Results from last 7 days   Lab Units 01/11/22  0605 01/10/22  0814 01/10/22  0540   SODIUM mmol/L 138   < > 140   POTASSIUM mmol/L 3 4*   < > 4 0   CHLORIDE mmol/L 104   < > 100   CO2 mmol/L 26   < > 24   BUN mg/dL 14   < > 27*   CREATININE mg/dL 0 67   < > 1 29   ANION GAP mmol/L 8   < > 16*   CALCIUM mg/dL 8 0*   < > 9 8   ALBUMIN g/dL  --   --  4 2   TOTAL BILIRUBIN mg/dL  --   --  0 38   ALK PHOS U/L  --   --  229*   ALT U/L  --   --  61   AST U/L  --   --  20   GLUCOSE RANDOM mg/dL 180*   < > 420*    < > = values in this interval not displayed  Results from last 7 days   Lab Units 01/10/22  0540   INR  1 06     Results from last 7 days   Lab Units 01/11/22  1200 01/11/22  1011 01/11/22  0809 01/11/22  0544 01/11/22  0251 01/11/22  0039 01/10/22  2240 01/10/22  2032 01/10/22  1821 01/10/22  1631 01/10/22  1432 01/10/22  1320   POC GLUCOSE mg/dl 196* 224* 127 180* 161* 133 195* 113 198* 218* 106 150*     Results from last 7 days   Lab Units 01/10/22  0540   HEMOGLOBIN A1C % 8 0*     Results from last 7 days   Lab Units 01/11/22  0616 01/10/22  0814 01/10/22  0540   LACTIC ACID mmol/L  --  1 2 2 3*   PROCALCITONIN ng/ml 0 20  --  0 32*       * I Have Reviewed All Lab Data Listed Above  * Additional Pertinent Lab Tests Reviewed:  All Labs For Current Hospital Admission Reviewed    Imaging:    Imaging Reports Reviewed Today Include:  Chest x-ray  Imaging Personally Reviewed by Myself Includes:      Recent Cultures (last 7 days):     Results from last 7 days   Lab Units 01/10/22  0656 01/10/22  0540   BLOOD CULTURE  No Growth at 24 hrs  No Growth at 24 hrs  Last 24 Hours Medication List:   Current Facility-Administered Medications   Medication Dose Route Frequency Provider Last Rate    acetaminophen  650 mg Oral Q6H PRN Hulon Siad, PA-MYKE      aspirin  325 mg Oral Daily Hulon Siad, PA-MYKE      atorvastatin  80 mg Oral Daily With [employer]      calcium carbonate  500 mg Oral Daily PRN Hulon Siad, MARILYN      fluticasone  2 spray Each Nare Daily Trinity Bar PA-C      gabapentin  300 mg Oral TID Hulon Siad, MARILYN      heparin (porcine)  5,000 Units Subcutaneous [facility] Shagufta Manzo PA-C      hydrOXYzine HCL  10 mg Oral Q6H PRN Hulon Siad, MARILYN      insulin aspart  200 Units Subcutaneous Insulin Pump Daily PRN Vane Mcege MD     [employer] [START ON 1/12/2022] lisinopril  5 mg Oral Daily Trinity Bar PA-C      metaxalone  800 mg Oral TID Hulon Siad, MARILYN      metoprolol tartrate  50 mg Oral Q12H [facility] [place] SiadMARILYN      multi-electrolyte  75 mL/hr Intravenous Continuous Trinity Bar PA-C 125 mL/hr (01/10/22 2252)    ondansetron  4 mg Intravenous Q6H PRN Shagufta Manzo PA-C      pantoprazole  40 mg Oral Early Morning Hulon MARILYN [place]      patient maintained insulin pump  1 each Subcutaneous Q8H Vane Mcgee MD      QUEtiapine  25 mg Oral HS Shila Manzo PA-C      sodium chloride  2 spray Each Nare Q1H PRN Sid Gupta PA-C          Today, Patient Was Seen By: Sid Gupta PA-C    ** Please Note: Dictation voice to text software may have been used in the creation of this document   **

## 2022-01-11 NOTE — UTILIZATION REVIEW
Inpatient Admission Authorization Request   NOTIFICATION OF INPATIENT ADMISSION/INPATIENT AUTHORIZATION REQUEST   SERVICING FACILITY:   [address] [facility], [address] [place] [address]  Tax ID: [id number]  NPI: [id number]  Place of Service: Inpatient [address] [facility]  [address]  Place of Service Code: 24     ATTENDING PROVIDER:  Attending Name and NPI#: Alyssa Shade [place] [[id number]]  Address: [address], [address] [place] [address]  Phone: [phone]     UTILIZATION REVIEW CONTACT:  [facility] Utilization   Network Utilization Review Department  Phone: [phone]  Fax: [phone]  Email: [email or web address]     PHYSICIAN ADVISORY SERVICES:  FOR [id number] REVIEW - MEDICAL NECESSITY DENIAL  Phone: [phone]  Fax: [phone]  Email: [email or web address]     TYPE OF REQUEST:  Inpatient Status     ADMISSION INFORMATION:  ADMISSION DATE/TIME: 1/10/22  7:13 AM  PATIENT DIAGNOSIS CODE/DESCRIPTION:  Nausea and vomiting [R11 2]  DKA, type 1 ([address] ) [E10 10]  2019 novel coronavirus disease (COVID-19) [U07 1]  COVID-19 [U07 1]  DISCHARGE DATE/TIME: No discharge date for patient encounter  DISCHARGE DISPOSITION (IF DISCHARGED): Home/Self Care     IMPORTANT INFORMATION:  Please contact the [facility] directly with any questions or concerns regarding this request  Department voicemails are confidential     Send requests for admission clinical reviews, concurrent reviews, approvals, and administrative denials due to lack of clinical to fax [phone]

## 2022-01-11 NOTE — PROGRESS NOTES
Previous algorithms not documented for insulin gtt  Based off of trending blood glucose readings, will remain in algorithm 1

## 2022-01-11 NOTE — ASSESSMENT & PLAN NOTE
Lab Results   Component Value Date    HGBA1C 8 0 (H) 01/10/2022       Recent Labs     01/11/22  0544 01/11/22  0809 01/11/22  1011 01/11/22  1200   POCGLU 180* 127 224* 196*       Blood Sugar Average: Last 72 hrs:  (P) 195 [id number]   patient with underlying history of type 1 diabetes- A1c reflects reasonable control  · Patient presented to the hospital due to blood sugar greater than 500 in the setting of very poor oral intake/vomiting  Beta hydroxybutyrate was 3 4 and lactic acid was 2 3, creat was 1 29    Now improved after receiving IV fluids  · Initially he required an insulin drip but now is stabilizing and will be transitioned back to his insulin pump per endocrinology

## 2022-01-11 NOTE — ASSESSMENT & PLAN NOTE
· Vaccinated x 2  Tested positive on 1/4/22  · Chest x-ray unremarkable  · Patient without any respiratory issues  · Suspect patient's nausea vomiting and diarrhea were due to COVID--continue supportive care  · Also with severe nasal congestion--add Flonase and Ocean nasal spray    Counseled patient that he cannot continue to use Afrin due to rhinitis medicamentosa

## 2022-01-11 NOTE — ASSESSMENT & PLAN NOTE
· Noted that patient has significant leukocytosis of 20,000, this has risen slightly since admission  No fever  · Also with mild procalcitonin elevation which is trending down  · Blood cultures negative at 24 hours  Chest x-ray negative  UA negative  · Patient did not receive any steroids  · Possibly stress response  Will recheck in a m

## 2022-01-11 NOTE — UTILIZATION REVIEW
Initial Clinical Review    Admission: Date/Time/Statement:   Admission Orders (From admission, onward)     Ordered        01/10/22 0713  Inpatient Admission  Once                      Orders Placed This Encounter   Procedures    Inpatient Admission     Standing Status:   Standing     Number of Occurrences:   1     Order Specific Question:   Level of Care     Answer:   Level 1 Stepdown [13]     Order Specific Question:   Estimated length of stay     Answer:   Not Applicable     ED Arrival Information     Expected Arrival Acuity    - 1/10/2022 05:17 Emergent         Means of arrival Escorted by Service Admission type    Ambulance [facility] Emergency         Arrival complaint            Chief Complaint   Patient presents with    Vomiting     Pt +, c/o n/v/d  Pt +Covid  1/4  Denies CP, SOB or any other sx  Initial Presentation:  56 yo male PMH vaxxed for Covid 19, + PCR  covid infection 1/4/2022, DM1, CAD, HLP, anxiety to ED by EMS admit [place] Inpatient due to  anion GAP metabolic acidosis due to DKA   Reports mild covid symptoms but over last 72 HR w nausea & vomiting prompting eval  IN ED labs consistent w DKA (gluc>500), elevated TROPs, leukocytosis ;  Plan cont IVF resuscitation, electrolyte replacement, IV insulin, repeat labs Q 4hr; consult Endo  MILD Covid pathway - current no O2- supportive care for COVID-19 & monitor for O2 needs for starting protocol treatments; cont home meds  ENDO:  DKA improving ; still w nausea wo vomiting  Once GAP remains closed on 2 consequetive BMP switch to non DKA IV insulin infusion, suggest IV insulin overnight; transition to pump once hydrated & eatign  Likely req pump adjustment   Uncontrolled DM1- A1C=9 1%  Goal 7%  medtronic 770G pump wo sensor at home w occasional glucose checks   Close OP Endo   PM NOTE attending - appropriate for MED SURG; transition to regular insulin infusion  Date: 1/11/2022   Day 2:   Provider:  Transition to insulin pump due to stabilizing labs  Reports N/V/diarrhea impoved; mode diet & drinking fluids  Reports at home could not keep anything down  Reports no "traditional Covid URi symptoms since + test;  Except very nasal congestion  Breath sounds clear     ED Triage Vitals   Temperature Pulse Respirations Blood Pressure SpO2   01/10/22 0524 01/10/22 0524 01/10/22 0524 01/10/22 0524 01/10/22 0524   99 3 °F (37 4 °C) (!) 126 (!) 24 (!) 186/86 99 %      Temp Source Heart Rate Source Patient Position - Orthostatic VS BP Location FiO2 (%)   01/10/22 0524 01/10/22 0524 01/10/22 0524 01/10/22 0524 --   Oral Monitor Lying Right arm       Pain Score       01/10/22 0933       9          Wt Readings from Last 1 Encounters:   01/10/22 74 8 kg (165 lb)     Additional Vital Signs:   Date/Time Temp Pulse Resp BP MAP (mmHg) SpO2 O2 Device Patient Position - Orthostatic VS   01/11/22 1500 98 2 °F (36 8 °C) 66 18 141/67 97 93 % None (Room air) Lying   01/11/22 0800 98 8 °F (37 1 °C) 67 18 162/72 -- 92 % None (Room air) Lying   01/10/22 2245 99 °F (37 2 °C) 73 18 127/60 -- 93 % None (Room air) Lying   01/10/22 2147 98 7 °F (37 1 °C) 85 18 158/72 -- 96 % None (Room air) Lying   01/10/22 2130 -- 80 18 186/86 Abnormal  123 98 % None (Room air) Lying   01/10/22 1824 99 °F (37 2 °C) 78 18 118/58 -- 98 % None (Room air) Lying   01/10/22 1700 -- 82 -- 153/68 98 -- -- Lying   01/10/22 1645 -- 80 -- 185/84 Abnormal  121 98 % -- Lying   01/10/22 1445 -- 76 18 162/72 108 98 % None (Room air) Sitting   01/10/22 1430 -- 66 18 137/63 90 94 % None (Room air) --   01/10/22 1345 -- 74 -- 163/72 104 99 % None (Room air) --   01/10/22 1300 -- 72 -- 163/77 111 97 % -- --   01/10/22 1245 -- 72 -- 150/68 98 96 % -- Sitting   01/10/22 1230 -- 68 -- 175/80 Abnormal  115 96 % -- Sitting   01/10/22 1130 -- 72 18 162/74 107 97 % None (Room air) --   01/10/22 1045 -- -- -- 162/86 118 -- None (Room air) Sitting   01/10/22 1030 -- 108 Abnormal  18 168/96 127 -- -- -- 01/10/22 1024 -- 114 Abnormal  -- 168/96 -- -- -- --   01/10/22 0933 98 4 °F (36 9 °C) 121 Abnormal  18 177/81 Abnormal  -- 98 % None (Room air) Sitting   01/10/22 0800 -- 120 Abnormal  22 175/75 Abnormal  108 98 % None (Room air) Lying   01/10/22 0700 -- 114 Abnormal  22 180/79 Abnormal  114 95 % None (Room air) Lying   01/10/22 0600 -- 114 Abnormal  22 183/74 Abnormal  117 99 % None (Room air) Lying   01/10/22 0524 99 3 °F (37 4 °C) 126 Abnormal  24 Abnormal  186/86 Abnormal  -- 99 % None (Room air) Lying       Weights (last 14 days)    Date/Time Weight Weight Method Height   01/10/22 2147 74 8 kg (165 lb) Standing scale 5' 6" (1 676 m)   01/10/22 0933 74 8 kg (165 lb) Stated 5' 6" (1 676 m)       Pertinent Labs/Diagnostic Test Results:       Results from last 7 days   Lab Units 01/11/22  0605 01/10/22  1011 01/10/22  0540   WBC Thousand/uL 20 37*  --  18 45*   HEMOGLOBIN g/dL 11 3*  --  14 7   HEMATOCRIT % 33 2*  --  43 5   PLATELETS Thousands/uL 273 332 367   NEUTROS ABS Thousands/µL  --   --  15 51*         Results from last 7 days   Lab Units 01/11/22  0605 01/10/22  1200 01/10/22  0814 01/10/22  0540   SODIUM mmol/L 138 147* 144 140   POTASSIUM mmol/L 3 4* 4 0 3 8 4 0   CHLORIDE mmol/L 104 109* 108 100   CO2 mmol/L 26 28 23 24   ANION GAP mmol/L 8 10 13 16*   BUN mg/dL 14 22 25 27*   CREATININE mg/dL 0 67 0 95 1 03 1 29   EGFR ml/min/1 73sq m 108 89 81 62   CALCIUM mg/dL 8 0* 7 9* 9 0 9 8   MAGNESIUM mg/dL 2 1 2 2 2 0  --    PHOSPHORUS mg/dL 2 6* 3 2 2 6*  --      Results from last 7 days   Lab Units 01/10/22  0540   AST U/L 20   ALT U/L 61   ALK PHOS U/L 229*   TOTAL PROTEIN g/dL 9 0*   ALBUMIN g/dL 4 2   TOTAL BILIRUBIN mg/dL 0 38     Results from last 7 days   Lab Units 01/11/22  1200 01/11/22  1011 01/11/22  0809 01/11/22  0544 01/11/22  0251 01/11/22  0039 01/10/22  2240 01/10/22  2032 01/10/22  1821 01/10/22  1631 01/10/22  1432 01/10/22  1320   POC GLUCOSE mg/dl 196* 224* 127 180* 161* 133 195* 113 198* 218* 106 150*     Results from last 7 days   Lab Units 01/11/22  0605 01/10/22  1200 01/10/22  0814 01/10/22  0540   GLUCOSE RANDOM mg/dL 180* 195* 340* 420*         Results from last 7 days   Lab Units 01/10/22  0540   HEMOGLOBIN A1C % 8 0*   EAG mg/dl 183     BETA-HYDROXYBUTYRATE   Date Value Ref Range Status   01/10/2022 3 4 (H) <0 6 mmol/L Final          Results from last 7 days   Lab Units 01/10/22  0613   PH CURT  7 456*   PCO2 CURT mm Hg 36 2*   PO2 CURT mm Hg 32 9*   HCO3 CURT mmol/L 24 9   BASE EXC CURT mmol/L 1 4   O2 CONTENT CURT ml/dL 14 0   O2 HGB, VENOUS % 65 4             Results from last 7 days   Lab Units 01/10/22  1011 01/10/22  0814 01/10/22  0613   HS TNI 0HR ng/L  --   --  66*   HS TNI 2HR ng/L  --  77*  --    HSTNI D2 ng/L  --  11  --    HS TNI 4HR ng/L 84*  --   --    HSTNI D4 ng/L 18  --   --          Results from last 7 days   Lab Units 01/10/22  0540   PROTIME seconds 13 8   INR  1 06   PTT seconds 28         Results from last 7 days   Lab Units 01/11/22  0616 01/10/22  0540   PROCALCITONIN ng/ml 0 20 0 32*     Results from last 7 days   Lab Units 01/10/22  0814 01/10/22  0540   LACTIC ACID mmol/L 1 2 2 3*                                         Results from last 7 days   Lab Units 01/10/22  0657   CLARITY UA  Clear   COLOR UA  Yellow   SPEC GRAV UA  1 015   PH UA  5 5   GLUCOSE UA mg/dl >=1000 (1%)*   KETONES UA mg/dl 40 (2+)*   BLOOD UA  Small*   PROTEIN UA mg/dl Trace*   NITRITE UA  Negative   BILIRUBIN UA  Negative   UROBILINOGEN UA E U /dl 0 2   LEUKOCYTES UA  Elevated glucose may cause decreased leukocyte values  See urine microscopic for [facility] result/*   WBC UA /hpf None Seen   RBC UA /hpf 0-1   BACTERIA UA /hpf None Seen   EPITHELIAL CELLS WET PREP /hpf None Seen                                 Results from last 7 days   Lab Units 01/10/22  0656 01/10/22  0540   BLOOD CULTURE  No Growth at 24 hrs  No Growth at 24 hrs       XR chest 1 view portable   Final Result by Srinivasa Morillo MD (01/10 1146)      No acute cardiopulmonary disease          1/10/2022 ekg  Sinus tachycardia  Nonspecific ST and T wave abnormality  Abnormal ECG  When compared with ECG of 03-SEP-2020 04:49,  Nonspecific T wave abnormality now evident in Anterior leads    ED Treatment:   Medication Administration from 01/10/2022 0517 to 01/10/2022 2230       Date/Time Order Dose Route Action     01/10/2022 0526 ondansetron (FOR EMS ONLY) (ZOFRAN) 4 mg/2 mL injection 4 mg 0 mg Does not apply Given to EMS     01/10/2022 0916 insulin regular (HumuLIN R,NovoLIN R) 1 Units/mL in sodium chloride 0 9 % 100 mL infusion 14 6 Units/hr Intravenous Rate/Dose Change     01/10/2022 0808 insulin regular (HumuLIN R,NovoLIN R) 1 Units/mL in sodium chloride 0 9 % 100 mL infusion 7 3 Units/hr Intravenous New Bag     01/10/2022 0947 calcium carbonate (TUMS) chewable tablet 500 mg 500 mg Oral Given     01/10/2022 1532 ondansetron (ZOFRAN) injection 4 mg 4 mg Intravenous Given     01/10/2022 0946 ondansetron (ZOFRAN) injection 4 mg 4 mg Intravenous Given     01/10/2022 1024 aspirin tablet 325 mg 325 mg Oral Given     01/10/2022 1631 atorvastatin (LIPITOR) tablet 80 mg 80 mg Oral Given     01/10/2022 2130 gabapentin (NEURONTIN) capsule 300 mg 300 mg Oral Given     01/10/2022 1532 gabapentin (NEURONTIN) capsule 300 mg 300 mg Oral Given     01/10/2022 1024 gabapentin (NEURONTIN) capsule 300 mg 300 mg Oral Given     01/10/2022 1024 lisinopril (ZESTRIL) tablet 5 mg 5 mg Oral Given     01/10/2022 2130 metaxalone (SKELAXIN) tablet 800 mg 800 mg Oral Given     01/10/2022 1532 metaxalone (SKELAXIN) tablet 800 mg 800 mg Oral Given     01/10/2022 1052 metaxalone (SKELAXIN) tablet 800 mg 800 mg Oral Given     01/10/2022 2130 metoprolol tartrate (LOPRESSOR) tablet 50 mg 50 mg Oral Given     01/10/2022 1024 metoprolol tartrate (LOPRESSOR) tablet 50 mg 50 mg Oral Given     01/10/2022 1024 pantoprazole (PROTONIX) EC tablet 40 mg 40 mg Oral Given     01/10/2022 2130 QUEtiapine (SEROquel) tablet 25 mg 25 mg Oral Given     01/10/2022 2130 heparin (porcine) subcutaneous injection 5,000 Units 5,000 Units Subcutaneous Given     01/10/2022 1350 heparin (porcine) subcutaneous injection 5,000 Units 5,000 Units Subcutaneous Given     01/10/2022 1008 multi-electrolyte (ISOLYTE-S PH 7 4) bolus 1,000 mL 1,000 mL Intravenous New Bag     01/10/2022 1017 dextrose 5 % and sodium chloride 0 9 % infusion 250 mL/hr Intravenous New Bag     01/10/2022 1014 insulin regular (HumuLIN R,NovoLIN R) 1 Units/mL in sodium chloride 0 9 % 100 mL infusion 7 5 Units/hr Intravenous New Bag     01/10/2022 1141 multi-electrolyte (ISOLYTE-S PH 7 4 equivalent) IV solution 500 mL/hr Intravenous New Bag     01/10/2022 1339 multi-electrolyte (PLASMALYTE-A/ISOLYTE-S PH 7 4) IV solution 125 mL/hr Intravenous New Bag     01/10/2022 2032 insulin regular (HumuLIN R,NovoLIN R) 1 Units/mL in sodium chloride 0 9 % 100 mL infusion 1 5 Units/hr Intravenous Rate/Dose Change     01/10/2022 1824 insulin regular (HumuLIN R,NovoLIN R) 1 Units/mL in sodium chloride 0 9 % 100 mL infusion 4 Units/hr Intravenous Rate/Dose Change     01/10/2022 1640 insulin regular (HumuLIN R,NovoLIN R) 1 Units/mL in sodium chloride 0 9 % 100 mL infusion 3 Units/hr Intravenous Rate/Dose Change     01/10/2022 1340 insulin regular (HumuLIN R,NovoLIN R) 1 Units/mL in sodium chloride 0 9 % 100 mL infusion 1 Units/hr Intravenous New Bag        Past Medical History:   Diagnosis Date    Aortic stenosis     Clavicle fracture     LEFT    Diabetes mellitus [facility] [address] )     Hyperlipidemia     Hypertension     Myocardial infarction ([facility] [address] )     Thrombocytopenic purpura ([facility] [address] )      Present on Admission:   Coronary artery disease involving native coronary artery of native heart without angina pectoris   Essential hypertension      Admitting Diagnosis: Nausea and vomiting [R11 2]  DKA, type 1 ([facility] [address] ) [E10 10]  2019 novel coronavirus disease (COVID-19) [U07 1]  COVID-19 [U07 1]  Age/Sex: 55 y o  male  Admission Orders:  Neuro checks  Npo  Carb controlled diet  Ambulate patient    Scheduled Medications:  aspirin, 325 mg, Oral, Daily  atorvastatin, 80 mg, Oral, Daily With Dinner  fluticasone, 2 spray, Each Nare, Daily  gabapentin, 300 mg, Oral, TID  heparin (porcine), 5,000 Units, Subcutaneous, Q8H [address]  [START ON 1/12/2022] lisinopril, 5 mg, Oral, Daily  metaxalone, 800 mg, Oral, TID  metoprolol tartrate, 50 mg, Oral, Q12H ANA PAULA  pantoprazole, 40 mg, Oral, Early Morning  patient maintained insulin pump, 1 each, Subcutaneous, Q8H  QUEtiapine, 25 mg, Oral, HS    Continuous IV Infusions:  multi-electrolyte, 75 mL/hr, Intravenous, Continuous    IV  dextrose 5 % and sodium chloride 0 9 % infusion 1/10 1017 & DC 1342  Rate: 250 mL/hr Dose: 250 mL/hr  Freq: Continuous Route: IV    IV  insulin regular (HumuLIN R,NovoLIN R) 1 Units/mL in sodium chloride 0 9 % 100 mL infusion 1/10 1340 & DC 1/11 1437  Rate: 0 3-21 mL/hr Dose: 0 3-21 Units/hr  Freq: Titrated Route: IV    IV  insulin regular (HumuLIN R,NovoLIN R) 1 Units/mL in sodium chloride 0 9 % 100 mL infusion 1/10 1014 & DC 1340  Rate: 0 1-30 mL/hr Dose: 0 1-30 Units/hr  Freq: Continuous Route: IV    IV  insulin regular (HumuLIN R,NovoLIN R) 1 Units/mL in sodium chloride 0 9 % 100 mL infusion 1/10 0808 & DC 1013  Rate: 0 1-30 mL/hr Dose: 0 1-30 Units/hr  Freq: Continuous Route: IV    IV    multi-electrolyte (ISOLYTE-S PH 7 4 equivalent) IV solution  Rate: 500 mL/hr Dose: 500 mL/hr  Freq: Continuous Route: IV  Last Dose: Stopped (01/10/22 1330)  Start: 01/10/22 1015 End: 01/10/22 1423               1141     1330     1423-D/C'd       Followed by  multi-electrolyte (ISOLYTE-S PH 7 4 equivalent) IV solution  Rate: 250 mL/hr Dose: 250 mL/hr  Freq: Continuous Route: IV                    PRN Meds:  acetaminophen, 650 mg, Oral, Q6H PRN  calcium carbonate, 500 mg, Oral, Daily PRN  hydrOXYzine HCL, 10 mg, Oral, Q6H PRN  insulin aspart, 200 Units, Subcutaneous Insulin Pump, Daily PRN  ondansetron, 4 mg, Intravenous, Q6H PRN  sodium chloride, 2 spray, Each Nare, Q1H PRN    IP CONSULT TO CASE MANAGEMENT  IP CONSULT TO ENDOCRINOLOGY    Network Utilization Review Department  ATTENTION: Please call with any questions or concerns to [phone] and carefully listen to the prompts so that you are directed to the right person  All voicemails are confidential   Candia Siemens all requests for admission clinical reviews, approved or denied determinations and any other requests to dedicated fax number below belonging to the campus where the patient is receiving treatment   List of dedicated fax numbers for the Facilities:  [address] DENIALS (Administrative/Medical Necessity) [phone]   [address] (Maternity/NICU/Pediatrics) [phone]   [address]  [address] [id number] [place] [address] [address] [place] [address] [phone]

## 2022-01-11 NOTE — ASSESSMENT & PLAN NOTE
· Patient noted to have minimal troponin elevation 66--77--84, with no reports of chest pain; he was quite concerned when he saw these values in his chart as he has underlying history of heart disease  · Suspect this was simply non MI troponin minimal elevation in the setting of DKA

## 2022-01-11 NOTE — PLAN OF CARE
Problem: Nutrition/Hydration-ADULT  Goal: Nutrient/Hydration intake appropriate for improving, restoring or maintaining nutritional needs  Description: Monitor and assess patient's nutrition/hydration status for malnutrition  Collaborate with interdisciplinary team and initiate plan and interventions as ordered  Monitor patient's weight and dietary intake as ordered or per policy  Utilize nutrition screening tool and intervene as necessary  Determine patient's food preferences and provide high-protein, high-caloric foods as appropriate       INTERVENTIONS:  - Monitor oral intake, urinary output, labs, and treatment plans  - Assess nutrition and hydration status and recommend course of action  - Evaluate amount of meals eaten  - Assist patient with eating if necessary   - Allow adequate time for meals  - Recommend/ encourage appropriate diets, oral nutritional supplements, and vitamin/mineral supplements  - Order, calculate, and assess calorie counts as needed  - Recommend, monitor, and adjust tube feedings and TPN/PPN based on assessed needs  - Assess need for intravenous fluids  - Provide specific nutrition/hydration education as appropriate  - Include patient/family/caregiver in decisions related to nutrition  Outcome: Progressing     Problem: INFECTION - ADULT  Goal: Absence or prevention of progression during hospitalization  Description: INTERVENTIONS:  - Assess and monitor for signs and symptoms of infection  - Monitor lab/diagnostic results  - Monitor all insertion sites, i e  indwelling lines, tubes, and drains  - Monitor endotracheal if appropriate and nasal secretions for changes in amount and color  - [place] appropriate cooling/warming therapies per order  - Administer medications as ordered  - Instruct and encourage patient and family to use good hand hygiene technique  - Identify and instruct in appropriate isolation precautions for identified infection/condition  Outcome: Progressing  Goal: Absence of fever/infection during neutropenic period  Description: INTERVENTIONS:  - Monitor WBC    Outcome: Progressing     Problem: METABOLIC, FLUID AND ELECTROLYTES - ADULT  Goal: Electrolytes maintained within normal limits  Description: INTERVENTIONS:  - Monitor labs and assess patient for signs and symptoms of electrolyte imbalances  - Administer electrolyte replacement as ordered  - Monitor response to electrolyte replacements, including repeat lab results as appropriate  - Instruct patient on fluid and nutrition as appropriate  Outcome: Progressing  Goal: Fluid balance maintained  Description: INTERVENTIONS:  - Monitor labs   - Monitor I/O and WT  - Instruct patient on fluid and nutrition as appropriate  - Assess for signs & symptoms of volume excess or deficit  Outcome: Progressing  Goal: Glucose maintained within target range  Description: INTERVENTIONS:  - Monitor Blood Glucose as ordered  - Assess for signs and symptoms of hyperglycemia and hypoglycemia  - Administer ordered medications to maintain glucose within target range  - Assess nutritional intake and initiate nutrition service referral as needed  Outcome: Progressing

## 2022-01-11 NOTE — PROGRESS NOTES
The patient was identified by name and date of birth  Was informed that this is a virtual visit and that the visit is being conducted through [employer] Teams/telephone and patient was informed that this may not be a secure, HIPAA-complaint platform  Patient agreed to proceed  Patient privacy was secured with closed door and no other individual was privy to conversation on my end  Patient acknowledged consent and understanding of privacy and security of the video platform  The patient has agreed to participate and understands they can discontinue the visit at any time  Patient is aware this is a billable service  Inpatient follow-up progress note        Assessment:  55 yr male with uncontrolled diabetes for many years, HTN, HLD and CAD is admitted with DKA 2" covid 19 infection      PLAN:     1  DKA  Improving  Anion gap closed      2  Type 1 diabetes  Uncontrolled with A1c 9 1%  Goal is 7%  Uses medtronic 770G pump without sensor at home  Checks capillary glucose occasionally  Seems to have improved now  Tolerating diet  Will transition back to pump at his home settings as listed:  Basal:  11pm - 7am 2u/hr       7am - 11pm 1u/hr  ICR      1u/10gm          ISF 1u/40        TBG 120mg/dL           AIT - 4hr  IV insulin can stop once pump is initiated  Follow up for definitive management in 3-4 weeks  S:  Feeling better, had breakfast without issues and has lunch ordered      O:  Patient seen and examined personally  /72 (BP Location: Right arm)   Pulse 67   Temp 98 8 °F (37 1 °C) (Oral)   Resp 18   Ht 5' 6" (1 676 m)   Wt 74 8 kg (165 lb)   SpO2 92%   BMI 26 63 kg/m²     Physical Exam  Constitutional:       General: He is not in acute distress  Neurological:      Mental Status: He is alert and oriented to person, place, and time  Comments: Speech normal and appropriate   Psychiatric:         Thought Content:  Thought content normal          Current Facility-Administered Medications Medication Dose Route Frequency Provider Last Rate    acetaminophen  650 mg Oral Q6H PRN Evins MARILYN Rivas      aspirin  325 mg Oral Daily Evins MARILYN Rivas      atorvastatin  80 mg Oral Daily With Sherlindy Servin PA-C      calcium carbonate  500 mg Oral Daily PRN Evins MARILYN Rivas      gabapentin  300 mg Oral TID Evins MARILYN Rivas      heparin (porcine)  5,000 Units Subcutaneous [facility] Marianela Manzo PA-C      hydrOXYzine HCL  10 mg Oral Q6H PRN Evins MARILYN Rivas      insulin aspart  200 Units Subcutaneous Insulin Pump Daily PRN Gavino Espinosa MD      insulin regular (HumuLIN R,NovoLIN R) infusion  0 3-21 Units/hr Intravenous Titrated Marianela Manzo PA-C 4 Units/hr (01/11/22 1201)    lisinopril  5 mg Oral Daily Evins MARILYN Rivas      metaxalone  800 mg Oral TID Evins MARILYN Rivas      metoprolol tartrate  50 mg Oral Q12H [address] Evnorma Rivas PA-C      multi-electrolyte  125 mL/hr Intravenous Continuous Evnorma Rivas PA-C 125 mL/hr (01/10/22 2252)    ondansetron  4 mg Intravenous Q6H PRN Evins MARILYN Rivas      pantoprazole  40 mg Oral Early Morning Evnorma Rivas PA-C      patient maintained insulin pump  1 each Subcutaneous Q8H Gavino Espinosa MD      QUEtiapine  25 mg Oral HS Evnorma Rivas PA-C          Lab, Imaging and other studies:   POC Glucose (mg/dl)   Date Value   01/11/2022 196 (H)   01/11/2022 224 (H)   01/11/2022 127   01/11/2022 180 (H)   01/11/2022 161 (H)   01/11/2022 133   01/10/2022 195 (H)   01/10/2022 113   01/10/2022 198 (H)   01/10/2022 218 (H)

## 2022-01-12 LAB
ANION GAP SERPL CALCULATED.3IONS-SCNC: 8 MMOL/L (ref 4–13)
BASOPHILS # BLD AUTO: 0.04 THOUSANDS/ΜL (ref 0–0.1)
BASOPHILS NFR BLD AUTO: 0 % (ref 0–1)
BUN SERPL-MCNC: 9 MG/DL (ref 5–25)
CALCIUM SERPL-MCNC: 8.1 MG/DL (ref 8.3–10.1)
CHLORIDE SERPL-SCNC: 106 MMOL/L (ref 100–108)
CO2 SERPL-SCNC: 28 MMOL/L (ref 21–32)
CREAT SERPL-MCNC: 0.67 MG/DL (ref 0.6–1.3)
CRP SERPL QL: 69.7 MG/L
EOSINOPHIL # BLD AUTO: 0.02 THOUSAND/ΜL (ref 0–0.61)
EOSINOPHIL NFR BLD AUTO: 0 % (ref 0–6)
ERYTHROCYTE [DISTWIDTH] IN BLOOD BY AUTOMATED COUNT: 14 % (ref 11.6–15.1)
GFR SERPL CREATININE-BSD FRML MDRD: 108 ML/MIN/1.73SQ M
GLUCOSE SERPL-MCNC: 113 MG/DL (ref 65–140)
GLUCOSE SERPL-MCNC: 180 MG/DL (ref 65–140)
GLUCOSE SERPL-MCNC: 230 MG/DL (ref 65–140)
GLUCOSE SERPL-MCNC: 33 MG/DL (ref 65–140)
GLUCOSE SERPL-MCNC: 38 MG/DL (ref 65–140)
GLUCOSE SERPL-MCNC: 87 MG/DL (ref 65–140)
GLUCOSE SERPL-MCNC: 95 MG/DL (ref 65–140)
HCT VFR BLD AUTO: 34.4 % (ref 36.5–49.3)
HGB BLD-MCNC: 11.7 G/DL (ref 12–17)
IMM GRANULOCYTES # BLD AUTO: 0.1 THOUSAND/UL (ref 0–0.2)
IMM GRANULOCYTES NFR BLD AUTO: 1 % (ref 0–2)
LYMPHOCYTES # BLD AUTO: 2.65 THOUSANDS/ΜL (ref 0.6–4.47)
LYMPHOCYTES NFR BLD AUTO: 17 % (ref 14–44)
MCH RBC QN AUTO: 29.5 PG (ref 26.8–34.3)
MCHC RBC AUTO-ENTMCNC: 34 G/DL (ref 31.4–37.4)
MCV RBC AUTO: 87 FL (ref 82–98)
MONOCYTES # BLD AUTO: 1.82 THOUSAND/ΜL (ref 0.17–1.22)
MONOCYTES NFR BLD AUTO: 11 % (ref 4–12)
NEUTROPHILS # BLD AUTO: 11.38 THOUSANDS/ΜL (ref 1.85–7.62)
NEUTS SEG NFR BLD AUTO: 71 % (ref 43–75)
NRBC BLD AUTO-RTO: 0 /100 WBCS
PLATELET # BLD AUTO: 264 THOUSANDS/UL (ref 149–390)
PMV BLD AUTO: 11.3 FL (ref 8.9–12.7)
POTASSIUM SERPL-SCNC: 3.1 MMOL/L (ref 3.5–5.3)
PROCALCITONIN SERPL-MCNC: 0.12 NG/ML
RBC # BLD AUTO: 3.97 MILLION/UL (ref 3.88–5.62)
SODIUM SERPL-SCNC: 142 MMOL/L (ref 136–145)
WBC # BLD AUTO: 16.01 THOUSAND/UL (ref 4.31–10.16)

## 2022-01-12 PROCEDURE — 82948 REAGENT STRIP/BLOOD GLUCOSE: CPT

## 2022-01-12 PROCEDURE — 85025 COMPLETE CBC W/AUTO DIFF WBC: CPT | Performed by: PHYSICIAN ASSISTANT

## 2022-01-12 PROCEDURE — 80048 BASIC METABOLIC PNL TOTAL CA: CPT | Performed by: PHYSICIAN ASSISTANT

## 2022-01-12 PROCEDURE — 99232 SBSQ HOSP IP/OBS MODERATE 35: CPT | Performed by: INTERNAL MEDICINE

## 2022-01-12 PROCEDURE — 99232 SBSQ HOSP IP/OBS MODERATE 35: CPT | Performed by: PHYSICIAN ASSISTANT

## 2022-01-12 PROCEDURE — 84145 PROCALCITONIN (PCT): CPT | Performed by: PHYSICIAN ASSISTANT

## 2022-01-12 PROCEDURE — 86140 C-REACTIVE PROTEIN: CPT | Performed by: PHYSICIAN ASSISTANT

## 2022-01-12 PROCEDURE — XW033E5 INTRODUCTION OF REMDESIVIR ANTI-INFECTIVE INTO PERIPHERAL VEIN, PERCUTANEOUS APPROACH, NEW TECHNOLOGY GROUP 5: ICD-10-PCS | Performed by: INTERNAL MEDICINE

## 2022-01-12 RX ORDER — POTASSIUM CHLORIDE 20 MEQ/1
40 TABLET, EXTENDED RELEASE ORAL ONCE
Status: COMPLETED | OUTPATIENT
Start: 2022-01-12 | End: 2022-01-12

## 2022-01-12 RX ORDER — LOPERAMIDE HYDROCHLORIDE 2 MG/1
2 CAPSULE ORAL EVERY 4 HOURS PRN
Status: DISCONTINUED | OUTPATIENT
Start: 2022-01-12 | End: 2022-01-13 | Stop reason: HOSPADM

## 2022-01-12 RX ADMIN — ATORVASTATIN CALCIUM 80 MG: 40 TABLET, FILM COATED ORAL at 16:14

## 2022-01-12 RX ADMIN — SODIUM CHLORIDE, SODIUM GLUCONATE, SODIUM ACETATE, POTASSIUM CHLORIDE, MAGNESIUM CHLORIDE, SODIUM PHOSPHATE, DIBASIC, AND POTASSIUM PHOSPHATE 75 ML/HR: .53; .5; .37; .037; .03; .012; .00082 INJECTION, SOLUTION INTRAVENOUS at 05:13

## 2022-01-12 RX ADMIN — ASPIRIN 325 MG ORAL TABLET 325 MG: 325 PILL ORAL at 08:21

## 2022-01-12 RX ADMIN — PANTOPRAZOLE SODIUM 40 MG: 40 TABLET, DELAYED RELEASE ORAL at 05:07

## 2022-01-12 RX ADMIN — METOPROLOL TARTRATE 50 MG: 50 TABLET, FILM COATED ORAL at 20:11

## 2022-01-12 RX ADMIN — METOPROLOL TARTRATE 50 MG: 50 TABLET, FILM COATED ORAL at 08:21

## 2022-01-12 RX ADMIN — HEPARIN SODIUM 5000 UNITS: 5000 INJECTION INTRAVENOUS; SUBCUTANEOUS at 21:42

## 2022-01-12 RX ADMIN — GABAPENTIN 300 MG: 300 CAPSULE ORAL at 20:10

## 2022-01-12 RX ADMIN — CALCIUM CARBONATE (ANTACID) CHEW TAB 500 MG 500 MG: 500 CHEW TAB at 21:42

## 2022-01-12 RX ADMIN — REMDESIVIR 200 MG: 100 INJECTION, POWDER, LYOPHILIZED, FOR SOLUTION INTRAVENOUS at 12:41

## 2022-01-12 RX ADMIN — LISINOPRIL 5 MG: 5 TABLET ORAL at 08:21

## 2022-01-12 RX ADMIN — METAXALONE 800 MG: 800 TABLET ORAL at 21:43

## 2022-01-12 RX ADMIN — FLUTICASONE PROPIONATE 2 SPRAY: 50 SPRAY, METERED NASAL at 08:22

## 2022-01-12 RX ADMIN — HEPARIN SODIUM 5000 UNITS: 5000 INJECTION INTRAVENOUS; SUBCUTANEOUS at 05:07

## 2022-01-12 RX ADMIN — HYDROXYZINE HYDROCHLORIDE 10 MG: 10 TABLET ORAL at 20:10

## 2022-01-12 RX ADMIN — METAXALONE 800 MG: 800 TABLET ORAL at 08:42

## 2022-01-12 RX ADMIN — HYDROXYZINE HYDROCHLORIDE 10 MG: 10 TABLET ORAL at 14:13

## 2022-01-12 RX ADMIN — GABAPENTIN 300 MG: 300 CAPSULE ORAL at 08:21

## 2022-01-12 RX ADMIN — METAXALONE 800 MG: 800 TABLET ORAL at 16:14

## 2022-01-12 RX ADMIN — GABAPENTIN 300 MG: 300 CAPSULE ORAL at 16:14

## 2022-01-12 RX ADMIN — POTASSIUM CHLORIDE 40 MEQ: 1500 TABLET, EXTENDED RELEASE ORAL at 08:28

## 2022-01-12 RX ADMIN — QUETIAPINE FUMARATE 25 MG: 25 TABLET ORAL at 21:43

## 2022-01-12 RX ADMIN — HEPARIN SODIUM 5000 UNITS: 5000 INJECTION INTRAVENOUS; SUBCUTANEOUS at 12:41

## 2022-01-12 NOTE — PLAN OF CARE
Problem: Nutrition/Hydration-ADULT  Goal: Nutrient/Hydration intake appropriate for improving, restoring or maintaining nutritional needs  Description: Monitor and assess patient's nutrition/hydration status for malnutrition  Collaborate with interdisciplinary team and initiate plan and interventions as ordered  Monitor patient's weight and dietary intake as ordered or per policy  Utilize nutrition screening tool and intervene as necessary  Determine patient's food preferences and provide high-protein, high-caloric foods as appropriate       INTERVENTIONS:  - Monitor oral intake, urinary output, labs, and treatment plans  - Assess nutrition and hydration status and recommend course of action  - Evaluate amount of meals eaten  - Assist patient with eating if necessary   - Allow adequate time for meals  - Recommend/ encourage appropriate diets, oral nutritional supplements, and vitamin/mineral supplements  - Order, calculate, and assess calorie counts as needed  - Recommend, monitor, and adjust tube feedings and TPN/PPN based on assessed needs  - Assess need for intravenous fluids  - Provide specific nutrition/hydration education as appropriate  - Include patient/family/caregiver in decisions related to nutrition  Outcome: Progressing     Problem: INFECTION - ADULT  Goal: Absence or prevention of progression during hospitalization  Description: INTERVENTIONS:  - Assess and monitor for signs and symptoms of infection  - Monitor lab/diagnostic results  - Monitor all insertion sites, i e  indwelling lines, tubes, and drains  - Monitor endotracheal if appropriate and nasal secretions for changes in amount and color  - [place] appropriate cooling/warming therapies per order  - Administer medications as ordered  - Instruct and encourage patient and family to use good hand hygiene technique  - Identify and instruct in appropriate isolation precautions for identified infection/condition  Outcome: Progressing  Goal: Absence of fever/infection during neutropenic period  Description: INTERVENTIONS:  - Monitor WBC    Outcome: Progressing     Problem: METABOLIC, FLUID AND ELECTROLYTES - ADULT  Goal: Electrolytes maintained within normal limits  Description: INTERVENTIONS:  - Monitor labs and assess patient for signs and symptoms of electrolyte imbalances  - Administer electrolyte replacement as ordered  - Monitor response to electrolyte replacements, including repeat lab results as appropriate  - Instruct patient on fluid and nutrition as appropriate  Outcome: Progression  Goal: Fluid balance maintained  Description: INTERVENTIONS:  - Monitor labs   - Monitor I/O and WT  - Instruct patient on fluid and nutrition as appropriate  - Assess for signs & symptoms of volume excess or deficit  Outcome: Progressing  Goal: Glucose maintained within target range  Description: INTERVENTIONS:  - Monitor Blood Glucose as ordered  - Assess for signs and symptoms of hyperglycemia and hypoglycemia  - Administer ordered medications to maintain glucose within target range  - Assess nutritional intake and initiate nutrition service referral as needed  Outcome: Progressing

## 2022-01-12 NOTE — ASSESSMENT & PLAN NOTE
· Noted that patient has significant leukocytosis of >20k on 1/11/22, trang since admission  Improved today to 16K  · Also with mild procalcitonin elevation which trended down  · Blood cultures negative at 24 hours  Chest x-ray negative  UA negative  · Patient did not receive any steroids  · Possibly stress response?   Will recheck in a m  given new fever

## 2022-01-12 NOTE — ASSESSMENT & PLAN NOTE
Lab Results   Component Value Date    HGBA1C 8 0 (H) 01/10/2022       Recent Labs     01/12/22  0519 01/12/22  0538 01/12/22  0840 01/12/22  1135   POCGLU 33* 95 87 230*       Blood Sugar Average: Last 72 hrs:  (P) 503 [id number]   patient with underlying history of type 1 diabetes- A1c reflects reasonable control  · Patient presented to the hospital due to blood sugar greater than 500 in the setting of very poor oral intake/vomiting  Beta hydroxybutyrate was 3 4 and lactic acid was 2 3, creat was 1 29  Now improved after receiving IV fluids  · Initially he required an insulin drip but after stabilizing he was transitioned back to his insulin pump per endocrinology on 1/11/22    Unfortunately he did have severe hypoglycemia this morning of 33, now stable

## 2022-01-12 NOTE — ASSESSMENT & PLAN NOTE
· Vaccinated x 2  Tested positive on 1/4/22  · Chest x-ray unremarkable  · Patient without any respiratory issues  · Suspect patient's nausea vomiting and diarrhea were due to COVID--continue supportive care  · Also with severe nasal congestion--added Flonase and Ocean nasal spray  Counseled patient that he cannot continue to use Afrin due to rhinitis medicamentosa  · Patient newly developed fever this morning 101 2  I am concerned as to whether this is related to COVID or possibly yet undiagnosed bacterial infection given presence of leukocytosis and mildly elevated procalcitonin  He would be eligible in appropriate to receive IV remdesivir    I would recommend ongoing inpatient monitoring to ensure he is not on the cusp of further worsening

## 2022-01-12 NOTE — CASE MANAGEMENT
Case Management Assessment & Discharge Planning Note    Patient name Maged BONNER /S -01 MRN [id number]  : 1965 Date 2022       Current Admission Date: 1/10/2022  Current Admission Diagnosis:DKA (diabetic ketoacidosis) [facility])   Patient Active Problem List    Diagnosis Date Noted    non MI troponin elevation 2022    Leukocytosis 2022    Hypokalemia 2022    COVID-19 01/10/2022    [facility] 01/10/2022    Anxiety 01/10/2022    MILTON (acute kidney injury) ([facility] ) 01/10/2022    Cervical disc disorder with radiculopathy of mid-cervical region     Chronic right shoulder pain     Foraminal stenosis of cervical region     Neck pain     Muscle spasm 2021    Muscle strain of right shoulder 2021    Adhesive capsulitis of right shoulder 2021    Acute postoperative pulmonary insufficiency ([place] [address] ) 2020    Acute blood loss as cause of postoperative anemia 2020    Post-operative nausea and vomiting 2020    Aortic stenosis due to bicuspid aortic valve 2020    History of ITP 2020    History of coronary angioplasty with insertion of stent 2020    Dyslipidemia 2018    Mouth sores 2017    Coronary artery disease involving native coronary artery of native heart without angina pectoris 2017    Nonrheumatic aortic valve stenosis 2017    Type 1 diabetes mellitus with retinopathy ([facility] ) 2017    Essential hypertension 2017      LOS (days): 2  Geometric Mean LOS (GMLOS) (days):   Days to GMLOS:     OBJECTIVE:    Risk of Unplanned Readmission Score: 17         Current admission status: Inpatient  Referral Reason: Other (dispo planning)    Preferred Pharmacy:   José Avila [address] [facility] [address] [place] [id number]  Phone: [phone] Fax: Farrah Bowden [address] [facility], [address] [facility] [address]  Phone: [phone] Fax: [address], [address] [place] [id number]  Phone: [phone] Fax: [phone]    Primary Care Provider: Yanelis Anne DO    Primary Insurance: Elli Tanner Insurance:     ASSESSMENT:  Active Health Care Agents    There are no active Health Care Agents on file                        Patient Information  Admitted from[de-identified] Home  Mental Status: Alert  During Assessment patient was accompanied by: Not accompanied during assessment  Assessment information provided by[de-identified] Patient  Primary Caregiver: Self  Support Systems: Self,Spouse/significant other,Son  In the last 12 months, was there a time when you were not able to pay the mortgage or rent on time?: No  In the last 12 months, how many places have you lived?: 1  In the last 12 months, was there a time when you did not have a steady place to sleep or slept in a shelter (including now)?: No  Homeless/housing insecurity resource given?: N/A  Living Arrangements: Lives w/ Spouse/significant other,Lives Alone,Lives w/ Son    Activities of Daily Living Prior to Admission  Functional Status: Independent  Completes ADLs independently?: Yes  Ambulates independently?: Yes  Does patient use assisted devices?: No  Does patient currently own DME?: Yes  What DME does the patient currently own?: Other (Comment) (glucometer and insulin pump)         Patient Information Continued  Does patient have prescription coverage?: Yes (preference for [facility])  Within the past 12 months, you worried that your food would run out before you got the money to buy more : Never true  Within the past 12 months, the food you bought just didnt last and you didnt have money to get more : Never true  Food insecurity resource given?: No  Does patient receive dialysis treatments?: No  Does patient have a history of substance abuse?: No         Means of Transportation  Means of Transport to Appts[de-identified] Drives Self  In the past 12 months, has lack of transportation kept you from medical appointments or from getting medications?: No  In the past 12 months, has lack of transportation kept you from meetings, work, or from getting things needed for daily living?: No  Was application for public transport provided?: N/A        DISCHARGE DETAILS:    Discharge planning discussed with[de-identified] patient by room phone        CM contacted family/caregiver?: No- see comments  Were Treatment Team discharge recommendations reviewed with patient/caregiver?: Yes  Did patient/caregiver verbalize understanding of patient care needs?: Yes  Were patient/caregiver advised of the risks associated with not following Treatment Team discharge recommendations?: Yes    Contacts  Patient Contacts: spouse, Antonette Milligan  Relationship to Patient[de-identified] Family  Contact Method: Phone  Reason/Outcome: Emergency [address]         Is the patient interested in [facility] at discharge?: No    DME Referral Provided  Referral made for DME?: No    Other Referral/Resources/Interventions Provided:  Referral Comments: Patient admitted due to DKA, Dee Morris  CM consult received for dispo planning  Call made to patient's room to complete assessment  Patient states that he lives at home with his spouse, Antonette Milligan, and son  Confirmed spouse is emergency contact  Patient reports that he's independent in all aspects  Confirms that he has a glucometer and insulin pump at home and denies any issues obtaining meds  Patient reports that he usually uses [facility] as primary pharmacy  Confirms he has Rx coverage and denies any issues obtaining meds  Patient usually drives - no issues with transport and states spouse will be over to pick-up once clear for d/c  No needs currently anticipated  Will continue to follow      Would you like to participate in our [facility] service program?  : No - Declined    Treatment Team Recommendation: Home  Discharge Destination Plan[de-identified] Home  Transport at Discharge : [facility]

## 2022-01-12 NOTE — PROGRESS NOTES
The patient was identified by name and date of birth  Was informed that this is a virtual visit and that the visit is being conducted through [employer] Teams/telephone and patient was informed that this may not be a secure, HIPAA-complaint platform  Patient agreed to proceed  Patient privacy was secured with closed door and no other individual was privy to conversation on my end  Patient acknowledged consent and understanding of privacy and security of the video platform  The patient has agreed to participate and understands they can discontinue the visit at any time  Patient is aware this is a billable service  Inpatient follow-up progress note        Assessment:  55 yr male with uncontrolled diabetes for many years, HTN, HLD and CAD is admitted with DKA 2" covid 19 infection      PLAN:     1  DKA  Improving  Anion gap closed      2  Type 1 diabetes  Uncontrolled with A1c 9 1%  Goal is 7%  Uses medtronic 770G pump without sensor at home  Checks capillary glucose occasionally      Note severe hypoglycemia - likely due to high basal rate overnight and change in diet/lifestyle in hospital     Advised that it is not typical to use high dose overnight as basal  Advised to use a bolus/correction for late night food intake and keep a lower basal closer to daytime to avoid hypoglycemia  D/w patient and for now suggest following changes:  Basal:  11pm - 7am 1 5u/hr       7am - 11pm 1u/hr  ICR      1u/10gm          ISF 1u/40        TBG 120mg/dL           AIT - 4hr    Follow up closely in 3-4 weeks as outpatient  S:  Had severe hypoglycemia  O:  Patient seen and examined personally  /72 (BP Location: Right arm)   Pulse 75   Temp 100 2 °F (37 9 °C) (Oral)   Resp 16   Ht 5' 6" (1 676 m)   Wt 74 8 kg (165 lb)   SpO2 92%   BMI 26 63 kg/m²     Physical Exam  Constitutional:       General: He is not in acute distress    Neurological:      Mental Status: He is alert and oriented to person, place, and time  Comments: Speech normal and appropriate   Psychiatric:         Thought Content:  Thought content normal          Current Facility-Administered Medications   Medication Dose Route Frequency Provider Last Rate    acetaminophen  650 mg Oral Q6H PRN Amisha Mary PA-C      aspirin  325 mg Oral Daily Amisha Mary PA-C      atorvastatin  80 mg Oral Daily With Navarrete MARILYN Gamez      calcium carbonate  500 mg Oral Daily PRN Amisha Mary PA-C      fluticasone  2 spray Each Nare Daily Trinity Bar PA-C      gabapentin  300 mg Oral TID Amisha Mary PA-C      heparin (porcine)  5,000 Units Subcutaneous [facility] Manzo PA-C      hydrOXYzine HCL  10 mg Oral Q6H PRN Amisha Mary PA-C      insulin aspart  200 Units Subcutaneous Insulin Pump Daily PRN Erma Fiore MD      lisinopril  5 mg Oral Daily Trinity Bar PA-C      loperamide  2 mg Oral Q4H PRN Bridget Natarajan PA-C      metaxalone  800 mg Oral TID Amisha Mary PA-C      metoprolol tartrate  50 mg Oral Q12H [address] Sherwin Manzo PA-C      ondansetron  4 mg Intravenous Q6H PRN Amisha Mary PA-C      pantoprazole  40 mg Oral Early Morning Amisha Mary PA-C      patient maintained insulin pump  1 each Subcutaneous Q8H Erma Fiore MD      QUEtiapine  25 mg Oral HS Amisha Mary PA-C      remdesivir  200 mg Intravenous Q24H Trinity Bar PA-C      Followed by   Willie Pandya ON 1/13/2022] remdesivir  100 mg Intravenous Q24H Trinity Bar PA-C      sodium chloride  2 spray Each Nare Q1H PRN Bridget Natarajan PA-C          Lab, Imaging and other studies:   POC Glucose (mg/dl)   Date Value   01/12/2022 230 (H)   01/12/2022 87   01/12/2022 95   01/12/2022 33 (LL)   01/11/2022 112   01/11/2022 207 (H)   01/11/2022 196 (H)   01/11/2022 224 (H)   01/11/2022 127   01/11/2022 180 (H)

## 2022-01-12 NOTE — PROGRESS NOTES
[facility]  Progress Note - Bethany Maddison 1965, 55 y o  male MRN: [id number]  Unit/Bed#: S -01 Encounter: [id number]  Primary Care Provider: Magdaleno Mcfarlane DO   Date and time admitted to hospital: 1/10/2022  5:22 AM    * DKA (diabetic ketoacidosis) [facility])  Assessment & Plan  Lab Results   Component Value Date    HGBA1C 8 0 (H) 01/10/2022       Recent Labs     01/12/22  0519 01/12/22  0538 01/12/22  0840 01/12/22  1135   POCGLU 33* 95 87 230*       Blood Sugar Average: Last 72 hrs:  (P) 871 [id number]   patient with underlying history of type 1 diabetes- A1c reflects reasonable control  · Patient presented to the hospital due to blood sugar greater than 500 in the setting of very poor oral intake/vomiting  Beta hydroxybutyrate was 3 4 and lactic acid was 2 3, creat was 1 29  Now improved after receiving IV fluids  · Initially he required an insulin drip but after stabilizing he was transitioned back to his insulin pump per endocrinology on 1/11/22  Unfortunately he did have severe hypoglycemia this morning of 33, now stable    COVID-19  Assessment & Plan  · Vaccinated x 2  Tested positive on 1/4/22  · Chest x-ray unremarkable  · Patient without any respiratory issues  · Suspect patient's nausea vomiting and diarrhea were due to COVID--continue supportive care  · Also with severe nasal congestion--added Flonase and Ocean nasal spray  Counseled patient that he cannot continue to use Afrin due to rhinitis medicamentosa  · Patient newly developed fever this morning 101 2  I am concerned as to whether this is related to COVID or possibly yet undiagnosed bacterial infection given presence of leukocytosis and mildly elevated procalcitonin  He would be eligible in appropriate to receive IV remdesivir    I would recommend ongoing inpatient monitoring to ensure he is not on the cusp of further worsening    Leukocytosis  Assessment & Plan  · Noted that patient has significant leukocytosis of >20k on 1/11/22, trang since admission  Improved today to 16K  · Also with mild procalcitonin elevation which trended down  · Blood cultures negative at 24 hours  Chest x-ray negative  UA negative  · Patient did not receive any steroids  · Possibly stress response? Will recheck in a m  given new fever        Essential hypertension  Assessment & Plan  · Continue lisinopril and Lopressor  Noted that blood pressure is mostly stable    Hypokalemia  Assessment & Plan  · K 3 1, replete and recheck in am again  Monitor closely in setting of ongoing GI loss (diarrhea)    non MI troponin elevation  Assessment & Plan  · Patient noted to have minimal troponin elevation 66--77--84, with no reports of chest pain; he was quite concerned when he saw these values in his chart as he has underlying history of heart disease  · Suspect this was simply non MI troponin minimal elevation in the setting of DKA    VTE Pharmacologic Prophylaxis:   Pharmacologic: Heparin  Mechanical VTE Prophylaxis in Place: No    Patient Centered Rounds: spoke with RN    Discussions with Specialists or Other Care Team Provider:     Education and Discussions with Family / Patient:  Offered, declined    Time Spent for Care: 30 minutes  More than 50% of total time spent on counseling and coordination of care as described above  Current Length of Stay: 2 day(s)    Current Patient Status: Inpatient   Certification Statement: The patient will continue to require additional inpatient hospital stay due to New onset fever    Discharge Plan:  Possibly tomorrow if stable/improved    Code Status: Level 1 - Full Code      Subjective:   Patient reports he had an episode of low blood sugar this morning because he did not eat that much for dinner because he suddenly felt very full at that point requesting to have his IV fluids turned off  He also had not had any bowel movement at that point  No nausea or vomiting was reported    At that point in the morning we discussed patient being stable for discharge home  Patient did raise concerns to me about his elevated white blood cell count and asked if it could be related to his remote history of splenectomy  However, I was notified later that patient spiked a new fever  He also had an episode of diarrhea  He denied any bleeding or abdominal pain  He was feeling okay at this time, denying any cough or shortness of breath and has not required any oxygen  Objective:     Vitals:   Temp (24hrs), Av 6 °F (37 6 °C), Min:98 °F (36 7 °C), Max:101 2 °F (38 4 °C)    Temp:  [98 °F (36 7 °C)-101 2 °F (38 4 °C)] 100 2 °F (37 9 °C)  HR:  [66-75] 75  Resp:  [16-18] 16  BP: (136-158)/(66-72) 158/72  SpO2:  [92 %-96 %] 92 %  Body mass index is 26 63 kg/m²  Input and Output Summary (last 24 hours): Intake/Output Summary (Last 24 hours) at 2022 1203  Last data filed at 2022 0701  Gross per 24 hour   Intake 1600 ml   Output 1375 ml   Net 225 ml       Physical Exam:     Physical Exam  Vitals reviewed  Constitutional:       General: He is not in acute distress  Appearance: Normal appearance  He is not ill-appearing, toxic-appearing or diaphoretic  HENT:      Nose: Congestion present  Eyes:      General: No scleral icterus  Right eye: No discharge  Left eye: No discharge  Conjunctiva/sclera: Conjunctivae normal    Cardiovascular:      Rate and Rhythm: Normal rate and regular rhythm  Heart sounds: No murmur heard  Pulmonary:      Effort: No respiratory distress  Breath sounds: No stridor  No wheezing, rhonchi or rales  Abdominal:      General: Bowel sounds are normal  There is no distension  Palpations: Abdomen is soft  Tenderness: There is no abdominal tenderness  There is no guarding  Musculoskeletal:      Right lower leg: No edema  Left lower leg: No edema  Skin:     General: Skin is warm and dry        Coloration: Skin is not jaundiced or pale       Findings: No bruising, erythema, lesion or rash  Neurological:      General: No focal deficit present  Mental Status: He is alert  Mental status is at baseline  Comments: Awake alert interactive no confusion   Psychiatric:         Mood and Affect: Mood normal          Thought Content: Thought content normal        Additional Data:     Labs:    Results from last 7 days   Lab Units 01/12/22  0509   WBC Thousand/uL 16 01*   HEMOGLOBIN g/dL 11 7*   HEMATOCRIT % 34 4*   PLATELETS Thousands/uL 264   NEUTROS PCT % 71   LYMPHS PCT % 17   MONOS PCT % 11   EOS PCT % 0     Results from last 7 days   Lab Units 01/12/22  0509 01/10/22  0814 01/10/22  0540   SODIUM mmol/L 142   < > 140   POTASSIUM mmol/L 3 1*   < > 4 0   CHLORIDE mmol/L 106   < > 100   CO2 mmol/L 28   < > 24   BUN mg/dL 9   < > 27*   CREATININE mg/dL 0 67   < > 1 29   ANION GAP mmol/L 8   < > 16*   CALCIUM mg/dL 8 1*   < > 9 8   ALBUMIN g/dL  --   --  4 2   TOTAL BILIRUBIN mg/dL  --   --  0 38   ALK PHOS U/L  --   --  229*   ALT U/L  --   --  61   AST U/L  --   --  20   GLUCOSE RANDOM mg/dL 38*   < > 420*    < > = values in this interval not displayed  Results from last 7 days   Lab Units 01/10/22  0540   INR  1 06     Results from last 7 days   Lab Units 01/12/22  1135 01/12/22  0840 01/12/22  0538 01/12/22  0519 01/11/22  2041 01/11/22  1530 01/11/22  1200 01/11/22  1011 01/11/22  0809 01/11/22  0544 01/11/22  0251 01/11/22  0039   POC GLUCOSE mg/dl 230* 87 95 33* 112 207* 196* 224* 127 180* 161* 133     Results from last 7 days   Lab Units 01/10/22  0540   HEMOGLOBIN A1C % 8 0*     Results from last 7 days   Lab Units 01/11/22  0616 01/10/22  0814 01/10/22  0540   LACTIC ACID mmol/L  --  1 2 2 3*   PROCALCITONIN ng/ml 0 20  --  0 32*       * I Have Reviewed All Lab Data Listed Above  * Additional Pertinent Lab Tests Reviewed:  All Labs Within Last 24 Hours Reviewed    Imaging:    Imaging Reports Reviewed Today Include:   Imaging Personally Reviewed by Myself Includes:      Recent Cultures (last 7 days):     Results from last 7 days   Lab Units 01/10/22  0656 01/10/22  0540   BLOOD CULTURE  No Growth at 48 hrs  No Growth at 48 hrs  Last 24 Hours Medication List:   Current Facility-Administered Medications   Medication Dose Route Frequency Provider Last Rate    acetaminophen  650 mg Oral Q6H PRN Luis Fernando Jones, MARILYN      aspirin  325 mg Oral Daily Luis Fernando Jones, MARILYN      atorvastatin  80 mg Oral Daily With [employer], MARILYN      calcium carbonate  500 mg Oral Daily PRN Luis Fernando Jones PA-C      fluticasone  2 spray Each Nare Daily Trinity Bar, MARILYN      gabapentin  300 mg Oral TID Luis Fernando Jones PA-C      heparin (porcine)  5,000 Units Subcutaneous [facility] Devi Manzo PA-C      hydrOXYzine HCL  10 mg Oral Q6H PRN Luis Fernando Jones PA-C      insulin aspart  200 Units Subcutaneous Insulin Pump Daily PRN Jorden Johnston MD      lisinopril  5 mg Oral Daily Trinity Bar, MARILYN      loperamide  2 mg Oral Q4H PRN Sondra Healy PA-C      metaxalone  800 mg Oral TID Luis Fernando Jones, MARILYN      metoprolol tartrate  50 mg Oral Q12H [address] Devi Manzo PA-C      ondansetron  4 mg Intravenous Q6H PRN Luis Fernando Jones, MARILYN      pantoprazole  40 mg Oral Early Morning Luis Fernando Jones PA-C      patient maintained insulin pump  1 each Subcutaneous Q8H Jorden Johnston MD      QUEtiapine  25 mg Oral HS Devi Manzo PA-C      remdesivir  200 mg Intravenous Q24H Trinity Bar PA-C      Followed by   [employer] ON 1/13/2022] remdesivir  100 mg Intravenous Q24H Trinity Bar PA-C      sodium chloride  2 spray Each Nare Q1H PRN Sondra Healy PA-C          Today, Patient Was Seen By: Sondra Healy PA-C    ** Please Note: Dictation voice to text software may have been used in the creation of this document   **

## 2022-01-12 NOTE — DISCHARGE INSTR - AVS FIRST PAGE
Dear Natacha Mcpherson,     It was our pleasure to care for you here at [facility]  It is our hope that we were always able to exceed the expected standards for your care during your stay  You were hospitalized due to COVID-19, DKA  You were cared for on the 3rd floor by Maame Quintero PA-C under the service of David Vasques MD with the [facility] who covers for your primary care physician (PCP), Eric Mejía DO, while you were hospitalized  If you have any questions or concerns related to this hospitalization, you may contact us at [phone]  For follow up as well as any medication refills, we recommend that you follow up with your primary care physician  A registered nurse will reach out to you by phone within a few days after your discharge to answer any additional questions that you may have after going home  However, at this time we provide for you here, the most important instructions / recommendations at discharge:     Notable Medication Adjustments -   Potassium 20 mEq daily for the next 3 days starting tomorrow  Testing Required after Discharge -   Cbc and BMP should be rechecked in 1 week by your family doctor  Important follow up information -   Follow-up with endocrinology and family doctor  Other Instructions -   Monitor your blood sugars closely  Please review this entire after visit summary as additional general instructions including medication list, appointments, activity, diet, any pertinent wound care, and other additional recommendations from your care team that may be provided for you        Sincerely,     Maame Quintero PA-C

## 2022-01-13 VITALS
RESPIRATION RATE: 16 BRPM | HEART RATE: 75 BPM | DIASTOLIC BLOOD PRESSURE: 72 MMHG | TEMPERATURE: 99.4 F | HEIGHT: 66 IN | SYSTOLIC BLOOD PRESSURE: 149 MMHG | OXYGEN SATURATION: 98 % | WEIGHT: 165 LBS | BODY MASS INDEX: 26.52 KG/M2

## 2022-01-13 LAB
ALBUMIN SERPL BCP-MCNC: 2.5 G/DL (ref 3.5–5)
ALP SERPL-CCNC: 137 U/L (ref 46–116)
ALT SERPL W P-5'-P-CCNC: 39 U/L (ref 12–78)
ANION GAP SERPL CALCULATED.3IONS-SCNC: 7 MMOL/L (ref 4–13)
AST SERPL W P-5'-P-CCNC: 27 U/L (ref 5–45)
BASOPHILS # BLD AUTO: 0.05 THOUSANDS/ΜL (ref 0–0.1)
BASOPHILS NFR BLD AUTO: 1 % (ref 0–1)
BILIRUB SERPL-MCNC: 0.22 MG/DL (ref 0.2–1)
BUN SERPL-MCNC: 9 MG/DL (ref 5–25)
CALCIUM ALBUM COR SERPL-MCNC: 9.4 MG/DL (ref 8.3–10.1)
CALCIUM SERPL-MCNC: 8.2 MG/DL (ref 8.3–10.1)
CHLORIDE SERPL-SCNC: 107 MMOL/L (ref 100–108)
CO2 SERPL-SCNC: 28 MMOL/L (ref 21–32)
CREAT SERPL-MCNC: 0.71 MG/DL (ref 0.6–1.3)
EOSINOPHIL # BLD AUTO: 0.32 THOUSAND/ΜL (ref 0–0.61)
EOSINOPHIL NFR BLD AUTO: 3 % (ref 0–6)
ERYTHROCYTE [DISTWIDTH] IN BLOOD BY AUTOMATED COUNT: 14.3 % (ref 11.6–15.1)
GFR SERPL CREATININE-BSD FRML MDRD: 105 ML/MIN/1.73SQ M
GLUCOSE SERPL-MCNC: 66 MG/DL (ref 65–140)
GLUCOSE SERPL-MCNC: 78 MG/DL (ref 65–140)
HCT VFR BLD AUTO: 35.4 % (ref 36.5–49.3)
HGB BLD-MCNC: 11.9 G/DL (ref 12–17)
IMM GRANULOCYTES # BLD AUTO: 0.04 THOUSAND/UL (ref 0–0.2)
IMM GRANULOCYTES NFR BLD AUTO: 0 % (ref 0–2)
LYMPHOCYTES # BLD AUTO: 1.95 THOUSANDS/ΜL (ref 0.6–4.47)
LYMPHOCYTES NFR BLD AUTO: 19 % (ref 14–44)
MAGNESIUM SERPL-MCNC: 2 MG/DL (ref 1.6–2.6)
MCH RBC QN AUTO: 28.9 PG (ref 26.8–34.3)
MCHC RBC AUTO-ENTMCNC: 33.6 G/DL (ref 31.4–37.4)
MCV RBC AUTO: 86 FL (ref 82–98)
MONOCYTES # BLD AUTO: 1.2 THOUSAND/ΜL (ref 0.17–1.22)
MONOCYTES NFR BLD AUTO: 12 % (ref 4–12)
NEUTROPHILS # BLD AUTO: 6.9 THOUSANDS/ΜL (ref 1.85–7.62)
NEUTS SEG NFR BLD AUTO: 65 % (ref 43–75)
NRBC BLD AUTO-RTO: 0 /100 WBCS
PHOSPHATE SERPL-MCNC: 2.8 MG/DL (ref 2.7–4.5)
PLATELET # BLD AUTO: 245 THOUSANDS/UL (ref 149–390)
PMV BLD AUTO: 11.3 FL (ref 8.9–12.7)
POTASSIUM SERPL-SCNC: 3.1 MMOL/L (ref 3.5–5.3)
PROCALCITONIN SERPL-MCNC: 0.13 NG/ML
PROT SERPL-MCNC: 6.1 G/DL (ref 6.4–8.2)
RBC # BLD AUTO: 4.12 MILLION/UL (ref 3.88–5.62)
SODIUM SERPL-SCNC: 142 MMOL/L (ref 136–145)
WBC # BLD AUTO: 10.46 THOUSAND/UL (ref 4.31–10.16)

## 2022-01-13 PROCEDURE — 83735 ASSAY OF MAGNESIUM: CPT | Performed by: PHYSICIAN ASSISTANT

## 2022-01-13 PROCEDURE — 85025 COMPLETE CBC W/AUTO DIFF WBC: CPT | Performed by: PHYSICIAN ASSISTANT

## 2022-01-13 PROCEDURE — 84100 ASSAY OF PHOSPHORUS: CPT | Performed by: PHYSICIAN ASSISTANT

## 2022-01-13 PROCEDURE — 99239 HOSP IP/OBS DSCHRG MGMT >30: CPT | Performed by: PHYSICIAN ASSISTANT

## 2022-01-13 PROCEDURE — 82948 REAGENT STRIP/BLOOD GLUCOSE: CPT

## 2022-01-13 PROCEDURE — 80053 COMPREHEN METABOLIC PANEL: CPT | Performed by: PHYSICIAN ASSISTANT

## 2022-01-13 PROCEDURE — 84145 PROCALCITONIN (PCT): CPT | Performed by: PHYSICIAN ASSISTANT

## 2022-01-13 RX ORDER — POTASSIUM CHLORIDE 20 MEQ/1
40 TABLET, EXTENDED RELEASE ORAL ONCE
Status: COMPLETED | OUTPATIENT
Start: 2022-01-13 | End: 2022-01-13

## 2022-01-13 RX ORDER — POTASSIUM CHLORIDE 20 MEQ/1
20 TABLET, EXTENDED RELEASE ORAL DAILY
Qty: 3 TABLET | Refills: 0 | Status: SHIPPED | OUTPATIENT
Start: 2022-01-14 | End: 2022-01-17

## 2022-01-13 RX ORDER — LOPERAMIDE HYDROCHLORIDE 2 MG/1
2 CAPSULE ORAL EVERY 4 HOURS PRN
Qty: 30 CAPSULE | Refills: 0 | Status: SHIPPED | OUTPATIENT
Start: 2022-01-13

## 2022-01-13 RX ORDER — FLUTICASONE PROPIONATE 50 MCG
2 SPRAY, SUSPENSION (ML) NASAL DAILY
Refills: 0
Start: 2022-01-13

## 2022-01-13 RX ORDER — ECHINACEA PURPUREA EXTRACT 125 MG
2 TABLET ORAL
Refills: 0
Start: 2022-01-13

## 2022-01-13 RX ADMIN — HEPARIN SODIUM 5000 UNITS: 5000 INJECTION INTRAVENOUS; SUBCUTANEOUS at 05:03

## 2022-01-13 RX ADMIN — ASPIRIN 325 MG ORAL TABLET 325 MG: 325 PILL ORAL at 08:06

## 2022-01-13 RX ADMIN — PANTOPRAZOLE SODIUM 40 MG: 40 TABLET, DELAYED RELEASE ORAL at 05:03

## 2022-01-13 RX ADMIN — GABAPENTIN 300 MG: 300 CAPSULE ORAL at 08:06

## 2022-01-13 RX ADMIN — METOPROLOL TARTRATE 50 MG: 50 TABLET, FILM COATED ORAL at 08:06

## 2022-01-13 RX ADMIN — POTASSIUM CHLORIDE 40 MEQ: 1500 TABLET, EXTENDED RELEASE ORAL at 08:06

## 2022-01-13 RX ADMIN — LISINOPRIL 5 MG: 5 TABLET ORAL at 08:06

## 2022-01-13 NOTE — ASSESSMENT & PLAN NOTE
· Vaccinated x 2  Tested positive on 1/4/22  · Chest x-ray unremarkable  · Patient without any respiratory issues  · Suspect patient's nausea vomiting and diarrhea were due to COVID--continue supportive care  · Also with severe nasal congestion--added Flonase and Ocean nasal spray    Counseled patient that he cannot continue to use Afrin due to rhinitis medicamentosa  · Patient newly developed fever of 101 2 on 1/12/22 am   Given concern that he could have been on the cusp of worsening we did add IV remdesivir, but noted that he stabilized without any additional fever or worsening of symptoms and therefore does not need to be maintained on it

## 2022-01-13 NOTE — DISCHARGE SUMMARY
[facility]  Discharge- Darwin Hobbs 1965, 55 y o  male MRN: [id number]  Unit/Bed#: S -01 Encounter: [id number]  Primary Care Provider: Nery Graham DO   Date and time admitted to hospital: 1/10/2022  5:22 AM    * DKA (diabetic ketoacidosis) [facility])  Assessment & Plan  Lab Results   Component Value Date    HGBA1C 8 0 (H) 01/10/2022       Recent Labs     01/12/22  1135 01/12/22  1558 01/12/22 2012 01/13/22  0750   POCGLU 230* 180* 113 78       Blood Sugar Average: Last 72 hrs:  (P) 100 [id number]   patient with underlying history of type 1 diabetes- A1c reflects reasonable control  · Patient presented to the hospital due to blood sugar greater than 500 in the setting of very poor oral intake/vomiting  Beta hydroxybutyrate was 3 4 and lactic acid was 2 3, creat was 1 29  Now improved after receiving IV fluids  · Initially he required an insulin drip but after stabilizing he was transitioned back to his insulin pump per endocrinology on 1/11/22  Unfortunately he did have severe hypoglycemia January 12th morning of 33, now stable    COVID-19  Assessment & Plan  · Vaccinated x 2  Tested positive on 1/4/22  · Chest x-ray unremarkable  · Patient without any respiratory issues  · Suspect patient's nausea vomiting and diarrhea were due to COVID--continue supportive care  · Also with severe nasal congestion--added Flonase and Ocean nasal spray  Counseled patient that he cannot continue to use Afrin due to rhinitis medicamentosa  · Patient newly developed fever of 101 2 on 1/12/22 am   Given concern that he could have been on the cusp of worsening we did add IV remdesivir, but noted that he stabilized without any additional fever or worsening of symptoms and therefore does not need to be maintained on it    Leukocytosis  Assessment & Plan  · Noted that patient has significant leukocytosis of >20k on 1/11/22, (had risen since admission)   Improved today to 10K without antibiotics  · Also with mild procalcitonin elevation which trended down without intervention  · Blood cultures negative at 48 hours  Chest x-ray negative  UA negative  · Patient did not receive any steroids  · Possibly stress response? Could recheck as an outpatient when stable to confirm         Essential hypertension  Assessment & Plan  · Continue lisinopril and Lopressor  Noted that blood pressure is mostly stable    Hypokalemia  Assessment & Plan  · K 3 1 again, replete today with 40 mEq and then 20 mEq daily for the next 3 days  Recommend PCP recheck outpatient labs in 1 week    Encouraged additional potassium in the diet    Coronary artery disease involving native coronary artery of native heart without angina pectoris  Assessment & Plan  · With history of CABG    non MI troponin elevation  Assessment & Plan  · Patient noted to have minimal troponin elevation 66--77--84, with no reports of chest pain; he was quite concerned when he saw these values in his chart as he has underlying history of heart disease  · Suspect this was simply non MI troponin minimal elevation in the setting of DKA    Discharging Physician / Practitioner: Belgica Bass PA-C  PCP: Lexy Ernandez DO  Admission Date:   Admission Orders (From admission, onward)     Ordered        01/10/22 0713  Inpatient Admission  Once                      Discharge Date: 01/13/22    Medical Problems             Resolved Problems  Date Reviewed: 1/13/2022    None                Consultations During Hospital Stay:  · endocrinology    Procedures Performed:   · none    Significant Findings / Test Results:   · As above    Incidental Findings:   · elevated wbc and procal    Test Results Pending at Discharge (will require follow up):   · none     Outpatient Tests Requested:  · Cbc/bmp    Complications:  fever    Reason for Admission: vomiting    Hospital Course:     Eli Catalan is a 55 y o  male patient who originally presented to the hospital on 1/10/2022 with known COVID-19 having tested positive prior to the hospitalization, due to intractable vomiting and diarrhea in a type 1 diabetic with associated DKA and blood sugar greater than 500  He was placed on an insulin drip and supportive care with antiemetics and fluid resuscitation with resolution of his DKA  He was then transitioned back to his home insulin pump but further adjustments needed to be made due to hypoglycemia  Was initially planned for discharge on January 12 but then developed a fever and was monitored an additional day in the hospital after receiving IV remdesivir  He stabilized without any additional fevers and his oral intake is adequate without any additional severe events of vomiting or diarrhea  Electrolyte repletion was provided and the leukocytosis and elevated procalcitonin level he had on admission is resolving without intervention of antibiotics    Please see above list of diagnoses and related plan for additional information  Condition at Discharge: stable     Discharge Day Visit / Exam:     Subjective:  Patient without any further fever  No chest pain, shortness of breath, cough  Still with some stuffy nose but better with medication  Overall feels better today  Tolerating diet  No episodes of vomiting  Says his stool is not as loose although not totally back to normal   Vitals: Blood Pressure: 149/72 (01/13/22 0700)  Pulse: 75 (01/13/22 0700)  Temperature: 99 4 °F (37 4 °C) (01/12/22 2008)  Temp Source: Oral (01/13/22 0700)  Respirations: 16 (01/13/22 0700)  Height: 5' 6" (167 6 cm) (01/10/22 2147)  Weight - Scale: 74 8 kg (165 lb) (01/10/22 2147)  SpO2: 98 % (01/13/22 0700)  Exam:   Physical Exam  Vitals reviewed  Constitutional:       General: He is not in acute distress  Appearance: Normal appearance  He is not ill-appearing, toxic-appearing or diaphoretic  Eyes:      General: No scleral icterus  Right eye: No discharge           Left eye: No discharge  Conjunctiva/sclera: Conjunctivae normal    Cardiovascular:      Rate and Rhythm: Normal rate and regular rhythm  Heart sounds: No murmur heard  Pulmonary:      Effort: No respiratory distress  Breath sounds: No stridor  No wheezing, rhonchi or rales  Abdominal:      General: There is no distension  Palpations: Abdomen is soft  Tenderness: There is no abdominal tenderness  There is no guarding  Musculoskeletal:      Right lower leg: No edema  Left lower leg: No edema  Skin:     General: Skin is warm and dry  Coloration: Skin is not jaundiced or pale  Findings: No bruising, erythema, lesion or rash  Neurological:      General: No focal deficit present  Mental Status: He is alert  Mental status is at baseline  Psychiatric:         Mood and Affect: Mood normal          Thought Content: Thought content normal          Discussion with Family: declined    Discharge instructions/Information to patient and family:   See after visit summary for information provided to patient and family  Provisions for Follow-Up Care:  See after visit summary for information related to follow-up care and any pertinent home health orders  Disposition: home    Planned Readmission: none     Discharge Statement:  I spent 35 minutes discharging the patient  This time was spent on the day of discharge  I had direct contact with the patient on the day of discharge  Greater than 50% of the total time was spent examining patient, answering all patient questions, arranging and discussing plan of care with patient as well as directly providing post-discharge instructions  Additional time then spent on discharge activities  Discharge Medications:  See after visit summary for reconciled discharge medications provided to patient and family        ** Please Note: This note has been constructed using a voice recognition system **

## 2022-01-13 NOTE — PLAN OF CARE
Problem: Nutrition/Hydration-ADULT  Goal: Nutrient/Hydration intake appropriate for improving, restoring or maintaining nutritional needs  Description: Monitor and assess patient's nutrition/hydration status for malnutrition  Collaborate with interdisciplinary team and initiate plan and interventions as ordered  Monitor patient's weight and dietary intake as ordered or per policy  Utilize nutrition screening tool and intervene as necessary  Determine patient's food preferences and provide high-protein, high-caloric foods as appropriate       INTERVENTIONS:  - Monitor oral intake, urinary output, labs, and treatment plans  - Assess nutrition and hydration status and recommend course of action  - Evaluate amount of meals eaten  - Assist patient with eating if necessary   - Allow adequate time for meals  - Recommend/ encourage appropriate diets, oral nutritional supplements, and vitamin/mineral supplements  - Order, calculate, and assess calorie counts as needed  - Recommend, monitor, and adjust tube feedings and TPN/PPN based on assessed needs  - Assess need for intravenous fluids  - Provide specific nutrition/hydration education as appropriate  - Include patient/family/caregiver in decisions related to nutrition  Outcome: Completed     Problem: INFECTION - ADULT  Goal: Absence or prevention of progression during hospitalization  Description: INTERVENTIONS:  - Assess and monitor for signs and symptoms of infection  - Monitor lab/diagnostic results  - Monitor all insertion sites, i e  indwelling lines, tubes, and drains  - Monitor endotracheal if appropriate and nasal secretions for changes in amount and color  - Richardson appropriate cooling/warming therapies per order  - Administer medications as ordered  - Instruct and encourage patient and family to use good hand hygiene technique  - Identify and instruct in appropriate isolation precautions for identified infection/condition  Outcome: Completed  Goal: Absence of fever/infection during neutropenic period  Description: INTERVENTIONS:  - Monitor WBC    Outcome: Completed     Problem: METABOLIC, FLUID AND ELECTROLYTES - ADULT  Goal: Electrolytes maintained within normal limits  Description: INTERVENTIONS:  - Monitor labs and assess patient for signs and symptoms of electrolyte imbalances  - Administer electrolyte replacement as ordered  - Monitor response to electrolyte replacements, including repeat lab results as appropriate  - Instruct patient on fluid and nutrition as appropriate  Outcome: Completed  Goal: Fluid balance maintained  Description: INTERVENTIONS:  - Monitor labs   - Monitor I/O and WT  - Instruct patient on fluid and nutrition as appropriate  - Assess for signs & symptoms of volume excess or deficit  Outcome: Completed  Goal: Glucose maintained within target range  Description: INTERVENTIONS:  - Monitor Blood Glucose as ordered  - Assess for signs and symptoms of hyperglycemia and hypoglycemia  - Administer ordered medications to maintain glucose within target range  - Assess nutritional intake and initiate nutrition service referral as needed  Outcome: Completed     Problem: Potential for Falls  Goal: Patient will remain free of falls  Description: INTERVENTIONS:  - Educate patient/family on patient safety including physical limitations  - Instruct patient to call for assistance with activity   - Consult OT/PT to assist with strengthening/mobility   - Keep Call bell within reach  - Keep bed low and locked with side rails adjusted as appropriate  - Keep care items and personal belongings within reach  - Initiate and maintain comfort rounds  - Make Fall Risk Sign visible to staff  - Offer Toileting ever Hours, in advance of need  - Initiate/Maintain alarm  - Obtain necessary fall risk management equipment:   - Apply yellow socks and bracelet for high fall risk patients  - Consider moving patient to room near nurses station  Outcome: Completed

## 2022-01-13 NOTE — ASSESSMENT & PLAN NOTE
Lab Results   Component Value Date    HGBA1C 8 0 (H) 01/10/2022       Recent Labs     01/12/22  1135 01/12/22  1558 01/12/22 2012 01/13/22  0750   POCGLU 230* 180* 113 78       Blood Sugar Average: Last 72 hrs:  (P) 759 [id number]   patient with underlying history of type 1 diabetes- A1c reflects reasonable control  · Patient presented to the hospital due to blood sugar greater than 500 in the setting of very poor oral intake/vomiting  Beta hydroxybutyrate was 3 4 and lactic acid was 2 3, creat was 1 29  Now improved after receiving IV fluids  · Initially he required an insulin drip but after stabilizing he was transitioned back to his insulin pump per endocrinology on 1/11/22    Unfortunately he did have severe hypoglycemia January 12th morning of 33, now stable

## 2022-01-13 NOTE — ASSESSMENT & PLAN NOTE
· K 3 1 again, replete today with 40 mEq and then 20 mEq daily for the next 3 days  Recommend PCP recheck outpatient labs in 1 week    Encouraged additional potassium in the diet

## 2022-01-13 NOTE — ASSESSMENT & PLAN NOTE
· Noted that patient has significant leukocytosis of >20k on 1/11/22, (had risen since admission)  Improved today to 10K without antibiotics  · Also with mild procalcitonin elevation which trended down without intervention  · Blood cultures negative at 48 hours  Chest x-ray negative  UA negative  · Patient did not receive any steroids  · Possibly stress response?   Could recheck as an outpatient when stable to confirm

## 2022-01-13 NOTE — PLAN OF CARE
Problem: Nutrition/Hydration-ADULT  Goal: Nutrient/Hydration intake appropriate for improving, restoring or maintaining nutritional needs  Description: Monitor and assess patient's nutrition/hydration status for malnutrition  Collaborate with interdisciplinary team and initiate plan and interventions as ordered  Monitor patient's weight and dietary intake as ordered or per policy  Utilize nutrition screening tool and intervene as necessary  Determine patient's food preferences and provide high-protein, high-caloric foods as appropriate       INTERVENTIONS:  - Monitor oral intake, urinary output, labs, and treatment plans  - Assess nutrition and hydration status and recommend course of action  - Evaluate amount of meals eaten  - Assist patient with eating if necessary   - Allow adequate time for meals  - Recommend/ encourage appropriate diets, oral nutritional supplements, and vitamin/mineral supplements  - Order, calculate, and assess calorie counts as needed  - Recommend, monitor, and adjust tube feedings and TPN/PPN based on assessed needs  - Assess need for intravenous fluids  - Provide specific nutrition/hydration education as appropriate  - Include patient/family/caregiver in decisions related to nutrition  Outcome: Progressing     Problem: INFECTION - ADULT  Goal: Absence or prevention of progression during hospitalization  Description: INTERVENTIONS:  - Assess and monitor for signs and symptoms of infection  - Monitor lab/diagnostic results  - Monitor all insertion sites, i e  indwelling lines, tubes, and drains  - Monitor endotracheal if appropriate and nasal secretions for changes in amount and color  - [place] appropriate cooling/warming therapies per order  - Administer medications as ordered  - Instruct and encourage patient and family to use good hand hygiene technique  - Identify and instruct in appropriate isolation precautions for identified infection/condition  Outcome: Progressing  Goal: Absence of fever/infection during neutropenic period  Description: INTERVENTIONS:  - Monitor WBC    Outcome: Progressing     Problem: METABOLIC, FLUID AND ELECTROLYTES - ADULT  Goal: Electrolytes maintained within normal limits  Description: INTERVENTIONS:  - Monitor labs and assess patient for signs and symptoms of electrolyte imbalances  - Administer electrolyte replacement as ordered  - Monitor response to electrolyte replacements, including repeat lab results as appropriate  - Instruct patient on fluid and nutrition as appropriate  Outcome: Progressing  Goal: Fluid balance maintained  Description: INTERVENTIONS:  - Monitor labs   - Monitor I/O and WT  - Instruct patient on fluid and nutrition as appropriate  - Assess for signs & symptoms of volume excess or deficit  Outcome: Progressing  Goal: Glucose maintained within target range  Description: INTERVENTIONS:  - Monitor Blood Glucose as ordered  - Assess for signs and symptoms of hyperglycemia and hypoglycemia  - Administer ordered medications to maintain glucose within target range  - Assess nutritional intake and initiate nutrition service referral as needed  Outcome: Progressing     Problem: Potential for Falls  Goal: Patient will remain free of falls  Description: INTERVENTIONS:  - Educate patient/family on patient safety including physical limitations  - Instruct patient to call for assistance with activity   - Consult OT/PT to assist with strengthening/mobility   - Keep Call bell within reach  - Keep bed low and locked with side rails adjusted as appropriate  - Keep care items and personal belongings within reach  - Initiate and maintain comfort rounds  - Make Fall Risk Sign visible to staff  - Offer Toileting every  Hours, in advance of need  - Initiate/Maintain alarm  - Obtain necessary fall risk management equipmen  - Apply yellow socks and bracelet for high fall risk patients  - Consider moving patient to room near nurses station  Outcome: Progressing

## 2022-01-14 NOTE — UTILIZATION REVIEW
Please note we sent our Initial request to you on 1/11/2022  All determinations MUST be either called into [phone] or Faxed to [phone]    Inpatient Admission Authorization Request   NOTIFICATION OF INPATIENT ADMISSION/INPATIENT AUTHORIZATION REQUEST   SERVICING FACILITY:    [address] [place] [address]  Tax ID: [id number]  NPI: [id number]  Place of Service: Inpatient [address]  Place of Service Code: 24     ATTENDING PROVIDER:  Attending Name and NPI#: Zach Shields Md [[id number]]  Address: Kenney Dunlap  [address]  Phone: [phone]     UTILIZATION REVIEW CONTACT:  [facility] Utilization   Network Utilization Review Department  Phone: [phone]  Fax: [phone]  Email: [email or web address]     PHYSICIAN ADVISORY SERVICES:  FOR DNOJ-TB-VMWM REVIEW - MEDICAL NECESSITY DENIAL  Phone: [phone]  Fax: [phone]  Email: [email or web address]     TYPE OF REQUEST:  Inpatient Status     ADMISSION INFORMATION:  ADMISSION DATE/TIME: 1/10/22  7:13 AM  PATIENT DIAGNOSIS CODE/DESCRIPTION:  Nausea and vomiting [R11 2]  DKA, type 1 ([address] ) [E10 10]  2019 novel coronavirus disease (COVID-19) [U07 1]  COVID-19 [U07 1]  DISCHARGE DATE/TIME: 1/13/2022  9:00 AM  DISCHARGE DISPOSITION (IF DISCHARGED): Home/Self Care     IMPORTANT INFORMATION:  Please contact the [facility] directly with any questions or concerns regarding this request  Department voicemails are confidential     Send requests for admission clinical reviews, concurrent reviews, approvals, and administrative denials due to lack of clinical to fax [phone]

## 2022-01-15 LAB
BACTERIA BLD CULT: NORMAL
BACTERIA BLD CULT: NORMAL

## 2022-01-17 ENCOUNTER — TELEPHONE (OUTPATIENT)
Dept: PAIN MEDICINE | Facility: CLINIC | Age: 57
End: 2022-01-17

## 2022-01-17 NOTE — TELEPHONE ENCOUNTER
Pt contacted Call Center requested refill of their medication  Medication Name:gabapentin (NEURONTIN        Dosage of Med: 300 mg       Frequency of Med: Take 1 capsule (300 mg total) by mouth 3 (three) times a day      Remaining Medication: 0      Pharmacy and Location:  [address] [place] [address] [address] [place] [address]   Phone:  [phone]        Pt  Preferred Callback Phone Number: [phone]      Thank you

## 2022-01-17 NOTE — TELEPHONE ENCOUNTER
S/w pt  Pt is requesting a refill of Gabapentin  Medication is helping and dose confirmed 300 mg tid  Please advise- refill same?

## 2022-01-21 NOTE — UTILIZATION REVIEW
Notification of Discharge   This is a Notification of Discharge from our facility [address] [place] [address]  Please be advised that this patient has been discharge from our facility  Below you will find the admission and discharge date and time including the patients disposition  UTILIZATION REVIEW CONTACT:  Victoriano Kasper  Utilization   Network Utilization Review Department  Phone: [phone] x carefully listen to the prompts  All voicemails are confidential   Email: [email or web address]     PHYSICIAN ADVISORY SERVICES:  FOR STVN-EJ-DOTL REVIEW - MEDICAL NECESSITY DENIAL  Phone: [phone]  Fax: [phone]  Email: [email or web address]     PRESENTATION DATE: 1/10/2022  5:22 AM  OBERVATION ADMISSION DATE:   INPATIENT ADMISSION DATE: 1/10/22  7:13 AM   DISCHARGE DATE: 1/13/2022  9:00 AM  DISPOSITION: Home/Self Care Home/Self Care      IMPORTANT INFORMATION:  Send all requests for admission clinical reviews, approved or denied determinations and any other requests to dedicated fax number below belonging to the campus where the patient is receiving treatment   List of dedicated fax numbers:  [address] DENIALS (Administrative/Medical Necessity) [phone]   1000  [facility] (Maternity/NICU/Pediatrics) [phone]   Randa Quiros [phone]   130 [facility] [phone]   [address] [place] [address] [phone]   2000 [facility] [address] [place] [address],[address] [place] [address] [phone]   [facility]  [phone]   2205 [facility] [address]  2401 [facility] 1000 [address] [facility] [phone]

## 2022-01-26 DIAGNOSIS — E10.9 TYPE 1 DIABETES MELLITUS WITHOUT COMPLICATION (HCC): ICD-10-CM

## 2022-01-26 NOTE — TELEPHONE ENCOUNTER
Patient needs appt for more refills  When appt has been made, let me know when appt is and will rx supply to last until appt  Needs appt ASAP, recently in hospital for DKA, hasn't been seen in about a year

## 2022-02-02 ENCOUNTER — DOCUMENTATION (OUTPATIENT)
Dept: CARDIOLOGY CLINIC | Facility: CLINIC | Age: 57
End: 2022-02-02

## 2022-02-02 NOTE — PROGRESS NOTES
[facility] CRITICAL ILLNESS CLAIM FORM COMPLETED AND SIGNED BY DR Mon Side  RECORDS ATTACHED-EKG, STRESS ECHO, ECHO, CATH, D/C SUMMARY-PATIENT TO  AND SEND TO [facility] DIRECTLY

## 2022-04-18 DIAGNOSIS — M79.18 MYOFASCIAL PAIN SYNDROME: ICD-10-CM

## 2022-04-18 DIAGNOSIS — M54.2 NECK PAIN: ICD-10-CM

## 2022-04-18 DIAGNOSIS — M75.01 ADHESIVE CAPSULITIS OF RIGHT SHOULDER: ICD-10-CM

## 2022-04-18 RX ORDER — GABAPENTIN 300 MG/1
CAPSULE ORAL
Qty: 90 CAPSULE | Refills: 2 | Status: SHIPPED | OUTPATIENT
Start: 2022-04-18 | End: 2022-07-25

## 2022-07-20 DIAGNOSIS — E10.9 TYPE 1 DIABETES MELLITUS WITHOUT COMPLICATION (HCC): ICD-10-CM

## 2022-07-20 NOTE — TELEPHONE ENCOUNTER
Pt called for refill of humalog but has not been here since 2019 saw [address] 2020   Appointment scheduled for tomorrow

## 2022-07-21 ENCOUNTER — OFFICE VISIT (OUTPATIENT)
Dept: ENDOCRINOLOGY | Facility: CLINIC | Age: 57
End: 2022-07-21
Payer: COMMERCIAL

## 2022-07-21 VITALS
TEMPERATURE: 97.6 F | BODY MASS INDEX: 26.87 KG/M2 | DIASTOLIC BLOOD PRESSURE: 76 MMHG | HEART RATE: 60 BPM | SYSTOLIC BLOOD PRESSURE: 138 MMHG | WEIGHT: 167.2 LBS | HEIGHT: 66 IN

## 2022-07-21 DIAGNOSIS — I10 ESSENTIAL HYPERTENSION: ICD-10-CM

## 2022-07-21 DIAGNOSIS — E78.5 DYSLIPIDEMIA: ICD-10-CM

## 2022-07-21 DIAGNOSIS — E10.65 TYPE 1 DIABETES MELLITUS WITH HYPERGLYCEMIA (HCC): Primary | ICD-10-CM

## 2022-07-21 DIAGNOSIS — E10.311 TYPE 1 DIABETES MELLITUS WITH RETINOPATHY OF BOTH EYES AND MACULAR EDEMA, UNSPECIFIED RETINOPATHY SEVERITY (HCC): Primary | ICD-10-CM

## 2022-07-21 PROCEDURE — 99214 OFFICE O/P EST MOD 30 MIN: CPT | Performed by: PHYSICIAN ASSISTANT

## 2022-07-21 RX ORDER — FLASH GLUCOSE SENSOR
6 KIT MISCELLANEOUS
COMMUNITY
End: 2022-07-21 | Stop reason: SDUPTHER

## 2022-07-21 RX ORDER — FLASH GLUCOSE SCANNING READER
EACH MISCELLANEOUS
Qty: 1 EACH | Refills: 1 | Status: SHIPPED | OUTPATIENT
Start: 2022-07-21

## 2022-07-21 RX ORDER — HYDROXYZINE 50 MG/1
TABLET, FILM COATED ORAL
COMMUNITY
Start: 2022-07-15

## 2022-07-21 RX ORDER — FLASH GLUCOSE SCANNING READER
1 EACH MISCELLANEOUS
COMMUNITY
End: 2022-07-21 | Stop reason: SDUPTHER

## 2022-07-21 RX ORDER — BUSPIRONE HYDROCHLORIDE 15 MG/1
TABLET ORAL
COMMUNITY
Start: 2022-05-17

## 2022-07-21 RX ORDER — QUETIAPINE FUMARATE 50 MG/1
TABLET, FILM COATED ORAL
COMMUNITY
Start: 2022-05-18

## 2022-07-21 RX ORDER — FLASH GLUCOSE SENSOR
KIT MISCELLANEOUS
Qty: 6 EACH | Refills: 0 | OUTPATIENT
Start: 2022-07-21

## 2022-07-21 RX ORDER — BLOOD-GLUCOSE SENSOR
EACH MISCELLANEOUS
Qty: 2 EACH | Refills: 5 | Status: SHIPPED | OUTPATIENT
Start: 2022-07-21 | End: 2022-07-21 | Stop reason: CLARIF

## 2022-07-21 RX ORDER — TADALAFIL 20 MG/1
TABLET ORAL
COMMUNITY
Start: 2022-05-26

## 2022-07-21 RX ORDER — FLASH GLUCOSE SENSOR
KIT MISCELLANEOUS
Qty: 6 EACH | Refills: 1 | Status: SHIPPED | OUTPATIENT
Start: 2022-07-21

## 2022-07-21 RX ORDER — FLASH GLUCOSE SCANNING READER
EACH MISCELLANEOUS
Qty: 1 EACH | Refills: 1 | OUTPATIENT
Start: 2022-07-21

## 2022-07-21 NOTE — PATIENT INSTRUCTIONS
Hypoglycemia instructions   Parul Challenger  7/21/2022  [id number]    Low Blood Sugar    Steps to treat low blood sugar  1  Test blood sugar if you have symptoms of low blood sugar:   Low Blood Sugar Symptoms:  o Sweaty  o Dizzy  o Rapid heartbeat  o Shaky  o Bad mood  o Hungry      2  Treat blood sugar less than 70 with 15 grams of fast-acting carbohydrate:   Examples of 15 grams Fast-Acting Carbohydrate:  o 4 oz juice  o 4 oz regular soda  o 3-4 glucose tablets (chew)  o 3-4 hard candies (chew)          3  Wait 15 minutes and test your blood sugar again     4   If blood sugar is less than 100, repeat steps 2-3     5  When your blood sugar is 100 or more, eat a snack if it will be longer than one hour until your next meal  The snack should be 15 grams of carbohydrate and a protein:   Examples of snacks:  o ½ sandwich  o 6 crackers with cheese  o Piece of fruit with cheese or peanut butter  o 6 crackers with peanut butter

## 2022-07-21 NOTE — PROGRESS NOTES
Patient Progress Note      CC: DM      Referring Provider  No referring provider defined for this encounter  History of Present Illness:   Venessa Paulson is a 65 y o  male with a history of type 1 diabetes with long term use of insulin  Diabetes course has been stable  Complications of DM: retinopathy, CAD  He was hospitalized in Jan 2022 for DKA and Covid19  Denies recent severe hypoglycemic or severe hyperglycemic episodes  Denies any issues with his current regimen  Home glucose monitoring: are performed regularly, 1-2 times a day (only entering BG in pump once a day)    He is not using the bolus wizard but states he is using a carb ratio of 10 to determine his bolus  Home blood glucose readings:   Before breakfast: 170-365 mg/dl (reports mornings are high because BG is high after dinner  Patient is on a MiniMed pump  He denies any malfunctioning of the pump  Current Insulin pump settings:  Basal rate:  12 am = 2 00  7 am = 1 00  Insulin to carb ratio: 10  Insulin sensitivity factor: 30  BG target: 100, 110  Type of insulin: Humalog    Discussed with patient in case of malfunctioning of the pump to use basal and bolus therapy as backup which is prescribed to the patient  Also notified patient to call clinic if any issues  Hypoglycemic episodes: Yes, infrequent (during the day - but he changed his settings to avoid lows which as helped)  H/o of hypoglycemia causing hospitalization or Intervention such as glucagon injection  or ambulance call :  Yes  Hypoglycemia symptoms: Numbness in hands, sweaty  Treatment of hypoglycemia: soda     Medic alert tag: recommended: Yes    Diabetes education: Not recently; he declines his education  Diet: 2 meals per day, 2 snacks per day  Timing of meals is predictable  Diabetic diet compliance:  he sometimes misses boluses to prevent lows  Activity: Daily activity is predictable: Yes  He is active at work; he works in retail  [employer]     Op[facility]hamology: [facility]  Podiatry: Dr Joanie Gregory    Has hypertension: on ACE inhibitor/ARB, compliant most of the time  Has hyperlipidemia: on statin - tolerating well, no myalgias  compliant most of the time, denies any side effects from medications    Thyroid disorders: No  History of pancreatitis: No    Patient Active Problem List   Diagnosis    Nonrheumatic aortic valve stenosis    Type 1 diabetes mellitus with retinopathy ([address] )    Essential hypertension    Mouth sores    Coronary artery disease involving native coronary artery of native heart without angina pectoris    Dyslipidemia    Aortic stenosis due to bicuspid aortic valve    History of ITP    History of coronary angioplasty with insertion of stent    Acute blood loss as cause of postoperative anemia    Post-operative nausea and vomiting    Acute postoperative pulmonary insufficiency (HCC)    Adhesive capsulitis of right shoulder    Muscle spasm    Muscle strain of right shoulder    Chronic right shoulder pain    Foraminal stenosis of cervical region    Neck pain    Cervical disc disorder with radiculopathy of mid-cervical region    COVID-19    DKA (diabetic ketoacidosis) ([address] )    Anxiety    MILTON (acute kidney injury) ([address] )    non MI troponin elevation    Leukocytosis    Hypokalemia    Type 1 diabetes mellitus with hyperglycemia (HCC)      Past Medical History:   Diagnosis Date    Aortic stenosis     Clavicle fracture     LEFT    Diabetes mellitus ([address] )     Hyperlipidemia     Hypertension     Myocardial infarction ([address] )     Thrombocytopenic purpura ([address] )       Past Surgical History:   Procedure Laterality Date    ABDOMINAL SURGERY      HARVEST VEIN Left 9/2/2020    Procedure: HARVEST VEIN ENDOSCOPIC ([facility]) LEFT LEG;  Surgeon: Linda Alcala MD;  [facility];  Service: Cardiac Surgery    LA CABG, ARTERY-VEIN, THREE N/A 9/2/2020    Procedure: CORONARY ARTERY BYPASS GRAFT X 3 WITH SVG TO Right Posterior Lateral Branch, OM1 AND CLARK TO LAD ;  Surgeon: Derek Dorantes MD;  [facility];  Service: Cardiac Surgery    MA RPLCMT AORTIC VALVE OPN W/STENTLESS TISSUE VALVE N/A 2020    Procedure: AORTIC VALVE REPLACEMENT WITH A 25MM SANTOYO INSPIRIS RESILIA  TISSUE AORTIC VALVE;  Surgeon: Derek Dorantes MD;  [facility];  Service: Cardiac Surgery    ROTATOR CUFF REPAIR      SPLENECTOMY      TONSILLECTOMY      VITRECTOMY Bilateral     BY ANTERIOR APPROACH       Family History   Problem Relation Age of Onset    Aneurysm Mother         CAREBRAL ARTERY     Coronary artery disease Mother     Diabetes Mother     Stroke Father      Social History     Tobacco Use    Smoking status: Former Smoker     Years: 10 00     Types: Cigars     Quit date: 2020     Years since quittin 9    Smokeless tobacco: Never Used   Substance Use Topics    Alcohol use: Not Currently     Alcohol/week: 0 0 standard drinks     Comment: none     No Known Allergies      Current Outpatient Medications:     Continuous Blood Gluc Sensor (FreeStyle Corbin 3 Sensor) MISC, Apply sensor to check BG 4+ times a day, Disp: 2 each, Rfl: 5    glucagon 1 MG injection, Inject 1 mg under the skin once as needed for low blood sugar for up to 1 dose, Disp: 1 kit, Rfl: 1    HumaLOG 100 UNIT/ML injection, USE UP  UNITS PER DAY VIA INSULIN PUMP, Disp: 90 mL, Rfl: 1    aspirin 325 mg tablet, Take 1 tablet (325 mg total) by mouth daily, Disp: 30 tablet, Rfl: 2    atorvastatin (LIPITOR) 80 mg tablet, Take 1 tablet by mouth daily with dinner, Disp: 30 tablet, Rfl: 0    busPIRone (BUSPAR) 15 mg tablet, , Disp: , Rfl:     fluticasone (FLONASE) 50 mcg/act nasal spray, 2 sprays into each nostril daily, Disp: , Rfl: 0    gabapentin (NEURONTIN) 300 mg capsule, TAKE 1 CAPSULE(300 MG) BY MOUTH THREE TIMES DAILY, Disp: 90 capsule, Rfl: 2    hydrOXYzine HCL (ATARAX) 50 mg tablet, , Disp: , Rfl:     lisinopril (ZESTRIL) 5 mg tablet, TAKE 1 TABLET(5 MG) BY MOUTH DAILY, Disp: 90 tablet, Rfl: 3    loperamide (IMODIUM) 2 mg capsule, Take 1 capsule (2 mg total) by mouth every 4 (four) hours as needed for diarrhea, Disp: 30 capsule, Rfl: 0    metaxalone (SKELAXIN) 800 mg tablet, Take 1 tablet (800 mg total) by mouth 3 (three) times a day, Disp: 30 tablet, Rfl: 0    metoprolol tartrate (LOPRESSOR) 50 mg tablet, TAKE 1 TABLET(50 MG) BY MOUTH EVERY 12 HOURS, Disp: 180 tablet, Rfl: 3    omeprazole (PriLOSEC) 20 mg delayed release capsule, Take 20 mg by mouth daily, Disp: , Rfl:     potassium chloride (K-DUR,KLOR-CON) 20 mEq tablet, Take 1 tablet (20 mEq total) by mouth daily for 3 doses, Disp: 3 tablet, Rfl: 0    QUEtiapine (SEROquel) 50 mg tablet, , Disp: , Rfl:     sodium chloride (OCEAN) 0 65 % nasal spray, 2 sprays into each nostril every hour as needed for congestion, Disp: , Rfl: 0    tadalafil (CIALIS) 20 MG tablet, , Disp: , Rfl:   Review of Systems   Constitutional: Negative for activity change, appetite change, fatigue and unexpected weight change  HENT: Negative for trouble swallowing  Eyes: Negative for visual disturbance  Respiratory: Negative for shortness of breath  Cardiovascular: Negative for chest pain and palpitations  Gastrointestinal: Negative for constipation and diarrhea  Endocrine: Negative for polydipsia and polyuria  Musculoskeletal: Negative  Skin: Negative for wound  Neurological: Negative for numbness  Psychiatric/Behavioral: Negative  Physical Exam:  Body mass index is 26 99 kg/m²  /76 (BP Location: Left arm, Patient Position: Sitting, Cuff Size: Standard)   Pulse 60   Temp 97 6 °F (36 4 °C)   Ht 5' 6" (1 676 m)   Wt 75 8 kg (167 lb 3 2 oz)   BMI 26 99 kg/m²    Wt Readings from Last 3 Encounters:   07/21/22 75 8 kg (167 lb 3 2 oz)   01/10/22 74 8 kg (165 lb)   10/05/21 73 kg (161 lb)       Physical Exam  Vitals and nursing note reviewed  Constitutional:       Appearance: He is well-developed     HENT: Head: Normocephalic  Eyes:      General: No scleral icterus  Pupils: Pupils are equal, round, and reactive to light  Neck:      Thyroid: No thyromegaly  Cardiovascular:      Rate and Rhythm: Normal rate and regular rhythm  Pulses:           Radial pulses are 2+ on the right side and 2+ on the left side  Heart sounds: No murmur heard  Pulmonary:      Effort: Pulmonary effort is normal  No respiratory distress  Breath sounds: Normal breath sounds  No wheezing  Musculoskeletal:      Cervical back: Neck supple  Skin:     General: Skin is warm and dry  Neurological:      Mental Status: He is alert  Patient's shoes and socks were not removed  Labs:   Lab Results   Component Value Date    HGBA1C 8 0 (H) 01/10/2022   A1C today in office 8 1%  Ref Range & Units 6/20/22  4:05 PM   Glucose 65 - 99 mg/dL 57 Low     BUN 7 - 28 mg/dL 13    Creatinine 0 53 - 1 30 mg/dL 0 84    Sodium 135 - 145 mmol/L 142    Potassium 3 5 - 5 2 mmol/L 4 5    Chloride 100 - 109 mmol/L 106    Carbon Dioxide 21 - 31 mmol/L 29    Calcium 8 5 - 10 1 mg/dL 8 9    Alkaline Phosphatase 35 - 120 U/L 135 High     Albumin 3 5 - 5 7 g/dL 3 9    Bilirubin, Total 0 2 - 1 0 mg/dL 0 5    Comment: Eltrombopag and its metabolites may interfere with this assay causing erroneously high patient results  Protein, Total 6 3 - 8 3 g/dL 7 0    AST <41 U/L 41 High     ALT <56 U/L 43    Anion Gap 3 - 11 7    eGFRcr >59 102          Plan:    Diagnoses and all orders for this visit:    Type 1 diabetes mellitus with hyperglycemia ([facility] )  HGA1C 8 1%  Worsened  Treatment regimen: advised patient to start using bolus wizard  Check BG at least 4 times a day  He did not like the Guardian sensor and would like to try the [facility]  Change ICR to 1:8 after 5 pm due to hyperglycemia after dinner and hyperglycemia in the mornings  Patient agreeable to start using bolus wizard and checking BG more frequently   Download pump / sensor in 2 weeks  Discussed intensive insulin regimen does increase risk for hypoglycemia  Episodes of hypoglycemia can lead to permanent disability and death  Discussed risks/complications associated with uncontrolled diabetes  Advised to adhere to diabetic diet, and recommended staying active/exercising routinely as tolerated  Keep carbohydrates consistent to limit blood glucose fluctuations  Advised to call if blood sugars less than 70 mg/dl or over 300 mg/dl  Check blood glucose 3+ times a day  Discussed symptoms and treatment of hypoglycemia  Discussed use of CGM to collect additional blood glucose data to reveal trends and patterns that can be used to optimize treatment plan  Patient declines education  Recommended routine follow-up with podiatry and ophthalmology  Ordered blood work to complete prior to next visit   -     glucagon 1 MG injection; Inject 1 mg under the skin once as needed for low blood sugar for up to 1 dose  -     HumaLOG 100 UNIT/ML injection; USE UP  UNITS PER DAY VIA INSULIN PUMP  -     Ambulatory referral to Podiatry; Future  -     Continuous Blood Gluc Sensor (FreeStyle Corbin 3 Sensor) MISC; Apply sensor to check BG 4+ times a day    Essential hypertension  Blood pressure well controlled, continue current treatment     Dyslipidemia  LDL previously 102  Continue statin therapy           Discussed with the patient diagnosis and treatment and all questions fully answered  He will call me if any problems arise  Counseled patient on diagnostic results, prognosis, risk and benefit of treatment options, instruction for management, importance of treatment compliance, risk factor reduction and impressions        Carmel Fisher PA-C

## 2022-07-21 NOTE — TELEPHONE ENCOUNTER
Pt called to state that the Zaki Alvarez that you ordered the pharmacy doesn't have that available to give to the patient   Can Mcmahon or Mcmahon 2 be ordered?

## 2022-07-24 DIAGNOSIS — M75.01 ADHESIVE CAPSULITIS OF RIGHT SHOULDER: ICD-10-CM

## 2022-07-24 DIAGNOSIS — M79.18 MYOFASCIAL PAIN SYNDROME: ICD-10-CM

## 2022-07-24 DIAGNOSIS — M54.2 NECK PAIN: ICD-10-CM

## 2022-07-25 RX ORDER — GABAPENTIN 300 MG/1
CAPSULE ORAL
Qty: 90 CAPSULE | Refills: 2 | Status: SHIPPED | OUTPATIENT
Start: 2022-07-25 | End: 2022-10-19

## 2022-08-03 ENCOUNTER — DOCUMENTATION (OUTPATIENT)
Dept: CARDIOLOGY CLINIC | Facility: CLINIC | Age: 57
End: 2022-08-03

## 2022-08-03 NOTE — PROGRESS NOTES
[facility] CLAIM FORM COMPLETED AND 9/2/2020 D/C SUMMARY ATTACHED AS REQUESTED-PT PICKED UP PERSONALLY  PT TO CONTACT [facility] FOR ADDITIONAL INFO NEEDED

## 2022-08-25 ENCOUNTER — TELEPHONE (OUTPATIENT)
Dept: ENDOCRINOLOGY | Facility: CLINIC | Age: 57
End: 2022-08-25

## 2022-08-25 NOTE — TELEPHONE ENCOUNTER
Its looks like BG levels are dropping too low after correction of high BG  And then when he tries to bring his BG up after its been too low, it rises too high  Please ask him to change ISF to 35 instead of 30  Follow 15-15 rule for treatment of hypoglycemia - review with patient

## 2022-08-25 NOTE — TELEPHONE ENCOUNTER
Spoke with patient and reviewed pump and crow information    He understood how to treat low blood sugar

## 2022-08-25 NOTE — TELEPHONE ENCOUNTER
Steps to treat low blood sugar  1  Test blood sugar if you have symptoms of low blood sugar:   Low Blood Sugar Symptoms:  o Sweaty  o Dizzy  o Rapid heartbeat  o Shaky  o Bad mood  o Hungry      2  Treat blood sugar less than 70 with 15 grams of fast-acting carbohydrate:   Examples of 15 grams Fast-Acting Carbohydrate:  o 4 oz juice  o 4 oz regular soda  o 3-4 glucose tablets (chew)  o 3-4 hard candies (chew)          3  Wait 15 minutes and test your blood sugar again     4   If blood sugar is less than 100, repeat steps 2-3     5  When your blood sugar is 100 or more, eat a snack if it will be longer than one hour until your next meal  The snack should be 15 grams of carbohydrate and a protein:   Examples of snacks:  o ½ sandwich  o 6 crackers with cheese  o Piece of fruit with cheese or peanut butter  o 6 crackers with peanut butter

## 2022-10-11 ENCOUNTER — OFFICE VISIT (OUTPATIENT)
Dept: CARDIOLOGY CLINIC | Facility: CLINIC | Age: 57
End: 2022-10-11
Payer: COMMERCIAL

## 2022-10-11 VITALS
DIASTOLIC BLOOD PRESSURE: 62 MMHG | HEIGHT: 66 IN | SYSTOLIC BLOOD PRESSURE: 104 MMHG | HEART RATE: 70 BPM | OXYGEN SATURATION: 96 % | BODY MASS INDEX: 26.9 KG/M2 | WEIGHT: 167.4 LBS

## 2022-10-11 DIAGNOSIS — I10 ESSENTIAL HYPERTENSION: ICD-10-CM

## 2022-10-11 DIAGNOSIS — E78.5 DYSLIPIDEMIA: ICD-10-CM

## 2022-10-11 DIAGNOSIS — I25.10 CORONARY ARTERY DISEASE INVOLVING NATIVE CORONARY ARTERY OF NATIVE HEART WITHOUT ANGINA PECTORIS: ICD-10-CM

## 2022-10-11 DIAGNOSIS — Q23.0 AORTIC STENOSIS DUE TO BICUSPID AORTIC VALVE: Primary | Chronic | ICD-10-CM

## 2022-10-11 DIAGNOSIS — I35.0 NONRHEUMATIC AORTIC VALVE STENOSIS: ICD-10-CM

## 2022-10-11 DIAGNOSIS — Q23.1 AORTIC STENOSIS DUE TO BICUSPID AORTIC VALVE: Primary | Chronic | ICD-10-CM

## 2022-10-11 PROCEDURE — 99214 OFFICE O/P EST MOD 30 MIN: CPT | Performed by: INTERNAL MEDICINE

## 2022-10-11 RX ORDER — IBUPROFEN 600 MG/1
600 TABLET ORAL EVERY 6 HOURS PRN
COMMUNITY
Start: 2022-08-31

## 2022-10-11 NOTE — PROGRESS NOTES
Cardiology Follow Up    Vesna Bishop  1965  [id number]  [id number]  [phone]    1  Aortic stenosis due to bicuspid aortic valve  VAS carotid complete study   2  Nonrheumatic aortic valve stenosis  VAS carotid complete study   3  Essential hypertension  VAS carotid complete study   4  Coronary artery disease involving native coronary artery of native heart without angina pectoris  VAS carotid complete study   5  Dyslipidemia  VAS carotid complete study       Discussion/Summary:  1  Coronary artery disease with prior LAD PCI in 2017, 80% small posterolateral branch  2  Severe aortic stenosis probable bicuspid valve  3  Hypertension  4  Hyperlipidemia  5  Type 1 diabetes mellitus  6  Status post CABG x3  7  Status post AVR    Recommendations: Overall he has been doing great since his surgery  Coronary disease stable no complaints of angina  Blood pressure has been well controlled lipids are stable on current regimen  Echocardiogram was personally reviewed preserved LV function AVR with normal gradient  Needs to work on A1c  Continue diet and exercise he is due for carotid Doppler due to mild bilateral stenosis seen in 2020  See him back next year  Interval History:  81-year-old type 1 diabetic with history of coronary artery disease, LAD PCI in 2017, preserved LV function, severe aortic stenosis, hypertension, hyperlipidemia presents for an evaluation in the office by myself  He was previously seen by 1 of my partners who left the practice  Patient has been somewhat noncompliant with office testing in the past   Stress echo had been ordered close to a year ago and when he presented for the stress echo his aortic stenosis was evaluated and had progressed into the severe range  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        Overall he has been doing well since her last appointment  He underwent surgery for severe aortic stenosis and multivessel coronary disease  He had a CABG x3 with LIMA to LAD, saphenous vein graft to OM1, saphenous vein graft to right posterior lateral branch  Overall he has been doing well from a cardiac standpoint  Denies any chest pain, shortness of breath, palpitations, lightheadedness, dizziness, or syncope  There has been no lower extremity edema, PND, orthopnea  He has been taking all medications as prescribed      Problem List     Nonrheumatic aortic valve stenosis    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)      Lab Results   Component Value Date    HGBA1C 8 3 (H) 2022         Essential hypertension    Mouth sores    Coronary artery disease involving native coronary artery of native heart without angina pectoris    Dyslipidemia        Past Medical History:   Diagnosis Date   • Aortic stenosis    • Clavicle fracture     LEFT   • Diabetes mellitus ([facility] )    • Hyperlipidemia    • Hypertension    • Myocardial infarction ([facility] )    • Thrombocytopenic purpura ([facility] )      Social History     Socioeconomic History   • Marital status: /Civil Union     Spouse name: Not on file   • Number of children: Not on file   • Years of education: GRADUATED HIGHSCHOOL    • Highest education level: Not on file   Occupational History   • Occupation: MANAGER AT [employer]    Tobacco Use   • Smoking status: Former Smoker     Years: 10 00     Types: Cigars     Quit date: 2020     Years since quittin 1   • Smokeless tobacco: Never Used   Vaping Use   • Vaping Use: Never used   Substance and Sexual Activity   • Alcohol use: Not Currently     Alcohol/week: 0 0 standard drinks     Comment: none   • Drug use: Yes     Frequency: 3 0 times per week     Types: Marijuana     Comment: none   • Sexual activity: Not Currently     Partners: Female   Other Topics Concern   • Not on file   Social History Narrative   • Not on file     Social Determinants of Health     Financial Resource Strain: Not on file   Food Insecurity: No Food Insecurity   • Worried About Running Out of Food in the Last Year: Never true   • Ran Out of Food in the Last Year: Never true   Transportation Needs: No Transportation Needs   • Lack of Transportation (Medical): No   • Lack of Transportation (Non-Medical):  No   Physical Activity: Not on file   Stress: Not on file   Social Connections: Not on file   Intimate Partner Violence: Not on file   Housing Stability: Low Risk    • Unable to Pay for Housing in the Last Year: No   • Number of Places Lived in the Last Year: 1   • Unstable Housing in the Last Year: No      Family History   Problem Relation Age of Onset   • Aneurysm Mother         CAREBRAL ARTERY    • Coronary artery disease Mother    • Diabetes Mother    • Stroke Father      Past Surgical History:   Procedure Laterality Date   • ABDOMINAL SURGERY     • HARVEST VEIN Left 9/2/2020    Procedure: HARVEST VEIN ENDOSCOPIC ([facility]) LEFT LEG;  Surgeon: Matias Stewart MD;  [facility];  Service: Cardiac Surgery   • LA CABG, ARTERY-VEIN, THREE N/A 9/2/2020    Procedure: CORONARY ARTERY BYPASS GRAFT X 3 WITH SVG TO Right Posterior Lateral Branch, OM1 AND LIMA TO LAD ;  Surgeon: Matias Stewart MD;  [facility];  Service: Cardiac Surgery   • LA RPLCMT AORTIC VALVE OPN W/STENTLESS TISSUE VALVE N/A 9/2/2020    Procedure: AORTIC VALVE REPLACEMENT WITH A 25MM SANTOYO INSPIRIS RESILIA  TISSUE AORTIC VALVE;  Surgeon: Matias Stewart MD;  [facility];  Service: Cardiac Surgery   • ROTATOR CUFF REPAIR     • SPLENECTOMY     • TONSILLECTOMY     • VITRECTOMY Bilateral     BY ANTERIOR APPROACH        Current Outpatient Medications:   •  aspirin 325 mg tablet, Take 1 tablet (325 mg total) by mouth daily, Disp: 30 tablet, Rfl: 2  •  atorvastatin (LIPITOR) 80 mg tablet, Take 1 tablet by mouth daily with dinner, Disp: 30 tablet, Rfl: 0  •  busPIRone (BUSPAR) 15 mg tablet, , Disp: , Rfl: •  Continuous Blood Gluc  (FreeStyle Corbin 2 [place]) JAYESH, Use 4x daily to check sugars, Disp: 1 each, Rfl: 1  •  Continuous Blood Gluc Sensor (FreeStyle Corbin 2 Sensor) MISC, Change every 14 days, Disp: 6 each, Rfl: 1  •  gabapentin (NEURONTIN) 300 mg capsule, TAKE 1 CAPSULE(300 MG) BY MOUTH THREE TIMES DAILY, Disp: 90 capsule, Rfl: 2  •  glucagon 1 MG injection, Inject 1 mg under the skin once as needed for low blood sugar for up to 1 dose, Disp: 1 kit, Rfl: 1  •  HumaLOG 100 UNIT/ML injection, USE UP  UNITS PER DAY VIA INSULIN PUMP, Disp: 90 mL, Rfl: 1  •  hydrOXYzine HCL (ATARAX) 50 mg tablet, , Disp: , Rfl:   •  ibuprofen (MOTRIN) 600 mg tablet, Take 600 mg by mouth every 6 (six) hours as needed, Disp: , Rfl:   •  lisinopril (ZESTRIL) 5 mg tablet, TAKE 1 TABLET(5 MG) BY MOUTH DAILY, Disp: 90 tablet, Rfl: 3  •  metoprolol tartrate (LOPRESSOR) 50 mg tablet, TAKE 1 TABLET(50 MG) BY MOUTH EVERY 12 HOURS, Disp: 180 tablet, Rfl: 3  •  omeprazole (PriLOSEC) 20 mg delayed release capsule, Take 20 mg by mouth daily, Disp: , Rfl:   •  QUEtiapine (SEROquel) 50 mg tablet, , Disp: , Rfl:   •  sodium chloride (OCEAN) 0 65 % nasal spray, 2 sprays into each nostril every hour as needed for congestion, Disp: , Rfl: 0  •  tadalafil (CIALIS) 20 MG tablet, , Disp: , Rfl:   •  potassium chloride (K-DUR,KLOR-CON) 20 mEq tablet, Take 1 tablet (20 mEq total) by mouth daily for 3 doses, Disp: 3 tablet, Rfl: 0  No Known Allergies    Labs:     Chemistry        Component Value Date/Time     10/29/2016 0742    K 3 1 (L) 01/13/2022 0612    K 5 2 10/29/2016 0742     01/13/2022 0612    CL 99 10/29/2016 0742    CO2 28 01/13/2022 0612    CO2 26 09/02/2020 1257    BUN 9 01/13/2022 0612    BUN 16 10/29/2016 0742    CREATININE 0 71 01/13/2022 0612    CREATININE 0 92 10/29/2016 0742        Component Value Date/Time    CALCIUM 8 2 (L) 01/13/2022 0612    CALCIUM 9 7 10/29/2016 0742    ALKPHOS 137 (H) 01/13/2022 0612 ALKPHOS 121 (H) 10/29/2016 0742    AST 27 01/13/2022 0612    AST 21 10/29/2016 0742    ALT 39 01/13/2022 0612    ALT 26 10/29/2016 0742    BILITOT 0 4 10/29/2016 0742            Lab Results   Component Value Date    CHOL 259 (H) 10/15/2013     Lab Results   Component Value Date    HDL 53 07/25/2018     (H) 12/07/2017     (H) 09/01/2017     Lab Results   Component Value Date    LDLCALC 102 (H) 07/25/2018    LDLCALC 51 12/07/2017    LDLCALC 58 09/01/2017     Lab Results   Component Value Date    TRIG 225 (H) 07/25/2018    TRIG 98 12/07/2017    TRIG 62 09/01/2017     No results found for: CHOLHDL    Imaging: No results found  ECG:        Review of Systems   Constitutional: Positive for malaise/fatigue  HENT: Negative  Eyes: Negative  Cardiovascular: Negative  Respiratory: Positive for shortness of breath  Endocrine: Negative  Hematologic/Lymphatic: Negative  Skin: Negative  Musculoskeletal: Negative  Gastrointestinal: Negative  Genitourinary: Negative  Neurological: Negative  Psychiatric/Behavioral: Negative  All other systems reviewed and are negative  Vitals:    10/11/22 1323   BP: 104/62   Pulse: 70   SpO2: 96%     Vitals:    10/11/22 1323   Weight: 75 9 kg (167 lb 6 4 oz)     Height: 5' 6" (167 6 cm)   Body mass index is 27 02 kg/m²  Physical Exam:  Vital signs reviewed  General:  Alert and cooperative, appears stated age, no acute distress  HEENT:  PERRLA, EOMI, no scleral icterus, no conjunctival pallor  Neck:  No lymphadenopathy, no thyromegaly, no carotid bruits, no elevated JVP  Heart:  Regular rate and rhythm, normal S1/S2, no S3/S4, no murmur, rubs or gallops  PMI nondisplaced  Lungs:  Clear to auscultation bilaterally, no wheezes rales or rhonchi  Abdomen:  Soft, non-tender, positive bowel sounds, no rebound or guarding,   no organomegaly   Extremities:  Normal range of motion    No clubbing, cyanosis or edema   Vascular:  2+ pedal pulses  Skin: No rashes or lesions on exposed skin  Neurologic:  Cranial nerves II-XII grossly intact without focal deficits  Psych:  Normal mood and affect

## 2022-10-12 PROBLEM — E11.10 DKA (DIABETIC KETOACIDOSIS) (HCC): Status: RESOLVED | Noted: 2022-01-10 | Resolved: 2022-10-12

## 2022-10-19 DIAGNOSIS — M79.18 MYOFASCIAL PAIN SYNDROME: ICD-10-CM

## 2022-10-19 DIAGNOSIS — M75.01 ADHESIVE CAPSULITIS OF RIGHT SHOULDER: ICD-10-CM

## 2022-10-19 DIAGNOSIS — M54.2 NECK PAIN: ICD-10-CM

## 2022-10-19 RX ORDER — GABAPENTIN 300 MG/1
CAPSULE ORAL
Qty: 90 CAPSULE | Refills: 2 | Status: SHIPPED | OUTPATIENT
Start: 2022-10-19

## 2022-11-01 ENCOUNTER — HOSPITAL ENCOUNTER (OUTPATIENT)
Dept: NON INVASIVE DIAGNOSTICS | Facility: CLINIC | Age: 57
Discharge: HOME | End: 2022-11-01

## 2022-11-01 DIAGNOSIS — Q23.0 AORTIC STENOSIS DUE TO BICUSPID AORTIC VALVE: Chronic | ICD-10-CM

## 2022-11-01 DIAGNOSIS — E78.5 DYSLIPIDEMIA: ICD-10-CM

## 2022-11-01 DIAGNOSIS — I35.0 NONRHEUMATIC AORTIC VALVE STENOSIS: ICD-10-CM

## 2022-11-01 DIAGNOSIS — I10 ESSENTIAL HYPERTENSION: ICD-10-CM

## 2022-11-01 DIAGNOSIS — I25.10 CORONARY ARTERY DISEASE INVOLVING NATIVE CORONARY ARTERY OF NATIVE HEART WITHOUT ANGINA PECTORIS: ICD-10-CM

## 2022-11-01 DIAGNOSIS — Q23.1 AORTIC STENOSIS DUE TO BICUSPID AORTIC VALVE: Chronic | ICD-10-CM

## 2022-12-20 DIAGNOSIS — E10.311 TYPE 1 DIABETES MELLITUS WITH RETINOPATHY OF BOTH EYES AND MACULAR EDEMA, UNSPECIFIED RETINOPATHY SEVERITY (HCC): ICD-10-CM

## 2022-12-20 RX ORDER — FLASH GLUCOSE SENSOR
KIT MISCELLANEOUS
Qty: 6 EACH | Refills: 1 | Status: SHIPPED | OUTPATIENT
Start: 2022-12-20

## 2023-01-14 DIAGNOSIS — E10.65 TYPE 1 DIABETES MELLITUS WITH HYPERGLYCEMIA (HCC): ICD-10-CM

## 2023-01-16 RX ORDER — INSULIN LISPRO 100 [IU]/ML
INJECTION, SOLUTION INTRAVENOUS; SUBCUTANEOUS
Qty: 90 ML | Refills: 0 | Status: SHIPPED | OUTPATIENT
Start: 2023-01-16

## 2023-03-22 DIAGNOSIS — Z95.2 S/P AVR (AORTIC VALVE REPLACEMENT): ICD-10-CM

## 2023-03-22 DIAGNOSIS — Z95.1 S/P CABG X 3: ICD-10-CM

## 2023-03-22 RX ORDER — METOPROLOL TARTRATE 50 MG/1
TABLET, FILM COATED ORAL
Qty: 180 TABLET | Refills: 3 | Status: SHIPPED | OUTPATIENT
Start: 2023-03-22

## 2023-05-08 ENCOUNTER — TELEPHONE (OUTPATIENT)
Dept: CARDIOLOGY CLINIC | Facility: CLINIC | Age: 58
End: 2023-05-08

## 2023-05-08 ENCOUNTER — CLINICAL SUPPORT (OUTPATIENT)
Dept: CARDIOLOGY CLINIC | Facility: CLINIC | Age: 58
End: 2023-05-08

## 2023-05-08 VITALS
SYSTOLIC BLOOD PRESSURE: 148 MMHG | OXYGEN SATURATION: 98 % | DIASTOLIC BLOOD PRESSURE: 92 MMHG | BODY MASS INDEX: 26.31 KG/M2 | WEIGHT: 163.7 LBS | HEART RATE: 86 BPM | HEIGHT: 66 IN

## 2023-05-08 DIAGNOSIS — I25.10 CORONARY ARTERY DISEASE INVOLVING NATIVE CORONARY ARTERY OF NATIVE HEART WITHOUT ANGINA PECTORIS: ICD-10-CM

## 2023-05-08 DIAGNOSIS — E87.6 HYPOKALEMIA: ICD-10-CM

## 2023-05-08 DIAGNOSIS — Q23.0 AORTIC STENOSIS DUE TO BICUSPID AORTIC VALVE: Chronic | ICD-10-CM

## 2023-05-08 DIAGNOSIS — I10 ESSENTIAL HYPERTENSION: Primary | ICD-10-CM

## 2023-05-08 DIAGNOSIS — Q23.1 AORTIC STENOSIS DUE TO BICUSPID AORTIC VALVE: Chronic | ICD-10-CM

## 2023-05-08 DIAGNOSIS — E78.5 DYSLIPIDEMIA: ICD-10-CM

## 2023-05-08 NOTE — TELEPHONE ENCOUNTER
Pt woke with SOB this morning  He denies any pressure/pain  He stated he was doing some heavy lifting yesterday  He stated symptoms are similar to the SOB he had prior to AVR  He is at work and asking to come in for an EKG  I recommended ED for eval, but he declined

## 2023-05-08 NOTE — PROGRESS NOTES
The patient is here today under the direction of Dr Fermin Layne for an EKG  Perico Pichardoy called the triage line for SOB since this morning  Scotty Bautistaell stated on the triage line that his symptoms are similar to the SOB he had before his AVR, and that he also did some heavy lifting yesterday  Payamargentina Bautistaell stated that he has constant SOB all day and some dizziness at time of visit today  Denies having chest pain today  -BP today is 148/92, had to use automatic cuff due to BP being extremely faint  Pulse was 86, SpO2 was 98%  Reviewed Medications and Allergies  Reviewed vitals, medications, and EKG with Dr Sofi Tomlin, today's diagnostic doctor  Dr Sofi Tomlin stated today's EKG had no significant change compared to Elieser's EKG that he had done on 9/30/2020  Dr Sofi Tomlin would like Scotty Solorio to increase his Lisinopril to 10mg a day due to his BP today  He would also like for Scotty Solorio to get his BMP checked due to low potasium and an updated Echo to be done as well  Dr Sofi Tomlin would like for Scotty Solorio to have a follow up visit with either Bhumi Le or a nurse practioner to be scheduled for this month where his BP can be rechecked at that visit as well  Scotty Solorio is aware and understood

## 2023-05-16 ENCOUNTER — OFFICE VISIT (OUTPATIENT)
Dept: CARDIOLOGY CLINIC | Facility: CLINIC | Age: 58
End: 2023-05-16

## 2023-05-16 VITALS
HEART RATE: 65 BPM | OXYGEN SATURATION: 97 % | BODY MASS INDEX: 27.82 KG/M2 | HEIGHT: 65 IN | WEIGHT: 167 LBS | DIASTOLIC BLOOD PRESSURE: 54 MMHG | SYSTOLIC BLOOD PRESSURE: 96 MMHG

## 2023-05-16 DIAGNOSIS — J96.01 ACUTE RESPIRATORY FAILURE WITH HYPOXIA (HCC): ICD-10-CM

## 2023-05-16 DIAGNOSIS — Q23.1 AORTIC STENOSIS DUE TO BICUSPID AORTIC VALVE: Primary | Chronic | ICD-10-CM

## 2023-05-16 DIAGNOSIS — Q23.0 AORTIC STENOSIS DUE TO BICUSPID AORTIC VALVE: Primary | Chronic | ICD-10-CM

## 2023-05-16 DIAGNOSIS — E10.65 TYPE 1 DIABETES MELLITUS WITH HYPERGLYCEMIA (HCC): ICD-10-CM

## 2023-05-16 NOTE — PROGRESS NOTES
Cardiology Follow Up    Dallas Gonzalez  1965  [id number]  [id number]  [phone]    1  Aortic stenosis due to bicuspid aortic valve  Basic metabolic panel      2  Acute respiratory failure with hypoxia (HCC)        3  Type 1 diabetes mellitus with hyperglycemia (HCC)            Discussion/Summary:  1  Coronary artery disease with prior LAD PCI in 2017, 80% small posterolateral branch  2  Severe aortic stenosis probable bicuspid valve  3  Hypertension  4  Hyperlipidemia  5  Type 1 diabetes mellitus  6  Status post CABG x3  7  Status post AVR    Recommendations: He had a brief episode of shortness of breath that spontaneously resolved under a period of high stress  Overall he is feeling well with a good functional capacity and no anginal symptoms  Its been 2 years I recommended an echocardiogram to evaluate aortic valve function  Blood pressure is little on the low side he is going to go back to 5 mg a day on the lisinopril  Dizziness should resolve  I have asked him to increase hydration and fluids  He is also been low in potassium and checked a BNP I will have him follow-up in 6 months  Interval History:  52-year-old type 1 diabetic with history of coronary artery disease, LAD PCI in 2017, preserved LV function, severe aortic stenosis, hypertension, hyperlipidemia presents for an evaluation in the office by myself  He was previously seen by 1 of my partners who left the practice  Patient has been somewhat noncompliant with office testing in the past   Stress echo had been ordered close to a year ago and when he presented for the stress echo his aortic stenosis was evaluated and had progressed into the severe range  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  Overall he has been doing well since her last appointment    He underwent surgery for severe aortic stenosis and multivessel coronary disease  He had a CABG x3 with LIMA to LAD, saphenous vein graft to OM1, saphenous vein graft to right posterior lateral branch  He presents for an acute follow-up visit  He had a single episode of shortness of breath a couple of weeks ago  He was under some high stress at work  This has completely resolved  He is quite physically active he was climbing ladders and doing heavy lifting at work with no limitations  Denies any anginal sounding chest pain, shortness of breath, palpitations, lower extreme edema, PND, orthopnea      Problem List     Nonrheumatic aortic valve stenosis    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)      Lab Results   Component Value Date    HGBA1C 8 5 (H) 2023         Essential hypertension    Mouth sores    Coronary artery disease involving native coronary artery of native heart without angina pectoris    Dyslipidemia        Past Medical History:   Diagnosis Date   • Aortic stenosis    • Clavicle fracture     LEFT   • Diabetes mellitus ([facility] )    • Hyperlipidemia    • Hypertension    • Myocardial infarction ([facility] )    • Thrombocytopenic purpura ([facility] )      Social History     Socioeconomic History   • Marital status: /Civil Union     Spouse name: Not on file   • Number of children: Not on file   • Years of education: GRADUATED HIGHSCHOOL    • Highest education level: Not on file   Occupational History   • Occupation: MANAGER AT [employer]    Tobacco Use   • Smoking status: Former     Types: Cigars     Quit date: 2020     Years since quittin 7   • Smokeless tobacco: Never   Vaping Use   • Vaping Use: Never used   Substance and Sexual Activity   • Alcohol use: Not Currently     Alcohol/week: 0 0 standard drinks     Comment: none   • Drug use: Yes     Frequency: 3 0 times per week     Types: Marijuana     Comment: none   • Sexual activity: Not Currently     Partners: Female   Other Topics Concern   • Not on file   Social History Narrative • Not on file     Social Determinants of Health     Financial Resource Strain: Not on file   Food Insecurity: Not on file   Transportation Needs: Not on file   Physical Activity: Not on file   Stress: Not on file   Social Connections: Not on file   Intimate Partner Violence: Not on file   Housing Stability: Not on file      Family History   Problem Relation Age of Onset   • Aneurysm Mother         CAREBRAL ARTERY    • Coronary artery disease Mother    • Diabetes Mother    • Stroke Father      Past Surgical History:   Procedure Laterality Date   • ABDOMINAL SURGERY     • HARVEST VEIN Left 9/2/2020    Procedure: HARVEST VEIN ENDOSCOPIC ([facility]) LEFT LEG;  Surgeon: Lindsay Brooks MD;  [facility];  Service: Cardiac Surgery   • LA CORONARY ARTERY BYP W/VEIN & ARTERY GRAFT 3 VEIN N/A 9/2/2020    Procedure: CORONARY ARTERY BYPASS GRAFT X 3 WITH SVG TO Right Posterior Lateral Branch, OM1 AND LIMA TO LAD ;  Surgeon: iLndsay Brooks MD;  [facility];  Service: Cardiac Surgery   • LA RPLCMT AORTIC VALVE OPN W/STENTLESS TISSUE VALVE N/A 9/2/2020    Procedure: AORTIC VALVE REPLACEMENT WITH A 25MM SANTOYO EventoIRIS RESILIA  TISSUE AORTIC VALVE;  Surgeon: Lindsay Brooks MD;  [facility];  Service: Cardiac Surgery   • ROTATOR CUFF REPAIR     • SPLENECTOMY     • TONSILLECTOMY     • VITRECTOMY Bilateral     BY ANTERIOR APPROACH        Current Outpatient Medications:   •  aspirin 325 mg tablet, Take 1 tablet (325 mg total) by mouth daily, Disp: 30 tablet, Rfl: 2  •  atorvastatin (LIPITOR) 80 mg tablet, Take 1 tablet by mouth daily with dinner, Disp: 30 tablet, Rfl: 0  •  busPIRone (BUSPAR) 15 mg tablet, Take 15 mg by mouth 2 (two) times a day, Disp: , Rfl:   •  gabapentin (NEURONTIN) 300 mg capsule, TAKE 1 CAPSULE(300 MG) BY MOUTH THREE TIMES DAILY, Disp: 90 capsule, Rfl: 2  •  HumaLOG 100 UNIT/ML injection, USE UP  UNITS PER DAY VIA INSULIN PUMP, Disp: 90 mL, Rfl: 0  •  ibuprofen (MOTRIN) 600 mg tablet, Take 600 mg by mouth every 6 (six) hours as needed, Disp: , Rfl:   •  lisinopril (ZESTRIL) 5 mg tablet, TAKE 1 TABLET(5 MG) BY MOUTH DAILY (Patient taking differently: Take 10 mg by mouth daily), Disp: 90 tablet, Rfl: 3  •  metoprolol tartrate (LOPRESSOR) 50 mg tablet, TAKE 1 TABLET(50 MG) BY MOUTH EVERY 12 HOURS, Disp: 180 tablet, Rfl: 3  •  omeprazole (PriLOSEC) 20 mg delayed release capsule, Take 20 mg by mouth daily, Disp: , Rfl:   •  QUEtiapine (SEROquel) 50 mg tablet, , Disp: , Rfl:   •  tadalafil (CIALIS) 20 MG tablet, , Disp: , Rfl:   •  Continuous Blood Gluc  (FreeStyle [place]bin 2 [place]) JAYESH, Use 4x daily to check sugars, Disp: 1 each, Rfl: 1  •  Continuous Blood Gluc Sensor (FreeStyle Corbin 2 Sensor) MISC, CHANGE EVERY 14 DAYS, Disp: 6 each, Rfl: 1  •  glucagon 1 MG injection, Inject 1 mg under the skin once as needed for low blood sugar for up to 1 dose, Disp: 1 kit, Rfl: 1  •  hydrOXYzine HCL (ATARAX) 50 mg tablet, , Disp: , Rfl:   •  potassium chloride (K-DUR,KLOR-CON) 20 mEq tablet, Take 1 tablet (20 mEq total) by mouth daily for 3 doses (Patient not taking: Reported on 5/8/2023), Disp: 3 tablet, Rfl: 0  •  sodium chloride (OCEAN) 0 65 % nasal spray, 2 sprays into each nostril every hour as needed for congestion (Patient not taking: Reported on 5/16/2023), Disp: , Rfl: 0  No Known Allergies    Labs:     Chemistry        Component Value Date/Time     10/29/2016 0742    K 3 1 (L) 01/13/2022 0612    K 5 2 10/29/2016 0742     01/13/2022 0612    CL 99 10/29/2016 0742    CO2 28 01/13/2022 0612    CO2 26 09/02/2020 1257    BUN 9 01/13/2022 0612    BUN 16 10/29/2016 0742    CREATININE 0 71 01/13/2022 0612    CREATININE 0 92 10/29/2016 0742        Component Value Date/Time    CALCIUM 8 2 (L) 01/13/2022 0612    CALCIUM 9 7 10/29/2016 0742    ALKPHOS 137 (H) 01/13/2022 0612    ALKPHOS 121 (H) 10/29/2016 0742    AST 27 01/13/2022 0612    AST 21 10/29/2016 0742    ALT 39 01/13/2022 0612 "   ALT 26 10/29/2016 0742    BILITOT 0 4 10/29/2016 0742            Lab Results   Component Value Date    CHOL 259 (H) 10/15/2013     Lab Results   Component Value Date    HDL 53 07/25/2018     (H) 12/07/2017     (H) 09/01/2017     Lab Results   Component Value Date    LDLCALC 102 (H) 07/25/2018    LDLCALC 51 12/07/2017    LDLCALC 58 09/01/2017     Lab Results   Component Value Date    TRIG 225 (H) 07/25/2018    TRIG 98 12/07/2017    TRIG 62 09/01/2017     No results found for: CHOLHDL    Imaging: No results found  ECG:        Review of Systems   Constitutional: Negative  Negative for malaise/fatigue  HENT: Negative  Eyes: Negative  Cardiovascular: Negative  Respiratory: Negative  Negative for shortness of breath  Endocrine: Negative  Hematologic/Lymphatic: Negative  Skin: Negative  Musculoskeletal: Negative  Gastrointestinal: Negative  Genitourinary: Negative  Neurological: Negative  Psychiatric/Behavioral: Negative  All other systems reviewed and are negative  Vitals:    05/16/23 1303   BP: 96/54   Pulse: 65   SpO2: 97%     Vitals:    05/16/23 1303   Weight: 75 8 kg (167 lb)     Height: 5' 5\" (165 1 cm)   Body mass index is 27 79 kg/m²  Physical Exam:  Vital signs reviewed  General:  Alert and cooperative, appears stated age, no acute distress  HEENT:  PERRLA, EOMI, no scleral icterus, no conjunctival pallor  Neck:  No lymphadenopathy, no thyromegaly, no carotid bruits, no elevated JVP  Heart:  Regular rate and rhythm, normal S1/S2, no S3/S4, no murmur, rubs or gallops  PMI nondisplaced  Lungs:  Clear to auscultation bilaterally, no wheezes rales or rhonchi  Abdomen:  Soft, non-tender, positive bowel sounds, no rebound or guarding,   no organomegaly   Extremities:  Normal range of motion    No clubbing, cyanosis or edema   Vascular:  2+ pedal pulses  Skin:  No rashes or lesions on exposed skin  Neurologic:  Cranial nerves II-XII grossly intact without " focal deficits  Psych:  Normal mood and affect

## 2023-06-01 ENCOUNTER — HOSPITAL ENCOUNTER (OUTPATIENT)
Dept: NON INVASIVE DIAGNOSTICS | Facility: CLINIC | Age: 58
Discharge: HOME | End: 2023-06-01

## 2023-06-01 VITALS
BODY MASS INDEX: 27.82 KG/M2 | DIASTOLIC BLOOD PRESSURE: 54 MMHG | SYSTOLIC BLOOD PRESSURE: 96 MMHG | HEIGHT: 65 IN | HEART RATE: 65 BPM | WEIGHT: 167 LBS

## 2023-06-01 DIAGNOSIS — I25.10 CORONARY ARTERY DISEASE INVOLVING NATIVE CORONARY ARTERY OF NATIVE HEART WITHOUT ANGINA PECTORIS: ICD-10-CM

## 2023-06-01 DIAGNOSIS — Q23.0 AORTIC STENOSIS DUE TO BICUSPID AORTIC VALVE: Chronic | ICD-10-CM

## 2023-06-01 DIAGNOSIS — Q23.1 AORTIC STENOSIS DUE TO BICUSPID AORTIC VALVE: Chronic | ICD-10-CM

## 2023-06-01 LAB
AORTIC ROOT: 2.8 CM
AORTIC VALVE MEAN VELOCITY: 15.5 M/S
APICAL FOUR CHAMBER EJECTION FRACTION: 74 %
ASCENDING AORTA: 3.6 CM
AV AREA BY CONTINUOUS VTI: 1.3 CM2
AV AREA PEAK VELOCITY: 1.3 CM2
AV LVOT MEAN GRADIENT: 2 MMHG
AV LVOT PEAK GRADIENT: 4 MMHG
AV MEAN GRADIENT: 11 MMHG
AV PEAK GRADIENT: 20 MMHG
AV VALVE AREA: 1.3 CM2
AV VELOCITY RATIO: 0.43
DOP CALC AO PEAK VEL: 2.24 M/S
DOP CALC AO VTI: 56.05 CM
DOP CALC LVOT AREA: 3.14 CM2
DOP CALC LVOT DIAMETER: 2 CM
DOP CALC LVOT PEAK VEL VTI: 23.19 CM
DOP CALC LVOT PEAK VEL: 0.96 M/S
DOP CALC LVOT STROKE INDEX: 38.8 ML/M2
DOP CALC LVOT STROKE VOLUME: 72.82 CM3
E WAVE DECELERATION TIME: 201 MS
FRACTIONAL SHORTENING: 40 % (ref 28–44)
INTERVENTRICULAR SEPTUM IN DIASTOLE (PARASTERNAL SHORT AXIS VIEW): 1.4 CM
INTERVENTRICULAR SEPTUM: 1.4 CM (ref 0.6–1.1)
LAAS-AP2: 22.2 CM2
LAAS-AP4: 23.7 CM2
LEFT ATRIUM SIZE: 4.2 CM
LEFT INTERNAL DIMENSION IN SYSTOLE: 2.1 CM (ref 2.1–4)
LEFT VENTRICULAR INTERNAL DIMENSION IN DIASTOLE: 3.5 CM (ref 3.5–6)
LEFT VENTRICULAR POSTERIOR WALL IN END DIASTOLE: 1.4 CM
LEFT VENTRICULAR STROKE VOLUME: 37 ML
LVSV (TEICH): 37 ML
MV E'TISSUE VEL-SEP: 7 CM/S
MV PEAK A VEL: 0.86 M/S
MV PEAK E VEL: 120 CM/S
MV STENOSIS PRESSURE HALF TIME: 58 MS
MV VALVE AREA P 1/2 METHOD: 3.79 CM2
RIGHT ATRIUM AREA SYSTOLE A4C: 18.2 CM2
RIGHT VENTRICLE ID DIMENSION: 4.3 CM
SL CV LEFT ATRIUM LENGTH A2C: 5.6 CM
SL CV LV EF: 65
SL CV PED ECHO LEFT VENTRICLE DIASTOLIC VOLUME (MOD BIPLANE) 2D: 52 ML
SL CV PED ECHO LEFT VENTRICLE SYSTOLIC VOLUME (MOD BIPLANE) 2D: 15 ML
TR MAX PG: 32 MMHG
TR PEAK VELOCITY: 2.8 M/S
TRICUSPID ANNULAR PLANE SYSTOLIC EXCURSION: 2.1 CM
TRICUSPID VALVE PEAK REGURGITATION VELOCITY: 2.81 M/S

## 2023-06-21 NOTE — PROGRESS NOTES
Progress Note - Cardiothoracic Surgery   Bethany Washburn 48 y o  male MRN: [id number]  Unit/Bed#: [facility] 410-01 Encounter: [id number]    Aortic stenosis, Non-Rheumatic, Coronary artery disease  S/P aortic valve replacement and coronary artery bypass grafting; POD # 7      24 Hour Events: On supplemental oxygen yesterday  Tolerating diet  Pain well controlled  Ambulating without assistance       Medications:   Scheduled Meds:  Current Facility-Administered Medications   Medication Dose Route Frequency Provider Last Rate    acetaminophen  975 mg Oral Q8H Greg Barakat PA-C      aluminum-magnesium hydroxide-simethicone  30 mL Oral Q4H PRN Sabino Alcantar MD      ascorbic acid  500 mg Oral Daily Helen Blocker, MARILYN      aspirin  325 mg Oral Daily Greg MARILYN Barakat      atorvastatin  80 mg Oral Daily With [employer] RoshniMARILYN      bisacodyl  10 mg Rectal Daily PRN Yasmine Meyer PA-C      busPIRone  15 mg Oral BID Yasmine Meyer PA-C      calcium carbonate  500 mg Oral Daily PRN Yasmine Meyer PA-C      cholecalciferol  1,000 Units Oral Daily Yasmine Meyer PA-C      docusate sodium  100 mg Oral BID Yasmine Meyer PA-C      ferrous sulfate  325 mg Oral Daily With Breakfast Helen Wang PA-C      fondaparinux  2 5 mg Subcutaneous Daily Greg Barakat PA-C      furosemide  40 mg Intravenous BID (diuretic) Ashley Tracey PA-C      insulin lispro  300 Units Subcutaneous Insulin Pump Daily PRN Elsa Doe MD      lidocaine  3 patch Topical Daily Yasmine Meyer PA-C      metoclopramide  10 mg Intravenous Q6H PRN Sabino Alcantar MD      metoprolol tartrate  50 mg Oral Q12H [address] Raj Pickering MD      oxyCODONE  2 5 mg Oral Q4H PRN Yasmine Meyer PA-C      oxyCODONE  5 mg Oral Q4H PRN Yasmine Meyer PA-C      pantoprazole  40 mg Oral Q12H [address] Adam Romero MD      patient maintained insulin pump  1 each Subcutaneous Q8H Elsa Doe MD      polyethylene glycol  17 g Oral Daily Yasmine Meyer, PA-C      potassium chloride  20 mEq Oral BID Shara Vaughn, PA-C      QUEtiapine  25 mg Oral HS Norva Slice, PA-C      scopolamine  1 patch Transdermal Q72H Norva Slice, PA-C      temazepam  15 mg Oral HS PRN Norva Slice, PA-C       Continuous Infusions:   PRN Meds: aluminum-magnesium hydroxide-simethicone    bisacodyl    calcium carbonate    insulin lispro    metoclopramide    oxyCODONE    oxyCODONE    temazepam    Vitals:   Vitals:    09/08/20 2343 09/09/20 0300 09/09/20 0600 09/09/20 0707   BP: 114/64 (!) 88/51  104/53   BP Location: Right arm Right arm  Right arm   Pulse: (!) 113 79  98   Resp: 18 19  18   Temp: 99 7 °F (37 6 °C) 99 2 °F (37 3 °C)  98 9 °F (37 2 °C)   TempSrc: Oral Oral  Oral   SpO2: 94% 94%  93%   Weight:   70 9 kg (156 lb 4 9 oz)    Height:           Telemetry: NSR; Heart Rate: 95 bpm    Respiratory:   SpO2: SpO2: 93 %, SpO2 Activity: SpO2 Activity: At Rest; 4L    Intake/Output:     Intake/Output Summary (Last 24 hours) at 9/9/2020 0920  Last data filed at 9/8/2020 2235  Gross per 24 hour   Intake 720 ml   Output    Net 720 ml   Inaccurate - several occurrences of unmeasured UOP      Weights:   Weight (last 2 days)     Date/Time   Weight    09/09/20 0600   70 9 (156 31)    09/08/20 0600   73 7 (162 48)    09/07/20 0600   75 5 (166 45)            Results:   Results from last 7 days   Lab Units 09/09/20  0456 09/08/20  0520 09/07/20  0507   WBC Thousand/uL 16 63* 16 21* 13 81*   HEMOGLOBIN g/dL 8 9* 8 3* 8 4*   HEMATOCRIT % 26 4* 25 3* 25 0*   PLATELETS Thousands/uL 404* 284 223     Results from last 7 days   Lab Units 09/09/20  0456 09/08/20  0520 09/07/20  0507  09/02/20  1257   SODIUM mmol/L 139 139 142   < >  --    POTASSIUM mmol/L 3 4* 4 2 3 2*   < >  --    CHLORIDE mmol/L 105 109* 108   < >  --    CO2 mmol/L 28 26 28   < >  --    CO2, I-STAT mmol/L  --   --   --   --  26   BUN mg/dL 12 8 8   < >  --    CREATININE mg/dL 0 58* 0 52* 0 61   < >  --    GLUCOSE, ISTAT mg/dl --   --   --   --  103   CALCIUM mg/dL 8 0* 7 9* 7 8*   < >  --     < > = values in this interval not displayed  Results from last 7 days   Lab Units 09/04/20  0829 09/02/20  1514   INR  1 27* 1 25*   PTT seconds 32 28         Invasive Lines/Tubes:  Invasive Devices     Peripheral Intravenous Line            Peripheral IV 09/08/20 Left Antecubital less than 1 day              Physical Exam:    HEENT/NECK:  PERRLA  No jugular venous distention  Cardiac: Irregularly irregular rate and rhythm and No murmurs/rubs/gallops  Pulmonary:  Breath sounds diminished in the bases bilaterally  and Crackles bilaterally  Abdomen:  Non-tender, Non-distended and Normal bowel sounds  Incisions: Sternum is stable  Incision is clean, dry, and intact  Extremities: Extremities warm/dry and No edema B/L  Neuro: Alert and oriented X 3, Sensation is grossly intact and No focal deficits  Skin: Warm/Dry, without rashes or lesions  Studies:   CXR: 9/7  IMPRESSION:  There is a new small left apical pneumothorax      Diffuse bilateral groundglass opacities which could be due to pneumonia or pulmonary edema  CXR: 9/8  IMPRESSION:  No change in small left apical pneumothorax      Persistent bilateral ground glass opacity  Assessment:  Principal Problem:    Nonrheumatic aortic valve stenosis  Active Problems:    Type 1 diabetes mellitus with retinopathy ([address] )    Essential hypertension    Coronary artery disease involving native coronary artery of native heart without angina pectoris    Dyslipidemia    Aortic stenosis due to bicuspid aortic valve    History of ITP    History of coronary angioplasty with insertion of stent    Acute blood loss as cause of postoperative anemia    Post-operative nausea and vomiting       Aortic stenosis, Non-Rheumatic, Coronary artery disease  S/P aortic valve replacement and coronary artery bypass grafting; POD # 7    Plan:    1   Cardiac:   Sinus Rhythm; HR/BP well controlled   Lopressor, 50 mg PO BID  Continue ASA and Statin therapy  Central IV access discontinued  Continue DVT prophylaxis    2  Pulmonary:    Remains on supplemental oxygen, 2L NC  Encourage IS use, deep breathing, coughing, ambulation    3  Renal:   Intake/Output net: Inaccurate due to unmeasured occurrences of UOP   Lasix 40 mg IV QD with potassium supplementation   K 3 4 - will replete today  Post op Creatinine stable; Follow up labs prn    4  Neuro:    Neurologically intact; No active issues  Incisional pain well-controlled  Continue Tylenol, 975 mg PO q 8, standing dose  Continue Oxycodone, 2 5 to 5 mg PO q 4 hours prn pain    5  GI:  Nausea resolved, no further hematemesis  GI recommending outpatient follow up with EGD  Tolerating TLC 2 3 gm sodium diet  Maintain 1800 mL daily fluid restriction   Continue stool softeners and prn suppository  Continue GI prophylaxis    6  Endo:   No history of diabetes; Glucose well-controlled with sliding scale coverage  Resume insulin pump, per endocrine    7    Hematology:    APOBLA, Leukocytosis    Hgb stable    Check daily CBC      8     Disposition:      Anticipate discharge to home once off supplemental oxygen    VTE Pharmacologic Prophylaxis: Sequential compression device (Venodyne)  and Fondaparinux (Arixtra)  VTE Mechanical Prophylaxis: sequential compression device    Collaborative rounds completed with Dr Zana Munoz of care discussed with bedside nurse    SIGNATURE: Elyssa Rock PA-C  DATE: September 9, 2020  TIME: 9:20 AM Syncope

## 2024-03-22 DIAGNOSIS — Z95.1 S/P CABG X 3: ICD-10-CM

## 2024-03-22 DIAGNOSIS — Z95.2 S/P AVR (AORTIC VALVE REPLACEMENT): ICD-10-CM

## 2024-03-22 RX ORDER — METOPROLOL TARTRATE 50 MG/1
TABLET, FILM COATED ORAL
Qty: 180 TABLET | Refills: 3 | Status: SHIPPED | OUTPATIENT
Start: 2024-03-22

## 2024-04-01 ENCOUNTER — TELEPHONE (OUTPATIENT)
Dept: PSYCHIATRY | Facility: CLINIC | Age: 59
End: 2024-04-01

## 2024-04-01 NOTE — TELEPHONE ENCOUNTER
Patient has been added to the [facility] wait list without a referral.    Insurance: [employer] cross  Insurance Type:    Commercial [x]   Medicaid []   County (if applicable)   Medicare []  [place] Preference[email or web address]  Provider Preference: any  Virtual: Yes [x] No []  Were outside resources sent: Yes [x] No []

## 2024-10-01 ENCOUNTER — TELEPHONE (OUTPATIENT)
Age: 59
End: 2024-10-01

## 2024-12-19 ENCOUNTER — TELEMEDICINE (OUTPATIENT)
Dept: OTHER | Facility: HOSPITAL | Age: 59
End: 2024-12-19
Payer: COMMERCIAL

## 2024-12-19 ENCOUNTER — TELEPHONE (OUTPATIENT)
Age: 59
End: 2024-12-19

## 2024-12-19 DIAGNOSIS — S16.1XXA NECK STRAIN, INITIAL ENCOUNTER: Primary | ICD-10-CM

## 2024-12-19 PROCEDURE — 99213 OFFICE O/P EST LOW 20 MIN: CPT | Performed by: NURSE PRACTITIONER

## 2024-12-19 RX ORDER — METHOCARBAMOL 500 MG/1
500 TABLET, FILM COATED ORAL 3 TIMES DAILY PRN
Qty: 15 TABLET | Refills: 0 | Status: SHIPPED | OUTPATIENT
Start: 2024-12-19 | End: 2024-12-24

## 2024-12-19 NOTE — PROGRESS NOTES
Virtual Regular Visit  Name: Elieser Yousif      : 1965      MRN: [id number]  Encounter Provider: Your Video Visit Provider  Encounter Date: 2024   Encounter department: VIRTUAL CARE       Verification of patient location:  Patient is located at Home in the following state in which I hold an active license [place] :  Assessment & Plan  Neck strain, initial encounter    Orders:    methocarbamol (ROBAXIN) 500 mg tablet; Take 1 tablet (500 mg total) by mouth 3 (three) times a day as needed for muscle spasms for up to 5 days    Ambulatory Referral to Physical Therapy; Future        Encounter provider Your Video Visit Provider    The patient was identified by name and date of birth. Elieser Yousif was informed that this is a telemedicine visit and that the visit is being conducted through the Epic Embedded platform. He agrees to proceed..  My office door was closed. No one else was in the room.  He acknowledged consent and understanding of privacy and security of the video platform. The patient has agreed to participate and understands they can discontinue the visit at any time.    Patient is aware this is a billable service.     History was obtained from: History obtained from: patient  History of Present Illness     This is a 59 year old male here today for video visit. He states about 4 years ago he was moving and over doing things.  He injured his neck and was started with Neurontin at that time.  He was weaning down the medication to 1 day.  He states 5 days  ago he woke up with right sided neck pain.  He states it felt like a stiff neck.    He states yesterday the symptoms worsened.    No injury or trauma.   No numbness or tingling into the arm  He has some burning sensation.  Feels best when he looks at floor.  He is requesting a muscle relaxer.      Review of Systems   Constitutional: Negative.    Respiratory: Negative.     Cardiovascular: Negative.    Musculoskeletal:         Right neck pain.     Psychiatric/Behavioral: Negative.         Objective   There were no vitals taken for this visit.    Physical Exam  Constitutional:       General: He is not in acute distress.     Appearance: Normal appearance. He is not ill-appearing or toxic-appearing.   Musculoskeletal:      Comments: Ttp over the right side of neck, he states this feels like a knot   Decreased ROM to left due to pain.    Neurological:      Mental Status: He is alert and oriented to person, place, and time.   Psychiatric:         Mood and Affect: Mood normal.         Behavior: Behavior normal.         Thought Content: Thought content normal.         Judgment: Judgment normal.         Visit Time  Total Visit Duration: 8 minutes not including the time spent for establishing the audio/video connection.

## 2024-12-19 NOTE — TELEPHONE ENCOUNTER
Caller: fabio Mon     Doctor: Blanca    Reason for call: pt stated the neck pain is back. Pt procedure ov 10/2021. Pt has upcoming appt with Dr. Rios on 1/10/25. Pt would like to know what he could for the pain until he is able to see Dr. Rios, or if he could be seen sooner. Please advise     Call back#: [phone]

## 2024-12-19 NOTE — PATIENT INSTRUCTIONS
Will start muscle relaxer.  This can make you drowsy.  Only take while at home.  Continue Ibuprofen.  Tylenol as needed.  Alternate ice and heat.  PT ordered. Follow up with PCP if no improvement.  Go to ER with any worsening symptoms.

## 2025-01-06 ENCOUNTER — TELEPHONE (OUTPATIENT)
Age: 60
End: 2025-01-06

## 2025-01-06 NOTE — TELEPHONE ENCOUNTER
Contacted patient off of [facility]  to verify needs of services in attempts to offer patient an appointment. LVM for patient to contact intake dept  in regards to scheduling.     1st attempt

## 2025-01-10 ENCOUNTER — HOSPITAL ENCOUNTER (OUTPATIENT)
Dept: RADIOLOGY | Facility: HOSPITAL | Age: 60
Discharge: HOME | End: 2025-01-10
Payer: COMMERCIAL

## 2025-01-10 ENCOUNTER — OFFICE VISIT (OUTPATIENT)
Dept: PAIN MEDICINE | Facility: CLINIC | Age: 60
End: 2025-01-10
Payer: COMMERCIAL

## 2025-01-10 DIAGNOSIS — M48.02 CERVICAL SPINAL STENOSIS: ICD-10-CM

## 2025-01-10 DIAGNOSIS — M54.12 CERVICAL RADICULOPATHY: ICD-10-CM

## 2025-01-10 DIAGNOSIS — M47.812 CERVICAL SPONDYLOSIS: Primary | ICD-10-CM

## 2025-01-10 DIAGNOSIS — M47.812 CERVICAL SPONDYLOSIS: ICD-10-CM

## 2025-01-10 PROCEDURE — 72050 X-RAY EXAM NECK SPINE 4/5VWS: CPT

## 2025-01-10 PROCEDURE — 99214 OFFICE O/P EST MOD 30 MIN: CPT | Performed by: PHYSICAL MEDICINE & REHABILITATION

## 2025-01-10 NOTE — PROGRESS NOTES
Assessment:  1. Cervical spondylosis    2. Cervical radiculopathy    3. Cervical spinal stenosis        Plan:  Patient is a 59-year-old male returns for follow-up regarding his right-sided neck pain.  Patient reports right-sided neck pain radiating to right elbow over the past couple of years without any inciting event.  Patient had MRI of cervical spine done in September 2021 interpreted by me showing mild central canal stenosis at C4-C5 and C6-C7 with moderate bilateral foraminal narrowing at C6-C7.  Patient states that he has not gone to physical therapy recently.  At this time, I am clinically suspicious for patient's symptoms secondary to cervical radiculopathy although cervical facet arthritis is on differential.      Given that clinical presentation of cervical radiculopathy matches MRI findings, I would like to proceed forward with performing C7-T1 JESSE. Risks vs benefits discussed in detail with the patient. These risks include, but are not limited to, bleeding, infection, nerve damage, paralysis. Patient is not on anticoagulant therapy. Patient denies contrast allergy. All patient’s questions were answered. Patient understands risks and is willing to pursue the procedure. The approach was demonstrated using models and literature was provided. Verbal consent obtained.  Will order Xray of cervical spine  Will order MRI cervical spine   If symptoms do not improve with MARLINE, then will consider performing right cervical medial branch pathwau       My impressions and treatment recommendations were discussed in detail with the patient who verbalized understanding and had no further questions.  Discharge instructions were provided. I personally saw and examined the patient and I agree with the above discussed plan of care.            History of Present Illness:  Elieser Yousif is a 59 y.o. male who presents for a follow up office visit in regards to Neck Pain.   The patient’s current symptoms include  I have  personally reviewed and/or updated the patient's past medical history, past surgical history, family history, social history, current medications, allergies, and vital signs today.     Patient 59-year-old male presents with right-sided neck pain radiating to right elbow over the past couple of years.  Patient states that pain is constant, described as a sharp sensation with associated burning.  Patient denies associated weakness numbness or paresthesia.  Patient states that he has been taking gabapentin 300 mg twice a day and taking Advil occasionally with minimal relief in symptoms.    Review of Systems   Respiratory:  Negative for shortness of breath.    Cardiovascular:  Negative for chest pain.   Gastrointestinal:  Negative for constipation, diarrhea, nausea and vomiting.   Musculoskeletal:  Positive for joint swelling, neck pain and neck stiffness. Negative for arthralgias, gait problem and myalgias.   Skin:  Negative for rash.   Neurological:  Negative for dizziness, seizures and weakness.   All other systems reviewed and are negative.  General: no fevers, chills, infections  Neuro: no saddle anesthesia, no dropping objects or balance issues  GI: no changes in bowel habits  : no changes in bladder habits  Hem: no bleeding      Patient Active Problem List   Diagnosis    Nonrheumatic aortic valve stenosis    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)    Essential hypertension    Mouth sores    Coronary artery disease involving native coronary artery of native heart without angina pectoris    Dyslipidemia    Aortic stenosis due to bicuspid aortic valve    History of ITP    History of coronary angioplasty with insertion of stent    Acute blood loss as cause of postoperative anemia    Post-operative nausea and vomiting    Acute postoperative pulmonary insufficiency (HCC)    Adhesive capsulitis of right shoulder    Muscle spasm    Muscle strain of right shoulder    Chronic right shoulder pain    Foraminal  stenosis of cervical region    Neck pain    Cervical disc disorder with radiculopathy of mid-cervical region    COVID-19    Anxiety    MILTON (acute kidney injury) (HCC)    non MI troponin elevation    Leukocytosis    Hypokalemia    Type 1 diabetes mellitus with hyperglycemia (HCC)    Acute respiratory failure with hypoxia (HCC)       Past Medical History:   Diagnosis Date    Aortic stenosis     Clavicle fracture     LEFT    Diabetes mellitus (HCC)     Hyperlipidemia     Hypertension     Myocardial infarction (HCC)     Thrombocytopenic purpura (HCC)        Past Surgical History:   Procedure Laterality Date    ABDOMINAL SURGERY      HARVEST VEIN Left 2020    Procedure: HARVEST VEIN ENDOSCOPIC (EVH) LEFT LEG;  Surgeon: Christiano Gómez MD;  Location: [facility];  Service: Cardiac Surgery    MO CORONARY ARTERY BYP W/VEIN & ARTERY GRAFT 3 VEIN N/A 2020    Procedure: CORONARY ARTERY BYPASS GRAFT X 3 WITH SVG TO Right Posterior Lateral Branch, OM1 AND CLARK TO LAD.;  Surgeon: Christiano Gómez MD;  Location: [facility];  Service: Cardiac Surgery    MO RPLCMT AORTIC VALVE OPN W/STENTLESS TISSUE VALVE N/A 2020    Procedure: AORTIC VALVE REPLACEMENT WITH A 25MM SANTOYO INSPIRIS RESILIA  TISSUE AORTIC VALVE;  Surgeon: Christiano Gómez MD;  Location: [facility];  Service: Cardiac Surgery    ROTATOR CUFF REPAIR      SPLENECTOMY      TONSILLECTOMY      VITRECTOMY Bilateral     BY ANTERIOR APPROACH        Family History   Problem Relation Age of Onset    Aneurysm Mother         CAREBRAL ARTERY     Coronary artery disease Mother     Diabetes Mother     Stroke Father        Social History     Occupational History    Occupation: MANAGER AT [employer]    Tobacco Use    Smoking status: Former     Types: Cigars     Quit date: 2020     Years since quittin.4    Smokeless tobacco: Never   Vaping Use    Vaping status: Never Used   Substance and Sexual Activity    Alcohol use: Not Currently     Alcohol/week: 0.0 standard  drinks of alcohol     Comment: none    Drug use: Yes     Frequency: 3.0 times per week     Types: Marijuana     Comment: none    Sexual activity: Not Currently     Partners: Female       Current Outpatient Medications on File Prior to Visit   Medication Sig    aspirin 325 mg tablet Take 1 tablet (325 mg total) by mouth daily    atorvastatin (LIPITOR) 80 mg tablet Take 1 tablet by mouth daily with dinner    busPIRone (BUSPAR) 15 mg tablet Take 15 mg by mouth 2 (two) times a day    Continuous Blood Gluc  (FreeStyle Corbin 2 [place]) JAYESH Use 4x daily to check sugars    gabapentin (NEURONTIN) 300 mg capsule TAKE 1 CAPSULE(300 MG) BY MOUTH THREE TIMES DAILY    glucagon 1 MG injection Inject 1 mg under the skin once as needed for low blood sugar for up to 1 dose    HumaLOG 100 UNIT/ML injection USE UP  UNITS PER DAY VIA INSULIN PUMP    hydrOXYzine HCL (ATARAX) 50 mg tablet     ibuprofen (MOTRIN) 600 mg tablet Take 600 mg by mouth every 6 (six) hours as needed    lisinopril (ZESTRIL) 5 mg tablet TAKE 1 TABLET(5 MG) BY MOUTH DAILY    metoprolol tartrate (LOPRESSOR) 50 mg tablet TAKE 1 TABLET(50 MG) BY MOUTH EVERY 12 HOURS    omeprazole (PriLOSEC) 20 mg delayed release capsule Take 20 mg by mouth daily    QUEtiapine (SEROquel) 50 mg tablet     tadalafil (CIALIS) 20 MG tablet     Continuous Blood Gluc Sensor (FreeStyle Corbin 2 Sensor) MISC CHANGE EVERY 14 DAYS    methocarbamol (ROBAXIN) 500 mg tablet Take 1 tablet (500 mg total) by mouth 3 (three) times a day as needed for muscle spasms for up to 5 days    sodium chloride (OCEAN) 0.65 % nasal spray 2 sprays into each nostril every hour as needed for congestion (Patient not taking: Reported on 5/16/2023)     No current facility-administered medications on file prior to visit.       No Known Allergies    Physical Exam:    There were no vitals taken for this visit.    There were no vitals filed for this visit.  Constitutional: no apparent distress, does not appear  sedated   HEENT: pupils equal and round, symmetric facial muscles   Neck: supple  Cardiovascular: well perfused, no peripheral cyanosis  Pulmonary: good chest wall excursion, breathing unlabored   Psych: appropriate affect and insight, No agitation. No evidence of aberrant behavior   Skin: No rashes or lesions  Neuro: cranial nerves II-XII grossly intact   MSK:  Inspection: no signs of infection to neck  Palpation: tender to palpation to cervical spine  ROM: decreased rotation of neck to left, right, and extension   MMT 5/5 strength in B/L UE  Sensation to light touch intact B/L UE  DTR: 2+ in B/L biceps, triceps   Special test: - Ruelas's, +Spurling's on Right, + Cervical facet loading   Gait: ambulates unassisted, gait is not antalgic        Imaging

## 2025-01-13 ENCOUNTER — TELEPHONE (OUTPATIENT)
Dept: PAIN MEDICINE | Facility: CLINIC | Age: 60
End: 2025-01-13

## 2025-01-13 NOTE — TELEPHONE ENCOUNTER
Pt aware to remove glucose monitor from arm prior to procedure and replace afterward. Aware to contact the company who provides the monitor that hehad to remove it for a procedure and they may replace the monitor.  Pt also aware that the insulin pump should be disconnected prior to going in to the procedure and reconnected when he comes out from the procedure.

## 2025-01-13 NOTE — TELEPHONE ENCOUNTER
----- Message from Pat SUE sent at 1/13/2025  7:37 AM EST -----  Regarding: KW pt-glucose monitor  Patient has been scheduled with Dr. Rios on 1/15/25 for MARLINE, and wears a glucose monitor on their arm and also wears insulin pump.  Patient advised someone from the clinical staff would reach out  with further instructions regarding this.     Thank you,   Kirk

## 2025-01-15 ENCOUNTER — TELEPHONE (OUTPATIENT)
Dept: PAIN MEDICINE | Facility: CLINIC | Age: 60
End: 2025-01-15

## 2025-01-15 ENCOUNTER — HOSPITAL ENCOUNTER (OUTPATIENT)
Dept: RADIOLOGY | Facility: HOSPITAL | Age: 60
Discharge: HOME | End: 2025-01-15

## 2025-01-15 ENCOUNTER — NURSE TRIAGE (OUTPATIENT)
Dept: OTHER | Facility: OTHER | Age: 60
End: 2025-01-15

## 2025-01-15 VITALS
TEMPERATURE: 97.6 F | SYSTOLIC BLOOD PRESSURE: 193 MMHG | HEART RATE: 89 BPM | OXYGEN SATURATION: 99 % | RESPIRATION RATE: 17 BRPM | DIASTOLIC BLOOD PRESSURE: 92 MMHG

## 2025-01-15 DIAGNOSIS — M50.120 CERVICAL DISC DISORDER WITH RADICULOPATHY OF MID-CERVICAL REGION: Primary | ICD-10-CM

## 2025-01-15 DIAGNOSIS — M54.12 CERVICAL RADICULOPATHY: ICD-10-CM

## 2025-01-15 RX ORDER — TRAMADOL HYDROCHLORIDE 50 MG/1
50 TABLET ORAL 2 TIMES DAILY PRN
Qty: 40 TABLET | Refills: 0 | Status: SHIPPED | OUTPATIENT
Start: 2025-01-15 | End: 2025-01-16 | Stop reason: SDUPTHER

## 2025-01-15 NOTE — TELEPHONE ENCOUNTER
RN advised pt that KW will be sending a PRN rx for Tramadol to his pharmacy on file. Pt takes ASA OTC on his own and was advised it will need to be held 6 days prior to his reschedule procedure, that Kirk will contact him about. RN reminded him to take his BP meds the day of the procedure.

## 2025-01-15 NOTE — TELEPHONE ENCOUNTER
Caller: Tho    Doctor: Blanca    Reason for call: patient states since we were unable to procedure we offered to send pain medication to his Pharm[facility]     Call back#: [phone]

## 2025-01-15 NOTE — TELEPHONE ENCOUNTER
Pt canceled due to elevated /92.  Pt takes 325 ASA and could not verbalize/ confirm last dose.  Patient expressed frustration due to procedure cancellation.  Discussed with pt reason for cancellation due to elevated BP and bleeding due to taking ASA.

## 2025-01-15 NOTE — NURSING NOTE
Pt cancelled pre procedure due to elevated BP, KW aware, and pt not able to verbalized when last dose of ASA.

## 2025-01-15 NOTE — TELEPHONE ENCOUNTER
Contacted patient off of [facility]  to verify needs of services in attempts for wait list maintenance. spoke with patient whom stated no longer is in need of services. Pt removed from [facility] at this time.

## 2025-01-16 ENCOUNTER — TELEPHONE (OUTPATIENT)
Dept: PAIN MEDICINE | Facility: CLINIC | Age: 60
End: 2025-01-16

## 2025-01-16 DIAGNOSIS — E10.65 TYPE 1 DIABETES MELLITUS WITH HYPERGLYCEMIA (HCC): Primary | ICD-10-CM

## 2025-01-16 DIAGNOSIS — M50.120 CERVICAL DISC DISORDER WITH RADICULOPATHY OF MID-CERVICAL REGION: ICD-10-CM

## 2025-01-16 RX ORDER — TRAMADOL HYDROCHLORIDE 50 MG/1
50 TABLET ORAL 2 TIMES DAILY PRN
Qty: 40 TABLET | Refills: 0 | Status: SHIPPED | OUTPATIENT
Start: 2025-01-16

## 2025-01-16 NOTE — TELEPHONE ENCOUNTER
S/w pt and advised of the same. While on the call pt asked if someone was going to call pt to reschedule patient for procedure as his BP was too high at yesterday to have procedure.  Nurse advised pt nurse to send message to .  Pt appreciative of call.

## 2025-01-16 NOTE — TELEPHONE ENCOUNTER
"Reason for Disposition  • [1] Caller has NON-URGENT medicine question about med that PCP prescribed AND [2] triager unable to answer question    Answer Assessment - Initial Assessment Questions  1. NAME of MEDICINE: \"What medicine(s) are you calling about?\"      Tramadol 50 mg    2. QUESTION: \"What is your question?\" (e.g., double dose of medicine, side effect)      \"The medication was sent to the wrong Pharmacy\"    3. PRESCRIBER: \"Who prescribed the medicine?\" Reason: if prescribed by specialist, call should be referred to that group.      Colton Rios    Protocols used: Medication Question Call-Adult-AH    "

## 2025-01-16 NOTE — TELEPHONE ENCOUNTER
"Called patient to advise need for A1C bloodwork prior to injection.  Patient did not understand the need for a 3 month cumulative blood test if his blood sugars have been under control.  He was not sure all of these \"hassles\" were worth his time.  Patient states hhe does not have time for all this with.    "

## 2025-01-16 NOTE — TELEPHONE ENCOUNTER
----- Message from Colton Rios DO sent at 1/16/2025 11:33 AM EST -----  Regarding: RE: A1C  Ordered  Thank you  ----- Message -----  From: Pat Abdi  Sent: 1/16/2025  10:01 AM EST  To: Colton Rios DO  Subject: A1C                                              Dr. Rios,    After reviewing instructions with the patient, it appears he is diabetic.  He last few glucose levels have been good, however, he has not had an A1C since 11/23 and at that time, his levels were over 8.  Do you want to order an A1C for him before we do his MARLINE on 1/29/25?    Kirk

## 2025-01-16 NOTE — TELEPHONE ENCOUNTER
"Regarding: Prescription problem  ----- Message from Zuleyka SUE sent at 1/15/2025  6:18 PM EST -----  \"My provider sent my medication to the wrong pharmacy. I need traMADol (Ultram) 50 mg tablet prescription to be send to [facility] [address]  [address] - [place].\"    "

## 2025-01-16 NOTE — TELEPHONE ENCOUNTER
Patient has been scheduled for their procedure, please see [place] message for additional details.

## 2025-01-25 ENCOUNTER — APPOINTMENT (OUTPATIENT)
Dept: LAB | Facility: CLINIC | Age: 60
End: 2025-01-25
Payer: COMMERCIAL

## 2025-01-25 DIAGNOSIS — E10.65 TYPE 1 DIABETES MELLITUS WITH HYPERGLYCEMIA (HCC): ICD-10-CM

## 2025-01-25 LAB
EST. AVERAGE GLUCOSE BLD GHB EST-MCNC: 214 MG/DL
HBA1C MFR BLD: 9.1 %

## 2025-01-25 PROCEDURE — 36415 COLL VENOUS BLD VENIPUNCTURE: CPT

## 2025-01-25 PROCEDURE — 83036 HEMOGLOBIN GLYCOSYLATED A1C: CPT

## 2025-02-03 DIAGNOSIS — M50.120 CERVICAL DISC DISORDER WITH RADICULOPATHY OF MID-CERVICAL REGION: ICD-10-CM

## 2025-02-03 RX ORDER — TRAMADOL HYDROCHLORIDE 50 MG/1
50 TABLET ORAL 2 TIMES DAILY PRN
Qty: 40 TABLET | Refills: 0 | Status: CANCELLED | OUTPATIENT
Start: 2025-02-03

## 2025-02-03 RX ORDER — TRAMADOL HYDROCHLORIDE 50 MG/1
50 TABLET ORAL 2 TIMES DAILY PRN
Qty: 20 TABLET | Refills: 0 | Status: SHIPPED | OUTPATIENT
Start: 2025-02-03 | End: 2025-02-13

## 2025-02-03 NOTE — TELEPHONE ENCOUNTER
Would this be reordered?  Procedure scheduled for 1/29 was cancelled due to HGBA1C.  Please advise.

## 2025-02-03 NOTE — TELEPHONE ENCOUNTER
If patient continues to report ongoing severe pain  Please schedule an office follow-up and reevaluation to initiate chronic opiate pain management with CRNP  Thank you

## 2025-02-03 NOTE — TELEPHONE ENCOUNTER
Reason for call:   [x] Refill   [] Prior Auth  [] Other:     Office:   [] PCP/Provider -   [x] Specialty/Provider - S & P     Medication: Tramadol 50 mg, take 1 tablet by mouth 2 times a day       Pharmacy: [facility]     Does the patient have enough for 3 days?   [] Yes   [x] No - Send as HP to POD

## 2025-02-03 NOTE — TELEPHONE ENCOUNTER
S/W pt.  Advised pt of the same.  Pt has ongoing severe pain.  He started a new job last week.  Scheduled pt for SOVS on 2/12 at 7:30 w/ AS.  Pt stated he has no pills left.  Pt asking if he can get some to his appt?  Advil upsets his stomach.  Pt verbalized understanding.  Please advise.

## 2025-02-10 ENCOUNTER — OFFICE VISIT (OUTPATIENT)
Dept: CARDIOLOGY CLINIC | Facility: CLINIC | Age: 60
End: 2025-02-10
Payer: COMMERCIAL

## 2025-02-10 VITALS
BODY MASS INDEX: 27.57 KG/M2 | WEIGHT: 165.5 LBS | OXYGEN SATURATION: 98 % | DIASTOLIC BLOOD PRESSURE: 64 MMHG | HEIGHT: 65 IN | HEART RATE: 59 BPM | SYSTOLIC BLOOD PRESSURE: 110 MMHG

## 2025-02-10 DIAGNOSIS — I25.10 CORONARY ARTERY DISEASE INVOLVING NATIVE CORONARY ARTERY OF NATIVE HEART WITHOUT ANGINA PECTORIS: Primary | ICD-10-CM

## 2025-02-10 DIAGNOSIS — Q23.81 AORTIC STENOSIS DUE TO BICUSPID AORTIC VALVE: Chronic | ICD-10-CM

## 2025-02-10 DIAGNOSIS — I65.23 CAROTID ARTERY STENOSIS WITHOUT CEREBRAL INFARCTION, BILATERAL: ICD-10-CM

## 2025-02-10 DIAGNOSIS — Z95.5 HISTORY OF CORONARY ANGIOPLASTY WITH INSERTION OF STENT: Chronic | ICD-10-CM

## 2025-02-10 DIAGNOSIS — N52.9 ERECTILE DYSFUNCTION, UNSPECIFIED ERECTILE DYSFUNCTION TYPE: ICD-10-CM

## 2025-02-10 DIAGNOSIS — E78.5 DYSLIPIDEMIA: ICD-10-CM

## 2025-02-10 DIAGNOSIS — J96.01 ACUTE RESPIRATORY FAILURE WITH HYPOXIA (HCC): ICD-10-CM

## 2025-02-10 DIAGNOSIS — Q23.0 AORTIC STENOSIS DUE TO BICUSPID AORTIC VALVE: Chronic | ICD-10-CM

## 2025-02-10 DIAGNOSIS — I10 ESSENTIAL HYPERTENSION: ICD-10-CM

## 2025-02-10 DIAGNOSIS — E10.319 TYPE 1 DIABETES MELLITUS WITH RETINOPATHY, MACULAR EDEMA PRESENCE UNSPECIFIED, UNSPECIFIED LATERALITY, UNSPECIFIED RETINOPATHY SEVERITY (HCC): ICD-10-CM

## 2025-02-10 DIAGNOSIS — I35.0 NONRHEUMATIC AORTIC VALVE STENOSIS: ICD-10-CM

## 2025-02-10 DIAGNOSIS — R06.09 DOE (DYSPNEA ON EXERTION): ICD-10-CM

## 2025-02-10 PROCEDURE — 93000 ELECTROCARDIOGRAM COMPLETE: CPT | Performed by: INTERNAL MEDICINE

## 2025-02-10 PROCEDURE — 99214 OFFICE O/P EST MOD 30 MIN: CPT | Performed by: INTERNAL MEDICINE

## 2025-02-10 RX ORDER — TADALAFIL 20 MG/1
20 TABLET ORAL DAILY PRN
Qty: 6 TABLET | Refills: 6 | Status: SHIPPED | OUTPATIENT
Start: 2025-02-10

## 2025-02-10 NOTE — PROGRESS NOTES
Cardiology Follow Up    Elieser Yousif  1965  [id number]  CARDIOVASC PHYSICIAN  [address] [facility] [facility] [place] [address]  [phone]  [phone]    1. Coronary artery disease involving native coronary artery of native heart without angina pectoris  POCT ECG    Lipid Panel with Direct LDL reflex    Stress test only, exercise      2. History of coronary angioplasty with insertion of stent  POCT ECG      3. Essential hypertension  POCT ECG      4. Nonrheumatic aortic valve stenosis  POCT ECG      5. Dyslipidemia  POCT ECG      6. Aortic stenosis due to bicuspid aortic valve  POCT ECG      7. Carotid artery stenosis without cerebral infarction, bilateral  VAS carotid complete study      8. JACOBS (dyspnea on exertion)  Stress test only, exercise      9. Acute respiratory failure with hypoxia ([facility])        10. Type 1 diabetes mellitus with retinopathy, macular edema presence unspecified, unspecified laterality, unspecified retinopathy severity (HCC)        11. Erectile dysfunction, unspecified erectile dysfunction type  tadalafil (CIALIS) 20 MG tablet          Discussion/Summary:  1. Coronary artery disease with prior LAD PCI in 2017, 80% small posterolateral branch  2. Severe aortic stenosis probable bicuspid valve  3. Hypertension  4. Hyperlipidemia  5. Type 1 diabetes mellitus  6. Status post CABG x3  7. Status post AVR    Recommendations: Overall he has been doing well he had some mild shortness of breath at times and has had some issues with vomiting.  The vomiting sounds like he has gastroparesis he has an upcoming appointment with GI.  Due to the shortness of breath I have recommended a treadmill only stress to make sure there is no evidence of chronotropic incompetence.  Blood pressure has been well-controlled.  Lipids are due to be checked.  Echo from a year and a half ago shows normal LV function with normally functioning bioprosthetic valve.  He is due for bilateral  carotid Dopplers.  I will follow-up with him yearly.      Interval History:  59-year-old type 1 diabetic with history of coronary artery disease, LAD PCI in 2017, preserved LV function, severe aortic stenosis, hypertension, hyperlipidemia presents for an evaluation in the office by myself.  He was previously seen by 1 of my partners who left the practice.  Patient has been somewhat noncompliant with office testing in the past.  Stress echo had been ordered close to a year ago and when he presented for the stress echo his aortic stenosis was evaluated and had progressed into the severe range.  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      Overall he has been doing well since her last appointment.  He underwent surgery for severe aortic stenosis and multivessel coronary disease.  He had a CABG x3 with LIMA to LAD, saphenous vein graft to OM1, saphenous vein graft to right posterior lateral branch.      Overall he has been doing well.  He has had some episodes of vomiting that can occur after he is working.  These are not associated with any pain.  After he vomits he feels well and he can go back to physical work without any difficulties.  Denies any chest pain or discomfort.  He does have some mild dyspnea if he pushes himself and generalized fatigue.  Denies any palpitations, lower extreme edema, PND, orthopnea.    Problem List       Nonrheumatic aortic valve stenosis    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)      Lab Results   Component Value Date    HGBA1C 9.1 (H) 01/25/2025         Essential hypertension    Mouth sores    Coronary artery disease involving native coronary artery of native heart without angina pectoris    Dyslipidemia          Past Medical History:   Diagnosis Date    Aortic stenosis     Clavicle fracture     LEFT    Diabetes mellitus (HCC)     Hyperlipidemia     Hypertension     Myocardial infarction (HCC)     Thrombocytopenic purpura (HCC)      Social History      Socioeconomic History    Marital status: /Civil Union     Spouse name: Not on file    Number of children: Not on file    Years of education: GRADUATED HIGHSCHOOL     Highest education level: Not on file   Occupational History    Occupation: MANAGER AT [employer]    Tobacco Use    Smoking status: Former     Types: Cigars     Quit date: 2020     Years since quittin.5    Smokeless tobacco: Never   Vaping Use    Vaping status: Never Used   Substance and Sexual Activity    Alcohol use: Not Currently     Alcohol/week: 0.0 standard drinks of alcohol     Comment: none    Drug use: Yes     Frequency: 3.0 times per week     Types: Marijuana     Comment: none    Sexual activity: Not Currently     Partners: Female   Other Topics Concern    Not on file   Social History Narrative    Not on file     Social Drivers of Health     Financial Resource Strain: Not At Risk (2025)    Received from [facility]    Financial Insecurity     In the last 12 months did you skip medications to save money?: No     In the last 12 months was there a time when you needed to see a doctor but could not because of cost?: No   Food Insecurity: No Food Insecurity (2025)    Received from [facility]    Food Insecurity     In the last 12 months did you ever eat less than you felt you should because there wasn't enough money for food?: No   Transportation Needs: No Transportation Needs (2025)    Received from [facility]    Transportation Needs     In the last 12 months have you ever had to go without healthcare because you didn't have a way to get there?: No   Physical Activity: Not on file   Stress: No Stress Concern Present (2024)    Received from [facility]    [place] [employer] - Occupational Stress Questionnaire     Feeling of Stress : Only a little   Social Connections: Patient Declined (2025)    Received from [facility]    Social Connection     Do you often feel lonely?: Decline to Answer   Intimate Partner Violence: Not At Risk (4/18/2024)    [facility]    Humiliation, Afraid, Rape, and Kick questionnaire     Fear of Current or Ex-Partner: No     Emotionally Abused: No     Physically Abused: No     Sexually Abused: No   Housing Stability: Not At Risk (2/4/2025)    Received from [facility]    Housing Stability     Are you worried that in the next 2 months you may not have stable housing?: No      Family History   Problem Relation Age of Onset    Aneurysm Mother         CAREBRAL ARTERY     Coronary artery disease Mother     Diabetes Mother     Stroke Father      Past Surgical History:   Procedure Laterality Date    ABDOMINAL SURGERY      HARVEST VEIN Left 9/2/2020    Procedure: HARVEST VEIN ENDOSCOPIC ([facility]) LEFT LEG;  Surgeon: Christiano Gómez MD;  Location: [facility];  Service: Cardiac Surgery    VT CORONARY ARTERY BYP W/VEIN & ARTERY GRAFT 3 VEIN N/A 9/2/2020    Procedure: CORONARY ARTERY BYPASS GRAFT X 3 WITH SVG TO Right Posterior Lateral Branch, OM1 AND CLARK TO LAD.;  Surgeon: Christiano Gómez MD;  Location: [facility];  Service: Cardiac Surgery    VT RPLCMT AORTIC VALVE OPN W/STENTLESS TISSUE VALVE N/A 9/2/2020    Procedure: AORTIC VALVE REPLACEMENT WITH A 25MM SANTOYO INSPIRIS RESILIA  TISSUE AORTIC VALVE;  Surgeon: Christiano Gómez MD;  Location: [facility];  Service: Cardiac Surgery    ROTATOR CUFF REPAIR      SPLENECTOMY      TONSILLECTOMY      VITRECTOMY Bilateral     BY ANTERIOR APPROACH        Current Outpatient Medications:     atorvastatin (LIPITOR) 80 mg tablet, Take 1 tablet by mouth daily with dinner, Disp: 30 tablet, Rfl: 0    gabapentin (NEURONTIN) 300 mg capsule, TAKE 1 CAPSULE(300 MG) BY MOUTH THREE TIMES DAILY, Disp: 90 capsule, Rfl: 2    glucagon 1 MG injection, Inject 1 mg under the skin once as needed for low  blood sugar for up to 1 dose, Disp: 1 kit, Rfl: 1    HumaLOG 100 UNIT/ML injection, USE UP  UNITS PER DAY VIA INSULIN PUMP, Disp: 90 mL, Rfl: 0    hydrOXYzine HCL (ATARAX) 50 mg tablet, 2 (two) times a day, Disp: , Rfl:     lisinopril (ZESTRIL) 5 mg tablet, TAKE 1 TABLET(5 MG) BY MOUTH DAILY, Disp: 90 tablet, Rfl: 3    metoprolol tartrate (LOPRESSOR) 50 mg tablet, TAKE 1 TABLET(50 MG) BY MOUTH EVERY 12 HOURS, Disp: 180 tablet, Rfl: 3    omeprazole (PriLOSEC) 20 mg delayed release capsule, Take 20 mg by mouth daily, Disp: , Rfl:     QUEtiapine (SEROquel) 50 mg tablet, , Disp: , Rfl:     tadalafil (CIALIS) 20 MG tablet, Take 1 tablet (20 mg total) by mouth daily as needed for erectile dysfunction, Disp: 6 tablet, Rfl: 6    traMADol (Ultram) 50 mg tablet, Take 1 tablet (50 mg total) by mouth 2 (two) times a day as needed for moderate pain for up to 10 days, Disp: 20 tablet, Rfl: 0    aspirin 325 mg tablet, Take 1 tablet (325 mg total) by mouth daily (Patient not taking: Reported on 2/10/2025), Disp: 30 tablet, Rfl: 2    busPIRone (BUSPAR) 15 mg tablet, Take 15 mg by mouth 2 (two) times a day (Patient not taking: Reported on 2/10/2025), Disp: , Rfl:     Continuous Blood Gluc  (FreeStyle [place] 2 [place]) JAYESH, Use 4x daily to check sugars, Disp: 1 each, Rfl: 1    Continuous Blood Gluc Sensor (FreeStyle [place] 2 Sensor) MISC, CHANGE EVERY 14 DAYS, Disp: 6 each, Rfl: 1    ibuprofen (MOTRIN) 600 mg tablet, Take 600 mg by mouth every 6 (six) hours as needed (Patient not taking: Reported on 2/10/2025), Disp: , Rfl:     methocarbamol (ROBAXIN) 500 mg tablet, Take 1 tablet (500 mg total) by mouth 3 (three) times a day as needed for muscle spasms for up to 5 days, Disp: 15 tablet, Rfl: 0    sodium chloride (OCEAN) 0.65 % nasal spray, 2 sprays into each nostril every hour as needed for congestion (Patient not taking: Reported on 5/16/2023), Disp: , Rfl: 0  No Known Allergies    Labs:     Chemistry        Component  "Value Date/Time     10/29/2016 0742    K 4.5 11/15/2024 1131     11/15/2024 1131    CO2 31 11/15/2024 1131    BUN 14 11/15/2024 1131    CREATININE 0.72 11/15/2024 1131        Component Value Date/Time    CALCIUM 9.1 11/15/2024 1131    ALKPHOS 142 (H) 11/15/2024 1131    AST 24 11/15/2024 1131    ALT 33 11/15/2024 1131    BILITOT 0.4 10/29/2016 0742            Lab Results   Component Value Date    CHOL 259 (H) 10/15/2013     Lab Results   Component Value Date    HDL 53 07/25/2018     (H) 12/07/2017     (H) 09/01/2017     Lab Results   Component Value Date    LDLCALC 102 (H) 07/25/2018    LDLCALC 51 12/07/2017    LDLCALC 58 09/01/2017     Lab Results   Component Value Date    TRIG 225 (H) 07/25/2018    TRIG 98 12/07/2017    TRIG 62 09/01/2017     No results found for: \"CHOLHDL\"    Imaging: No results found.    ECG:        Review of Systems   Constitutional: Negative. Negative for malaise/fatigue.   HENT: Negative.     Eyes: Negative.    Cardiovascular: Negative.    Respiratory: Negative.  Negative for shortness of breath.    Endocrine: Negative.    Hematologic/Lymphatic: Negative.    Skin: Negative.    Musculoskeletal: Negative.    Gastrointestinal: Negative.    Genitourinary: Negative.    Neurological: Negative.    Psychiatric/Behavioral: Negative.     All other systems reviewed and are negative.      Vitals:    02/10/25 0818   BP: 110/64   Pulse: 59   SpO2: 98%     Vitals:    02/10/25 0818   Weight: 75.1 kg (165 lb 8 oz)     Height: 5' 5\" (165.1 cm)   Body mass index is 27.54 kg/m².    Physical Exam:  Vital signs reviewed  General:  Alert and cooperative, appears stated age, no acute distress  HEENT:  PERRLA, EOMI, no scleral icterus, no conjunctival pallor  Neck:  No lymphadenopathy, no thyromegaly, no carotid bruits, no elevated JVP  Heart:  Regular rate and rhythm, normal S1/S2, no S3/S4, no murmur, rubs or gallops.  PMI nondisplaced  Lungs:  Clear to auscultation bilaterally, no wheezes " rales or rhonchi  Abdomen:  Soft, non-tender, positive bowel sounds, no rebound or guarding,   no organomegaly   Extremities:  Normal range of motion.  No clubbing, cyanosis or edema   Vascular:  2+ pedal pulses  Skin:  No rashes or lesions on exposed skin  Neurologic:  Cranial nerves II-XII grossly intact without focal deficits  Psych:  Normal mood and affect

## 2025-02-21 ENCOUNTER — HOSPITAL ENCOUNTER (OUTPATIENT)
Dept: NON INVASIVE DIAGNOSTICS | Facility: CLINIC | Age: 60
Discharge: HOME | End: 2025-02-21

## 2025-05-31 DIAGNOSIS — S16.1XXA NECK STRAIN, INITIAL ENCOUNTER: ICD-10-CM

## 2025-06-02 RX ORDER — METHOCARBAMOL 500 MG/1
500 TABLET, FILM COATED ORAL 3 TIMES DAILY PRN
Qty: 15 TABLET | Refills: 0 | OUTPATIENT
Start: 2025-06-02

## (undated) DEVICE — ALCON OPHTHALMIC KNIFE 15 °: Brand: ALCON

## (undated) DEVICE — SUT SILK 0 CT-1 30 IN 424H

## (undated) DEVICE — TUBING INSUFFLATION SET ISO CONNECTOR

## (undated) DEVICE — BLADE BEAVER MINI SZ 69

## (undated) DEVICE — INTENDED FOR TISSUE SEPARATION, AND OTHER PROCEDURES THAT REQUIRE A SHARP SURGICAL BLADE TO PUNCTURE OR CUT.: Brand: BARD-PARKER ® CARBON RIB-BACK BLADES

## (undated) DEVICE — JP PERF DRN SIL FLT 10MM FULL: Brand: CARDINAL HEALTH

## (undated) DEVICE — HEART SUPPORT

## (undated) DEVICE — SUT SILK 3-0 SH CR/8 18 IN C013D

## (undated) DEVICE — PLEDGET CARDIO PTFE 9.5 X 4.8 SOFT LF (6EA/PK)

## (undated) DEVICE — GLOVE SRG BIOGEL ECLIPSE 7.5

## (undated) DEVICE — INTENDED FOR TISSUE SEPARATION, AND OTHER PROCEDURES THAT REQUIRE A SHARP SURGICAL BLADE TO PUNCTURE OR CUT.: Brand: BARD-PARKER SAFETY BLADES SIZE 15, STERILE

## (undated) DEVICE — HEMOCLIP CARTRIDGE LRG

## (undated) DEVICE — AORTIC PUNCH 5.2 MM DISP

## (undated) DEVICE — CHLORAPREP HI-LITE 10.5ML ORANGE

## (undated) DEVICE — ANTIBACTERIAL UNDYED BRAIDED (POLYGLACTIN 910), SYNTHETIC ABSORBABLE SUTURE: Brand: COATED VICRYL

## (undated) DEVICE — SUT MONOCRYL PLUS 3-0 PS-2 27 IN MCP427H

## (undated) DEVICE — SUT PROLENE 6-0 C-1/C-1 30 IN 8307H

## (undated) DEVICE — UMBILICAL TAPE: Brand: DEROYAL

## (undated) DEVICE — SORIN VEIN DISTENTION KIT

## (undated) DEVICE — SUT ETHIBOND 2-0 SH-1/SH-1 30 IN X763H

## (undated) DEVICE — ACE WRAP 6 IN UNSTERILE

## (undated) DEVICE — RECIP.STERNUM SAW BLADE 34/7.5/0.7MM: Brand: AESCULAP

## (undated) DEVICE — DRESSING ALLEVYN LIFE HEEL 25 X 25.2CM

## (undated) DEVICE — SUT PROLENE 4-0 RB-1/RB-1 36 IN 8557H

## (undated) DEVICE — ADHESIVE SKIN HIGH VISCOSITY EXOFIN 1ML

## (undated) DEVICE — JP CHANNEL DRAIN, 24FR HUBLESS: Brand: CARDINAL HEALTH

## (undated) DEVICE — BLANKET HYPOTHERMIA ADULT GAYMAR

## (undated) DEVICE — SILVER-COATED ANTIBACTERIAL BARRIER DRESSING: Brand: ACTICOAT SURGIC 10X12CM 5PK US

## (undated) DEVICE — SPONGE LAP 18 X 18 IN STRL RFD

## (undated) DEVICE — FILTER SMOKE EVAC VIROSAFE

## (undated) DEVICE — VASOVIEW HEMOPRO 2: Brand: VASOVIEW HEMOPRO 2

## (undated) DEVICE — LIGACLIP MCA MULTIPLE CLIP APPLIERS, 20 SMALL CLIPS: Brand: LIGACLIP

## (undated) DEVICE — CATH STRAIGHT RED RUBBER 20 FR

## (undated) DEVICE — 32 FR RIGHT ANGLE – SOFT PVC CATHETER: Brand: PVC THORACIC CATHETERS

## (undated) DEVICE — ELECTRODE BLADE E-Z CLEAN 2.75IN 7CM -0012A

## (undated) DEVICE — OASIS DRAIN, DUAL, IN-LINE, ATS COMPATIBLE: Brand: OASIS

## (undated) DEVICE — PLUMEPEN PRO 10FT

## (undated) DEVICE — GLOVE INDICATOR PI UNDERGLOVE SZ 8 BLUE

## (undated) DEVICE — 3000CC GUARDIAN II: Brand: GUARDIAN

## (undated) DEVICE — SUT ETHIBOND 2-0 V-5/V-5 30 IN PXX52

## (undated) DEVICE — SUT MONOCRYL PLUS 4-0 PS-2 18 IN MCP496G

## (undated) DEVICE — PACK CABG PBDS

## (undated) DEVICE — THERMOFLECT BLANKET, L, 25EA                               TS THERMOFLECT BLANKET, 48" X 84", SILVER, 5/BG, 5 BG/CS NW: Brand: THERMOFLECT

## (undated) DEVICE — 40601 PROLONGED POSITIONING SYSTEM: Brand: 40601 PROLONGED POSITIONING SYSTEM

## (undated) DEVICE — BULB SYRINGE,IRRIGATION WITH PROTECTIVE CAP: Brand: DOVER

## (undated) DEVICE — SUTURE GUIDE

## (undated) DEVICE — TRAY FOLEY 16FR SURESTEP TEMP SENS URIMETER STAT LOK

## (undated) DEVICE — GAUZE SPONGES,16 PLY: Brand: CURITY

## (undated) DEVICE — BONE WAX WHITE: Brand: BONE WAX WHITE

## (undated) DEVICE — SKIN MARKER DUAL TIP WITH RULER CAP, FLEXIBLE RULER AND LABELS: Brand: DEVON

## (undated) DEVICE — 32 FR STRAIGHT – SOFT PVC CATHETER: Brand: PVC THORACIC CATHETERS

## (undated) DEVICE — SILVER-COATED ANTIBACTERIAL BARRIER DRESSING: Brand: ACTICOAT SURGIC 10X25CM 5PK US

## (undated) DEVICE — LIGHT HANDLE COVER SLEEVE DISP BLUE STELLAR

## (undated) DEVICE — EVERGRIP INSERT SET 61MM: Brand: FOGARTY EVERGRIP

## (undated) DEVICE — PENCIL ELECTROSURG E-Z CLEAN -0035H

## (undated) DEVICE — SUT PDS PLUS 1 CTB 36 IN PDPB359T

## (undated) DEVICE — SUT PROLENE 7-0 BV175-8/BV175-8 24 IN EPM8747

## (undated) DEVICE — STERNAL WIRE

## (undated) DEVICE — SUT SILK 2 60 IN SA8H

## (undated) DEVICE — SUCTION CATH 18 FR